# Patient Record
Sex: MALE | Race: WHITE | Employment: OTHER | URBAN - METROPOLITAN AREA
[De-identification: names, ages, dates, MRNs, and addresses within clinical notes are randomized per-mention and may not be internally consistent; named-entity substitution may affect disease eponyms.]

---

## 2017-01-16 ENCOUNTER — MYC MEDICAL ADVICE (OUTPATIENT)
Dept: FAMILY MEDICINE | Facility: CLINIC | Age: 52
End: 2017-01-16

## 2017-01-16 DIAGNOSIS — F34.1 DYSTHYMIA: ICD-10-CM

## 2017-01-16 DIAGNOSIS — F90.0 ADHD (ATTENTION DEFICIT HYPERACTIVITY DISORDER), INATTENTIVE TYPE: Primary | ICD-10-CM

## 2017-01-16 NOTE — TELEPHONE ENCOUNTER
Will route to provider to advise on Adderall Rx, and I see no plan for Bupropion in your last office visit with him.     Anna Marcum RN   January 16, 2017 4:49 PM  [facility] Triage   [phone]

## 2017-01-17 RX ORDER — DEXTROAMPHETAMINE SACCHARATE, AMPHETAMINE ASPARTATE MONOHYDRATE, DEXTROAMPHETAMINE SULFATE AND AMPHETAMINE SULFATE 5; 5; 5; 5 MG/1; MG/1; MG/1; MG/1
20 CAPSULE, EXTENDED RELEASE ORAL DAILY
Qty: 30 CAPSULE | Refills: 0 | Status: SHIPPED | OUTPATIENT
Start: 2017-01-17 | End: 2017-01-17

## 2017-01-17 RX ORDER — DEXTROAMPHETAMINE SACCHARATE, AMPHETAMINE ASPARTATE MONOHYDRATE, DEXTROAMPHETAMINE SULFATE AND AMPHETAMINE SULFATE 7.5; 7.5; 7.5; 7.5 MG/1; MG/1; MG/1; MG/1
30 CAPSULE, EXTENDED RELEASE ORAL DAILY
Qty: 30 CAPSULE | Refills: 0 | Status: SHIPPED | OUTPATIENT
Start: 2017-01-17 | End: 2017-03-28

## 2017-01-17 RX ORDER — DEXTROAMPHETAMINE SACCHARATE, AMPHETAMINE ASPARTATE MONOHYDRATE, DEXTROAMPHETAMINE SULFATE AND AMPHETAMINE SULFATE 7.5; 7.5; 7.5; 7.5 MG/1; MG/1; MG/1; MG/1
30 CAPSULE, EXTENDED RELEASE ORAL DAILY
Qty: 30 CAPSULE | Refills: 0 | Status: SHIPPED | OUTPATIENT
Start: 2017-01-17 | End: 2017-01-17

## 2017-01-17 RX ORDER — DEXTROAMPHETAMINE SACCHARATE, AMPHETAMINE ASPARTATE MONOHYDRATE, DEXTROAMPHETAMINE SULFATE AND AMPHETAMINE SULFATE 5; 5; 5; 5 MG/1; MG/1; MG/1; MG/1
20 CAPSULE, EXTENDED RELEASE ORAL DAILY
Qty: 30 CAPSULE | Refills: 0 | Status: SHIPPED | OUTPATIENT
Start: 2017-01-17 | End: 2017-03-28

## 2017-01-17 RX ORDER — BUPROPION HYDROCHLORIDE 300 MG/1
300 TABLET ORAL EVERY MORNING
Qty: 90 TABLET | Refills: 1 | Status: SHIPPED | OUTPATIENT
Start: 2017-01-17 | End: 2017-07-21

## 2017-01-31 ENCOUNTER — MYC MEDICAL ADVICE (OUTPATIENT)
Dept: FAMILY MEDICINE | Facility: CLINIC | Age: 52
End: 2017-01-31

## 2017-01-31 NOTE — TELEPHONE ENCOUNTER
PA was faxed to [facility] at the following fax number [phone]    Will postpone encounter for 3 days- awaiting on response.     Etta Sierra MA

## 2017-01-31 NOTE — TELEPHONE ENCOUNTER
PA needed for Adderall 20mg. Letter placed in Diomedes Morris's sign me folder.    Etta Sierra MA

## 2017-02-01 NOTE — TELEPHONE ENCOUNTER
Received PA Decision: HID# [id number]    Prior authorization of this medication for this recipient is not granted.  Authorization criteria are not met.    Placed letter in Diomedes Morris'  MA Folder

## 2017-02-03 NOTE — TELEPHONE ENCOUNTER
Called [facility] at [phone] to clarify that the brand name ADDERALL is approved with no PA needed.     Perla Royal CMA ([facility])

## 2017-02-03 NOTE — TELEPHONE ENCOUNTER
PA denied.  Reason given:      Patient does not meet PA criteria. **The brand Adderall XR is preferred over the generic and does not require a PA.**     Perla Royal CMA (AAMA)

## 2017-02-03 NOTE — TELEPHONE ENCOUNTER
Reviewed - so is the medication covered or not? We can do brand name if they prefer - patient should call insurance and clarify this.  Thanks.  Kian Morris, MPAS, PA-C

## 2017-02-03 NOTE — TELEPHONE ENCOUNTER
Spoke with patient and relayed Diomedes's message below.    Perla Royal CMA ([facility])

## 2017-03-20 ENCOUNTER — MYC MEDICAL ADVICE (OUTPATIENT)
Dept: FAMILY MEDICINE | Facility: CLINIC | Age: 52
End: 2017-03-20

## 2017-03-20 DIAGNOSIS — L29.9 ITCHY SCALP: ICD-10-CM

## 2017-03-20 NOTE — TELEPHONE ENCOUNTER
Will route to provider for refills. See MyChart. Wellbutrin is good for 3 more months. Fluocinonide not prescribed since 2015.     Juni Mays RN

## 2017-03-21 RX ORDER — FLUOCINONIDE TOPICAL SOLUTION USP, 0.05% 0.5 MG/ML
SOLUTION TOPICAL
Qty: 60 ML | Refills: 0 | Status: SHIPPED | OUTPATIENT
Start: 2017-03-21 | End: 2018-05-22

## 2017-03-28 ENCOUNTER — MYC MEDICAL ADVICE (OUTPATIENT)
Dept: FAMILY MEDICINE | Facility: CLINIC | Age: 52
End: 2017-03-28

## 2017-03-28 DIAGNOSIS — F90.0 ADHD (ATTENTION DEFICIT HYPERACTIVITY DISORDER), INATTENTIVE TYPE: ICD-10-CM

## 2017-03-28 RX ORDER — DEXTROAMPHETAMINE SACCHARATE, AMPHETAMINE ASPARTATE MONOHYDRATE, DEXTROAMPHETAMINE SULFATE AND AMPHETAMINE SULFATE 5; 5; 5; 5 MG/1; MG/1; MG/1; MG/1
20 CAPSULE, EXTENDED RELEASE ORAL DAILY
Qty: 30 CAPSULE | Refills: 0 | Status: SHIPPED | OUTPATIENT
Start: 2017-03-28 | End: 2017-03-28

## 2017-03-28 RX ORDER — DEXTROAMPHETAMINE SACCHARATE, AMPHETAMINE ASPARTATE MONOHYDRATE, DEXTROAMPHETAMINE SULFATE AND AMPHETAMINE SULFATE 5; 5; 5; 5 MG/1; MG/1; MG/1; MG/1
20 CAPSULE, EXTENDED RELEASE ORAL DAILY
Qty: 30 CAPSULE | Refills: 0 | Status: SHIPPED | OUTPATIENT
Start: 2017-03-28 | End: 2017-07-17

## 2017-03-28 RX ORDER — DEXTROAMPHETAMINE SACCHARATE, AMPHETAMINE ASPARTATE MONOHYDRATE, DEXTROAMPHETAMINE SULFATE AND AMPHETAMINE SULFATE 7.5; 7.5; 7.5; 7.5 MG/1; MG/1; MG/1; MG/1
30 CAPSULE, EXTENDED RELEASE ORAL DAILY
Qty: 30 CAPSULE | Refills: 0 | Status: SHIPPED | OUTPATIENT
Start: 2017-03-28 | End: 2017-03-28

## 2017-03-28 RX ORDER — DEXTROAMPHETAMINE SACCHARATE, AMPHETAMINE ASPARTATE MONOHYDRATE, DEXTROAMPHETAMINE SULFATE AND AMPHETAMINE SULFATE 7.5; 7.5; 7.5; 7.5 MG/1; MG/1; MG/1; MG/1
30 CAPSULE, EXTENDED RELEASE ORAL DAILY
Qty: 30 CAPSULE | Refills: 0 | Status: SHIPPED | OUTPATIENT
Start: 2017-03-28 | End: 2017-07-17

## 2017-03-28 NOTE — TELEPHONE ENCOUNTER
Please advise regarding refill of Adderall.  Patient has one more Wellbutrin refill on file for a 90 day supply.    Tory Juarez RN

## 2017-07-16 ENCOUNTER — E-VISIT (OUTPATIENT)
Dept: FAMILY MEDICINE | Facility: CLINIC | Age: 52
End: 2017-07-16
Payer: COMMERCIAL

## 2017-07-16 DIAGNOSIS — F90.0 ADHD (ATTENTION DEFICIT HYPERACTIVITY DISORDER), INATTENTIVE TYPE: ICD-10-CM

## 2017-07-16 PROCEDURE — 99207 ZZC NO BILLABLE SERVICE THIS VISIT: CPT | Performed by: PHYSICIAN ASSISTANT

## 2017-07-16 NOTE — MR AVS SNAPSHOT
After Visit Summary   7/16/2017    Carrillo Dunlap    MRN: [id number]           Patient Information     Date Of Birth          1965        Visit Information        Provider Department      7/16/2017 7:49 PM Kian Morris PA-C [facility]        Today's Diagnoses     ADHD (attention deficit hyperactivity disorder), inattentive type           Follow-ups after your visit        Who to contact     If you have questions or need follow up information about today's clinic visit or your schedule please contact [facility] directly at [phone].  Normal or non-critical lab and imaging results will be communicated to you by Happy [employer] party supplies & rentalshart, letter or phone within 4 business days after the clinic has received the results. If you do not hear from us within 7 days, please contact the clinic through AquaBlokt or phone. If you have a critical or abnormal lab result, we will notify you by phone as soon as possible.  Submit refill requests through Redington or call your pharmacy and they will forward the refill request to us. Please allow 3 business days for your refill to be completed.          Additional Information About Your Visit        MyChart Information     [facility] gives you secure access to your electronic health record. If you see a primary care provider, you can also send messages to your care team and make appointments. If you have questions, please call your primary care clinic.  If you do not have a primary care provider, please call [phone] and they will assist you.        Care EveryWhere ID     This is your Care EveryWhere ID. This could be used by other organizations to access your [facility] medical records  [id number]         Blood Pressure from Last 3 Encounters:   12/19/16 100/64   09/23/16 110/60   04/20/16 97/55    Weight from Last 3 Encounters:   12/19/16 170 lb (77.1 kg)   09/23/16 167 lb 4 oz (75.9 kg)   04/20/16 173 lb 3.2 oz (78.6 kg)               Today, you had the following     No orders found for display         Today's Medication Changes          These changes are accurate as of: 7/16/17 11:59 PM.  If you have any questions, ask your nurse or doctor.               These medicines have changed or have updated prescriptions.        Dose/Directions    * amphetamine-dextroamphetamine 30 MG per 24 hr capsule   Commonly known as:  ADDERALL XR   This may have changed:  additional instructions   Used for:  ADHD (attention deficit hyperactivity disorder), inattentive type        Dose:  30 mg   Take 1 capsule (30 mg) by mouth daily   Quantity:  30 capsule   Refills:  0       * amphetamine-dextroamphetamine 20 MG per 24 hr capsule   Commonly known as:  ADDERALL XR   This may have changed:  additional instructions   Used for:  ADHD (attention deficit hyperactivity disorder), inattentive type        Dose:  20 mg   Take 1 capsule (20 mg) by mouth daily   Quantity:  30 capsule   Refills:  0       * Notice:  This list has 2 medication(s) that are the same as other medications prescribed for you. Read the directions carefully, and ask your doctor or other care provider to review them with you.         Where to get your medicines      Some of these will need a paper prescription and others can be bought over the counter.  Ask your nurse if you have questions.     Bring a paper prescription for each of these medications     amphetamine-dextroamphetamine 20 MG per 24 hr capsule    amphetamine-dextroamphetamine 30 MG per 24 hr capsule                Primary Care Provider Office Phone # Fax #    Kian Morris PA-C [phone]       [address] [address]        Equal Access to Services     [facility] AH: Joselyn Moss, lisandro nagel, josesito arenas. So [facility] [phone].    ATENCIÓN: Si habla español, tiene a ch disposición  servicios gratuitos de asistencia lingüística. Gualberto machado [phone].    We comply with applicable federal civil rights laws and [place] laws. We do not discriminate on the basis of race, color, national origin, age, disability sex, sexual orientation or gender identity.            Thank you!     Thank you for choosing [facility]  for your care. Our goal is always to provide you with excellent care. Hearing back from our patients is one way we can continue to improve our services. Please take a few minutes to complete the written survey that you may receive in the mail after your visit with us. Thank you!             Your Updated Medication List - Protect others around you: Learn how to safely use, store and throw away your medicines at www.disposemymeds.org.          This list is accurate as of: 7/16/17 11:59 PM.  Always use your most recent med list.                   Brand Name Dispense Instructions for use Diagnosis    * amphetamine-dextroamphetamine 30 MG per 24 hr capsule    ADDERALL XR    30 capsule    Take 1 capsule (30 mg) by mouth daily    ADHD (attention deficit hyperactivity disorder), inattentive type       * amphetamine-dextroamphetamine 20 MG per 24 hr capsule    ADDERALL XR    30 capsule    Take 1 capsule (20 mg) by mouth daily    ADHD (attention deficit hyperactivity disorder), inattentive type       buPROPion 300 MG 24 hr tablet    WELLBUTRIN XL    90 tablet    Take 1 tablet (300 mg) by mouth every morning    Dysthymia       CALCIUM + D PO      2 tablet daily - 600mg total        cyanocobalamin 1000 MCG tablet    vitamin  B-12     Take 1,000 mcg by mouth daily.        fish oil-omega-3 fatty acids 1000 MG capsule      Take 2 g by mouth daily.        fluocinonide 0.05 % solution    LIDEX    60 mL    Apply sparingly to affected area twice daily as needed.  Do not apply to face. +++Related visit due+++    Itchy scalp       l-tyrosine 500 MG Tabs           Multi-vitamin Tabs tablet    Generic drug:  multivitamin, therapeutic with minerals      1 TABLET DAILY        PROBIOTIC DAILY PO           Selenium 200 MCG Caps           zinc 50 MG Tabs           * Notice:  This list has 2 medication(s) that are the same as other medications prescribed for you. Read the directions carefully, and ask your doctor or other care provider to review them with you.

## 2017-07-17 RX ORDER — DEXTROAMPHETAMINE SACCHARATE, AMPHETAMINE ASPARTATE MONOHYDRATE, DEXTROAMPHETAMINE SULFATE AND AMPHETAMINE SULFATE 7.5; 7.5; 7.5; 7.5 MG/1; MG/1; MG/1; MG/1
30 CAPSULE, EXTENDED RELEASE ORAL DAILY
Qty: 30 CAPSULE | Refills: 0 | Status: SHIPPED | OUTPATIENT
Start: 2017-07-17 | End: 2017-07-21

## 2017-07-17 RX ORDER — DEXTROAMPHETAMINE SACCHARATE, AMPHETAMINE ASPARTATE MONOHYDRATE, DEXTROAMPHETAMINE SULFATE AND AMPHETAMINE SULFATE 5; 5; 5; 5 MG/1; MG/1; MG/1; MG/1
20 CAPSULE, EXTENDED RELEASE ORAL DAILY
Qty: 30 CAPSULE | Refills: 0 | Status: SHIPPED | OUTPATIENT
Start: 2017-07-17 | End: 2017-07-21

## 2017-07-21 ENCOUNTER — OFFICE VISIT (OUTPATIENT)
Dept: FAMILY MEDICINE | Facility: CLINIC | Age: 52
End: 2017-07-21
Payer: COMMERCIAL

## 2017-07-21 VITALS
BODY MASS INDEX: 25.03 KG/M2 | HEART RATE: 72 BPM | SYSTOLIC BLOOD PRESSURE: 98 MMHG | HEIGHT: 69 IN | TEMPERATURE: 98.7 F | WEIGHT: 169 LBS | DIASTOLIC BLOOD PRESSURE: 58 MMHG

## 2017-07-21 DIAGNOSIS — F41.1 GENERALIZED ANXIETY DISORDER: Primary | ICD-10-CM

## 2017-07-21 DIAGNOSIS — F34.1 DYSTHYMIA: ICD-10-CM

## 2017-07-21 DIAGNOSIS — F90.0 ADHD (ATTENTION DEFICIT HYPERACTIVITY DISORDER), INATTENTIVE TYPE: ICD-10-CM

## 2017-07-21 DIAGNOSIS — Z13.220 SCREENING CHOLESTEROL LEVEL: ICD-10-CM

## 2017-07-21 PROCEDURE — 80061 LIPID PANEL: CPT | Performed by: PHYSICIAN ASSISTANT

## 2017-07-21 PROCEDURE — 36415 COLL VENOUS BLD VENIPUNCTURE: CPT | Performed by: PHYSICIAN ASSISTANT

## 2017-07-21 PROCEDURE — 99214 OFFICE O/P EST MOD 30 MIN: CPT | Performed by: PHYSICIAN ASSISTANT

## 2017-07-21 RX ORDER — DEXTROAMPHETAMINE SACCHARATE, AMPHETAMINE ASPARTATE MONOHYDRATE, DEXTROAMPHETAMINE SULFATE AND AMPHETAMINE SULFATE 5; 5; 5; 5 MG/1; MG/1; MG/1; MG/1
20 CAPSULE, EXTENDED RELEASE ORAL DAILY
Qty: 30 CAPSULE | Refills: 0 | Status: SHIPPED | OUTPATIENT
Start: 2017-07-21 | End: 2017-07-21

## 2017-07-21 RX ORDER — DEXTROAMPHETAMINE SACCHARATE, AMPHETAMINE ASPARTATE MONOHYDRATE, DEXTROAMPHETAMINE SULFATE AND AMPHETAMINE SULFATE 7.5; 7.5; 7.5; 7.5 MG/1; MG/1; MG/1; MG/1
30 CAPSULE, EXTENDED RELEASE ORAL DAILY
Qty: 30 CAPSULE | Refills: 0 | Status: SHIPPED | OUTPATIENT
Start: 2017-07-21 | End: 2017-10-30

## 2017-07-21 RX ORDER — BUPROPION HYDROCHLORIDE 300 MG/1
300 TABLET ORAL EVERY MORNING
Qty: 90 TABLET | Refills: 1 | Status: SHIPPED | OUTPATIENT
Start: 2017-07-21 | End: 2018-02-06

## 2017-07-21 RX ORDER — DEXTROAMPHETAMINE SACCHARATE, AMPHETAMINE ASPARTATE MONOHYDRATE, DEXTROAMPHETAMINE SULFATE AND AMPHETAMINE SULFATE 7.5; 7.5; 7.5; 7.5 MG/1; MG/1; MG/1; MG/1
30 CAPSULE, EXTENDED RELEASE ORAL DAILY
Qty: 30 CAPSULE | Refills: 0 | Status: SHIPPED | OUTPATIENT
Start: 2017-07-21 | End: 2017-07-21

## 2017-07-21 RX ORDER — DEXTROAMPHETAMINE SACCHARATE, AMPHETAMINE ASPARTATE MONOHYDRATE, DEXTROAMPHETAMINE SULFATE AND AMPHETAMINE SULFATE 5; 5; 5; 5 MG/1; MG/1; MG/1; MG/1
20 CAPSULE, EXTENDED RELEASE ORAL DAILY
Qty: 30 CAPSULE | Refills: 0 | Status: SHIPPED | OUTPATIENT
Start: 2017-07-21 | End: 2017-10-30

## 2017-07-21 RX ORDER — ASCORBIC ACID 500 MG
500 TABLET ORAL DAILY
COMMUNITY
End: 2021-08-03

## 2017-07-21 ASSESSMENT — ANXIETY QUESTIONNAIRES
3. WORRYING TOO MUCH ABOUT DIFFERENT THINGS: MORE THAN HALF THE DAYS
6. BECOMING EASILY ANNOYED OR IRRITABLE: SEVERAL DAYS
7. FEELING AFRAID AS IF SOMETHING AWFUL MIGHT HAPPEN: NOT AT ALL
1. FEELING NERVOUS, ANXIOUS, OR ON EDGE: SEVERAL DAYS
5. BEING SO RESTLESS THAT IT IS HARD TO SIT STILL: SEVERAL DAYS
GAD7 TOTAL SCORE: 8
2. NOT BEING ABLE TO STOP OR CONTROL WORRYING: SEVERAL DAYS

## 2017-07-21 ASSESSMENT — PATIENT HEALTH QUESTIONNAIRE - PHQ9: 5. POOR APPETITE OR OVEREATING: MORE THAN HALF THE DAYS

## 2017-07-21 NOTE — PROGRESS NOTES
"  SUBJECTIVE:                                                    Carrillo Dunlap is a 52 year old male who presents to clinic today for the following health issues:    Patient would also like to have his cholesterol checked today.    Depression Followup    Status since last visit: Stable, about the same    See PHQ-9 for current symptoms.  Other associated symptoms: None    Complicating factors:   Significant life event:  Yes-  Moving mom into a home   Current substance abuse:  None  Anxiety or Panic symptoms:  Yes-  A little bit    PHQ-9  English  PHQ-9   Any Language      Amount of exercise or physical activity:    Problems taking medications regularly: No    Medication side effects: none  Diet: regular (no restrictions)      Medication Followup of ADDERALL    Taking Medication as prescribed: yes    Side Effects:  Sometimes stays up too late.  Might be because of needing to cut down on coffee.    Medication Helping Symptoms:  yes     Mood - still challenged with self doubt, low mood. Feels down, hard on himself. Is a perfectionist.     Problem list and histories reviewed & adjusted, as indicated.  Additional history: as documented    Labs reviewed in EPIC    Reviewed and updated as needed this visit by clinical staffAllergies       Reviewed and updated as needed this visit by Provider         ROS:  Constitutional, HEENT, cardiovascular, pulmonary, gi and gu systems are negative, except as otherwise noted.      OBJECTIVE:   BP 98/58 (BP Location: Right arm, Cuff Size: Adult Regular)  Pulse 72  Temp 98.7  F (37.1  C) (Oral)  Ht 5' 9\" (1.753 m)  Wt 169 lb (76.7 kg)  BMI 24.96 kg/m2  Body mass index is 24.96 kg/(m^2).  GENERAL: healthy, alert and no distress  Psych: Appropriate appearance.  Alert and oriented times 3; coherent speech, normal   rate and volume, able to articulate logical thoughts, able   to abstract reason, no tangential thoughts, no hallucinations   or delusions.  Normal behavior.  His " affect is bright.      ASSESSMENT/PLAN:     (F41.1) Generalized anxiety disorder  (primary encounter diagnosis)  Comment:   Plan: Ongoing therapy visits. Self cares, exercise, meditation, etc    (F90.0) ADHD (attention deficit hyperactivity disorder), inattentive type  Comment:   Plan: amphetamine-dextroamphetamine (ADDERALL XR) 20         MG per 24 hr capsule,         amphetamine-dextroamphetamine (ADDERALL XR) 30         MG per 24 hr capsule, DISCONTINUED:         amphetamine-dextroamphetamine (ADDERALL XR) 20         MG per 24 hr capsule, DISCONTINUED:         amphetamine-dextroamphetamine (ADDERALL XR) 30         MG per 24 hr capsule, DISCONTINUED:         amphetamine-dextroamphetamine (ADDERALL XR) 20         MG per 24 hr capsule, DISCONTINUED:         amphetamine-dextroamphetamine (ADDERALL XR) 30         MG per 24 hr capsule        Stable, doing okay    (F34.1) Dysthymia  Comment:   Plan: buPROPion (WELLBUTRIN XL) 300 MG 24 hr tablet        Ongoing self doubts and challenges related to that. Continue therapy. Positive self talk advised    (Z03.486) Screening cholesterol level  Comment:   Plan: Lipid panel reflex to direct LDL        Checking lab per request     ZORAIDA DOHERTY PA-C  [facility]

## 2017-07-21 NOTE — MR AVS SNAPSHOT
After Visit Summary   7/21/2017    Carrillo Dunlap    MRN: [id number]           Patient Information     Date Of Birth          1965        Visit Information        Provider Department      7/21/2017 12:20 PM Kian Morris PA-C [facility]        Today's Diagnoses     Generalized anxiety disorder    -  1    ADHD (attention deficit hyperactivity disorder), inattentive type        Dysthymia        Screening cholesterol level          Care Instructions    [facility]   Discharged by : Rena CATHERINE Certified Medical Assistant (AAMA)   Paper scripts provided to patient : 6   If you have any questions regarding to your visit please contact your care team:   Team [facility] Hours Telephone Number   KRISTI Genao Dr., PA-C    7am-7pm Monday - Thursday   7am-5pm Fridays  [phone]   (Appointment scheduling available 24/7)   RN Line   [phone] option 2       What options do I have for visits at the clinic other than the traditional office visit?   To expand how we care for you, many of our providers are utilizing electronic visits (e-visits) and telephone visits, when medically appropriate, for interactions with their patients rather than a visit in the clinic. We also offer nurse visits for many medical concerns. Just like any other service, we will bill your insurance company for this type of visit based on time spent on the phone with your provider. Not all insurance companies cover these visits. Please check with your medical insurance if this type of visit is covered. You will be responsible for any charges that are not paid by your insurance.     E-visits via [place]: generally incur a $35.00 fee.     Telephone visits:   Time spent on the phone: *charged based on time that is spent on the phone in increments of 10 minutes. Estimated cost:   5-10 mins $30.00   11-20 mins. $59.00   21-30 mins.  $85.00   Use AMGast (secure email communication and access to your chart) to send your primary care provider a message or make an appointment. Ask someone on your Team how to sign up for Cramster.   For a Price Quote for your services, please call our Consumer Price Line at [phone].   As always, Thank you for trusting us with your health care needs!                [facility]:    Scheduling Appointments  [place] [place]  Call: [phone]    [place]  Call: [phone]    [facility]  Call: [phone]                    Follow-ups after your visit        Who to contact     If you have questions or need follow up information about today's clinic visit or your schedule please contact [facility] directly at [phone].  Normal or non-critical lab and imaging results will be communicated to you by MyChart, letter or phone within 4 business days after the clinic has received the results. If you do not hear from us within 7 days, please contact the clinic through Kanduhart or phone. If you have a critical or abnormal lab result, we will notify you by phone as soon as possible.  Submit refill requests through Cramster or call your pharmacy and they will forward the refill request to us. Please allow 3 business days for your refill to be completed.          Additional Information About Your Visit        MyChart Information     [employer] gives you secure access to your electronic health record. If you see a primary care provider, you can also send messages to your care team and make appointments. If you have questions, please call your primary care clinic.  If you do not have a primary care provider, please call [phone] and they will assist you.        Care EveryWhere ID     This is your Care EveryWhere ID. This could be used by other organizations to access your [facility] medical records  [id number]       "  Your Vitals Were     Pulse Temperature Height BMI (Body Mass Index)          72 98.7  F (37.1  C) (Oral) 5' 9\" (1.753 m) 24.96 kg/m2         Blood Pressure from Last 3 Encounters:   07/21/17 98/58   12/19/16 100/64   09/23/16 110/60    Weight from Last 3 Encounters:   07/21/17 169 lb (76.7 kg)   12/19/16 170 lb (77.1 kg)   09/23/16 167 lb 4 oz (75.9 kg)              We Performed the Following     Lipid panel reflex to direct LDL          Today's Medication Changes          These changes are accurate as of: 7/21/17  1:13 PM.  If you have any questions, ask your nurse or doctor.               Start taking these medicines.        Dose/Directions    * amphetamine-dextroamphetamine 20 MG per 24 hr capsule   Commonly known as:  ADDERALL XR   Used for:  ADHD (attention deficit hyperactivity disorder), inattentive type   Started by:  Kian Morris PA-C        Dose:  20 mg   Take 1 capsule (20 mg) by mouth daily (Fill on or after 9/19/2017)   Quantity:  30 capsule   Refills:  0       * amphetamine-dextroamphetamine 30 MG per 24 hr capsule   Commonly known as:  ADDERALL XR   Used for:  ADHD (attention deficit hyperactivity disorder), inattentive type   Started by:  Kian Morris PA-C        Dose:  30 mg   Take 1 capsule (30 mg) by mouth daily (Fill on or after 9/19/2017)   Quantity:  30 capsule   Refills:  0       * Notice:  This list has 2 medication(s) that are the same as other medications prescribed for you. Read the directions carefully, and ask your doctor or other care provider to review them with you.         Where to get your medicines      These medications were sent to [id number] - [place], [place] - [address]  [address], [place] [address]     Phone:  [phone]     buPROPion 300 MG 24 hr tablet         Some of these will need a paper prescription and others can be bought over the counter.  Ask your nurse if you have questions.     Bring a paper prescription for " each of these medications     amphetamine-dextroamphetamine 20 MG per 24 hr capsule    amphetamine-dextroamphetamine 30 MG per 24 hr capsule                Primary Care Provider Office Phone # Fax #    Kian Morris PA-C [phone]       [facility] [address] [facility] [address]        Equal Access to Services     SHOAIB MACIAS : Hadii aad ku hadasho Soomaali, waaxda luqadaha, qaybta kaalmada adeegyada, waxay idiin hayaan adeeg kharash la'anthonyn . So [facility] [phone].    ATENCIÓN: Si habla español, tiene a ch disposición servicios gratuitos de asistencia lingüística. Donnaame al [phone].    We comply with applicable federal civil rights laws and [place] laws. We do not discriminate on the basis of race, color, national origin, age, disability sex, sexual orientation or gender identity.            Thank you!     Thank you for choosing [facility]  for your care. Our goal is always to provide you with excellent care. Hearing back from our patients is one way we can continue to improve our services. Please take a few minutes to complete the written survey that you may receive in the mail after your visit with us. Thank you!             Your Updated Medication List - Protect others around you: Learn how to safely use, store and throw away your medicines at www.disposemymeds.org.          This list is accurate as of: 7/21/17  1:13 PM.  Always use your most recent med list.                   Brand Name Dispense Instructions for use Diagnosis    * amphetamine-dextroamphetamine 20 MG per 24 hr capsule    ADDERALL XR    30 capsule    Take 1 capsule (20 mg) by mouth daily (Fill on or after 9/19/2017)    ADHD (attention deficit hyperactivity disorder), inattentive type       * amphetamine-dextroamphetamine 30 MG per 24 hr capsule    ADDERALL XR    30 capsule    Take 1 capsule (30 mg) by mouth daily (Fill on or after 9/19/2017)    ADHD (attention deficit  hyperactivity disorder), inattentive type       ascorbic acid 500 MG tablet    VITAMIN C     Take by mouth daily        buPROPion 300 MG 24 hr tablet    WELLBUTRIN XL    90 tablet    Take 1 tablet (300 mg) by mouth every morning    Dysthymia       CALCIUM + D PO      2 tablet daily - 600mg total        cyanocobalamin 1000 MCG tablet    vitamin  B-12     Take 1,000 mcg by mouth daily.        fish oil-omega-3 fatty acids 1000 MG capsule      Take 2 g by mouth daily.        fluocinonide 0.05 % solution    LIDEX    60 mL    Apply sparingly to affected area twice daily as needed.  Do not apply to face. +++Related visit due+++    Itchy scalp       l-tyrosine 500 MG Tabs           Multi-vitamin Tabs tablet   Generic drug:  multivitamin, therapeutic with minerals      1 TABLET DAILY        THEANINE PO      Take 200 mg by mouth daily        UNABLE TO FIND      MEDICATION NAME: Lynn Lemon Balm 500mg capsule        zinc 50 MG Tabs           * Notice:  This list has 2 medication(s) that are the same as other medications prescribed for you. Read the directions carefully, and ask your doctor or other care provider to review them with you.

## 2017-07-21 NOTE — PATIENT INSTRUCTIONS
[facility]   Discharged by : Rena CATHERINE Certified Medical Assistant (AAMA)   Paper scripts provided to patient : 6   If you have any questions regarding to your visit please contact your care team:   [facility] Hours Telephone Number   KRISTI Genao Dr., PA-C    7am-7pm Monday - Thursday   7am-5pm Fridays  [phone]   (Appointment scheduling available 24/7)   RN Line   [phone] option 2       What options do I have for visits at the clinic other than the traditional office visit?   To expand how we care for you, many of our providers are utilizing electronic visits (e-visits) and telephone visits, when medically appropriate, for interactions with their patients rather than a visit in the clinic. We also offer nurse visits for many medical concerns. Just like any other service, we will bill your insurance company for this type of visit based on time spent on the phone with your provider. Not all insurance companies cover these visits. Please check with your medical insurance if this type of visit is covered. You will be responsible for any charges that are not paid by your insurance.     E-visits via Checkrhart: generally incur a $35.00 fee.     Telephone visits:   Time spent on the phone: *charged based on time that is spent on the phone in increments of 10 minutes. Estimated cost:   5-10 mins $30.00   11-20 mins. $59.00   21-30 mins. $85.00   Use Checkrhart (secure email communication and access to your chart) to send your primary care provider a message or make an appointment. Ask someone on your Team how to sign up for "[employer]   For a Price Quote for your services, please call our Consumer Price Line at [phone].   As always, Thank you for trusting us with your health care needs!                [facility]:    Scheduling Appointments  [facility] [place]  Call: [phone]    [place]  [facility]  Call: [phone]    [facility]  Call: [phone]

## 2017-07-21 NOTE — NURSING NOTE
"Chief Complaint   Patient presents with     Depression     Recheck Medication     Adderall     LAB REQUEST     lipids       Initial There were no vitals taken for this visit. Estimated body mass index is 25.1 kg/(m^2) as calculated from the following:    Height as of 12/19/16: 5' 9\" (1.753 m).    Weight as of 12/19/16: 170 lb (77.1 kg).  Medication Reconciliation: complete   Jennifer Youssef, KATELYNN      "

## 2017-07-22 LAB
CHOLEST SERPL-MCNC: 207 MG/DL
HDLC SERPL-MCNC: 49 MG/DL
LDLC SERPL CALC-MCNC: 146 MG/DL
NONHDLC SERPL-MCNC: 158 MG/DL
TRIGL SERPL-MCNC: 62 MG/DL

## 2017-07-22 ASSESSMENT — ANXIETY QUESTIONNAIRES: GAD7 TOTAL SCORE: 8

## 2017-07-22 ASSESSMENT — PATIENT HEALTH QUESTIONNAIRE - PHQ9: SUM OF ALL RESPONSES TO PHQ QUESTIONS 1-9: 8

## 2017-09-22 ENCOUNTER — MYC MEDICAL ADVICE (OUTPATIENT)
Dept: FAMILY MEDICINE | Facility: CLINIC | Age: 52
End: 2017-09-22

## 2017-09-25 ENCOUNTER — MYC MEDICAL ADVICE (OUTPATIENT)
Dept: FAMILY MEDICINE | Facility: CLINIC | Age: 52
End: 2017-09-25

## 2017-10-30 ENCOUNTER — OFFICE VISIT (OUTPATIENT)
Dept: FAMILY MEDICINE | Facility: CLINIC | Age: 52
End: 2017-10-30
Payer: COMMERCIAL

## 2017-10-30 VITALS
DIASTOLIC BLOOD PRESSURE: 64 MMHG | HEIGHT: 69 IN | TEMPERATURE: 99.2 F | HEART RATE: 76 BPM | WEIGHT: 166 LBS | SYSTOLIC BLOOD PRESSURE: 100 MMHG | BODY MASS INDEX: 24.59 KG/M2

## 2017-10-30 DIAGNOSIS — Z23 NEED FOR PROPHYLACTIC VACCINATION AND INOCULATION AGAINST INFLUENZA: ICD-10-CM

## 2017-10-30 DIAGNOSIS — F90.0 ADHD (ATTENTION DEFICIT HYPERACTIVITY DISORDER), INATTENTIVE TYPE: Primary | ICD-10-CM

## 2017-10-30 DIAGNOSIS — F41.1 GENERALIZED ANXIETY DISORDER: ICD-10-CM

## 2017-10-30 PROCEDURE — 99214 OFFICE O/P EST MOD 30 MIN: CPT | Mod: 25 | Performed by: PHYSICIAN ASSISTANT

## 2017-10-30 PROCEDURE — 90471 IMMUNIZATION ADMIN: CPT | Performed by: PHYSICIAN ASSISTANT

## 2017-10-30 PROCEDURE — 90686 IIV4 VACC NO PRSV 0.5 ML IM: CPT | Performed by: PHYSICIAN ASSISTANT

## 2017-10-30 RX ORDER — DEXTROAMPHETAMINE SACCHARATE, AMPHETAMINE ASPARTATE MONOHYDRATE, DEXTROAMPHETAMINE SULFATE AND AMPHETAMINE SULFATE 7.5; 7.5; 7.5; 7.5 MG/1; MG/1; MG/1; MG/1
30 CAPSULE, EXTENDED RELEASE ORAL DAILY
Qty: 30 CAPSULE | Refills: 0 | Status: SHIPPED | OUTPATIENT
Start: 2017-10-30 | End: 2017-10-30

## 2017-10-30 RX ORDER — DEXTROAMPHETAMINE SACCHARATE, AMPHETAMINE ASPARTATE MONOHYDRATE, DEXTROAMPHETAMINE SULFATE AND AMPHETAMINE SULFATE 5; 5; 5; 5 MG/1; MG/1; MG/1; MG/1
20 CAPSULE, EXTENDED RELEASE ORAL DAILY
Qty: 30 CAPSULE | Refills: 0 | Status: SHIPPED | OUTPATIENT
Start: 2017-10-30 | End: 2017-10-30

## 2017-10-30 RX ORDER — DEXTROAMPHETAMINE SACCHARATE, AMPHETAMINE ASPARTATE MONOHYDRATE, DEXTROAMPHETAMINE SULFATE AND AMPHETAMINE SULFATE 7.5; 7.5; 7.5; 7.5 MG/1; MG/1; MG/1; MG/1
30 CAPSULE, EXTENDED RELEASE ORAL DAILY
Qty: 30 CAPSULE | Refills: 0 | Status: SHIPPED | OUTPATIENT
Start: 2017-10-30 | End: 2018-02-06

## 2017-10-30 RX ORDER — DEXTROAMPHETAMINE SACCHARATE, AMPHETAMINE ASPARTATE MONOHYDRATE, DEXTROAMPHETAMINE SULFATE AND AMPHETAMINE SULFATE 5; 5; 5; 5 MG/1; MG/1; MG/1; MG/1
20 CAPSULE, EXTENDED RELEASE ORAL DAILY
Qty: 30 CAPSULE | Refills: 0 | Status: SHIPPED | OUTPATIENT
Start: 2017-10-30 | End: 2018-02-06

## 2017-10-30 NOTE — MR AVS SNAPSHOT
"              After Visit Summary   10/30/2017    Carrillo Dunlap    MRN: [id number]           Patient Information     Date Of Birth          1965        Visit Information        Provider Department      10/30/2017 12:20 PM Kian Morris PA-C [facility]        Today's Diagnoses     ADHD (attention deficit hyperactivity disorder), inattentive type    -  1    Need for prophylactic vaccination and inoculation against influenza        Generalized anxiety disorder           Follow-ups after your visit        Who to contact     If you have questions or need follow up information about today's clinic visit or your schedule please contact [facility] directly at [phone].  Normal or non-critical lab and imaging results will be communicated to you by MyChart, letter or phone within 4 business days after the clinic has received the results. If you do not hear from us within 7 days, please contact the clinic through [employer] or phone. If you have a critical or abnormal lab result, we will notify you by phone as soon as possible.  Submit refill requests through [employer] [facility] [employer] or call your pharmacy and they will forward the refill request to us. Please allow 3 business days for your refill to be completed.          Additional Information About Your Visit        MyChart Information     [facility] [employer] [facility] gives you secure access to your electronic health record. If you see a primary care provider, you can also send messages to your care team and make appointments. If you have questions, please call your primary care clinic.  If you do not have a primary care provider, please call [phone] and they will assist you.        Care EveryWhere ID     This is your Care EveryWhere ID. This could be used by other organizations to access your [place] medical records  [id number]        Your Vitals Were     Pulse Temperature Height BMI (Body Mass Index)          76 99.2  F (37.3  C) (Oral) 5' 9\" " (1.753 m) 24.51 kg/m2         Blood Pressure from Last 3 Encounters:   10/30/17 100/64   07/21/17 98/58   12/19/16 100/64    Weight from Last 3 Encounters:   10/30/17 166 lb (75.3 kg)   07/21/17 169 lb (76.7 kg)   12/19/16 170 lb (77.1 kg)              We Performed the Following     FLU VAC, SPLIT VIRUS IM > 3 YO (QUADRIVALENT) [34237]     Vaccine Administration, Initial [95491]          Today's Medication Changes          These changes are accurate as of: 10/30/17  2:40 PM.  If you have any questions, ask your nurse or doctor.               Start taking these medicines.        Dose/Directions    * amphetamine-dextroamphetamine 20 MG per 24 hr capsule   Commonly known as:  ADDERALL XR   Used for:  ADHD (attention deficit hyperactivity disorder), inattentive type   Started by:  Kian Morris PA-C        Dose:  20 mg   Take 1 capsule (20 mg) by mouth daily (Fill on or after 12/28/2017)   Quantity:  30 capsule   Refills:  0       * amphetamine-dextroamphetamine 30 MG per 24 hr capsule   Commonly known as:  ADDERALL XR   Used for:  ADHD (attention deficit hyperactivity disorder), inattentive type   Started by:  Kian Morris PA-C        Dose:  30 mg   Take 1 capsule (30 mg) by mouth daily (Fill on or after 12/28/2017)   Quantity:  30 capsule   Refills:  0       * Notice:  This list has 2 medication(s) that are the same as other medications prescribed for you. Read the directions carefully, and ask your doctor or other care provider to review them with you.         Where to get your medicines      Some of these will need a paper prescription and others can be bought over the counter.  Ask your nurse if you have questions.     Bring a paper prescription for each of these medications     amphetamine-dextroamphetamine 20 MG per 24 hr capsule    amphetamine-dextroamphetamine 30 MG per 24 hr capsule                Primary Care Provider Office Phone # Fax #    Kian Morris PA-C [phone]        [address] [facility] [address]        Equal Access to Services     SHOAIB MACIAS : Hadii aad ku hadsonyajean Moss, waleandro nagel, qaleo clarkemagill sprague, josesito wilks. So [facility] [phone].    ATENCIÓN: Si habla español, tiene a ch disposición servicios gratuitos de asistencia lingüística. Llame al [phone].    We comply with applicable federal civil rights laws and [place] laws. We do not discriminate on the basis of race, color, national origin, age, disability, sex, sexual orientation, or gender identity.            Thank you!     Thank you for choosing [facility]  for your care. Our goal is always to provide you with excellent care. Hearing back from our patients is one way we can continue to improve our services. Please take a few minutes to complete the written survey that you may receive in the mail after your visit with us. Thank you!             Your Updated Medication List - Protect others around you: Learn how to safely use, store and throw away your medicines at www.disposemymeds.org.          This list is accurate as of: 10/30/17  2:40 PM.  Always use your most recent med list.                   Brand Name Dispense Instructions for use Diagnosis    * amphetamine-dextroamphetamine 20 MG per 24 hr capsule    ADDERALL XR    30 capsule    Take 1 capsule (20 mg) by mouth daily (Fill on or after 12/28/2017)    ADHD (attention deficit hyperactivity disorder), inattentive type       * amphetamine-dextroamphetamine 30 MG per 24 hr capsule    ADDERALL XR    30 capsule    Take 1 capsule (30 mg) by mouth daily (Fill on or after 12/28/2017)    ADHD (attention deficit hyperactivity disorder), inattentive type       ascorbic acid 500 MG tablet    VITAMIN C     Take by mouth daily        buPROPion 300 MG 24 hr tablet    WELLBUTRIN XL    90 tablet    Take 1 tablet (300 mg) by mouth every morning    Dysthymia       CALCIUM + D PO      2 tablet daily  - 600mg total        cyanocobalamin 1000 MCG tablet    vitamin  B-12     Take 1,000 mcg by mouth daily.        fish oil-omega-3 fatty acids 1000 MG capsule      Take 2 g by mouth daily.        fluocinonide 0.05 % solution    LIDEX    60 mL    Apply sparingly to affected area twice daily as needed.  Do not apply to face. +++Related visit due+++    Itchy scalp       l-tyrosine 500 MG Tabs           Multi-vitamin Tabs tablet   Generic drug:  multivitamin, therapeutic with minerals      1 TABLET DAILY        THEANINE PO      Take 200 mg by mouth daily        UNABLE TO FIND      MEDICATION NAME: Lynn Krause Balm 500mg capsule        zinc 50 MG Tabs           * Notice:  This list has 2 medication(s) that are the same as other medications prescribed for you. Read the directions carefully, and ask your doctor or other care provider to review them with you.

## 2017-10-30 NOTE — NURSING NOTE
"Chief Complaint   Patient presents with     A.D.H.D     Flu Shot     done       Initial /64 (Cuff Size: Adult Regular)  Pulse 76  Temp 99.2  F (37.3  C) (Oral)  Ht 5' 9\" (1.753 m)  Wt 166 lb (75.3 kg)  BMI 24.51 kg/m2 Estimated body mass index is 24.51 kg/(m^2) as calculated from the following:    Height as of this encounter: 5' 9\" (1.753 m).    Weight as of this encounter: 166 lb (75.3 kg).  Medication Reconciliation: complete   Rena Eubanks, Certified Medical Assistant (AAMA)     "

## 2017-10-30 NOTE — PROGRESS NOTES
"  SUBJECTIVE:   Carrillo Dunlap is a 52 year old male who presents to clinic today for the following health issues:    Medication Followup of Adderall    Taking Medication as prescribed: yes    Side Effects:  None    Medication Helping Symptoms:  yes     Doing well on current dose. 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     Denies any side effects - no weight loss, no decreased appetite.   States he gets hungry at night.  Problem list and histories reviewed & adjusted, as indicated.  Additional history: as documented    Anxiety a challenge, tends to be a perfectionist. He doesn't complete tasks. Is reading books about this. Stopped seeing his therapist.       BP Readings from Last 3 Encounters:   10/30/17 100/64   07/21/17 98/58   12/19/16 100/64    Wt Readings from Last 3 Encounters:   10/30/17 166 lb (75.3 kg)   07/21/17 169 lb (76.7 kg)   12/19/16 170 lb (77.1 kg)          Reviewed and updated as needed this visit by clinical staffTobacco  Allergies  Med Hx  Surg Hx  Fam Hx  Soc Hx      Reviewed and updated as needed this visit by Provider         ROS:  Constitutional, HEENT, cardiovascular, pulmonary, gi and gu systems are negative, except as otherwise noted.      OBJECTIVE:   /64 (Cuff Size: Adult Regular)  Pulse 76  Temp 99.2  F (37.3  C) (Oral)  Ht 5' 9\" (1.753 m)  Wt 166 lb (75.3 kg)  BMI 24.51 kg/m2  Body mass index is 24.51 kg/(m^2).  GENERAL: healthy, alert and no distress  Psych: Appropriate appearance.  Alert and oriented times 3; coherent speech, normal   rate and volume, able to articulate logical thoughts, able   to abstract reason, no tangential thoughts, no hallucinations   or delusions.  Normal behavior.  His affect is bright, he is agitated at times.        ASSESSMENT/PLAN:     (F90.0) ADHD (attention deficit hyperactivity disorder), inattentive type  (primary encounter diagnosis)  Comment:   Plan: amphetamine-dextroamphetamine (ADDERALL XR) 20 " "        MG per 24 hr capsule,         amphetamine-dextroamphetamine (ADDERALL XR) 30         MG per 24 hr capsule, DISCONTINUED:         amphetamine-dextroamphetamine (ADDERALL XR) 20         MG per 24 hr capsule, DISCONTINUED:         amphetamine-dextroamphetamine (ADDERALL XR) 30         MG per 24 hr capsule, DISCONTINUED:         amphetamine-dextroamphetamine (ADDERALL XR) 20         MG per 24 hr capsule, DISCONTINUED:         amphetamine-dextroamphetamine (ADDERALL XR) 30         MG per 24 hr capsule        This is mostly stable. I think his baseline anxiety and worry are flaring things, see below    (Z23) Need for prophylactic vaccination and inoculation against influenza  Comment:   Plan: FLU VAC, SPLIT VIRUS IM > 3 YO (QUADRIVALENT)         [07879], Vaccine Administration, Initial         [72212]        Given    (F41.1) Generalized anxiety disorder  Comment:   Plan: Recommend self cares and focusing on breaking down tasks. He's hard on himself, hopefully he can see some improvement through the books he is reading. I want him to do the 20 minute task list and work on the \"gotta and should\" statements he makes on himself.     25 min visit over 50% education and counseling   He can revisit me in 6 months, earlier if issues arise with his current anxiety - I think he's coming a long way.     ZORAIDA DOHERTY PA-C  [facility]  Injectable Influenza Immunization Documentation    1.  Is the person to be vaccinated sick today?   No    2. Does the person to be vaccinated have an allergy to a component   of the vaccine?   No  Egg Allergy Algorithm Link    3. Has the person to be vaccinated ever had a serious reaction   to influenza vaccine in the past?   No    4. Has the person to be vaccinated ever had Guillain-Barré syndrome?   No    Form completed by Rena Eubanks, Certified Medical Assistant (AAMA)          "

## 2017-12-27 ENCOUNTER — OFFICE VISIT (OUTPATIENT)
Dept: URGENT CARE | Facility: URGENT CARE | Age: 52
End: 2017-12-27
Payer: COMMERCIAL

## 2017-12-27 VITALS
OXYGEN SATURATION: 99 % | SYSTOLIC BLOOD PRESSURE: 127 MMHG | BODY MASS INDEX: 25.46 KG/M2 | WEIGHT: 168 LBS | TEMPERATURE: 98.5 F | DIASTOLIC BLOOD PRESSURE: 77 MMHG | HEIGHT: 68 IN | HEART RATE: 86 BPM

## 2017-12-27 DIAGNOSIS — S41.112A ARM LACERATION, LEFT, INITIAL ENCOUNTER: Primary | ICD-10-CM

## 2017-12-27 PROCEDURE — 90715 TDAP VACCINE 7 YRS/> IM: CPT | Performed by: FAMILY MEDICINE

## 2017-12-27 PROCEDURE — 90471 IMMUNIZATION ADMIN: CPT | Performed by: FAMILY MEDICINE

## 2017-12-27 PROCEDURE — 99213 OFFICE O/P EST LOW 20 MIN: CPT | Mod: 25 | Performed by: FAMILY MEDICINE

## 2017-12-27 RX ORDER — CEPHALEXIN 500 MG/1
500 CAPSULE ORAL 3 TIMES DAILY
Qty: 30 CAPSULE | Refills: 0 | Status: SHIPPED | OUTPATIENT
Start: 2017-12-27 | End: 2018-01-06

## 2017-12-27 NOTE — MR AVS SNAPSHOT
After Visit Summary   12/27/2017    Carrillo Dunlap    MRN: [id number]           Patient Information     Date Of Birth          1965        Visit Information        Provider Department      12/27/2017 8:15 PM Aleida Smith MD [facility]        Today's Diagnoses     Arm laceration, left, initial encounter    -  1      Care Instructions      Old Laceration: Not Sutured  A laceration is a cut through the skin. This will usually require stitches if it is deep. However, if a laceration remains open for too long, the risk of infection increases. In your case, too much time has passed before coming for treatment. The danger of infection from stitching at this time is too high. That is why your wound was not stitched.  If the wound is spread open, it will heal by filling in from the bottom and sides. A wound that is not stitched may take 1 to 4 weeks to heal, depending on the size of the opening. You will probably have a visible scar. You can discuss revision of the scar with your healthcare provider at a later time.  Home care  The following guidelines will help you care for your laceration at home:    Keep the wound clean and dry. If a bandage was applied and it becomes wet or dirty, replace it. Otherwise, leave it in place for the first 24 hours, then change it once a day or as directed.    Clean the wound daily:    After removing any bandage, wash the area with soap and water. Use a wet cotton swab to loosen and remove any blood or crust that forms.    Talk with your healthcare provider before applying any antibiotic ointment to the wound. Reapply a fresh bandage.    You may remove the bandage to shower as usual after the first 24 hours, but don't soak the area in water (no tub baths or swimming) until the wound heals or your provider tells you it's OK.  Avoid activities that may reinjure your wound.    The healthcare provider may prescribe an antibiotic  cream or ointment to prevent infection.     If you are at high risk for infection, or the wound is grossly contaminated, your provider may prescribe an oral antibiotic medicine to prevent infection. Don't stop using this medicine until you have finished the prescribed course, or the provider tells you to stop.    The healthcare provider may also prescribe medicine for pain. Follow instructions for taking these medicines.    Check the wound daily for signs of infection listed below.  Follow-up care  Follow up with your healthcare provider, or as advised.  When to seek medical advice  Call your healthcare provider right away if any of these occur:    Wound bleeding not controlled by direct pressure    Signs of infection, including increasing pain in the wound, increasing wound redness or swelling, or pus or bad odor coming from the wound    Fever of 100.4 F (38. C) or higher or as directed by your healthcare provider    Wound edges re-open    Wound changes colors    Numbness around the wound     Decreased movement around the injured area  Date Last Reviewed: 6/10/2015    9658-3587 [employer]. [address] [place] [address]. All rights reserved. This information is not intended as a substitute for professional medical care. Always follow your healthcare professional's instructions.                Follow-ups after your visit        Additional Services     GENERAL SURG ADULT REFERRAL       Your provider has referred you to: [facility]: General Surgery Clinic [facility] [phone]   [email or web address]    Please be aware that coverage of these services is subject to the terms and limitations of your health insurance plan.  Call member services at your health plan with any benefit or coverage questions.      Please bring the following with you to your appointment:    (1) Any X-Rays, CTs or MRIs which have been performed.  Contact the facility where they were  done to arrange for  prior to your scheduled appointment.   (2) List of current medications   (3) This referral request   (4) Any documents/labs given to you for this referral                  Follow-up notes from your care team     Return in about 7 days (around 1/3/2018), or if symptoms worsen or fail to improve.      Your next 10 appointments already scheduled     Dec 28, 2017  5:00 PM CST   SHORT with Columba Rios MD   [facility]    [address] [place] [id number] [phone]              Who to contact     If you have questions or need follow up information about today's clinic visit or your schedule please contact [facility] directly at [phone].  Normal or non-critical lab and imaging results will be communicated to you by MyChart, letter or phone within 4 business days after the clinic has received the results. If you do not hear from us within 7 days, please contact the clinic through MyChart or phone. If you have a critical or abnormal lab result, we will notify you by phone as soon as possible.  Submit refill requests through [employer] or call your pharmacy and they will forward the refill request to us. Please allow 3 business days for your refill to be completed.          Additional Information About Your Visit        [employer] Information     [employer] gives you secure access to your electronic health record. If you see a primary care provider, you can also send messages to your care team and make appointments. If you have questions, please call your primary care clinic.  If you do not have a primary care provider, please call [phone] and they will assist you.        Care EveryWhere ID     This is your Care EveryWhere ID. This could be used by other organizations to access your [place] medical records  [id number]        Your Vitals Were     Pulse Temperature Height Pulse Oximetry BMI (Body Mass Index)  "      86 98.5  F (36.9  C) (Oral) 5' 8\" (1.727 m) 99% 25.54 kg/m2        Blood Pressure from Last 3 Encounters:   12/27/17 127/77   10/30/17 100/64   07/21/17 98/58    Weight from Last 3 Encounters:   12/27/17 168 lb (76.2 kg)   10/30/17 166 lb (75.3 kg)   07/21/17 169 lb (76.7 kg)              We Performed the Following     GENERAL SURG ADULT REFERRAL     TDAP (ADACEL)          Today's Medication Changes          These changes are accurate as of: 12/27/17  9:03 PM.  If you have any questions, ask your nurse or doctor.               Start taking these medicines.        Dose/Directions    cephALEXin 500 MG capsule   Commonly known as:  KEFLEX   Used for:  Arm laceration, left, initial encounter   Started by:  Aleida Smith MD        Dose:  500 mg   Take 1 capsule (500 mg) by mouth 3 times daily for 10 days   Quantity:  30 capsule   Refills:  0            Where to get your medicines      These medications were sent to [facility] [address] [place] [address] AT [address] [address]     Phone:  [phone]     cephALEXin 500 MG capsule                Primary Care Provider Office Phone # Fax #    Kian Morris PA-C [phone]       [address] [place] [address]        Equal Access to Services     SHOAIB MACIAS AH: Hadii jarred ku hadasho Soomaali, waaxda luqadaha, qaybta kaalmada adeegyada, waxay rocio wilks. So [facility] [phone].    ATENCIÓN: Si habla español, tiene a ch disposición servicios gratuitos de asistencia lingüística. Llame al [phone].    We comply with applicable federal civil rights laws and [place] laws. We do not discriminate on the basis of race, color, national origin, age, disability, sex, sexual orientation, or gender identity.            Thank you!     Thank you for choosing [facility]  for your care. Our goal is always to " provide you with excellent care. Hearing back from our patients is one way we can continue to improve our services. Please take a few minutes to complete the written survey that you may receive in the mail after your visit with us. Thank you!             Your Updated Medication List - Protect others around you: Learn how to safely use, store and throw away your medicines at www.disposemymeds.org.          This list is accurate as of: 12/27/17  9:03 PM.  Always use your most recent med list.                   Brand Name Dispense Instructions for use Diagnosis    * amphetamine-dextroamphetamine 20 MG per 24 hr capsule    ADDERALL XR    30 capsule    Take 1 capsule (20 mg) by mouth daily (Fill on or after 12/28/2017)    ADHD (attention deficit hyperactivity disorder), inattentive type       * amphetamine-dextroamphetamine 30 MG per 24 hr capsule    ADDERALL XR    30 capsule    Take 1 capsule (30 mg) by mouth daily (Fill on or after 12/28/2017)    ADHD (attention deficit hyperactivity disorder), inattentive type       ascorbic acid 500 MG tablet    VITAMIN C     Take by mouth daily        buPROPion 300 MG 24 hr tablet    WELLBUTRIN XL    90 tablet    Take 1 tablet (300 mg) by mouth every morning    Dysthymia       CALCIUM + D PO      2 tablet daily - 600mg total        cephALEXin 500 MG capsule    KEFLEX    30 capsule    Take 1 capsule (500 mg) by mouth 3 times daily for 10 days    Arm laceration, left, initial encounter       cyanocobalamin 1000 MCG tablet    vitamin  B-12     Take 1,000 mcg by mouth daily.        fish oil-omega-3 fatty acids 1000 MG capsule      Take 2 g by mouth daily.        fluocinonide 0.05 % solution    LIDEX    60 mL    Apply sparingly to affected area twice daily as needed.  Do not apply to face. +++Related visit due+++    Itchy scalp       l-tyrosine 500 MG Tabs           Multi-vitamin Tabs tablet   Generic drug:  multivitamin, therapeutic with minerals      1 TABLET DAILY        THEANINE PO       Take 200 mg by mouth daily        UNABLE TO FIND      MEDICATION NAME: Lynn Krause Balm 500mg capsule        zinc 50 MG Tabs           * Notice:  This list has 2 medication(s) that are the same as other medications prescribed for you. Read the directions carefully, and ask your doctor or other care provider to review them with you.

## 2017-12-28 ENCOUNTER — MYC MEDICAL ADVICE (OUTPATIENT)
Dept: FAMILY MEDICINE | Facility: CLINIC | Age: 52
End: 2017-12-28

## 2017-12-28 NOTE — PROGRESS NOTES
SUBJECTIVE:   52 year old male sustained laceration of right inner upper arm 3 days ago. Nature of injury: tripped over Mary clutter and wrapped his left arm around the door metal plate and the hook tore up his skin. Tetanus vaccination status reviewed: Adacel vaccination indicated and given today.     OBJECTIVE:   Patient appears well, vitals are normal. Laceration 4 cm long and 1.5 cm gaping noted at left inner upper arm.  The laceration is deep and muscle sheath is visible at the base.  There is a rim of redness around the wound with scant crusting. No induration.  No pus discharge. Neurovascular and tendon structures are intact.    ASSESSMENT:   Laceration as described-too old to suture today    PLAN:   Wound cleansed, debrided of visible foreign material and necrotic tissue, and dressed and bandaged.  Keflex as per orders. . Antibiotic ointment and dressing applied.  Wound care instructions provided.  Adacel given.  Observe for any signs of infection or other problems.  Return for delayed closure in 7-10 days with general surgery.    Aleida Cole MD

## 2017-12-28 NOTE — NURSING NOTE
"Chief Complaint   Patient presents with     Urgent Care     Pt in clinic to have eval for left upper arm pain due to fall 3 days ago.     Musculoskeletal Problem       Initial /77  Pulse 86  Temp 98.5  F (36.9  C) (Oral)  Ht 5' 8\" (1.727 m)  Wt 168 lb (76.2 kg)  SpO2 99%  BMI 25.54 kg/m2 Estimated body mass index is 25.54 kg/(m^2) as calculated from the following:    Height as of this encounter: 5' 8\" (1.727 m).    Weight as of this encounter: 168 lb (76.2 kg).  Medication Reconciliation: complete   Stephanie Crane/ MA    "

## 2017-12-28 NOTE — PATIENT INSTRUCTIONS
Old Laceration: Not Sutured  A laceration is a cut through the skin. This will usually require stitches if it is deep. However, if a laceration remains open for too long, the risk of infection increases. In your case, too much time has passed before coming for treatment. The danger of infection from stitching at this time is too high. That is why your wound was not stitched.  If the wound is spread open, it will heal by filling in from the bottom and sides. A wound that is not stitched may take 1 to 4 weeks to heal, depending on the size of the opening. You will probably have a visible scar. You can discuss revision of the scar with your healthcare provider at a later time.  Home care  The following guidelines will help you care for your laceration at home:    Keep the wound clean and dry. If a bandage was applied and it becomes wet or dirty, replace it. Otherwise, leave it in place for the first 24 hours, then change it once a day or as directed.    Clean the wound daily:    After removing any bandage, wash the area with soap and water. Use a wet cotton swab to loosen and remove any blood or crust that forms.    Talk with your healthcare provider before applying any antibiotic ointment to the wound. Reapply a fresh bandage.    You may remove the bandage to shower as usual after the first 24 hours, but don't soak the area in water (no tub baths or swimming) until the wound heals or your provider tells you it's OK.  Avoid activities that may reinjure your wound.    The healthcare provider may prescribe an antibiotic cream or ointment to prevent infection.     If you are at high risk for infection, or the wound is grossly contaminated, your provider may prescribe an oral antibiotic medicine to prevent infection. Don't stop using this medicine until you have finished the prescribed course, or the provider tells you to stop.    The healthcare provider may also prescribe medicine for pain. Follow instructions for  taking these medicines.    Check the wound daily for signs of infection listed below.  Follow-up care  Follow up with your healthcare provider, or as advised.  When to seek medical advice  Call your healthcare provider right away if any of these occur:    Wound bleeding not controlled by direct pressure    Signs of infection, including increasing pain in the wound, increasing wound redness or swelling, or pus or bad odor coming from the wound    Fever of 100.4 F (38. C) or higher or as directed by your healthcare provider    Wound edges re-open    Wound changes colors    Numbness around the wound     Decreased movement around the injured area  Date Last Reviewed: 6/10/2015    9215-0410 [employer]. [address] [place] [address], [place] [address]. All rights reserved. This information is not intended as a substitute for professional medical care. Always follow your healthcare professional's instructions.

## 2018-01-03 ENCOUNTER — TELEPHONE (OUTPATIENT)
Dept: SURGERY | Facility: CLINIC | Age: 53
End: 2018-01-03

## 2018-01-03 NOTE — TELEPHONE ENCOUNTER
Pre Visit Call and Assessment    Date of call:  01/03/2018    Phone numbers:  Home number on file [phone] (home)    Reached patient/confirmed appointment:  No - left message:   on voicemail  Patient care team/Primary provider:  Kian Morris  Referred to:  Dr. Sukhwinder Huerta    Reason for visit:  Arm laceration consult

## 2018-01-04 ENCOUNTER — OFFICE VISIT (OUTPATIENT)
Dept: SURGERY | Facility: CLINIC | Age: 53
End: 2018-01-04
Payer: COMMERCIAL

## 2018-01-04 VITALS
OXYGEN SATURATION: 98 % | WEIGHT: 170.4 LBS | SYSTOLIC BLOOD PRESSURE: 112 MMHG | DIASTOLIC BLOOD PRESSURE: 72 MMHG | BODY MASS INDEX: 25.82 KG/M2 | HEIGHT: 68 IN | HEART RATE: 90 BPM | TEMPERATURE: 97.8 F

## 2018-01-04 DIAGNOSIS — S41.112A LACERATION OF LEFT UPPER EXTREMITY, INITIAL ENCOUNTER: Primary | ICD-10-CM

## 2018-01-04 ASSESSMENT — PAIN SCALES - GENERAL: PAINLEVEL: NO PAIN (0)

## 2018-01-04 NOTE — LETTER
1/4/2018       RE: Carrillo Dunlap  [address]  [place] [address]     Dear Colleague,    Thank you for referring your patient, Carrillo Dunlap, to the [facility] SUR[facility]ERY at [facility]. Please see a copy of my visit note below.    Here for wound check of minor laceration of left axilla/upper arm, was left open  Without symptoms of infection.  Wound examined good granulation tissue, no signs infection.  Today discussed open wound cares  Stop antibiotics.  Follow up with me if not closed in 2 -3 weeks.  The total time spent with this patient was 10 minutes.  Of this time, greater than 50% was spent counseling and coordinating care.        Again, thank you for allowing me to participate in the care of your patient.      Sincerely,    Sukhwinder Huerta MD

## 2018-01-04 NOTE — NURSING NOTE
"No chief complaint on file.      Vitals:    01/04/18 0837   BP: 112/72   Pulse: 90   Temp: 97.8  F (36.6  C)   TempSrc: Oral   SpO2: 98%   Weight: 170 lb 6.4 oz   Height: 5' 8\"       Body mass index is 25.91 kg/(m^2).      Miladys MCCRAY LPN                       "

## 2018-01-04 NOTE — PROGRESS NOTES
Here for wound check of minor laceration of left axilla/upper arm, was left open  Without symptoms of infection.  Wound examined good granulation tissue, no signs infection.  Today discussed open wound cares  Stop antibiotics.  Follow up with me if not closed in 2 -3 weeks.  The total time spent with this patient was 10 minutes.  Of this time, greater than 50% was spent counseling and coordinating care.

## 2018-01-04 NOTE — MR AVS SNAPSHOT
After Visit Summary   1/4/2018    Carrillo Dunlap    MRN: [id number]           Patient Information     Date Of Birth          1965        Visit Information        Provider Department      1/4/2018 8:30 AM Sukhwinder Huerta MD [facility]        Today's Diagnoses     Laceration of left upper extremity, initial encounter    -  1      Care Instructions    Return to the Surgery Clinic on an as needed basis.  Please call [phone], option #3, with questions.            Follow-ups after your visit        Who to contact     Please call your clinic at [phone] to:    Ask questions about your health    Make or cancel appointments    Discuss your medicines    Learn about your test results    Speak to your doctor   If you have compliments or concerns about an experience at your clinic, or if you wish to file a complaint, please contact [facility] Patient Relations at [phone] or email us at [email or web address]         Additional Information About Your Visit        [employer] Information     [employer] gives you secure access to your electronic health record. If you see a primary care provider, you can also send messages to your care team and make appointments. If you have questions, please call your primary care clinic.  If you do not have a primary care provider, please call [phone] and they will assist you.      Bundle It is an electronic gateway that provides easy, online access to your medical records. With Bundle It, you can request a clinic appointment, read your test results, renew a prescription or communicate with your care team.     To access your existing account, please contact your [facility] Clinic or call [phone] for assistance.        Care EveryWhere ID     This is your Care EveryWhere ID. This could be used by other organizations to access your [place] medical records  [id number]        Your  "Vitals Were     Pulse Temperature Height Pulse Oximetry BMI (Body Mass Index)       90 97.8  F (36.6  C) (Oral) 5' 8\" 98% 25.91 kg/m2        Blood Pressure from Last 3 Encounters:   01/04/18 112/72   12/27/17 127/77   10/30/17 100/64    Weight from Last 3 Encounters:   01/04/18 170 lb 6.4 oz   12/27/17 168 lb   10/30/17 166 lb              Today, you had the following     No orders found for display       Primary Care Provider Office Phone # Fax #    Kian Morris PA-C [phone]       [address] [facility] [address]        Equal Access to Services     SHOAIB MACIAS : Hadii jarred Moss, wadbda robe, qaybta kaalmada adejoseyagill, josesito wilks. So [facility] [phone].    ATENCIÓN: Si habla español, tiene a ch disposición servicios gratuitos de asistencia lingüística. Llame al [phone].    We comply with applicable federal civil rights laws and [place] laws. We do not discriminate on the basis of race, color, national origin, age, disability, sex, sexual orientation, or gender identity.            Thank you!     Thank you for choosing [facility]  for your care. Our goal is always to provide you with excellent care. Hearing back from our patients is one way we can continue to improve our services. Please take a few minutes to complete the written survey that you may receive in the mail after your visit with us. Thank you!             Your Updated Medication List - Protect others around you: Learn how to safely use, store and throw away your medicines at www.disposemymeds.org.          This list is accurate as of: 1/4/18  9:07 AM.  Always use your most recent med list.                   Brand Name Dispense Instructions for use Diagnosis    * amphetamine-dextroamphetamine 20 MG per 24 hr capsule    ADDERALL XR    30 capsule    Take 1 capsule (20 mg) by mouth daily (Fill on or after 12/28/2017)    ADHD (attention deficit " hyperactivity disorder), inattentive type       * amphetamine-dextroamphetamine 30 MG per 24 hr capsule    ADDERALL XR    30 capsule    Take 1 capsule (30 mg) by mouth daily (Fill on or after 12/28/2017)    ADHD (attention deficit hyperactivity disorder), inattentive type       ascorbic acid 500 MG tablet    VITAMIN C     Take by mouth daily        buPROPion 300 MG 24 hr tablet    WELLBUTRIN XL    90 tablet    Take 1 tablet (300 mg) by mouth every morning    Dysthymia       CALCIUM + D PO      2 tablet daily - 600mg total        cephALEXin 500 MG capsule    KEFLEX    30 capsule    Take 1 capsule (500 mg) by mouth 3 times daily for 10 days    Arm laceration, left, initial encounter       cyanocobalamin 1000 MCG tablet    vitamin  B-12     Take 1,000 mcg by mouth daily.        fish oil-omega-3 fatty acids 1000 MG capsule      Take 2 g by mouth daily.        fluocinonide 0.05 % solution    LIDEX    60 mL    Apply sparingly to affected area twice daily as needed.  Do not apply to face. +++Related visit due+++    Itchy scalp       Multi-vitamin Tabs tablet   Generic drug:  multivitamin, therapeutic with minerals      1 TABLET DAILY        zinc 50 MG Tabs           * Notice:  This list has 2 medication(s) that are the same as other medications prescribed for you. Read the directions carefully, and ask your doctor or other care provider to review them with you.

## 2018-02-06 ENCOUNTER — MYC MEDICAL ADVICE (OUTPATIENT)
Dept: FAMILY MEDICINE | Facility: CLINIC | Age: 53
End: 2018-02-06

## 2018-02-06 DIAGNOSIS — F90.0 ADHD (ATTENTION DEFICIT HYPERACTIVITY DISORDER), INATTENTIVE TYPE: ICD-10-CM

## 2018-02-06 DIAGNOSIS — F34.1 DYSTHYMIA: ICD-10-CM

## 2018-02-06 RX ORDER — DEXTROAMPHETAMINE SACCHARATE, AMPHETAMINE ASPARTATE MONOHYDRATE, DEXTROAMPHETAMINE SULFATE AND AMPHETAMINE SULFATE 7.5; 7.5; 7.5; 7.5 MG/1; MG/1; MG/1; MG/1
30 CAPSULE, EXTENDED RELEASE ORAL DAILY
Qty: 30 CAPSULE | Refills: 0 | Status: SHIPPED | OUTPATIENT
Start: 2018-02-06 | End: 2018-02-06

## 2018-02-06 RX ORDER — DEXTROAMPHETAMINE SACCHARATE, AMPHETAMINE ASPARTATE MONOHYDRATE, DEXTROAMPHETAMINE SULFATE AND AMPHETAMINE SULFATE 5; 5; 5; 5 MG/1; MG/1; MG/1; MG/1
20 CAPSULE, EXTENDED RELEASE ORAL DAILY
Qty: 30 CAPSULE | Refills: 0 | Status: SHIPPED | OUTPATIENT
Start: 2018-02-06 | End: 2018-05-22

## 2018-02-06 RX ORDER — DEXTROAMPHETAMINE SACCHARATE, AMPHETAMINE ASPARTATE MONOHYDRATE, DEXTROAMPHETAMINE SULFATE AND AMPHETAMINE SULFATE 7.5; 7.5; 7.5; 7.5 MG/1; MG/1; MG/1; MG/1
30 CAPSULE, EXTENDED RELEASE ORAL DAILY
Qty: 30 CAPSULE | Refills: 0 | Status: SHIPPED | OUTPATIENT
Start: 2018-02-06 | End: 2018-05-22

## 2018-02-06 RX ORDER — DEXTROAMPHETAMINE SACCHARATE, AMPHETAMINE ASPARTATE MONOHYDRATE, DEXTROAMPHETAMINE SULFATE AND AMPHETAMINE SULFATE 5; 5; 5; 5 MG/1; MG/1; MG/1; MG/1
20 CAPSULE, EXTENDED RELEASE ORAL DAILY
Qty: 30 CAPSULE | Refills: 0 | Status: SHIPPED | OUTPATIENT
Start: 2018-02-06 | End: 2018-02-06

## 2018-02-06 RX ORDER — BUPROPION HYDROCHLORIDE 300 MG/1
300 TABLET ORAL EVERY MORNING
Qty: 90 TABLET | Refills: 1 | Status: SHIPPED | OUTPATIENT
Start: 2018-02-06 | End: 2018-05-22

## 2018-02-06 NOTE — TELEPHONE ENCOUNTER
Will route to provider to advise. Do you require an OV for adderall and wellbutrin? MyChart sent.     Juni Mays RN

## 2018-02-18 ENCOUNTER — HEALTH MAINTENANCE LETTER (OUTPATIENT)
Age: 53
End: 2018-02-18

## 2018-02-19 NOTE — TELEPHONE ENCOUNTER
Patient picking up envelope ID verified and envelope given to patient.      .Shanthi Burgos  Patient Representative

## 2018-03-13 ENCOUNTER — MYC MEDICAL ADVICE (OUTPATIENT)
Dept: FAMILY MEDICINE | Facility: CLINIC | Age: 53
End: 2018-03-13

## 2018-03-14 NOTE — TELEPHONE ENCOUNTER
MyChart sent to patient.      Entered immunization for March, 3, 2018    Rena Eubanks, Certified Medical Assistant (AAMA)

## 2018-05-17 ENCOUNTER — OFFICE VISIT (OUTPATIENT)
Dept: PHARMACY | Facility: CLINIC | Age: 53
End: 2018-05-17
Payer: COMMERCIAL

## 2018-05-17 DIAGNOSIS — G47.09 OTHER INSOMNIA: ICD-10-CM

## 2018-05-17 DIAGNOSIS — E78.5 HYPERLIPIDEMIA WITH TARGET LDL LESS THAN 130: ICD-10-CM

## 2018-05-17 DIAGNOSIS — F41.1 GENERALIZED ANXIETY DISORDER: ICD-10-CM

## 2018-05-17 DIAGNOSIS — F90.0 ADHD (ATTENTION DEFICIT HYPERACTIVITY DISORDER), INATTENTIVE TYPE: Primary | ICD-10-CM

## 2018-05-17 DIAGNOSIS — F33.0 MILD EPISODE OF RECURRENT MAJOR DEPRESSIVE DISORDER (H): ICD-10-CM

## 2018-05-17 PROCEDURE — 99607 MTMS BY PHARM ADDL 15 MIN: CPT | Performed by: PHARMACIST

## 2018-05-17 PROCEDURE — 99605 MTMS BY PHARM NP 15 MIN: CPT | Performed by: PHARMACIST

## 2018-05-17 NOTE — PATIENT INSTRUCTIONS
Recommendations from today's MTM visit:                                                    MTM (medication therapy management) is a service provided by a clinical pharmacist designed to help you get the most of out of your medicines.   Today we reviewed what your medicines are for, how to know if they are working, that your medicines are safe and how to make your medicine regimen as easy as possible.     1. Try taking your Adderall together in the morning.  Start keeping a log of your symptoms with time of day.    2. I think it would be a good idea to start seeing a therapist again.    Next MTM visit: 6/14/18 at 12pm    To schedule another MTM appointment, please call the clinic directly or you may call the MTM scheduling line at [phone] or toll-free at [phone].     My Clinical Pharmacist's contact information:                                                      It was a pleasure seeing you today!  Please feel free to contact me with any questions or concerns you have.      Maria Ines Chaney, PharmD  Medication Therapy Management Pharmacist  [facility]  Phone: [phone]    You may receive a survey about the MTM services you received.  I would appreciate your feedback to help me serve you better in the future. Please fill it out and return it when you can. Your comments will be anonymous.

## 2018-05-17 NOTE — Clinical Note
Yolanda Hercules,  I visited with this patient for MTM to discuss his ADHD.  We talked a lot about not self-adjusting meds with his currently frequency in order for us to assess efficacy.  He is going to start taking the Adderall XR 50mg QAM and keep a symptom log.  I also placed a BMP order, as last one on file is 2009.  He sees you on Tuesday.  Please see my note as fyi and let me know if you have questions.  I'll follow up with him in a month.  Thank you! Maria Ines Chaney, PharmD Medication Therapy Management Pharmacist [facility] Phone: [phone]

## 2018-05-17 NOTE — MR AVS SNAPSHOT
After Visit Summary   5/17/2018    Carrillo Dunlap    MRN: [id number]           Patient Information     Date Of Birth          1965        Visit Information        Provider Department      5/17/2018 2:00 PM Maria Ines Chaney [facility]        Care Instructions    Recommendations from today's MTM visit:                                                    MTM (medication therapy management) is a service provided by a clinical pharmacist designed to help you get the most of out of your medicines.   Today we reviewed what your medicines are for, how to know if they are working, that your medicines are safe and how to make your medicine regimen as easy as possible.     1. Try taking your Adderall together in the morning.  Start keeping a log of your symptoms with time of day.    2. I think it would be a good idea to start seeing a therapist again.    Next MTM visit: 6/14/18 at 12pm    To schedule another MTM appointment, please call the clinic directly or you may call the MTM scheduling line at [phone] or toll-free at [phone].     My Clinical Pharmacist's contact information:                                                      It was a pleasure seeing you today!  Please feel free to contact me with any questions or concerns you have.      Maria Ines Chaney, PharmD  Medication Therapy Management Pharmacist  [facility]  Phone: [phone]    You may receive a survey about the MTM services you received.  I would appreciate your feedback to help me serve you better in the future. Please fill it out and return it when you can. Your comments will be anonymous.            Follow-ups after your visit        Your next 10 appointments already scheduled     May 22, 2018  1:00 PM CDT   MyChart Long with Kian Morris PA-C   [facility] ([facility])    [address] [place] [address]  [address]   [phone]            Jun 14, 2018 12:00 PM CDT   Office Visit with Maria Ines Chaney RPH   [facility] ([facility])    [address] [place] [address] [phone]           Bring a current list of meds and any records pertaining to this visit. For Physicals, please bring immunization records and any forms needing to be filled out. Please arrive 10 minutes early to complete paperwork.              Who to contact     If you have questions or need follow up information about today's clinic visit or your schedule please contact [facility] directly at [phone].  Normal or non-critical lab and imaging results will be communicated to you by [employer], letter or phone within 4 business days after the clinic has received the results. If you do not hear from us within 7 days, please contact the clinic through [employer] or phone. If you have a critical or abnormal lab result, we will notify you by phone as soon as possible.  Submit refill requests through Co[employer] or call your pharmacy and they will forward the refill request to us. Please allow 3 business days for your refill to be completed.          Additional Information About Your Visit        CoSchedule Information     Co[employer] gives you secure access to your electronic health record. If you see a primary care provider, you can also send messages to your care team and make appointments. If you have questions, please call your primary care clinic.  If you do not have a primary care provider, please call [phone] and they will assist you.        Care EveryWhere ID     This is your Care EveryWhere ID. This could be used by other organizations to access your [place] medical records  [id number]         Blood Pressure from Last 3 Encounters:   01/04/18 112/72   12/27/17 127/77   10/30/17 100/64    Weight from Last 3  Encounters:   01/04/18 170 lb 6.4 oz (77.3 kg)   12/27/17 168 lb (76.2 kg)   10/30/17 166 lb (75.3 kg)              Today, you had the following     No orders found for display       Primary Care Provider Office Phone # Fax #    Kian Morris PA-C [phone]       [address] [facility] [address]        Equal Access to Services     SHOAIB MACIAS : Hadii aad ku hadasho Soomaali, waaxda luqadaha, qaybta kaalmada adeegyada, waxay idiin hayaan adeeg kharash la'anthonyn . So [facility] [phone].    ATENCIÓN: Si habla español, tiene a ch disposición servicios gratuitos de asistencia lingüística. Llame al [phone].    We comply with applicable federal civil rights laws and [place] laws. We do not discriminate on the basis of race, color, national origin, age, disability, sex, sexual orientation, or gender identity.            Thank you!     Thank you for choosing [facility]  for your care. Our goal is always to provide you with excellent care. Hearing back from our patients is one way we can continue to improve our services. Please take a few minutes to complete the written survey that you may receive in the mail after your visit with us. Thank you!             Your Updated Medication List - Protect others around you: Learn how to safely use, store and throw away your medicines at www.disposemymeds.org.          This list is accurate as of 5/17/18  3:08 PM.  Always use your most recent med list.                   Brand Name Dispense Instructions for use Diagnosis    * amphetamine-dextroamphetamine 20 MG per 24 hr capsule    ADDERALL XR    30 capsule    Take 1 capsule (20 mg) by mouth daily (Fill on or after 4/4/2018)    ADHD (attention deficit hyperactivity disorder), inattentive type       * amphetamine-dextroamphetamine 30 MG per 24 hr capsule    ADDERALL XR    30 capsule    Take 1 capsule (30 mg) by mouth daily (Fill on or after 4/4/2018)    ADHD (attention  deficit hyperactivity disorder), inattentive type       ascorbic acid 500 MG tablet    VITAMIN C     Take by mouth daily        buPROPion 300 MG 24 hr tablet    WELLBUTRIN XL    90 tablet    Take 1 tablet (300 mg) by mouth every morning    Dysthymia       CALCIUM + D PO      2 tablet daily - 600mg total        cyanocobalamin 1000 MCG tablet    vitamin  B-12     Take 1,000 mcg by mouth daily.        fish oil-omega-3 fatty acids 1000 MG capsule      Take 2 g by mouth daily.        fluocinonide 0.05 % solution    LIDEX    60 mL    Apply sparingly to affected area twice daily as needed.  Do not apply to face. +++Related visit due+++    Itchy scalp       Multi-vitamin Tabs tablet   Generic drug:  multivitamin, therapeutic with minerals      1 TABLET DAILY        zinc 50 MG Tabs           * Notice:  This list has 2 medication(s) that are the same as other medications prescribed for you. Read the directions carefully, and ask your doctor or other care provider to review them with you.

## 2018-05-17 NOTE — PROGRESS NOTES
"SUBJECTIVE/OBJECTIVE:                           Carrillo Dunlap is a 53 year old male coming in for an initial visit for Medication Therapy Management.  He was self-referred to me.   Pt was 30 minutes late for 60 minute appointment, due to reported difficulty with finding location.    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 He does not have a set schedule.  Finds himself \"becoming a hermit\"    ADHD: Pt taking Adderall XR 50mg daily, though often divides and changes the time of the doses.  He reported having taken 50mg all in the morning \"at some point,\" but couldn't recall why he changed the timing.  He reported most frequently taking 20mg QAM and 30mg early afternoon.  He forgets the second dose 1-2 times weekly and noted that he doesn't realize it until ~5pm when it seems apparent that he hasn't \"gotten anything done all afternoon,\" at which point he takes the 30mg dose. Pt reported feeling more able to focus on work soon after the morning dose and notices that the dose subtley wears off during the day (rather than abruptly).  Pt described difficulty with focus and becoming easily distracted to other tasks without his medication  It is difficult for patient to report whether afternoon dose is helpful, since he takes it at variable times.  Pt used to keep a log of symptoms and current dosing, but hasn't done that lately.  He has read many books about ADHD and " "treatment options and has used that information to adjust his medication timing or try days/weeks off of medication.  Of note, patient described a long history of likely ADHD behavior throughout adolescence, but was not diagnosed until about 3 years ago.  Pt describes himself as a perfectionist.    Past meds: Concerta x 1 month in 12/2015- didn't work    Insomnia: Pt reported that his sleep initiation times are always different and that he feels wide awake \"like I should start my day\" at 8-9pm.  He will often not go to sleep until 4am, but does sleep soundly on most nights, once asleep.  As above, patient drinks about 1-2 pots of coffee daily, often until 10pm or later, seemingly out of habit and wanting to get more work done, especially on days he forgets his afternoon Adderall.    Depression/Anxiety: Pt provided lengthy history of having been a successful  with frequent travel to major companies.  He reported that [employer] wanted to work with him on a certain product, which he declined at the time and now regrets.  His recall of past events was focused on lost opportunities and feeling unsupported in his work and personal life.  He is currently working at home doing web design, which is not where he pictured himself at this point in life.  He noted that he feels as though he's become a hermit in his apartment and has no friends in [place].  He described constantly second guessing himself, worrying about everything, and being a perfectionist.  He had been seeing a therapist for awhile, but stopped about one year ago.    Pt currently taking bupropion ER 300mg QAM, which was originally started for combination of depression, smoking cessation, and ADHD.  Per chart notes, he has historically been averse to trying medication for depression/anxiety.  Unable to fully discuss at this visit (focused on ADHD).    Citalopram x 6 months in 2010  Sertraline x 4-5 months in 2011    Hyperlipidemia: No current therapy.  The " 10-year ASCVD risk score (Sidneylaney JIMENEZ Jr, et al., 2013) is: 6.5%    Values used to calculate the score:      Age: 53 years      Sex: Male      Is Non- : No      Diabetic: No      Tobacco smoker: Yes      Systolic Blood Pressure: 104 mmHg      Is BP treated: No      HDL Cholesterol: 49 mg/dL      Total Cholesterol: 207 mg/dL      Current labs include:  BP Readings from Last 3 Encounters:   05/18/18 104/70   01/04/18 112/72   12/27/17 127/77     Lab Results   Component Value Date    A1C 5.9 05/02/2008   .  Lab Results   Component Value Date    CHOL 207 07/21/2017     Lab Results   Component Value Date    TRIG 62 07/21/2017     Lab Results   Component Value Date    HDL 49 07/21/2017     Lab Results   Component Value Date     07/21/2017     Last Basic Metabolic Panel:  Lab Results   Component Value Date     11/16/2009      Lab Results   Component Value Date    POTASSIUM 4.5 11/16/2009     Lab Results   Component Value Date    CHLORIDE 104 11/16/2009     Lab Results   Component Value Date    BUN 15 11/16/2009     Lab Results   Component Value Date    CR 0.80 11/16/2009     ASSESSMENT:                             Not all current medications were reviewed today.   Though supplements were not discussed today, follow up on electrolytes/renal function is due.  Last BMP 2009.    Medication Adherence: good, though discussed not self-adjusting doses in order to determine true efficacy    ADHD: The use of extended release Adderall, in combination with hx of Sharmin en Y gastric bypass, leads to questionable serum levels due to inability of GI to process medications that require prolonged dissolution for adequate absorption.  It is likely that the medication is not getting fully absorbed, but remains unknown as to whether there could be lingering effect from this formulation.  Pt will start taking the full 50mg QAM, as currently prescribed, to determine if/when he notices a wearing off effect.   "Despite s/p RYGB, it is possible to adjust XR dosing for benefit, though may consider switching to immediate release formulations with more frequent dosing, including ability to adjust for smaller doses in the afternoon, if needed.  Although he should not have BID dosing with this change, further discussed not taking the second dose in the evening, as it likely affecting his sleep pattern.  Pt will also start keeping a log of symptoms.  Discussed importance of taking medications as prescribed in order to adequately assess efficacy.    Insomnia: Discussed importance of avoiding caffeine after mid afternoon, as it can affect sleep/wake cycle even if he doesn't feel \"caffeinated.\"  In combination with ADHD medication adjustment, patient will hopefully not feel the need to drink caffeine into the night.  As above, discussed timing of Adderall XR doses.    Depression/Anxiety: Pt would likely benefit from starting individual therapy again- pt agreed and stated that he had contact information.  Will more fully discuss depression/anxiety at follow up.  May benefit from SSRI.    Hyperlipidemia: Based on risk score <7.5% and no other known risk factors, therapy is not indicated at this time.    PLAN:                            1. Pt will take Adderall 50mg QAM and start keeping a log of of symptoms.  2. Pt will look into restarting individual therapy.  3. Pt due for updated BMP- order placed per provider CPA    I spent 40 minutes with this patient today.  A copy of the visit note was provided to the patient's primary care provider.    Will follow up in one month.    The patient was given a summary of these recommendations as an after visit summary.     Maria Ines Chaney, PharmD  Medication Therapy Management Pharmacist  [facility]  Phone: [phone]    "

## 2018-05-18 VITALS — SYSTOLIC BLOOD PRESSURE: 104 MMHG | DIASTOLIC BLOOD PRESSURE: 70 MMHG | HEART RATE: 83 BPM

## 2018-05-22 ENCOUNTER — OFFICE VISIT (OUTPATIENT)
Dept: FAMILY MEDICINE | Facility: CLINIC | Age: 53
End: 2018-05-22
Payer: COMMERCIAL

## 2018-05-22 VITALS
TEMPERATURE: 100.1 F | SYSTOLIC BLOOD PRESSURE: 106 MMHG | HEIGHT: 68 IN | DIASTOLIC BLOOD PRESSURE: 64 MMHG | BODY MASS INDEX: 26.07 KG/M2 | WEIGHT: 172 LBS | HEART RATE: 80 BPM

## 2018-05-22 DIAGNOSIS — Z12.11 SCREEN FOR COLON CANCER: ICD-10-CM

## 2018-05-22 DIAGNOSIS — F34.1 DYSTHYMIA: ICD-10-CM

## 2018-05-22 DIAGNOSIS — L29.9 ITCHY SCALP: ICD-10-CM

## 2018-05-22 DIAGNOSIS — F90.0 ADHD (ATTENTION DEFICIT HYPERACTIVITY DISORDER), INATTENTIVE TYPE: Primary | ICD-10-CM

## 2018-05-22 PROCEDURE — 99214 OFFICE O/P EST MOD 30 MIN: CPT | Performed by: PHYSICIAN ASSISTANT

## 2018-05-22 RX ORDER — DEXTROAMPHETAMINE SACCHARATE, AMPHETAMINE ASPARTATE MONOHYDRATE, DEXTROAMPHETAMINE SULFATE AND AMPHETAMINE SULFATE 7.5; 7.5; 7.5; 7.5 MG/1; MG/1; MG/1; MG/1
30 CAPSULE, EXTENDED RELEASE ORAL DAILY
Qty: 30 CAPSULE | Refills: 0 | Status: SHIPPED | OUTPATIENT
Start: 2018-05-22 | End: 2018-06-20

## 2018-05-22 RX ORDER — BUPROPION HYDROCHLORIDE 300 MG/1
300 TABLET ORAL EVERY MORNING
Qty: 90 TABLET | Refills: 3 | Status: SHIPPED | OUTPATIENT
Start: 2018-05-22 | End: 2018-10-15 | Stop reason: DRUGHIGH

## 2018-05-22 RX ORDER — DEXTROAMPHETAMINE SACCHARATE, AMPHETAMINE ASPARTATE MONOHYDRATE, DEXTROAMPHETAMINE SULFATE AND AMPHETAMINE SULFATE 5; 5; 5; 5 MG/1; MG/1; MG/1; MG/1
20 CAPSULE, EXTENDED RELEASE ORAL DAILY
Qty: 30 CAPSULE | Refills: 0 | Status: SHIPPED | OUTPATIENT
Start: 2018-05-22 | End: 2018-05-22

## 2018-05-22 RX ORDER — DEXTROAMPHETAMINE SACCHARATE, AMPHETAMINE ASPARTATE MONOHYDRATE, DEXTROAMPHETAMINE SULFATE AND AMPHETAMINE SULFATE 5; 5; 5; 5 MG/1; MG/1; MG/1; MG/1
20 CAPSULE, EXTENDED RELEASE ORAL DAILY
Qty: 30 CAPSULE | Refills: 0 | Status: SHIPPED | OUTPATIENT
Start: 2018-05-22 | End: 2018-06-20

## 2018-05-22 RX ORDER — FLUOCINONIDE TOPICAL SOLUTION USP, 0.05% 0.5 MG/ML
SOLUTION TOPICAL
Qty: 60 ML | Refills: 5 | Status: SHIPPED | OUTPATIENT
Start: 2018-05-22 | End: 2021-08-03

## 2018-05-22 RX ORDER — DEXTROAMPHETAMINE SACCHARATE, AMPHETAMINE ASPARTATE MONOHYDRATE, DEXTROAMPHETAMINE SULFATE AND AMPHETAMINE SULFATE 7.5; 7.5; 7.5; 7.5 MG/1; MG/1; MG/1; MG/1
30 CAPSULE, EXTENDED RELEASE ORAL DAILY
Qty: 30 CAPSULE | Refills: 0 | Status: SHIPPED | OUTPATIENT
Start: 2018-05-22 | End: 2018-05-22

## 2018-05-22 ASSESSMENT — ANXIETY QUESTIONNAIRES
1. FEELING NERVOUS, ANXIOUS, OR ON EDGE: NEARLY EVERY DAY
7. FEELING AFRAID AS IF SOMETHING AWFUL MIGHT HAPPEN: NOT AT ALL
5. BEING SO RESTLESS THAT IT IS HARD TO SIT STILL: SEVERAL DAYS
3. WORRYING TOO MUCH ABOUT DIFFERENT THINGS: MORE THAN HALF THE DAYS
6. BECOMING EASILY ANNOYED OR IRRITABLE: MORE THAN HALF THE DAYS
GAD7 TOTAL SCORE: 13
2. NOT BEING ABLE TO STOP OR CONTROL WORRYING: NEARLY EVERY DAY

## 2018-05-22 ASSESSMENT — PATIENT HEALTH QUESTIONNAIRE - PHQ9: 5. POOR APPETITE OR OVEREATING: MORE THAN HALF THE DAYS

## 2018-05-22 NOTE — MR AVS SNAPSHOT
After Visit Summary   5/22/2018    Carrillo Dunlap    MRN: [id number]           Patient Information     Date Of Birth          1965        Visit Information        Provider Department      5/22/2018 1:00 PM Kian Morris PA-C [facility]        Today's Diagnoses     ADHD (attention deficit hyperactivity disorder), inattentive type    -  1    Screen for colon cancer        Dysthymia        Itchy scalp          Care Instructions    1. Ravinder Youssef's books are good - Daring Greatly is about shame (and indirectly perfection) and Gifts of Imperfection           Follow-ups after your visit        Additional Services     GASTROENTEROLOGY ADULT REF PROCEDURE ONLY Other; [place] [facility] [phone]       Last Lab Result: Creatinine (mg/dL)       Date                     Value                 11/16/2009               0.80             ----------  There is no height or weight on file to calculate BMI.     Needed:  No  Language:  English    Patient will be contacted to schedule procedure.     Please be aware that coverage of these services is subject to the terms and limitations of your health insurance plan.  Call member services at your health plan with any benefit or coverage questions.  Any procedures must be performed at a [facility] facility OR coordinated by your clinic's referral office.    Please bring the following with you to your appointment:    (1) Any X-Rays, CTs or MRIs which have been performed.  Contact the facility where they were done to arrange for  prior to your scheduled appointment.    (2) List of current medications   (3) This referral request   (4) Any documents/labs given to you for this referral            MENTAL HEALTH REFERRAL  - Adult; Outpatient Treatment; Individual/Couples/Family/Group Therapy/Health Psychology; Other: Not Listed - Enter Referral Details in Scheduling Comments Below       Patient will check own therapist     All scheduling  is subject to the client's specific insurance plan & benefits, provider/location availability, and provider clinical specialities.  Please arrive 15 minutes early for your first appointment and bring your completed paperwork.    Please be aware that coverage of these services is subject to the terms and limitations of your health insurance plan.  Call member services at your health plan with any benefit or coverage questions.                  Your next 10 appointments already scheduled     Jun 14, 2018 12:00 PM CDT   Office Visit with Maria Ines Chaney RPH   [facility] ([facility])    [address] [place] [id number] [phone]           Bring a current list of meds and any records pertaining to this visit. For Physicals, please bring immunization records and any forms needing to be filled out. Please arrive 10 minutes early to complete paperwork.              Who to contact     If you have questions or need follow up information about today's clinic visit or your schedule please contact [facility] directly at [phone].  Normal or non-critical lab and imaging results will be communicated to you by [employer] [employer], letter or phone within 4 business days after the clinic has received the results. If you do not hear from us within 7 days, please contact the clinic through [employer] or phone. If you have a critical or abnormal lab result, we will notify you by phone as soon as possible.  Submit refill requests through [employer] or call your pharmacy and they will forward the refill request to us. Please allow 3 business days for your refill to be completed.          Additional Information About Your Visit        [facility] [employer] Information     [employer] gives you secure access to your electronic health record. If you see a primary care provider, you can also send messages to your care team and make  "appointments. If you have questions, please call your primary care clinic.  If you do not have a primary care provider, please call [phone] and they will assist you.        Care EveryWhere ID     This is your Care EveryWhere ID. This could be used by other organizations to access your [place] medical records  [id number]        Your Vitals Were     Pulse Temperature Height BMI (Body Mass Index)          80 100.1  F (37.8  C) (Oral) 5' 8\" (1.727 m) 26.15 kg/m2         Blood Pressure from Last 3 Encounters:   05/22/18 106/64   05/18/18 104/70   01/04/18 112/72    Weight from Last 3 Encounters:   05/22/18 172 lb (78 kg)   01/04/18 170 lb 6.4 oz (77.3 kg)   12/27/17 168 lb (76.2 kg)              We Performed the Following     GASTROENTEROLOGY ADULT REF PROCEDURE ONLY Other; [place] [facility] [phone]     MENTAL HEALTH REFERRAL  - Adult; Outpatient Treatment; Individual/Couples/Family/Group Therapy/Health Psychology; Other: Not Listed - Enter Referral Details in Scheduling Comments Below          Today's Medication Changes          These changes are accurate as of 5/22/18  1:43 PM.  If you have any questions, ask your nurse or doctor.               Start taking these medicines.        Dose/Directions    * amphetamine-dextroamphetamine 30 MG per 24 hr capsule   Commonly known as:  ADDERALL XR   Used for:  ADHD (attention deficit hyperactivity disorder), inattentive type   Started by:  Kian Morris PA-C        Dose:  30 mg   Take 1 capsule (30 mg) by mouth daily (Fill on or after 6/21/18)   Quantity:  30 capsule   Refills:  0       * amphetamine-dextroamphetamine 20 MG per 24 hr capsule   Commonly known as:  ADDERALL XR   Used for:  ADHD (attention deficit hyperactivity disorder), inattentive type   Started by:  Kian Morris PA-C        Dose:  20 mg   Take 1 capsule (20 mg) by mouth daily (Fill on or after 6/21/18)   Quantity:  30 capsule   Refills:  0       * Notice:  This list has 2 medication(s) that " are the same as other medications prescribed for you. Read the directions carefully, and ask your doctor or other care provider to review them with you.      These medicines have changed or have updated prescriptions.        Dose/Directions    fluocinonide 0.05 % solution   Commonly known as:  LIDEX   This may have changed:  additional instructions   Used for:  Itchy scalp   Changed by:  Kian Morris PA-C        Apply sparingly to affected area twice daily as needed.  Do not apply to face.   Quantity:  60 mL   Refills:  5            Where to get your medicines      These medications were sent to [id number] - [place], [place] - [address]  [address], [place] [address]     Phone:  [phone]     buPROPion 300 MG 24 hr tablet    fluocinonide 0.05 % solution         Some of these will need a paper prescription and others can be bought over the counter.  Ask your nurse if you have questions.     Bring a paper prescription for each of these medications     amphetamine-dextroamphetamine 20 MG per 24 hr capsule    amphetamine-dextroamphetamine 30 MG per 24 hr capsule                Primary Care Provider Office Phone # Fax #    Kian Morris PA-C [phone]       [address] [facility] [address]        Equal Access to Services     SHOAIB MACIAS AH: Hadii jarred franz hadasho Soomaali, waaxda luqadaha, qaybta kaalmada adeegyada, josesito wilks. So [facility] [phone].    ATENCIÓN: Si habla español, tiene a ch disposición servicios gratuitos de asistencia lingüística. Gualberto al [phone].    We comply with applicable federal civil rights laws and [place] laws. We do not discriminate on the basis of race, color, national origin, age, disability, sex, sexual orientation, or gender identity.            Thank you!     Thank you for choosing [facility]  for your care. Our goal is always to provide you with excellent care.  Hearing back from our patients is one way we can continue to improve our services. Please take a few minutes to complete the written survey that you may receive in the mail after your visit with us. Thank you!             Your Updated Medication List - Protect others around you: Learn how to safely use, store and throw away your medicines at www.disposemymeds.org.          This list is accurate as of 5/22/18  1:43 PM.  Always use your most recent med list.                   Brand Name Dispense Instructions for use Diagnosis    * amphetamine-dextroamphetamine 30 MG per 24 hr capsule    ADDERALL XR    30 capsule    Take 1 capsule (30 mg) by mouth daily (Fill on or after 6/21/18)    ADHD (attention deficit hyperactivity disorder), inattentive type       * amphetamine-dextroamphetamine 20 MG per 24 hr capsule    ADDERALL XR    30 capsule    Take 1 capsule (20 mg) by mouth daily (Fill on or after 6/21/18)    ADHD (attention deficit hyperactivity disorder), inattentive type       ascorbic acid 500 MG tablet    VITAMIN C     Take by mouth daily        buPROPion 300 MG 24 hr tablet    WELLBUTRIN XL    90 tablet    Take 1 tablet (300 mg) by mouth every morning    Dysthymia       CALCIUM + D PO      2 tablet daily - 600mg total        cyanocobalamin 1000 MCG tablet    vitamin  B-12     Take 1,000 mcg by mouth every other day        fish oil-omega-3 fatty acids 1000 MG capsule      Take 2 g by mouth daily.        fluocinonide 0.05 % solution    LIDEX    60 mL    Apply sparingly to affected area twice daily as needed.  Do not apply to face.    Itchy scalp       Multi-vitamin Tabs tablet   Generic drug:  multivitamin, therapeutic with minerals      1 TABLET DAILY        vitamin B complex with vitamin C Tabs tablet      Take 1 tablet by mouth every other day        zinc 50 MG Tabs           * Notice:  This list has 2 medication(s) that are the same as other medications prescribed for you. Read the directions carefully, and ask  your doctor or other care provider to review them with you.

## 2018-05-22 NOTE — PROGRESS NOTES
"  SUBJECTIVE:   Carrillo Dunlap is a 53 year old male who presents to clinic today for the following health issues:    Depression Followup    Status since last visit: Stable     See PHQ-9 for current symptoms.  Other associated symptoms: None    Complicating factors:   Significant life event:  Yes-  Mom has moved into old folks home   Current substance abuse:  None  Anxiety or Panic symptoms:  Yes-  Possibly more anxious    PHQ-9 3/22/2016 9/23/2016 7/21/2017   Total Score 7 8 8   Q9: Suicide Ideation Not at all Not at all Not at all       PHQ-9  English  PHQ-9   Any Language  Suicide Assessment Five-step Evaluation and Treatment (SAFE-T)    Amount of exercise or physical activity: 3 days/week for an average of 30-45 minutes    Problems taking medications regularly: Yes,  problems remembering to take and staying organized with his medications    Medication side effects: dry mouth    Diet: regular (no restrictions)    Medication Followup of fluocinonide    Taking Medication as prescribed: yes    Side Effects:  None    Medication Helping Symptoms:  Yes, keeps them from getting more irritated     Medication Followup of Adderall XR    Taking Medication as prescribed: yes    Side Effects:  Trouble sleeping    Medication Helping Symptoms:  yes    Problem list and histories reviewed & adjusted, as indicated.  Additional history: as documented    Labs reviewed in EPIC    Reviewed and updated as needed this visit by clinical staff  Tobacco  Allergies  Meds  Med Hx  Surg Hx  Fam Hx  Soc Hx      Reviewed and updated as needed this visit by Provider         ROS:  Constitutional, HEENT, cardiovascular, pulmonary, gi and gu systems are negative, except as otherwise noted.    OBJECTIVE:     /64 (Cuff Size: Adult Large)  Pulse 80  Temp 100.1  F (37.8  C) (Oral)  Ht 5' 8\" (1.727 m)  Wt 172 lb (78 kg)  BMI 26.15 kg/m2  Body mass index is 26.15 kg/(m^2).  GENERAL: healthy, alert and no distress  Psych: " Appropriate appearance.  Alert and oriented times 3; coherent speech, normal   rate and volume, able to articulate logical thoughts, able   to abstract reason, no tangential thoughts, no hallucinations   or delusions.  Normal behavior.  His affect is bright.      ASSESSMENT/PLAN:     (F90.0) ADHD (attention deficit hyperactivity disorder), inattentive type  (primary encounter diagnosis)  Comment:   Plan: amphetamine-dextroamphetamine (ADDERALL XR) 30         MG per 24 hr capsule,         amphetamine-dextroamphetamine (ADDERALL XR) 20         MG per 24 hr capsule, DISCONTINUED:         amphetamine-dextroamphetamine (ADDERALL XR) 20         MG per 24 hr capsule, DISCONTINUED:         amphetamine-dextroamphetamine (ADDERALL XR) 30         MG per 24 hr capsule        Stable - doing okay. Recommend he continue regimen. Self cares and other methods of managing his ADD with a non medication methods recommended.     (Z12.11) Screen for colon cancer  Comment:   Plan: GASTROENTEROLOGY ADULT REF PROCEDURE ONLY         Other; [facility] [phone]        Referral given    (F34.1) Dysthymia  Comment:   Plan: buPROPion (WELLBUTRIN XL) 300 MG 24 hr tablet,         MENTAL HEALTH REFERRAL  - Adult; Outpatient         Treatment; Individual/Couples/Family/Group         Therapy/Health Psychology; Other: Not Listed -         Enter Referral Details in Scheduling Comments         Below        I think a bit worse today. He's really quite anxious today and doing a lot of negative self talk about past and issues he feels he needs to address without finishing them. I suggested he focus on one thing at a time. He wants to continue his current regimen. I really think getting back in with a therapist is a very good idea.     (L29.9) Itchy scalp  Comment:   Plan: fluocinonide (LIDEX) 0.05 % solution        Refills given.     ZORAIDA DOHERTY PA-C  [facility]

## 2018-05-22 NOTE — NURSING NOTE
Handed patient 4 printed out prescription for:    2 for:  amphetamine-dextroamphetamine (ADDERALL XR) 30 MG per 24 hr capsule  2 for:  amphetamine-dextroamphetamine (ADDERALL XR) 20 MG per 24 hr capsule    Jennifer Youssef, CMA

## 2018-05-22 NOTE — PATIENT INSTRUCTIONS
1. Ravinder Youssef's books are good - Daring Greatly is about shame (and indirectly perfection) and Gifts of Imperfection

## 2018-05-23 ASSESSMENT — PATIENT HEALTH QUESTIONNAIRE - PHQ9: SUM OF ALL RESPONSES TO PHQ QUESTIONS 1-9: 9

## 2018-05-23 ASSESSMENT — ANXIETY QUESTIONNAIRES: GAD7 TOTAL SCORE: 13

## 2018-06-14 ENCOUNTER — OFFICE VISIT (OUTPATIENT)
Dept: PHARMACY | Facility: CLINIC | Age: 53
End: 2018-06-14
Payer: COMMERCIAL

## 2018-06-14 VITALS — HEART RATE: 89 BPM | DIASTOLIC BLOOD PRESSURE: 72 MMHG | SYSTOLIC BLOOD PRESSURE: 109 MMHG

## 2018-06-14 DIAGNOSIS — F90.0 ADHD (ATTENTION DEFICIT HYPERACTIVITY DISORDER), INATTENTIVE TYPE: Primary | ICD-10-CM

## 2018-06-14 DIAGNOSIS — F33.0 MILD EPISODE OF RECURRENT MAJOR DEPRESSIVE DISORDER (H): ICD-10-CM

## 2018-06-14 DIAGNOSIS — F41.1 GENERALIZED ANXIETY DISORDER: ICD-10-CM

## 2018-06-14 DIAGNOSIS — G47.09 OTHER INSOMNIA: ICD-10-CM

## 2018-06-14 PROCEDURE — 99606 MTMS BY PHARM EST 15 MIN: CPT | Performed by: PHARMACIST

## 2018-06-14 NOTE — Clinical Note
Hello,  I had a follow up visit with this patient today.  I think, at this point, Carrillo is going to benefit most from individual therapy.  I provided the [facility]. I encouraged him to take the the Adderall all in the morning, which has improved, but he is still dividing the dose on some days.  He did report feeling better since significantly cutting back on caffeine.  I'll follow up with him in a few months.  Please let me know if you have questions!  Maria Ines Chaney, PharmD Medication Therapy Management Pharmacist [facility] Phone: [phone]

## 2018-06-14 NOTE — PROGRESS NOTES
"SUBJECTIVE/OBJECTIVE:                           Carrillo Dunlap is a 53 year old male coming in for follow-up visit for Medication Therapy Management.  He was self-referred to me.     Chief Complaint: To check in on how Adderall is going.    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 He does not have a set schedule.     ADHD: Current therapy is Adderall XR 50 mg daily. He states that the Adderall helps \"at least a bit.\" Some days he chooses to divide the dosing. He feels overwhelmed with all of the tasks he wants to accomplish each day and he does not feel he is able to focus on one task. He states that he easily becomes distracted at home, so it may be helpful to try working outside of the house. He mentions trying to write a \"JUDIT\" (most important thing) each day and trying to focus on the JUDIT before other tasks. He also started to use the \"focus richar\" on the computer, which limits what he can do on the computer during his work time.     Past meds: Concerta x 1 month in 12/2015- didn't work    Insomnia: No current drug therapy. He states that he doesn't have much structure, which makes having a sleep schedule challenging. He stated that the big difference he noticed in sleep improvement was due to cutting back caffeine later in the day. He now adds decaf to his coffee to decrease his overall caffeine intake, but to still allow himself to drink coffee throughout the " day.    Depression/Anxiety: Pt currently taking bupropion ER 300mg QAM, which was originally started for combination of depression, smoking cessation, and ADHD. Pt feels unmotivated to do his work because he does not enjoy what he is doing. He states that his lack of confidence is keeping him from applying to other positions. He feels that he has failed and he is frustrated that he does not know how to let go of past decisions and life events. He expresses that he wants to talk to a therapist, but does not recall whom he should call.     Citalopram x 6 months in 2010  Sertraline x 4-5 months in 2011    Current labs include:  BP Readings from Last 3 Encounters:   05/22/18 106/64   05/18/18 104/70   01/04/18 112/72     Lab Results   Component Value Date    A1C 5.9 05/02/2008   .  Lab Results   Component Value Date    CHOL 207 07/21/2017     Lab Results   Component Value Date    TRIG 62 07/21/2017     Lab Results   Component Value Date    HDL 49 07/21/2017     Lab Results   Component Value Date     07/21/2017     Last Basic Metabolic Panel:  Lab Results   Component Value Date     11/16/2009      Lab Results   Component Value Date    POTASSIUM 4.5 11/16/2009     Lab Results   Component Value Date    CHLORIDE 104 11/16/2009     Lab Results   Component Value Date    BUN 15 11/16/2009     Lab Results   Component Value Date    CR 0.80 11/16/2009     ASSESSMENT:                             Not all current medications were reviewed today.     Medication Adherence: good, though there are days that he chooses to not take both the 20 mg and 30 mg Adderall capsule together    ADHD: Encouraged pt to continue taking the full 50 mg Adderall dose in the morning. It seems that reported difficulty with focus is likely related to depression in addition to some ADHD component.  Discussed techniques to aid in focus each day, such as trying to write a JUDIT each evening to prepare for the day ahead and going to the library to  complete work.    Insomnia: Stable. Reinforced the importance of decreasing caffeine intake later in the day.     Depression/Anxiety: Pt would benefit from starting individual therapy again- pt agreed. Provided pt with contact information for the [facility]. Continue current mediation for now.  Bupropion may be stimulating, but is difficult to assess at this time.  May consider alternate antidepressant in the future.    PLAN:                            1. Pt will continue to take Adderall 50mg QAM  2. Pt will look into restarting individual therapy.  3. Review antidepressant options at future appointment.     I spent 30 minutes with this patient today.  A copy of the visit note was provided to the patient's primary care provider.    Will follow up in three months.    The patient was given a summary of these recommendations as an after visit summary.     Maria Ines Chaney, PharmD  Medication Therapy Management Pharmacist  [facility]  Phone: [phone]  Moriah Collins, Pharm-D4

## 2018-06-14 NOTE — PATIENT INSTRUCTIONS
"Recommendations from today's MTM visit:                                                      1. Do your best to go to the library to do work.    2. Continue to decrease your caffeine intake later in the day.    3. Use a calendar or a journal to write your \"JUDIT\" for the day the night before. Do your best to focus on your JUDIT throughout the day. Try to prepare your workplace at night for the next day.     4. Try your best to take the Adderall XR 50 mg in the morning    5. The [facility] number is [phone]      Next MTM visit: September 6, 2018 at 11:00 am    To schedule another MTM appointment, please call the clinic directly or you may call the MTM scheduling line at [phone] or toll-free at [phone].     My Clinical Pharmacist's contact information:                                                      It was a pleasure seeing you today!  Please feel free to contact me with any questions or concerns you have.      Maria Ines Chaney, PharmD  Medication Therapy Management Pharmacist  [facility]  Phone: [phone]  Moriahzuleika Collins Pharm-D4    You may receive a survey about the MTM services you received.  I would appreciate your feedback to help me serve you better in the future. Please fill it out and return it when you can. Your comments will be anonymous.          "

## 2018-06-20 ENCOUNTER — MYC REFILL (OUTPATIENT)
Dept: FAMILY MEDICINE | Facility: CLINIC | Age: 53
End: 2018-06-20

## 2018-06-20 ENCOUNTER — MYC MEDICAL ADVICE (OUTPATIENT)
Dept: FAMILY MEDICINE | Facility: CLINIC | Age: 53
End: 2018-06-20

## 2018-06-20 DIAGNOSIS — F90.0 ADHD (ATTENTION DEFICIT HYPERACTIVITY DISORDER), INATTENTIVE TYPE: ICD-10-CM

## 2018-06-29 RX ORDER — DEXTROAMPHETAMINE SACCHARATE, AMPHETAMINE ASPARTATE MONOHYDRATE, DEXTROAMPHETAMINE SULFATE AND AMPHETAMINE SULFATE 5; 5; 5; 5 MG/1; MG/1; MG/1; MG/1
20 CAPSULE, EXTENDED RELEASE ORAL DAILY
Qty: 30 CAPSULE | Refills: 0 | Status: SHIPPED | OUTPATIENT
Start: 2018-06-29 | End: 2018-10-10 | Stop reason: DRUGHIGH

## 2018-06-29 RX ORDER — DEXTROAMPHETAMINE SACCHARATE, AMPHETAMINE ASPARTATE MONOHYDRATE, DEXTROAMPHETAMINE SULFATE AND AMPHETAMINE SULFATE 5; 5; 5; 5 MG/1; MG/1; MG/1; MG/1
20 CAPSULE, EXTENDED RELEASE ORAL DAILY
Qty: 30 CAPSULE | Refills: 0 | Status: SHIPPED | OUTPATIENT
Start: 2018-06-29 | End: 2018-06-29

## 2018-06-29 RX ORDER — DEXTROAMPHETAMINE SACCHARATE, AMPHETAMINE ASPARTATE MONOHYDRATE, DEXTROAMPHETAMINE SULFATE AND AMPHETAMINE SULFATE 7.5; 7.5; 7.5; 7.5 MG/1; MG/1; MG/1; MG/1
30 CAPSULE, EXTENDED RELEASE ORAL DAILY
Qty: 30 CAPSULE | Refills: 0 | Status: SHIPPED | OUTPATIENT
Start: 2018-06-29 | End: 2018-06-29

## 2018-06-29 RX ORDER — DEXTROAMPHETAMINE SACCHARATE, AMPHETAMINE ASPARTATE MONOHYDRATE, DEXTROAMPHETAMINE SULFATE AND AMPHETAMINE SULFATE 7.5; 7.5; 7.5; 7.5 MG/1; MG/1; MG/1; MG/1
30 CAPSULE, EXTENDED RELEASE ORAL DAILY
Qty: 30 CAPSULE | Refills: 0 | Status: SHIPPED | OUTPATIENT
Start: 2018-06-29 | End: 2018-10-15 | Stop reason: DRUGHIGH

## 2018-06-29 NOTE — TELEPHONE ENCOUNTER
Prescription of amphetamine-dextroamphetamine (ADDERALL XR) 30 MG (x3) and amphetamine-dextroamphetamine (ADDERALL XR) 20 MG (x3) was brought to the  and patient informed to .

## 2018-09-05 ENCOUNTER — TELEPHONE (OUTPATIENT)
Dept: FAMILY MEDICINE | Facility: CLINIC | Age: 53
End: 2018-09-05

## 2018-09-05 NOTE — TELEPHONE ENCOUNTER
9/5/2018    Call Regarding Preventive Health Screening Colonoscopy       Attempt 1    Message on voicemail     Comments:       Outreach   SV

## 2018-09-06 ENCOUNTER — OFFICE VISIT (OUTPATIENT)
Dept: PHARMACY | Facility: CLINIC | Age: 53
End: 2018-09-06
Payer: COMMERCIAL

## 2018-09-06 VITALS — SYSTOLIC BLOOD PRESSURE: 120 MMHG | DIASTOLIC BLOOD PRESSURE: 76 MMHG | HEART RATE: 91 BPM

## 2018-09-06 DIAGNOSIS — F41.1 GENERALIZED ANXIETY DISORDER: ICD-10-CM

## 2018-09-06 DIAGNOSIS — F90.0 ADHD (ATTENTION DEFICIT HYPERACTIVITY DISORDER), INATTENTIVE TYPE: Primary | ICD-10-CM

## 2018-09-06 DIAGNOSIS — G47.09 OTHER INSOMNIA: ICD-10-CM

## 2018-09-06 DIAGNOSIS — F33.0 MILD EPISODE OF RECURRENT MAJOR DEPRESSIVE DISORDER (H): ICD-10-CM

## 2018-09-06 PROCEDURE — 99606 MTMS BY PHARM EST 15 MIN: CPT | Performed by: PHARMACIST

## 2018-09-06 NOTE — Clinical Note
Hello,  I had MTM follow up with this patient to check in on ADHD.  He was able to establish care with an individual therapist.  Please see note as marsha.  I will follow up with him in 3 months.  Thank you, Maria Ines Chaney, PharmD Medication Therapy Management Pharmacist [facility] Phone: [phone]

## 2018-09-06 NOTE — PATIENT INSTRUCTIONS
Recommendations from today's MTM visit:                                                        1. Call [facility] at [phone] to get a new primary care doctor at [facility].    2. Continue taking your Adderall 50mg in the morning on days when that is helpful.  It's ok to divide it if you need some extra boost in the afternoon.    Next MTM visit: 12/3/18 at 12pm    To schedule another MTM appointment, please call the clinic directly or you may call the MTM scheduling line at [phone] or toll-free at [phone].     My Clinical Pharmacist's contact information:                                                      It was a pleasure seeing you today!  Please feel free to contact me with any questions or concerns you have.      Maria Ines Chaney, PharmD  Medication Therapy Management Pharmacist  [facility]  Phone: [phone]    You may receive a survey about the MTM services you received.  I would appreciate your feedback to help me serve you better in the future. Please fill it out and return it when you can. Your comments will be anonymous.

## 2018-09-06 NOTE — MR AVS SNAPSHOT
After Visit Summary   9/6/2018    Carrillo Dunlap    MRN: [id number]           Patient Information     Date Of Birth          1965        Visit Information        Provider Department      9/6/2018 11:00 AM Maria Ines Chaney RPH [facility]        Care Instructions    Recommendations from today's MTM visit:                                                        1. Call [place] scheduling at [phone] to get a new primary care doctor at [facility].    2. Continue taking your Adderall 50mg in the morning on days when that is helpful.  It's ok to divide it if you need some extra boost in the afternoon.    Next MTM visit: 12/3/18 at 12pm    To schedule another MTM appointment, please call the clinic directly or you may call the MTM scheduling line at [phone] or toll-free at [phone].     My Clinical Pharmacist's contact information:                                                      It was a pleasure seeing you today!  Please feel free to contact me with any questions or concerns you have.      Maria Ines Chaney, PharmD  Medication Therapy Management Pharmacist  [facility]  Phone: [phone]    You may receive a survey about the MTM services you received.  I would appreciate your feedback to help me serve you better in the future. Please fill it out and return it when you can. Your comments will be anonymous.          Follow-ups after your visit        Your next 10 appointments already scheduled     Dec 03, 2018 12:00 PM CST   Office Visit with Maria Ines Chaney RPH   [facility] ([facility])    [address] [place] [id number] [phone]           Bring a current list of meds and any records pertaining to this visit. For Physicals, please bring immunization records and any forms  needing to be filled out. Please arrive 10 minutes early to complete paperwork.              Who to contact     If you have questions or need follow up information about today's clinic visit or your schedule please contact [facility] directly at [phone].  Normal or non-critical lab and imaging results will be communicated to you by MyChart, letter or phone within 4 business days after the clinic has received the results. If you do not hear from us within 7 days, please contact the clinic through [employer] or phone. If you have a critical or abnormal lab result, we will notify you by phone as soon as possible.  Submit refill requests through [employer] or call your pharmacy and they will forward the refill request to us. Please allow 3 business days for your refill to be completed.          Additional Information About Your Visit        [employer] Information     [employer] gives you secure access to your electronic health record. If you see a primary care provider, you can also send messages to your care team and make appointments. If you have questions, please call your primary care clinic.  If you do not have a primary care provider, please call [phone] and they will assist you.        Care EveryWhere ID     This is your Care EveryWhere ID. This could be used by other organizations to access your [place] medical records  [id number]         Blood Pressure from Last 3 Encounters:   06/14/18 109/72   05/22/18 106/64   05/18/18 104/70    Weight from Last 3 Encounters:   05/22/18 172 lb (78 kg)   01/04/18 170 lb 6.4 oz (77.3 kg)   12/27/17 168 lb (76.2 kg)              Today, you had the following     No orders found for display       Primary Care Provider Office Phone # Fax #    Kian Morris PA-C [phone] [address] [facility] [address]        Equal Access to Services     SHOAIB MACIAS AH: lisandro Merrill qaybta  josesito morleyjose [place] ah. Eliana [facility] [phone].    ATENCIÓN: Si twin kuhn, tiene a ch disposición servicios gratuitos de asistencia lingüística. Gualberto al [phone].    We comply with applicable federal civil rights laws and [place] laws. We do not discriminate on the basis of race, color, national origin, age, disability, sex, sexual orientation, or gender identity.            Thank you!     Thank you for choosing [facility]  for your care. Our goal is always to provide you with excellent care. Hearing back from our patients is one way we can continue to improve our services. Please take a few minutes to complete the written survey that you may receive in the mail after your visit with us. Thank you!             Your Updated Medication List - Protect others around you: Learn how to safely use, store and throw away your medicines at www.disposemymeds.org.          This list is accurate as of 9/6/18 11:42 AM.  Always use your most recent med list.                   Brand Name Dispense Instructions for use Diagnosis    * amphetamine-dextroamphetamine 20 MG per 24 hr capsule    ADDERALL XR    30 capsule    Take 1 capsule (20 mg) by mouth daily (Fill on or after 8/27/18)    ADHD (attention deficit hyperactivity disorder), inattentive type       * amphetamine-dextroamphetamine 30 MG per 24 hr capsule    ADDERALL XR    30 capsule    Take 1 capsule (30 mg) by mouth daily (Fill on or after 8/27/18)    ADHD (attention deficit hyperactivity disorder), inattentive type       ascorbic acid 500 MG tablet    VITAMIN C     Take by mouth daily        buPROPion 300 MG 24 hr tablet    WELLBUTRIN XL    90 tablet    Take 1 tablet (300 mg) by mouth every morning    Dysthymia       CALCIUM + D PO      2 tablet daily - 600mg total        cyanocobalamin 1000 MCG tablet    vitamin  B-12     Take 1,000 mcg by mouth every other day        fish oil-omega-3 fatty  acids 1000 MG capsule      Take 2 g by mouth daily.        fluocinonide 0.05 % solution    LIDEX    60 mL    Apply sparingly to affected area twice daily as needed.  Do not apply to face.    Itchy scalp       Multi-vitamin Tabs tablet   Generic drug:  multivitamin, therapeutic with minerals      1 TABLET DAILY        vitamin B complex with vitamin C Tabs tablet      Take 1 tablet by mouth every other day        zinc 50 MG Tabs           * Notice:  This list has 2 medication(s) that are the same as other medications prescribed for you. Read the directions carefully, and ask your doctor or other care provider to review them with you.

## 2018-09-06 NOTE — PROGRESS NOTES
"SUBJECTIVE/OBJECTIVE:                           Carrillo Dunlap is a 53 year old male coming in for follow-up visit for Medication Therapy Management.  He was self-referred to me.   Pt reported he would like a new PCP clinic that is closer to his home.    Chief Complaint: To check in on how Adderall is going.    Allergies/ADRs: Reviewed in Epic  Tobacco: 0-1 pack per day - is not interested in quittingTobacco Cessation Action Plan: Information offered: Patient not interested at this time   Has nicotine gum for strong cravings where he can't smoke; Is reading \"Easy way to quit smoking\" and finds it helpful  Chantix- lots of irritability  Alcohol: not currently using  Caffeine: down to 1/4 caf coffee  Activity: walking- helpful  PMH: Reviewed in Epic; S/P Sharmin en KAT 2008    Medication Adherence/Access: Pt reported improving adherence to Adderall XR 50 mg once daily in the morning regime.  On occasions where his work period is delayed until the afternoon, he reported splitting doses to 30 mg in the morning and 20 mg in the early afternoon to try an extend the medication's effective period.     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 He does not have a set schedule.     ADHD: Pt has been taking Adderall XR 50mg qam, which is a change from divided dosing at last visit, and reported noticing increased motivation after taking it. He has had days where he still divides the dosing, mostly when he has work to do in the afternoon.  Pt reported that 50mg dose seems to work better, whereas 20-30mg dose has less robust effect, but he appreciates the divided dose option. Pt reported that both doses last about 4-5 hours prior to wearing off.  Pt also reported that he has regular tinnitus and notices it \"getting louder\" when Adderall is wearing off.    Pt is still working on web design, which he does not enjoy, making it difficult to find internal motivation.  He reported feeling frustrated that " "he \"hasn't done anything this summer.\" Pt did describe having improved structure to his day, prioritizing work in the morning, when he knows he can be most productive.  He continues to work on staying focused on the task at hand, in addition to acceptance of not completing his entire checklist in a day.    Insomnia: No current drug therapy. Patient reported that failure to accomplish work goals in a timely manner play into his insomnia.  As above, improved structure to his day is helping to reduce feeling of needing to stay up late to finish tasks. He reported reducing his caffeine intake to 1/4 caffeinated coffee.    Depression/Anxiety: Pt currently taking bupropion ER 300mg QAM, which was originally started for combination of depression, smoking cessation, and ADHD. Pt reported modest improvement in symptoms, but struggles with completing tasks.  He advised that bupropion has taken \"some of the fun out of smoking\", but that although he knows he needs to quit he is not ready at this time.  He has had an initial appointment with Gabino Gibbons, PhD of [facility] and plans to continue seeing him for individual therapy.    Citalopram x 6 months in 2010  Sertraline x 4-5 months in 2011    Current labs include:  BP Readings from Last 3 Encounters:   09/06/18 120/76   06/14/18 109/72   05/22/18 106/64       Lab Results   Component Value Date    CHOL 207 07/21/2017     Lab Results   Component Value Date    TRIG 62 07/21/2017     Lab Results   Component Value Date    HDL 49 07/21/2017     Lab Results   Component Value Date     07/21/2017     ASSESSMENT:                             Not all current medications were reviewed today.     Medication Adherence: good, though there are days that he chooses to not take both the 20 mg and 30 mg Adderall capsule together.    ADHD: Improved focus, as patient has worked on creating structure to his day. Encouraged pt to continue taking the full 50 mg Adderall " dose in the morning, though he can divide dosing or skip days as needed depending on the day's needs.    Insomnia: Improving.  Patient's reported decrease in caffeine intake has helped modestly.  Continued concerns appear tied to desire to complete unfinished work.  Reinforced the importance of not staying up to complete tasks patient was unable to finish at the expense of sleep.     Depression/Anxiety: Encouraged continued follow up with new psychologist to create ongoing plan for individual therapy.    PLAN:                            1. Call [facility] at [phone] to get a new primary care doctor at [facility].     2. Continue taking your Adderall 50mg in the morning on days when that is helpful.  It's ok to divide it if you need some extra boost in the afternoon.    I spent 30 minutes with this patient today.  A copy of the visit note was provided to the patient's primary care provider.    Will follow up in three months.  Next MTM visit is scheduled for 12/3/18 at 12pm.    The patient was given a summary of these recommendations as an after visit summary.     Maria Ines Chaney, PharmD  Medication Therapy Management Pharmacist  [facility]  Phone: [phone]  Carrillo Sparks PD4 Student

## 2018-09-12 ENCOUNTER — OFFICE VISIT (OUTPATIENT)
Dept: FAMILY MEDICINE | Facility: CLINIC | Age: 53
End: 2018-09-12
Payer: COMMERCIAL

## 2018-09-12 VITALS
WEIGHT: 174 LBS | BODY MASS INDEX: 26.46 KG/M2 | OXYGEN SATURATION: 99 % | RESPIRATION RATE: 18 BRPM | HEART RATE: 82 BPM | SYSTOLIC BLOOD PRESSURE: 108 MMHG | DIASTOLIC BLOOD PRESSURE: 64 MMHG | TEMPERATURE: 97.9 F

## 2018-09-12 DIAGNOSIS — Z23 NEED FOR PROPHYLACTIC VACCINATION AND INOCULATION AGAINST INFLUENZA: ICD-10-CM

## 2018-09-12 DIAGNOSIS — F90.0 ADHD (ATTENTION DEFICIT HYPERACTIVITY DISORDER), INATTENTIVE TYPE: ICD-10-CM

## 2018-09-12 DIAGNOSIS — Z13.220 LIPID SCREENING: ICD-10-CM

## 2018-09-12 DIAGNOSIS — Z12.11 SCREEN FOR COLON CANCER: ICD-10-CM

## 2018-09-12 DIAGNOSIS — F32.1 MODERATE MAJOR DEPRESSION (H): ICD-10-CM

## 2018-09-12 DIAGNOSIS — Z13.1 SCREENING FOR DIABETES MELLITUS: ICD-10-CM

## 2018-09-12 DIAGNOSIS — Z11.4 SCREENING FOR HIV (HUMAN IMMUNODEFICIENCY VIRUS): ICD-10-CM

## 2018-09-12 DIAGNOSIS — F41.1 GENERALIZED ANXIETY DISORDER: ICD-10-CM

## 2018-09-12 DIAGNOSIS — Z76.89 ESTABLISHING CARE WITH NEW DOCTOR, ENCOUNTER FOR: Primary | ICD-10-CM

## 2018-09-12 LAB
ALBUMIN SERPL-MCNC: 3.6 G/DL (ref 3.4–5)
ALP SERPL-CCNC: 89 U/L (ref 40–150)
ALT SERPL W P-5'-P-CCNC: 27 U/L (ref 0–70)
ANION GAP SERPL CALCULATED.3IONS-SCNC: 7 MMOL/L (ref 3–14)
AST SERPL W P-5'-P-CCNC: 26 U/L (ref 0–45)
BILIRUB SERPL-MCNC: 0.6 MG/DL (ref 0.2–1.3)
BUN SERPL-MCNC: 13 MG/DL (ref 7–30)
CALCIUM SERPL-MCNC: 8.6 MG/DL (ref 8.5–10.1)
CHLORIDE SERPL-SCNC: 106 MMOL/L (ref 94–109)
CHOLEST SERPL-MCNC: 174 MG/DL
CO2 SERPL-SCNC: 28 MMOL/L (ref 20–32)
CREAT SERPL-MCNC: 0.83 MG/DL (ref 0.66–1.25)
GFR SERPL CREATININE-BSD FRML MDRD: >90 ML/MIN/1.7M2
GLUCOSE SERPL-MCNC: 85 MG/DL (ref 70–99)
HDLC SERPL-MCNC: 44 MG/DL
LDLC SERPL CALC-MCNC: 119 MG/DL
NONHDLC SERPL-MCNC: 130 MG/DL
POTASSIUM SERPL-SCNC: 3.9 MMOL/L (ref 3.4–5.3)
PROT SERPL-MCNC: 7.1 G/DL (ref 6.8–8.8)
SODIUM SERPL-SCNC: 141 MMOL/L (ref 133–144)
TRIGL SERPL-MCNC: 56 MG/DL
TSH SERPL DL<=0.005 MIU/L-ACNC: 1.73 MU/L (ref 0.4–4)

## 2018-09-12 PROCEDURE — 90686 IIV4 VACC NO PRSV 0.5 ML IM: CPT | Performed by: NURSE PRACTITIONER

## 2018-09-12 PROCEDURE — 80061 LIPID PANEL: CPT | Performed by: NURSE PRACTITIONER

## 2018-09-12 PROCEDURE — 87389 HIV-1 AG W/HIV-1&-2 AB AG IA: CPT | Performed by: NURSE PRACTITIONER

## 2018-09-12 PROCEDURE — 84443 ASSAY THYROID STIM HORMONE: CPT | Performed by: NURSE PRACTITIONER

## 2018-09-12 PROCEDURE — 90471 IMMUNIZATION ADMIN: CPT | Performed by: NURSE PRACTITIONER

## 2018-09-12 PROCEDURE — 36415 COLL VENOUS BLD VENIPUNCTURE: CPT | Performed by: NURSE PRACTITIONER

## 2018-09-12 PROCEDURE — 99214 OFFICE O/P EST MOD 30 MIN: CPT | Mod: 25 | Performed by: NURSE PRACTITIONER

## 2018-09-12 PROCEDURE — 80053 COMPREHEN METABOLIC PANEL: CPT | Performed by: NURSE PRACTITIONER

## 2018-09-12 RX ORDER — BUSPIRONE HYDROCHLORIDE 5 MG/1
TABLET ORAL
Qty: 150 TABLET | Refills: 0 | Status: SHIPPED | OUTPATIENT
Start: 2018-09-12 | End: 2018-12-14

## 2018-09-12 NOTE — PATIENT INSTRUCTIONS
It was a pleasure to meet you! For now, continue the Adderall and counseling.  I know you wish to get off Aderall eventually, we will continue to work on the best plan for you  Follow-up:  Please Return to Clinic in 1 month for Health maintenance exam or earlier if any concerns   Consider adding buspar start low and titrate up as ordered if you feel your anxiety continues to be not well controlled.       Treating Anxiety Disorders with Therapy    If you have an anxiety disorder, you don t have to suffer anymore. Treatment is available. Therapy (also called counseling) is often a helpful treatment for anxiety disorders. With therapy, a specially trained professional (therapist) helps you face and learn to manage your anxiety. Therapy can be short-term or long-term depending on your needs. In some cases, medicine may also be prescribed with therapy. It may take time before you notice how much therapy is helping, but stick with it. With therapy, you can feel better.  Cognitive behavioral therapy (CBT)  Cognitive behavioral therapy (CBT) teaches you to manage anxiety. It does this by helping you understand how you think and act when you re anxious. Research has shown CBT to be a very effective treatment for anxiety disorders. How CBT is run is almost like a class. It involves homework and activities to build skills that teach you to cope with anxiety step by step. It can be done in a group or one-on-one, and often takes place for a set number of sessions. CBT has two main parts:    Cognitive therapy helps you identify the negative, irrational thoughts that occur with your anxiety. You ll learn to replace these with more positive, realistic thoughts.    Behavioral therapy helps you change how you react to anxiety. You ll learn coping skills and methods for relaxing to help you better deal with anxiety.  Other forms of therapy  Other therapy methods may work better for you than CBT. Or, you may move from CBT to another  form of therapy as your treatment needs change. This may mean meeting with a therapist by yourself or in a group. Therapy can also help you work through problems in your life, such as drug or alcohol dependence, that may be making your anxiety worse.  Getting better takes time  Therapy will help you feel better and teach you skills to help manage anxiety long term. But change doesn t happen right away. It takes a commitment from you. And treatment only works if you learn to face the causes of your anxiety. So, you might feel worse before you feel better. This can sometimes make it hard to stick with it. But remember: Therapy is a very effective treatment. The results will be well worth it.  Helping yourself  If anxiety is wearing you down, here are some things you can do to cope:    Check with your doctor and rule out any physical problems that may be causing the anxiety symptoms.    If an anxiety disorder is diagnosed, seek mental healthcare. This is an illness and it can respond to treatment. Most types of anxiety disorders will respond to talk therapy and medicine.    Educate yourself about anxiety disorders. Keep track of helpful online resources and books you can use during stressful periods.    Try stress management techniques such as meditation.    Consider online or in-person support groups.    Don t fight your feelings. Anxiety feeds itself. The more you worry about it, the worse it gets. Instead, try to identify what might have triggered your anxiety. Then try to put this threat in perspective.    Keep in mind that you can t control everything about a situation. Change what you can and let the rest take its course.    Exercise -- it s a great way to relieve tension and help your body feel relaxed.    Examine your life for stress, and try to find ways to reduce it.    Avoid caffeine and nicotine, which can make anxiety symptoms worse.    Fight the temptation to turn to alcohol or unprescribed drugs for  relief. They only make things worse in the long run.   Date Last Reviewed: 1/1/2017 2000-2017 [employer]. [address] [facility], [place] [address]. All rights reserved. This information is not intended as a substitute for professional medical care. Always follow your healthcare professional's instructions.        Depression Affects Your Mind and Body  Everyone feels sad or  blue  from time to time for a few days or weeks. Depression is when these feelings don't go away and they interfere with daily life.  Depression is a real illness that can develop at any age. It is one of the most common mental health problems in the [place] Depression makes you feel sad, helpless, and hopeless. It gets in the way of your life and relationships. It inhibits your ability to think and act. But, with help, you can feel better again.      When I was depressed, I felt awful. I was so tired all the time I could hardly think, but at night I couldn t fall asleep. My head hurt. My stomach hurt. I didn t know what was wrong with me.    Depression affects your whole body  Brain chemicals affect your body as well as your mood. So depression may do more than just make you feel low. You may also feel bad physically. Depression can:    Cause trouble with mental tasks such as remembering, concentrating, or making decisions    Make you feel nervous and jumpy    Cause trouble sleeping. Or you may sleep too much    Change your appetite    Cause headaches, stomachaches, or other aches and pains    Drain your body of energy  Depression and other illness  It is common for people who have chronic health problems to also have depression. It can often be hard to tell which one caused the other. A person might become depressed after finding out they have a health problem. But some studies suggest being depressed may make certain health problems more likely. And some depressed people stop taking care of themselves. This may make them more  likely to get sick.  Date Last Reviewed: 1/1/2017 2000-2017 [employer]. [address] [place] [address], [place] [address]. All rights reserved. This information is not intended as a substitute for professional medical care. Always follow your healthcare professional's instructions.        Depression: Tips to Help Yourself    As your healthcare providers help treat your depression, you can also help yourself. Keep in mind that your illness affects you emotionally, physically, mentally, and socially. So full recovery will take time. Take care of your body and your soul, and be patient with yourself as you get better.  Self-care    Educate yourself. Read about treatment and medicine options. If you have the energy, attend local conferences or support groups. Keep a list of useful websites and helpful books and use them as needed. This illness is not your fault. Don t blame yourself for your depression.    Manage early symptoms. If you notice symptoms returning, experience triggers, or identify other factors that may lead to a depressive episode, get help as soon as possible. Ask trusted friends and family to monitor your behavior and let you know if they see anything of concern.    Work with your provider. Find a provider you can trust. Communicate honestly with that person and share information on your treatment for depression and your reaction to medicines.    Be prepared for a crisis. Know what to do if you experience a crisis. Keep the phone number of a crisis hotline and know the location of your community's urgent care centers and the closest emergency department.    Hold off on big decisions. Depression can cloud your judgment. So wait until you feel better before making major life decisions, such as changing jobs, moving, or getting  or .    Be patient. Recovering from depression is a process. Don t be discouraged if it takes some time to feel better.    Keep it simple. Depression saps  your energy and concentration. So you won t be able to do all the things you used to do. Set small goals and do what you can.    Be with others. Don t isolate yourself--you ll only feel worse. Try to be with other people. And take part in fun activities when you can. Go to a movie, ballgame, [employer] service, or social event. Talk openly with people you can trust. And accept help when it s offered.  Take care of your body  People with depression often lose the desire to take care of themselves. That only makes their problems worse. During treatment and afterward, make a point to:    Exercise. It s a great way to take care of your body. And studies have shown that exercise helps fight depression.    Avoid drugs and alcohol. These may ease the pain in the short term. But they ll only make your problems worse in the long run.    Get relief from stress. Ask your healthcare provider for relaxation exercises and techniques to help relieve stress.    Eat right. A balanced and healthy diet helps keep your body healthy.  Date Last Reviewed: 1/1/2017 2000-2017 [employer]. [address] [place] [address], [place] [address]. All rights reserved. This information is not intended as a substitute for professional medical care. Always follow your healthcare professional's instructions.

## 2018-09-12 NOTE — MR AVS SNAPSHOT
After Visit Summary   9/12/2018    Carrillo Dunlap    MRN: [id number]           Patient Information     Date Of Birth          1965        Visit Information        Provider Department      9/12/2018 8:00 AM Adali Merino APRN [facility]        Today's Diagnoses     Establishing care with new doctor, encounter for    -  1    Generalized anxiety disorder        Moderate major depression (H)        Screen for colon cancer        Screening for HIV (human immunodeficiency virus)        Need for prophylactic vaccination and inoculation against influenza        ADHD (attention deficit hyperactivity disorder), inattentive type        Screening for diabetes mellitus        Lipid screening          Care Instructions    It was a pleasure to meet you! For now, continue the Adderall and counseling.  I know you wish to get off Aderall eventually, we will continue to work on the best plan for you  Follow-up:  Please Return to Clinic in 1 month for Health maintenance exam or earlier if any concerns   Consider adding buspar start low and titrate up as ordered if you feel your anxiety continues to be not well controlled.       Treating Anxiety Disorders with Therapy    If you have an anxiety disorder, you don t have to suffer anymore. Treatment is available. Therapy (also called counseling) is often a helpful treatment for anxiety disorders. With therapy, a specially trained professional (therapist) helps you face and learn to manage your anxiety. Therapy can be short-term or long-term depending on your needs. In some cases, medicine may also be prescribed with therapy. It may take time before you notice how much therapy is helping, but stick with it. With therapy, you can feel better.  Cognitive behavioral therapy (CBT)  Cognitive behavioral therapy (CBT) teaches you to manage anxiety. It does this by helping you understand how you think and act when you re anxious. Research has  shown CBT to be a very effective treatment for anxiety disorders. How CBT is run is almost like a class. It involves homework and activities to build skills that teach you to cope with anxiety step by step. It can be done in a group or one-on-one, and often takes place for a set number of sessions. CBT has two main parts:    Cognitive therapy helps you identify the negative, irrational thoughts that occur with your anxiety. You ll learn to replace these with more positive, realistic thoughts.    Behavioral therapy helps you change how you react to anxiety. You ll learn coping skills and methods for relaxing to help you better deal with anxiety.  Other forms of therapy  Other therapy methods may work better for you than CBT. Or, you may move from CBT to another form of therapy as your treatment needs change. This may mean meeting with a therapist by yourself or in a group. Therapy can also help you work through problems in your life, such as drug or alcohol dependence, that may be making your anxiety worse.  Getting better takes time  Therapy will help you feel better and teach you skills to help manage anxiety long term. But change doesn t happen right away. It takes a commitment from you. And treatment only works if you learn to face the causes of your anxiety. So, you might feel worse before you feel better. This can sometimes make it hard to stick with it. But remember: Therapy is a very effective treatment. The results will be well worth it.  Helping yourself  If anxiety is wearing you down, here are some things you can do to cope:    Check with your doctor and rule out any physical problems that may be causing the anxiety symptoms.    If an anxiety disorder is diagnosed, seek mental healthcare. This is an illness and it can respond to treatment. Most types of anxiety disorders will respond to talk therapy and medicine.    Educate yourself about anxiety disorders. Keep track of helpful online resources and books  you can use during stressful periods.    Try stress management techniques such as meditation.    Consider online or in-person support groups.    Don t fight your feelings. Anxiety feeds itself. The more you worry about it, the worse it gets. Instead, try to identify what might have triggered your anxiety. Then try to put this threat in perspective.    Keep in mind that you can t control everything about a situation. Change what you can and let the rest take its course.    Exercise -- it s a great way to relieve tension and help your body feel relaxed.    Examine your life for stress, and try to find ways to reduce it.    Avoid caffeine and nicotine, which can make anxiety symptoms worse.    Fight the temptation to turn to alcohol or unprescribed drugs for relief. They only make things worse in the long run.   Date Last Reviewed: 1/1/2017 2000-2017 [employer]. [address] [place], [place] [address]. All rights reserved. This information is not intended as a substitute for professional medical care. Always follow your healthcare professional's instructions.        Depression Affects Your Mind and Body  Everyone feels sad or  blue  from time to time for a few days or weeks. Depression is when these feelings don't go away and they interfere with daily life.  Depression is a real illness that can develop at any age. It is one of the most common mental health problems in the [place] Depression makes you feel sad, helpless, and hopeless. It gets in the way of your life and relationships. It inhibits your ability to think and act. But, with help, you can feel better again.      When I was depressed, I felt awful. I was so tired all the time I could hardly think, but at night I couldn t fall asleep. My head hurt. My stomach hurt. I didn t know what was wrong with me.    Depression affects your whole body  Brain chemicals affect your body as well as your mood. So depression may do more than just make you  feel low. You may also feel bad physically. Depression can:    Cause trouble with mental tasks such as remembering, concentrating, or making decisions    Make you feel nervous and jumpy    Cause trouble sleeping. Or you may sleep too much    Change your appetite    Cause headaches, stomachaches, or other aches and pains    Drain your body of energy  Depression and other illness  It is common for people who have chronic health problems to also have depression. It can often be hard to tell which one caused the other. A person might become depressed after finding out they have a health problem. But some studies suggest being depressed may make certain health problems more likely. And some depressed people stop taking care of themselves. This may make them more likely to get sick.  Date Last Reviewed: 1/1/2017 2000-2017 [employer]. [address] [place] [address], [place] [address]. All rights reserved. This information is not intended as a substitute for professional medical care. Always follow your healthcare professional's instructions.        Depression: Tips to Help Yourself    As your healthcare providers help treat your depression, you can also help yourself. Keep in mind that your illness affects you emotionally, physically, mentally, and socially. So full recovery will take time. Take care of your body and your soul, and be patient with yourself as you get better.  Self-care    Educate yourself. Read about treatment and medicine options. If you have the energy, attend local conferences or support groups. Keep a list of useful websites and helpful books and use them as needed. This illness is not your fault. Don t blame yourself for your depression.    Manage early symptoms. If you notice symptoms returning, experience triggers, or identify other factors that may lead to a depressive episode, get help as soon as possible. Ask trusted friends and family to monitor your behavior and let you know if they  see anything of concern.    Work with your provider. Find a provider you can trust. Communicate honestly with that person and share information on your treatment for depression and your reaction to medicines.    Be prepared for a crisis. Know what to do if you experience a crisis. Keep the phone number of a crisis hotline and know the location of your community's urgent care centers and the closest emergency department.    Hold off on big decisions. Depression can cloud your judgment. So wait until you feel better before making major life decisions, such as changing jobs, moving, or getting  or .    Be patient. Recovering from depression is a process. Don t be discouraged if it takes some time to feel better.    Keep it simple. Depression saps your energy and concentration. So you won t be able to do all the things you used to do. Set small goals and do what you can.    Be with others. Don t isolate yourself--you ll only feel worse. Try to be with other people. And take part in fun activities when you can. Go to a movie, ballgame, [employer] service, or social event. Talk openly with people you can trust. And accept help when it s offered.  Take care of your body  People with depression often lose the desire to take care of themselves. That only makes their problems worse. During treatment and afterward, make a point to:    Exercise. It s a great way to take care of your body. And studies have shown that exercise helps fight depression.    Avoid drugs and alcohol. These may ease the pain in the short term. But they ll only make your problems worse in the long run.    Get relief from stress. Ask your healthcare provider for relaxation exercises and techniques to help relieve stress.    Eat right. A balanced and healthy diet helps keep your body healthy.  Date Last Reviewed: 1/1/2017 2000-2017 [employer]. [address] [place] [address], [place] [address]. All rights reserved. This information  is not intended as a substitute for professional medical care. Always follow your healthcare professional's instructions.                Follow-ups after your visit        Follow-up notes from your care team     Return in about 1 month (around 10/12/2018) for next annual exam or as needed.      Your next 10 appointments already scheduled     Dec 03, 2018 12:00 PM CST   Office Visit with Maria Ines Chaney RPH   [facility] ([facility])    [address] [place] [id number] [phone]           Bring a current list of meds and any records pertaining to this visit. For Physicals, please bring immunization records and any forms needing to be filled out. Please arrive 10 minutes early to complete paperwork.              Who to contact     If you have questions or need follow up information about today's clinic visit or your schedule please contact [facility] directly at [phone].  Normal or non-critical lab and imaging results will be communicated to you by Correlechart, letter or phone within 4 business days after the clinic has received the results. If you do not hear from us within 7 days, please contact the clinic through [facility] or phone. If you have a critical or abnormal lab result, we will notify you by phone as soon as possible.  Submit refill requests through [employer]MOS or call your pharmacy and they will forward the refill request to us. Please allow 3 business days for your refill to be completed.          Additional Information About Your Visit        Correlechart Information     [employer] gives you secure access to your electronic health record. If you see a primary care provider, you can also send messages to your care team and make appointments. If you have questions, please call your primary care clinic.  If you do not have a primary care provider, please call [phone] and they will  assist you.        Care EveryWhere ID     This is your Care EveryWhere ID. This could be used by other organizations to access your [place] medical records  [id number]        Your Vitals Were     Pulse Temperature Respirations Pulse Oximetry BMI (Body Mass Index)       82 97.9  F (36.6  C) (Temporal) 18 99% 26.46 kg/m2        Blood Pressure from Last 3 Encounters:   09/12/18 108/64   09/06/18 120/76   06/14/18 109/72    Weight from Last 3 Encounters:   09/12/18 174 lb (78.9 kg)   05/22/18 172 lb (78 kg)   01/04/18 170 lb 6.4 oz (77.3 kg)              We Performed the Following     Comprehensive metabolic panel     FLU VACCINE, SPLIT VIRUS, IM (QUADRIVALENT) [62739]- >3 YRS     HIV Screening     Lipid panel reflex to direct LDL Fasting     TSH with free T4 reflex     Vaccine Administration, Initial [19022]          Today's Medication Changes          These changes are accurate as of 9/12/18  9:26 AM.  If you have any questions, ask your nurse or doctor.               Start taking these medicines.        Dose/Directions    busPIRone 5 MG tablet   Commonly known as:  BUSPAR   Used for:  Generalized anxiety disorder   Started by:  Adali Merino APRN CNP        Start at 5 mg twice daily for 3 days, then 7.5 mg (1.5 tabs) twice daily for 3 days, then 10 mg (2 tabs) twice daily   Quantity:  150 tablet   Refills:  0            Where to get your medicines      These medications were sent to [facility][id number] [address] [address]     Phone:  [phone]     busPIRone 5 MG tablet                Primary Care Provider Office Phone # Fax #    ONEL Uribe [phone]       [address] [facility] [address]  [facility] [address]        Equal Access to Services     SHOAIB MACIAS AH: Joselyn Moss, wadbda luqjay, qaybta kaalmada darcie, josesito wilks. So [facility] [phone].    ATENCIÓN: Ketty murcia  español, tiene a ch disposición servicios gratuitos de asistencia lingüística. Gualberto machado [phone].    We comply with applicable federal civil rights laws and [place] laws. We do not discriminate on the basis of race, color, national origin, age, disability, sex, sexual orientation, or gender identity.            Thank you!     Thank you for choosing [facility]  for your care. Our goal is always to provide you with excellent care. Hearing back from our patients is one way we can continue to improve our services. Please take a few minutes to complete the written survey that you may receive in the mail after your visit with us. Thank you!             Your Updated Medication List - Protect others around you: Learn how to safely use, store and throw away your medicines at www.disposemymeds.org.          This list is accurate as of 9/12/18  9:26 AM.  Always use your most recent med list.                   Brand Name Dispense Instructions for use Diagnosis    * amphetamine-dextroamphetamine 20 MG per 24 hr capsule    ADDERALL XR    30 capsule    Take 1 capsule (20 mg) by mouth daily (Fill on or after 8/27/18)    ADHD (attention deficit hyperactivity disorder), inattentive type       * amphetamine-dextroamphetamine 30 MG per 24 hr capsule    ADDERALL XR    30 capsule    Take 1 capsule (30 mg) by mouth daily (Fill on or after 8/27/18)    ADHD (attention deficit hyperactivity disorder), inattentive type       ascorbic acid 500 MG tablet    VITAMIN C     Take by mouth daily        buPROPion 300 MG 24 hr tablet    WELLBUTRIN XL    90 tablet    Take 1 tablet (300 mg) by mouth every morning    Dysthymia       busPIRone 5 MG tablet    BUSPAR    150 tablet    Start at 5 mg twice daily for 3 days, then 7.5 mg (1.5 tabs) twice daily for 3 days, then 10 mg (2 tabs) twice daily    Generalized anxiety disorder       CALCIUM + D PO      2 tablet daily - 600mg total        cyanocobalamin 1000 MCG tablet    vitamin   B-12     Take 1,000 mcg by mouth every other day        fish oil-omega-3 fatty acids 1000 MG capsule      Take 2 g by mouth daily.        fluocinonide 0.05 % solution    LIDEX    60 mL    Apply sparingly to affected area twice daily as needed.  Do not apply to face.    Itchy scalp       Multi-vitamin Tabs tablet   Generic drug:  multivitamin, therapeutic with minerals      1 TABLET DAILY        vitamin B complex with vitamin C Tabs tablet      Take 1 tablet by mouth every other day        zinc 50 MG Tabs           * Notice:  This list has 2 medication(s) that are the same as other medications prescribed for you. Read the directions carefully, and ask your doctor or other care provider to review them with you.

## 2018-09-13 LAB — HIV 1+2 AB+HIV1 P24 AG SERPL QL IA: NONREACTIVE

## 2018-09-17 ENCOUNTER — EXTERNAL RECORD (OUTPATIENT)
Dept: OTHER | Age: 53
End: 2018-09-17

## 2018-09-17 ENCOUNTER — TRANSFERRED RECORDS (OUTPATIENT)
Dept: HEALTH INFORMATION MANAGEMENT | Facility: CLINIC | Age: 53
End: 2018-09-17

## 2018-10-09 NOTE — PROGRESS NOTES
"  SUBJECTIVE:   Carrillo Dunlap is a 53 year old male who presents to clinic today for the following health issues:     Medication Followup of Adderall XR 50 mg daily **print dap letter**    Taking Medication as prescribed: yes    Side Effects:  None, dry mouth, tiredness     Medication Helping Symptoms:  yes       PHQ-9 SCORE 9/23/2016 7/21/2017 5/22/2018   Total Score - - -   Total Score - - -   Total Score 8 8 9      UGO-7 SCORE 9/23/2016 7/21/2017 5/22/2018   Total Score - - -   Total Score 12 8 13   Feeling anxious and tense, not sure if new or if he is more mindful   Still having a hard time focusing  Sleeps well when he does get to bed  Denies side effects    Did counseling started with Gabino Gibbons outside counseling, 2 appointments and going so so not sure if helpful but he is the top of his game,most expensive. Wants to swithc over counseling here maybe too but not sure if would be a step down to less qualified counselor     Takes meds at 8 am and later in the afternoon hears the ringing in his ears and knows his time is running out for the day--around 5 or 6 pm, Tries doing stupid stuff in the evening like housework not using brain much   Scribbled notes all over the house and does crazy things like changing font and colors of his notes  \"I pace like crazy\"  Gastric bypass history, does take his vitamins   never did IR, was started onXR and went up to the 50 mg daily  welbutrin only daily     Did try something else years before the buproprion in the past for anxiety and depression, bad sexual side effects but low priority since he is not with anyone nor desires to have partner so open to med changes  Also mentions he gets angry about things and is often frustrated    Kian Clark mini habits was helpful book for him    Works as  Web design, wants to go into film school maybe, many times in and out of school for different things, uncle's company 3 different times went back and stopped again. Mom was " active in womens rights, trying to do a film Renato Barlow type to interview her    Other questions at the end can they do a TENS unit but on my brain, or other electrical testing/stim? I stayed up late last night thinking of all the questions I could think of to talk to you about today...    Very tangiential with some pleasantly forced speech like a motor     Problem list and histories reviewed & adjusted, as indicated.  Additional history: as documented    Patient Active Problem List   Diagnosis     Dysthymia     Generalized anxiety disorder     Rectal fistula     Hyperlipidemia with target LDL less than 130     ADHD (attention deficit hyperactivity disorder), inattentive type     Excess skin of abdomen     Moderate major depression (H)     Past Surgical History:   Procedure Laterality Date     AMPUTATION  just kidding    be nice to have it pop up again where I simple verify it.     COLONOSCOPY  12/2016     GASTRIC BYPASS      2008     GI SURGERY         Social History   Substance Use Topics     Smoking status: Current Every Day Smoker     Smokeless tobacco: Never Used     Alcohol use No      Comment: Socially/very little     Family History   Problem Relation Age of Onset     HEART DISEASE Mother      Obesity Mother      C.A.D. Father      MI     Coronary Artery Disease Father      Coronary Artery Disease Paternal Grandfather      Depression/Anxiety Brother      Obesity Brother      Obesity Brother      Coronary Artery Disease Brother      heart blockage      Obesity Brother          Current Outpatient Prescriptions   Medication Sig Dispense Refill     amphetamine-dextroamphetamine (ADDERALL XR) 30 MG per 24 hr capsule Take 1 capsule (30 mg) by mouth daily (Fill on or after 8/27/18) 30 capsule 0     ascorbic acid (VITAMIN C) 500 MG tablet Take by mouth daily       buPROPion (WELLBUTRIN XL) 300 MG 24 hr tablet Take 1 tablet (300 mg) by mouth every morning 90 tablet 3     busPIRone (BUSPAR) 5 MG tablet Start at 5  mg twice daily for 3 days, then 7.5 mg (1.5 tabs) twice daily for 3 days, then 10 mg (2 tabs) twice daily 150 tablet 0     busPIRone (BUSPAR) 5 MG tablet Start at 5 mg twice daily for 3 days, then 7.5 mg (1.5 tabs) twice daily for 3 days, then 10 mg (2 tabs) twice daily 150 tablet 0     CALCIUM + D OR 2 tablet daily - 600mg total       cyanocolbalamin (VITAMIN B-12) 1000 MCG tablet Take 1,000 mcg by mouth every other day        fish oil-omega-3 fatty acids (FISH OIL) 1000 MG capsule Take 2 g by mouth daily.       fluocinonide (LIDEX) 0.05 % solution Apply sparingly to affected area twice daily as needed.  Do not apply to face. 60 mL 5     MULTI-VITAMIN OR TABS 1 TABLET DAILY       vitamin B complex with vitamin C (VITAMIN  B COMPLEX) TABS tablet Take 1 tablet by mouth every other day       zinc 50 MG TABS        No Known Allergies  BP Readings from Last 3 Encounters:   10/10/18 110/68   09/12/18 108/64   09/06/18 120/76    Wt Readings from Last 3 Encounters:   10/10/18 172 lb (78 kg)   09/12/18 174 lb (78.9 kg)   05/22/18 172 lb (78 kg)                  Labs reviewed in EPIC    Reviewed and updated as needed this visit by clinical staff       Reviewed and updated as needed this visit by Provider         ROS:  Constitutional, HEENT, cardiovascular, pulmonary, GI, , musculoskeletal, neuro, skin, endocrine and psych systems are negative, except as otherwise noted.    OBJECTIVE:     /68  Pulse 99  Temp 98.3  F (36.8  C) (Oral)  Resp 18  Wt 172 lb (78 kg)  SpO2 99%  BMI 26.15 kg/m2  Body mass index is 26.15 kg/(m^2).  GENERAL: healthy, alert and no distress  EYES: Eyes grossly normal to inspection, PERRL and conjunctivae and sclerae normal  RESP: Respiratory rate regular and breathing easily   CV: No abnormal color and extremities warm   MS: extremities normal- no gross deformities noted  SKIN: no suspicious lesions or rashes  NEURO: Normal strength and tone, mentation intact and speech normal  PSYCH:  mentation appears normal, tangential, affect normal/bright, anxious, speech pressured, judgement and insight intact and appearance well groomed, many notes and papers with jotted down or printed off lists he is shuffling around     Diagnostic Test Results:  No results found for this or any previous visit (from the past 24 hour(s)).    ASSESSMENT/PLAN:         ICD-10-CM    1. ADHD (attention deficit hyperactivity disorder), inattentive type F90.0 Drug Abuse Screen Panel 13, Urine (Pain Care Package)     MENTAL HEALTH REFERRAL  - Adult; Psychiatry and Medication Management; [place] [facility] [place] [facility] [phone].  Medication management & future refills will be returned to [facility] [place]CP upon completion of evaluation; We lia...   2. Moderate major depression (H) F32.1 MENTAL HEALTH REFERRAL  - Adult; Psychiatry and Medication Management; Psychiatry; [facility] [place] [facility] [phone].  Medication management & future refills will be returned to [facility] PCP upon completion of evaluation; We lia...   3. UGO (generalized anxiety disorder) F41.1 MENTAL HEALTH REFERRAL  - Adult; Psychiatry and Medication Management; Psychiatry; [facility] [place] [facility] [phone].  Medication management & future refills will be returned to [facility] PCP upon completion of evaluation; We lia...     busPIRone (BUSPAR) 5 MG tablet   ADHD and anxiety depression not well controlled, anxiety seems to be worsening. Pt admits to pacing and very low functioning daily around 5-6 pm, anger and frustration at times, no SI. Has difficulties falling asleep otherwise no side effects. Discussed and pt agrees to do psychiatry collaborative for oversight, I did consult with him for refills today and will wait to hear back, pt will return to get paper Rx once I do. Plan to change to IR twice a day due to history of gastric bypass. Added buspar to help more with anxiety, discussed will make small  changes each visit but consider addition or change to another serotonin specific reuptake inhibitor. Would like to discuss sleep habits in more detail next visit if can keep on task a bit more, very tangiential today, question bipolar but he denies this.   switched over to me as Primary Care Provider and needing refills in about a week.   contineu to work on counseling and good self care, Return to Clinic in 1 month  Health maintenance exam at his convenience    Patient Instructions   Check out:  Ravinder ANDERSON talk on Shame and vulnerability    Allison Gupta on habit forming-nice quiz to take on tendencies    I did refer you and sent a message to our Collaborative Care Psychiatrist I think would be excellent for you to see: Dr. Naila eB to review medications. I think we should switch you to twice a day buproprion and Adderall due to your history of gastric bypass, and I also added buspar that I sent for you to start tonight.     Once I get your urine results back and hear back from Dr. Be I will do the Adderall and welbutrin changes--just wanted to verify the dosages and give him heads up you will see him soon.     We can switch you over to the [place] counseling [facility]--last I heard they are booked out until the end of November so you might want to continue with your current therapist until you can get in.     Nice to see you and please return in a month for ADHD and Anxiety follow up. Take care!              50 min spent in direct face to face time with this pt, greater than 50% in counseling and coordination of care of above diagnoses      ONEL Uribe CNP  [facility] [place]

## 2018-10-10 ENCOUNTER — OFFICE VISIT (OUTPATIENT)
Dept: FAMILY MEDICINE | Facility: CLINIC | Age: 53
End: 2018-10-10
Payer: COMMERCIAL

## 2018-10-10 VITALS
BODY MASS INDEX: 26.15 KG/M2 | OXYGEN SATURATION: 99 % | RESPIRATION RATE: 18 BRPM | HEART RATE: 99 BPM | DIASTOLIC BLOOD PRESSURE: 68 MMHG | SYSTOLIC BLOOD PRESSURE: 110 MMHG | WEIGHT: 172 LBS | TEMPERATURE: 98.3 F

## 2018-10-10 DIAGNOSIS — F32.1 MODERATE MAJOR DEPRESSION (H): ICD-10-CM

## 2018-10-10 DIAGNOSIS — F41.1 GAD (GENERALIZED ANXIETY DISORDER): ICD-10-CM

## 2018-10-10 DIAGNOSIS — F90.0 ADHD (ATTENTION DEFICIT HYPERACTIVITY DISORDER), INATTENTIVE TYPE: Primary | ICD-10-CM

## 2018-10-10 LAB
AMPHETAMINES UR QL: ABNORMAL NG/ML
BARBITURATES UR QL SCN: NOT DETECTED NG/ML
BENZODIAZ UR QL SCN: NOT DETECTED NG/ML
BUPRENORPHINE UR QL: NOT DETECTED NG/ML
CANNABINOIDS UR QL: ABNORMAL NG/ML
COCAINE UR QL SCN: NOT DETECTED NG/ML
D-METHAMPHET UR QL: NOT DETECTED NG/ML
METHADONE UR QL SCN: NOT DETECTED NG/ML
OPIATES UR QL SCN: NOT DETECTED NG/ML
OXYCODONE UR QL SCN: NOT DETECTED NG/ML
PCP UR QL SCN: NOT DETECTED NG/ML
PROPOXYPH UR QL: NOT DETECTED NG/ML
TRICYCLICS UR QL SCN: NOT DETECTED NG/ML

## 2018-10-10 PROCEDURE — 80306 DRUG TEST PRSMV INSTRMNT: CPT | Performed by: NURSE PRACTITIONER

## 2018-10-10 PROCEDURE — 99215 OFFICE O/P EST HI 40 MIN: CPT | Performed by: NURSE PRACTITIONER

## 2018-10-10 RX ORDER — BUSPIRONE HYDROCHLORIDE 5 MG/1
TABLET ORAL
Qty: 150 TABLET | Refills: 0 | Status: SHIPPED | OUTPATIENT
Start: 2018-10-10 | End: 2018-12-04

## 2018-10-10 NOTE — MR AVS SNAPSHOT
After Visit Summary   10/10/2018    Carrillo Dunlap    MRN: [id number]           Patient Information     Date Of Birth          1965        Visit Information        Provider Department      10/10/2018 11:20 AM Adali Merino APRN [facility]        Today's Diagnoses     ADHD (attention deficit hyperactivity disorder), inattentive type    -  1    Moderate major depression (H)        UGO (generalized anxiety disorder)          Care Instructions    Check out:  Ravinder ANDERSON talk on Shame and vulnerability    Allison Gupta on habit forming-nice quiz to take on tendencies    I did refer you and sent a message to our [facility] Psychiatrist I think would be excellent for you to see: Dr. Naila Be to review medications. I think we should switch you to twice a day buproprion and Adderall due to your history of gastric bypass, and I also added buspar that I sent for you to start tonight.     Once I get your urine results back and hear back from Dr. Be I will do the Adderall and welbutrin changes--just wanted to verify the dosages and give him heads up you will see him soon.     We can switch you over to the [facility]--last I heard they are booked out until the end of November so you might want to continue with your current therapist until you can get in.     Nice to see you and please return in a month for ADHD and Anxiety follow up. Take care!                      Follow-ups after your visit        Additional Services     MENTAL HEALTH REFERRAL  - Adult; Psychiatry and Medication Management; Psychiatry; [facility] [place]: [facility]ry Service [phone].  Medication management & future refills will be returned to [facility] PCP upon completion of evaluation; We lia...       All scheduling is subject to the client's specific insurance plan & benefits, provider/location availability, and provider clinical specialities.  Please arrive 15 minutes  early for your first appointment and bring your completed paperwork.  I recommend Dr. Be for you to see    Please be aware that coverage of these services is subject to the terms and limitations of your health insurance plan.  Call member services at your health plan with any benefit or coverage questions.                  Follow-up notes from your care team     Return in about 4 weeks (around 11/7/2018).      Your next 10 appointments already scheduled     Dec 03, 2018 12:00 PM CST   Office Visit with Maria Ines Chaney [facility] Psychiatry ([facility])    [address] [place] [id number] [phone]           Bring a current list of meds and any records pertaining to this visit. For Physicals, please bring immunization records and any forms needing to be filled out. Please arrive 10 minutes early to complete paperwork.              Who to contact     If you have questions or need follow up information about today's clinic visit or your schedule please contact [facility] directly at [phone].  Normal or non-critical lab and imaging results will be communicated to you by MyChart, letter or phone within 4 business days after the clinic has received the results. If you do not hear from us within 7 days, please contact the clinic through [facility] or phone. If you have a critical or abnormal lab result, we will notify you by phone as soon as possible.  Submit refill requests through [facility] or call your pharmacy and they will forward the refill request to us. Please allow 3 business days for your refill to be completed.          Additional Information About Your Visit        [facility] Information     [facility] gives you secure access to your electronic health record. If you see a primary care provider, you can also send messages to your care team and make appointments. If you have questions,  please call your primary care clinic.  If you do not have a primary care provider, please call [phone] and they will assist you.        Care EveryWhere ID     This is your Care EveryWhere ID. This could be used by other organizations to access your [place] medical records  [id number]        Your Vitals Were     Pulse Temperature Respirations Pulse Oximetry BMI (Body Mass Index)       99 98.3  F (36.8  C) (Oral) 18 99% 26.15 kg/m2        Blood Pressure from Last 3 Encounters:   10/10/18 110/68   09/12/18 108/64   09/06/18 120/76    Weight from Last 3 Encounters:   10/10/18 172 lb (78 kg)   09/12/18 174 lb (78.9 kg)   05/22/18 172 lb (78 kg)              We Performed the Following     DEPRESSION ACTION PLAN (DAP)     Drug Abuse Screen Panel 13, Urine (Pain Care Package)     MENTAL HEALTH REFERRAL  - Adult; Psychiatry and Medication Management; [place] [facility] [place] [facility] [phone].  Medication management & future refills will be returned to [facility] PCP upon completion of evaluation; We lia...          Today's Medication Changes          These changes are accurate as of 10/10/18  1:13 PM.  If you have any questions, ask your nurse or doctor.               These medicines have changed or have updated prescriptions.        Dose/Directions    amphetamine-dextroamphetamine 30 MG per 24 hr capsule   Commonly known as:  ADDERALL XR   This may have changed:  Another medication with the same name was removed. Continue taking this medication, and follow the directions you see here.   Used for:  ADHD (attention deficit hyperactivity disorder), inattentive type   Changed by:  Adali Merino APRN CNP        Dose:  30 mg   Take 1 capsule (30 mg) by mouth daily (Fill on or after 8/27/18)   Quantity:  30 capsule   Refills:  0       * busPIRone 5 MG tablet   Commonly known as:  BUSPAR   This may have changed:  Another medication with the same name was added. Make sure you understand how and  when to take each.   Used for:  Generalized anxiety disorder   Changed by:  Adali Merino APRN CNP        Start at 5 mg twice daily for 3 days, then 7.5 mg (1.5 tabs) twice daily for 3 days, then 10 mg (2 tabs) twice daily   Quantity:  150 tablet   Refills:  0       * busPIRone 5 MG tablet   Commonly known as:  BUSPAR   This may have changed:  You were already taking a medication with the same name, and this prescription was added. Make sure you understand how and when to take each.   Used for:  UGO (generalized anxiety disorder)   Changed by:  Adali Merino APRN CNP        Start at 5 mg twice daily for 3 days, then 7.5 mg (1.5 tabs) twice daily for 3 days, then 10 mg (2 tabs) twice daily   Quantity:  150 tablet   Refills:  0       * Notice:  This list has 2 medication(s) that are the same as other medications prescribed for you. Read the directions carefully, and ask your doctor or other care provider to review them with you.         Where to get your medicines      These medications were sent to [id number] - [place], [place] - [address], [place] [address]     Phone:  [phone]     busPIRone 5 MG tablet                Primary Care Provider Office Phone # Fax #    ONEL Uribe [phone]       [address] [place] [id number]        Equal Access to Services     [facility] Center[facility]HMINA : Hadii jarred ku hadasho Soomaali, waaxda luqadaha, qaybta kaalmada adeegyada, josesito [place] . So [facility] [phone].    ATENCIÓN: Si habla español, tiene a ch disposición servicios gratuitos de asistencia lingüística. Llame al [phone].    We comply with applicable federal civil rights laws and [place] laws. We do not discriminate on the basis of race, color, national origin, age, disability, sex, sexual orientation, or gender identity.            Thank you!     Thank you for choosing [facility]  for  your care. Our goal is always to provide you with excellent care. Hearing back from our patients is one way we can continue to improve our services. Please take a few minutes to complete the written survey that you may receive in the mail after your visit with us. Thank you!             Your Updated Medication List - Protect others around you: Learn how to safely use, store and throw away your medicines at www.disposemymeds.org.          This list is accurate as of 10/10/18  1:13 PM.  Always use your most recent med list.                   Brand Name Dispense Instructions for use Diagnosis    amphetamine-dextroamphetamine 30 MG per 24 hr capsule    ADDERALL XR    30 capsule    Take 1 capsule (30 mg) by mouth daily (Fill on or after 8/27/18)    ADHD (attention deficit hyperactivity disorder), inattentive type       ascorbic acid 500 MG tablet    VITAMIN C     Take by mouth daily        buPROPion 300 MG 24 hr tablet    WELLBUTRIN XL    90 tablet    Take 1 tablet (300 mg) by mouth every morning    Dysthymia       * busPIRone 5 MG tablet    BUSPAR    150 tablet    Start at 5 mg twice daily for 3 days, then 7.5 mg (1.5 tabs) twice daily for 3 days, then 10 mg (2 tabs) twice daily    Generalized anxiety disorder       * busPIRone 5 MG tablet    BUSPAR    150 tablet    Start at 5 mg twice daily for 3 days, then 7.5 mg (1.5 tabs) twice daily for 3 days, then 10 mg (2 tabs) twice daily    UGO (generalized anxiety disorder)       CALCIUM + D PO      2 tablet daily - 600mg total        cyanocobalamin 1000 MCG tablet    vitamin  B-12     Take 1,000 mcg by mouth every other day        fish oil-omega-3 fatty acids 1000 MG capsule      Take 2 g by mouth daily.        fluocinonide 0.05 % solution    LIDEX    60 mL    Apply sparingly to affected area twice daily as needed.  Do not apply to face.    Itchy scalp       Multi-vitamin Tabs tablet   Generic drug:  multivitamin, therapeutic with minerals      1 TABLET DAILY        vitamin  B complex with vitamin C Tabs tablet      Take 1 tablet by mouth every other day        zinc 50 MG Tabs           * Notice:  This list has 2 medication(s) that are the same as other medications prescribed for you. Read the directions carefully, and ask your doctor or other care provider to review them with you.

## 2018-10-10 NOTE — LETTER
My Depression Action Plan  Name: Carrillo Dunlap   Date of Birth 1965  Date: 10/10/2018    My doctor: Adali Merino   My clinic: [address] [address] [phone]          GREEN    ZONE   Good Control    What it looks like:     Things are going generally well. You have normal up s and down s. You may even feel depressed from time to time, but bad moods usually last less than a day.   What you need to do:  1. Continue to care for yourself (see self care plan)  2. Check your depression survival kit and update it as needed  3. Follow your physician s recommendations including any medication.  4. Do not stop taking medication unless you consult with your physician first.           YELLOW         ZONE Getting Worse    What it looks like:     Depression is starting to interfere with your life.     It may be hard to get out of bed; you may be starting to isolate yourself from others.    Symptoms of depression are starting to last most all day and this has happened for several days.     You may have suicidal thoughts but they are not constant.   What you need to do:     1. Call your care team, your response to treatment will improve if you keep your care team informed of your progress. Yellow periods are signs an adjustment may need to be made.     2. Continue your self-care, even if you have to fake it!    3. Talk to someone in your support network    4. Open up your depression survival kit           RED    ZONE Medical Alert - Get Help    What it looks like:     Depression is seriously interfering with your life.     You may experience these or other symptoms: You can t get out of bed most days, can t work or engage in other necessary activities, you have trouble taking care of basic hygiene, or basic responsibilities, thoughts of suicide or death that will not go away, self-injurious behavior.     What you need to do:  1. Call  your care team and request a same-day appointment. If they are not available (weekends or after hours) call your local crisis line, emergency room or 911.            Depression Self Care Plan / Survival Kit    Self-Care for Depression  Here s the deal. Your body and mind are really not as separate as most people think.  What you do and think affects how you feel and how you feel influences what you do and think. This means if you do things that people who feel good do, it will help you feel better.  Sometimes this is all it takes.  There is also a place for medication and therapy depending on how severe your depression is, so be sure to consult with your medical provider and/ or Behavioral Health Consultant if your symptoms are worsening or not improving.     In order to better manage my stress, I will:    Exercise  Get some form of exercise, every day. This will help reduce pain and release endorphins, the  feel good  chemicals in your brain. This is almost as good as taking antidepressants!  This is not the same as joining a gym and then never going! (they count on that by the way ) It can be as simple as just going for a walk or doing some gardening, anything that will get you moving.      Hygiene   Maintain good hygiene (Get out of bed in the morning, Make your bed, Brush your teeth, Take a shower, and Get dressed like you were going to work, even if you are unemployed).  If your clothes don't fit try to get ones that do.    Diet  I will strive to eat foods that are good for me, drink plenty of water, and avoid excessive sugar, caffeine, alcohol, and other mood-altering substances.  Some foods that are helpful in depression are: complex carbohydrates, B vitamins, flaxseed, fish or fish oil, fresh fruits and vegetables.    Psychotherapy  I agree to participate in Individual Therapy (if recommended).    Medication  If prescribed medications, I agree to take them.  Missing doses can result in serious side effects.   I understand that drinking alcohol, or other illicit drug use, may cause potential side effects.  I will not stop my medication abruptly without first discussing it with my provider.    Staying Connected With Others  I will stay in touch with my friends, family members, and my primary care provider/team.    Use your imagination  Be creative.  We all have a creative side; it doesn t matter if it s oil painting, sand castles, or mud pies! This will also kick up the endorphins.    Witness Beauty  (AKA stop and smell the roses) Take a look outside, even in mid-winter. Notice colors, textures. Watch the squirrels and birds.     Service to others  Be of service to others.  There is always someone else in need.  By helping others we can  get out of ourselves  and remember the really important things.  This also provides opportunities for practicing all the other parts of the program.    Humor  Laugh and be silly!  Adjust your TV habits for less news and crime-drama and more comedy.    Control your stress  Try breathing deep, massage therapy, biofeedback, and meditation. Find time to relax each day.     My support system    Clinic Contact:  Phone number:    Contact 1:  Phone number:    Contact 2:  Phone number:    Hinduism/:  Phone number:    Therapist:  Phone number:    [facility] crisis center:    Phone number:    Other community support:  Phone number:

## 2018-10-10 NOTE — PATIENT INSTRUCTIONS
Check out:  Ravinder ANDERSON talk on Shame and vulnerability    Allison Gupta on habit forming-nice quiz to take on tendencies    I did refer you and sent a message to our Collaborative Care Psychiatrist I think would be excellent for you to see: Dr. Naila Be to review medications. I think we should switch you to twice a day buproprion and Adderall due to your history of gastric bypass, and I also added buspar that I sent for you to start tonight.     Once I get your urine results back and hear back from Dr. Be I will do the Adderall and welbutrin changes--just wanted to verify the dosages and give him heads up you will see him soon.     We can switch you over to the [facility]--last I heard they are booked out until the end of November so you might want to continue with your current therapist until you can get in.     Nice to see you and please return in a month for ADHD and Anxiety follow up. Take care!

## 2018-10-11 ASSESSMENT — PATIENT HEALTH QUESTIONNAIRE - PHQ9: SUM OF ALL RESPONSES TO PHQ QUESTIONS 1-9: 8

## 2018-10-12 ENCOUNTER — TELEPHONE (OUTPATIENT)
Dept: FAMILY MEDICINE | Facility: CLINIC | Age: 53
End: 2018-10-12

## 2018-10-12 ENCOUNTER — MYC MEDICAL ADVICE (OUTPATIENT)
Dept: FAMILY MEDICINE | Facility: CLINIC | Age: 53
End: 2018-10-12

## 2018-10-12 DIAGNOSIS — F90.0 ADHD (ATTENTION DEFICIT HYPERACTIVITY DISORDER), INATTENTIVE TYPE: Primary | ICD-10-CM

## 2018-10-12 DIAGNOSIS — F32.1 MODERATE MAJOR DEPRESSION (H): ICD-10-CM

## 2018-10-12 DIAGNOSIS — F41.1 GENERALIZED ANXIETY DISORDER: ICD-10-CM

## 2018-10-12 NOTE — TELEPHONE ENCOUNTER
Called pt, tested positive for marijana use so cannot refill his ADHD meds but will still refill anxiety and depression meds. Will also send Tamrt message and pt to see psych soon.     Adali SYKES CNP

## 2018-10-15 RX ORDER — BUPROPION HYDROCHLORIDE 150 MG/1
150 TABLET, EXTENDED RELEASE ORAL 2 TIMES DAILY
Qty: 60 TABLET | Refills: 1 | Status: SHIPPED | OUTPATIENT
Start: 2018-10-15 | End: 2018-12-14

## 2018-10-15 RX ORDER — DEXTROAMPHETAMINE SACCHARATE, AMPHETAMINE ASPARTATE, DEXTROAMPHETAMINE SULFATE AND AMPHETAMINE SULFATE 3.75; 3.75; 3.75; 3.75 MG/1; MG/1; MG/1; MG/1
15 TABLET ORAL 2 TIMES DAILY
Qty: 60 TABLET | Refills: 0 | Status: SHIPPED | OUTPATIENT
Start: 2018-10-15 | End: 2018-11-13

## 2018-10-15 NOTE — TELEPHONE ENCOUNTER
I changed the wellbutrin to 150 mg twice a day due to his history of gastric bypass.     Please let pt know that I consulted with Dr. Be and can refill the adderall at 15 mg twice a day rather than taking the holiday off these-- that would be a slight dose reduction rather than stopping these suddenly, that might be kind of hard.      pelase try to get soonest appointment with Dr. Naila Be collaborative Psych    Rx placed in basket.     Thanks  Adali SYKES CNP

## 2018-10-16 ASSESSMENT — ANXIETY QUESTIONNAIRES
IF YOU CHECKED OFF ANY PROBLEMS ON THIS QUESTIONNAIRE, HOW DIFFICULT HAVE THESE PROBLEMS MADE IT FOR YOU TO DO YOUR WORK, TAKE CARE OF THINGS AT HOME, OR GET ALONG WITH OTHER PEOPLE: SOMEWHAT DIFFICULT
7. FEELING AFRAID AS IF SOMETHING AWFUL MIGHT HAPPEN: NOT AT ALL
5. BEING SO RESTLESS THAT IT IS HARD TO SIT STILL: MORE THAN HALF THE DAYS
3. WORRYING TOO MUCH ABOUT DIFFERENT THINGS: MORE THAN HALF THE DAYS
6. BECOMING EASILY ANNOYED OR IRRITABLE: MORE THAN HALF THE DAYS
GAD7 TOTAL SCORE: 13
1. FEELING NERVOUS, ANXIOUS, OR ON EDGE: NEARLY EVERY DAY
2. NOT BEING ABLE TO STOP OR CONTROL WORRYING: MORE THAN HALF THE DAYS

## 2018-10-16 ASSESSMENT — PATIENT HEALTH QUESTIONNAIRE - PHQ9: 5. POOR APPETITE OR OVEREATING: MORE THAN HALF THE DAYS

## 2018-10-17 ENCOUNTER — MYC MEDICAL ADVICE (OUTPATIENT)
Dept: FAMILY MEDICINE | Facility: CLINIC | Age: 53
End: 2018-10-17

## 2018-10-17 ASSESSMENT — ANXIETY QUESTIONNAIRES: GAD7 TOTAL SCORE: 13

## 2018-10-17 NOTE — TELEPHONE ENCOUNTER
Spoke with pt,  He will have the pharmacy he wants to fill med [facility] to [facility] the script    Kindra Tobias RN   [facility]

## 2018-10-30 ENCOUNTER — MYC MEDICAL ADVICE (OUTPATIENT)
Dept: FAMILY MEDICINE | Facility: CLINIC | Age: 53
End: 2018-10-30

## 2018-10-30 NOTE — TELEPHONE ENCOUNTER
I will check with Joanne to see if something in the process has changed--I thought Dr. Be would be a good fit and I had already discussed pt with him and told him he was coming his way.     Joanne, just let me know.     Thanks,  Adali SYKES CNP

## 2018-10-30 NOTE — TELEPHONE ENCOUNTER
At this time Dr. Be's schedule is only open for IPC patients.  The patient will need to work with [facility] to get a collaborative psych appt.      Joanne Garcia [facility]  MEDICAL LEAD

## 2018-10-30 NOTE — TELEPHONE ENCOUNTER
Adali    See pt mychart message    Kindra Tobias, ZACHERY   [facility]

## 2018-10-30 NOTE — TELEPHONE ENCOUNTER
Called patient. Notified of message from Joanne. Pt verbalized understanding and had no further questions.    Sarah Cobos RN  [facility]

## 2018-12-03 ENCOUNTER — OFFICE VISIT (OUTPATIENT)
Dept: PHARMACY | Facility: CLINIC | Age: 53
End: 2018-12-03
Payer: COMMERCIAL

## 2018-12-03 DIAGNOSIS — F41.1 GENERALIZED ANXIETY DISORDER: ICD-10-CM

## 2018-12-03 DIAGNOSIS — F33.0 MILD EPISODE OF RECURRENT MAJOR DEPRESSIVE DISORDER (H): ICD-10-CM

## 2018-12-03 DIAGNOSIS — F90.0 ADHD (ATTENTION DEFICIT HYPERACTIVITY DISORDER), INATTENTIVE TYPE: Primary | ICD-10-CM

## 2018-12-03 DIAGNOSIS — G47.09 OTHER INSOMNIA: ICD-10-CM

## 2018-12-03 PROCEDURE — 99606 MTMS BY PHARM EST 15 MIN: CPT | Performed by: PHARMACIST

## 2018-12-03 PROCEDURE — 99607 MTMS BY PHARM ADDL 15 MIN: CPT | Performed by: PHARMACIST

## 2018-12-03 NOTE — PATIENT INSTRUCTIONS
Recommendations from today's MTM visit:                                                    MTM (medication therapy management) is a service provided by a clinical pharmacist designed to help you get the most of out of your medicines.     1. Set an alarm on your phone, with a second alert, to remember your second doses    2. It may be helpful to talk to your provider tomorrow about consolidating your bupropion dose since the second dose may be impacting sleep    3. [facility] may be able to help you set up CBT.  You can reach them at  [phone].    Next MTM visit: 3/11/19 at noon    To schedule another MTM appointment, please call the clinic directly or you may call the MTM scheduling line at [phone] or toll-free at [phone].     My Clinical Pharmacist's contact information:                                                      It was a pleasure talking with you today!  Please feel free to contact me with any questions or concerns you have.      Maria Ines Chaney, Valerie  Medication Therapy Management Pharmacist  [facility]  Phone: [phone]    You may receive a survey about the MTM services you received.  I would appreciate your feedback to help me serve you better in the future. Please fill it out and return it when you can. Your comments will be anonymous.

## 2018-12-03 NOTE — MR AVS SNAPSHOT
After Visit Summary   12/3/2018    Carrillo Dunlap    MRN: [id number]           Patient Information     Date Of Birth          1965        Visit Information        Provider Department      12/3/2018 12:00 PM Maria Ines Chaney [facility]        Care Instructions    Recommendations from today's MTM visit:                                                    MTM (medication therapy management) is a service provided by a clinical pharmacist designed to help you get the most of out of your medicines.     1. Set an alarm on your phone, with a second alert, to remember your second doses    2. It may be helpful to talk to your provider tomorrow about consolidating your bupropion dose since the second dose may be impacting sleep    3. [facility] may be able to help you set up CBT.  You can reach them at  [phone].    Next MTM visit: 3/11/19 at noon    To schedule another MTM appointment, please call the clinic directly or you may call the MTM scheduling line at [phone] or toll-free at [phone].     My Clinical Pharmacist's contact information:                                                      It was a pleasure talking with you today!  Please feel free to contact me with any questions or concerns you have.      Maria Ines Chaney, Valerie  Medication Therapy Management Pharmacist  [facility]  Phone: [phone]    You may receive a survey about the MTM services you received.  I would appreciate your feedback to help me serve you better in the future. Please fill it out and return it when you can. Your comments will be anonymous.            Follow-ups after your visit        Your next 10 appointments already scheduled     Dec 04, 2018  1:15 PM CST   (Arrive by 12:45 PM)   [facility] Daniele [facility]es CNP   [facility]    [address]  [place] [address]   [phone]            Mar 11, 2019 12:00 PM CDT   Office Visit with Maria Ines Chaney RPH   [facility] ([facility])    [address] [place] [id number] [phone]           Bring a current list of meds and any records pertaining to this visit. For Physicals, please bring immunization records and any forms needing to be filled out. Please arrive 10 minutes early to complete paperwork.              Who to contact     If you have questions or need follow up information about today's clinic visit or your schedule please contact [facility] directly at [phone].  Normal or non-critical lab and imaging results will be communicated to you by MyChart, letter or phone within 4 business days after the clinic has received the results. If you do not hear from us within 7 days, please contact the clinic through Poshmarkhart or phone. If you have a critical or abnormal lab result, we will notify you by phone as soon as possible.  Submit refill requests through Can'tWait or call your pharmacy and they will forward the refill request to us. Please allow 3 business days for your refill to be completed.          Additional Information About Your Visit        PoshmarkharAmuso Information     Can'tWait gives you secure access to your electronic health record. If you see a primary care provider, you can also send messages to your care team and make appointments. If you have questions, please call your primary care clinic.  If you do not have a primary care provider, please call [phone] and they will assist you.        Care EveryWhere ID     This is your Care EveryWhere ID. This could be used by other organizations to access your [facility] medical records  [id number]         Blood Pressure from Last 3 Encounters:   10/10/18 110/68   09/12/18 108/64   09/06/18 120/76     Weight from Last 3 Encounters:   10/10/18 172 lb (78 kg)   09/12/18 174 lb (78.9 kg)   05/22/18 172 lb (78 kg)              Today, you had the following     No orders found for display       Primary Care Provider Office Phone # Fax #    ONEL Uribe [phone]       [address] [facility] [address]  [facility] [address]        Equal Access to Services     SHOAIB MACIAS : Hadii aad ku hadasho Soomaali, waaxda luqadaha, qaybta kaalmada adeegyada, waxay idiin hayaan adeeg kharash la'haritha . So [facility] [phone].    ATENCIÓN: Si habla español, tiene a ch disposición servicios gratuitos de asistencia lingüística. Llame al [phone].    We comply with applicable federal civil rights laws and [place] laws. We do not discriminate on the basis of race, color, national origin, age, disability, sex, sexual orientation, or gender identity.            Thank you!     Thank you for choosing [facility]  for your care. Our goal is always to provide you with excellent care. Hearing back from our patients is one way we can continue to improve our services. Please take a few minutes to complete the written survey that you may receive in the mail after your visit with us. Thank you!             Your Updated Medication List - Protect others around you: Learn how to safely use, store and throw away your medicines at www.disposemymeds.org.          This list is accurate as of 12/3/18  1:03 PM.  Always use your most recent med list.                   Brand Name Dispense Instructions for use Diagnosis    amphetamine-dextroamphetamine 15 MG tablet    ADDERALL    60 tablet    Take 1 tablet (15 mg) by mouth 2 times daily    ADHD (attention deficit hyperactivity disorder), inattentive type       buPROPion 150 MG 12 hr tablet    WELLBUTRIN SR    60 tablet    Take 1 tablet (150 mg) by mouth 2 times daily    Generalized anxiety disorder, Moderate major depression (H)       * busPIRone 5 MG  tablet    BUSPAR    150 tablet    Start at 5 mg twice daily for 3 days, then 7.5 mg (1.5 tabs) twice daily for 3 days, then 10 mg (2 tabs) twice daily    Generalized anxiety disorder       * busPIRone 5 MG tablet    BUSPAR    150 tablet    Start at 5 mg twice daily for 3 days, then 7.5 mg (1.5 tabs) twice daily for 3 days, then 10 mg (2 tabs) twice daily    UGO (generalized anxiety disorder)       CALCIUM + D PO      2 tablet daily - 600mg total        fish oil-omega-3 fatty acids 1000 MG capsule      Take 2 g by mouth daily.        fluocinonide 0.05 % external solution    LIDEX    60 mL    Apply sparingly to affected area twice daily as needed.  Do not apply to face.    Itchy scalp       Multi-vitamin tablet   Generic drug:  multivitamin w/minerals      1 TABLET DAILY        vitamin B complex with vitamin C tablet      Take 1 tablet by mouth every other day        vitamin B-12 1000 MCG tablet    CYANOCOBALAMIN     Take 1,000 mcg by mouth every other day        vitamin C 500 MG tablet    ASCORBIC ACID     Take by mouth daily        zinc 50 MG Tabs           * Notice:  This list has 2 medication(s) that are the same as other medications prescribed for you. Read the directions carefully, and ask your doctor or other care provider to review them with you.

## 2018-12-03 NOTE — Clinical Note
Hello,  I see this patient every few months for Medication Therapy Management (MTM) to discuss medication optimization.  I saw him this past Monday.  I just read your note and much of your assessment in regard to rumination and negative self talk is reflective of my experience with him over the past many months.  In knowing him, I do think that the XR forms of his meds were more helpful as well. We had been working on optimal dose timing together and felt that he was having some improvement. Please see my note as marsha and let me know what questions you may have.  I look forward to working with you.  Maria Ines Chaney, PharmD Medication Therapy Management Pharmacist [facility] Phone: [phone]

## 2018-12-03 NOTE — PROGRESS NOTES
"SUBJECTIVE/OBJECTIVE:                           Carrillo Dunlap is a 53 year old male coming in for follow-up visit for Medication Therapy Management.  He was self-referred to me.    Chief Complaint: To check in on how Adderall is going.    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 He does not have a set schedule.     ADHD: Pt is currently taking Adderall 15mg BID, which was recently changed from Adderall XR 50mg every morning by new PCP.  Prior to that, he had been dividing the dose into 20+30mg at variable times during the day and noted an improvement in motivation and consistent effect when moved to once in the morning.  Today, he reported taking the first dose soon after waking and feels a big peak effect, but then notices a very \"sharp drop\" when the IR tablet wears off around lunch and notices some tinnitus. Despite worsening symptoms at that time, he often forgets the second dose and will take it as late as 4pm, but forgets the dose altogether on about three days per week.  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  More significantly, he has acquired many boxes from his mother over the past few months, which are starting to pile up in his apartment and causing " "lots of stress.  He reported that he will attempt to start going through the items, but get distracted in the process, which makes him feel very poorly about himself.  He reported having gotten two speeding tickets in past three months, which he attributes to \"just not paying attention.\"  One ticket occurred while driving back from [place] overnight and the other was on his way to work in the morning.    He noted that he recently listened to the book, \"Faster than Normal,\" which was very helpful to him in terms of finding behavioral strategies for working with ADHD.    Past meds: Concerta x 1 month in 12/2015- didn't work    Depression/Anxiety/Insomnia: Pt is currently taking Bupropion SR 150mg BID, which was recently changed from bupropion ER 300mg in the morning.  He doesn't feel that the dose change has made a significant effect on his mood, but he is forgetting the second dose about half the days per week.  He will take the second dose as late as 6pm if he remembers.  Buspirone was also recently started by PCP, titrating from 5mg BID to 10mg BID, though patient noted that he had a much more difficult time falling asleep at a higher dose, so he continues at 5mg BID.  He does feel \"less tense\" with the buspirone, but hasn't noticed a significant impact on general anxiety.  Pt has intake appointment scheduled with collaborative care psychiatry med management provider on 12/4/18.    Citalopram x 6 months in 2010  Sertraline x 4-5 months in 2011    Current labs include:  BP Readings from Last 3 Encounters:   12/03/18 118/72   10/10/18 110/68   09/12/18 108/64     ASSESSMENT:                             Not all current medications were reviewed today.     Medication Adherence: good    ADHD: Pt looking forward to intake for psychiatry medication provider on 12/4/18.  Encouraged patient to continue working through the book that he has found helpful.  Pt is having difficulty remembering the second dose, though symptoms " were also not fully managed on extended release Adderall.  Switching to a methylphenidate based medication may provide improved outcomes, though he did try Concerta a few years ago for a one month trial.  Patient is likely to have better outcomes with once daily dosing and doses can be adjusted as needed, if absorption is questionable due to previous gastric bypass.  For now, we discussed setting an alarm with a second alert on his phone to help remember second daily doses. Pt plans to discuss with new provider tomorrow.      Depression/Anxiety/Insomnia:  Writer has had some difficulty with diagnostic clarity due to complex overlap of depression, anxiety, and ADHD symptoms.  Pt may be having more difficulty falling asleep due to taking second doses of Adderall and bupropion later in the evening- consider switching back to bupropion ER. Buspirone may also contribute to increased anxiety, though may also cause some drowsiness.  Continue current medication for now, though switching to a different medication may be helpful since buspirone tolerability has been difficult. Patient has tried two SSRIs, so SNRI trial may be reasonable.  Patient is likely to do best with once daily dosing.    PLAN:                            1. Follow up with psychiatry as scheduled.  2. Consider switching back to XR Adderall and possibly switching to a methylphenidate product in the future.  At this of closing this note, pt has seen psych provider and Adderall was switched back to XR formulation.    Future: Consider switching bupropion back to XR then to SNRI in the future    I spent 60 minutes with this patient today.  A copy of the visit note was provided to the patient's primary care provider and new psychiatry provider.    Will follow up in three months.  Next MTM visit is scheduled for 12/3/18 at 12pm.    The patient was given a summary of these recommendations as an after visit summary.     Maria Ines Chaney, PharmD  Medication Therapy  Management Pharmacist  [facility]  Phone: [phone]

## 2018-12-04 ENCOUNTER — OFFICE VISIT (OUTPATIENT)
Dept: PSYCHIATRY | Facility: CLINIC | Age: 53
End: 2018-12-04
Attending: NURSE PRACTITIONER
Payer: COMMERCIAL

## 2018-12-04 VITALS
SYSTOLIC BLOOD PRESSURE: 120 MMHG | RESPIRATION RATE: 16 BRPM | HEART RATE: 92 BPM | TEMPERATURE: 98.6 F | WEIGHT: 177 LBS | BODY MASS INDEX: 26.91 KG/M2 | DIASTOLIC BLOOD PRESSURE: 60 MMHG | OXYGEN SATURATION: 99 %

## 2018-12-04 VITALS — HEART RATE: 84 BPM | DIASTOLIC BLOOD PRESSURE: 72 MMHG | SYSTOLIC BLOOD PRESSURE: 118 MMHG

## 2018-12-04 DIAGNOSIS — F32.1 MODERATE MAJOR DEPRESSION (H): ICD-10-CM

## 2018-12-04 DIAGNOSIS — F90.0 ADHD (ATTENTION DEFICIT HYPERACTIVITY DISORDER), INATTENTIVE TYPE: Primary | ICD-10-CM

## 2018-12-04 DIAGNOSIS — F41.1 GENERALIZED ANXIETY DISORDER: ICD-10-CM

## 2018-12-04 PROCEDURE — 90792 PSYCH DIAG EVAL W/MED SRVCS: CPT | Performed by: NURSE PRACTITIONER

## 2018-12-04 RX ORDER — DEXTROAMPHETAMINE SACCHARATE, AMPHETAMINE ASPARTATE MONOHYDRATE, DEXTROAMPHETAMINE SULFATE AND AMPHETAMINE SULFATE 7.5; 7.5; 7.5; 7.5 MG/1; MG/1; MG/1; MG/1
30 CAPSULE, EXTENDED RELEASE ORAL DAILY
Qty: 30 CAPSULE | Refills: 0 | Status: SHIPPED | OUTPATIENT
Start: 2018-12-04 | End: 2019-01-02

## 2018-12-04 RX ORDER — DEXTROAMPHETAMINE SACCHARATE, AMPHETAMINE ASPARTATE MONOHYDRATE, DEXTROAMPHETAMINE SULFATE AND AMPHETAMINE SULFATE 5; 5; 5; 5 MG/1; MG/1; MG/1; MG/1
20 CAPSULE, EXTENDED RELEASE ORAL DAILY
Qty: 30 CAPSULE | Refills: 0 | Status: SHIPPED | OUTPATIENT
Start: 2018-12-04 | End: 2019-01-02

## 2018-12-04 ASSESSMENT — ANXIETY QUESTIONNAIRES
5. BEING SO RESTLESS THAT IT IS HARD TO SIT STILL: MORE THAN HALF THE DAYS
GAD7 TOTAL SCORE: 15
7. FEELING AFRAID AS IF SOMETHING AWFUL MIGHT HAPPEN: NOT AT ALL
3. WORRYING TOO MUCH ABOUT DIFFERENT THINGS: NEARLY EVERY DAY
6. BECOMING EASILY ANNOYED OR IRRITABLE: NEARLY EVERY DAY
2. NOT BEING ABLE TO STOP OR CONTROL WORRYING: NEARLY EVERY DAY
GAD7 TOTAL SCORE: 15
7. FEELING AFRAID AS IF SOMETHING AWFUL MIGHT HAPPEN: NOT AT ALL
1. FEELING NERVOUS, ANXIOUS, OR ON EDGE: MORE THAN HALF THE DAYS
GAD7 TOTAL SCORE: 15
4. TROUBLE RELAXING: MORE THAN HALF THE DAYS

## 2018-12-04 ASSESSMENT — PATIENT HEALTH QUESTIONNAIRE - PHQ9
10. IF YOU CHECKED OFF ANY PROBLEMS, HOW DIFFICULT HAVE THESE PROBLEMS MADE IT FOR YOU TO DO YOUR WORK, TAKE CARE OF THINGS AT HOME, OR GET ALONG WITH OTHER PEOPLE: EXTREMELY DIFFICULT
SUM OF ALL RESPONSES TO PHQ QUESTIONS 1-9: 15
SUM OF ALL RESPONSES TO PHQ QUESTIONS 1-9: 15

## 2018-12-04 NOTE — PROGRESS NOTES
"                                                         Outpatient Psychiatric Evaluation- Standard  Adult    Name:  Carrillo Dunlap  : 1965    Source of Referral:  Primary Care Provider: ONEL Uribe CNP   Last visit: 10/10/2018  Current Psychotherapist: none   Last visit: interested CBT     Identifying Data:  Patient is a 53 year old, single  White  male  who presents for initial visit with me.  Patient is currently employed part time. Patient attended the session alone. Consent to communicate signed for no-one patient's. Consent for treatment signed and included in electronic medical record. Discussed limits of confidentiality today. My Practice Policy was reviewed and signed.     Patient prefers to be called: \"Nirmal\"    Chief Complaint:    Patient reports: \"I'm looking for help with managing ADHD issues, and confidence, depression and anxiety.\"      HPI:    Patient had neuro-psych testing in  some time after moving to [place]. Transitioned from 18 year dynamic job of working for [employer] (active, on feet, multiple varied tasks) to running own business. Neuropsych testing at local clinic diagnosed him with ADHD. Thereafter was treated by former PCP with Adderall. He started with new PCP, JEANMARIE Merino CNP when he moved residences. She changed his Adderall XR to IR BID and highlighted patient's depression/anxiety, hence the referral to writer and recommendation of therapy.    Patient reports \"feeling stuck\" in current leila of poor task management, procrastination and rumination on perceived lack of success. 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 Eventually  left, and recession hit; he lost his business as well as two homes and filed for bankruptcy. He is now renting and is a " ". Compares himself often to younger successful colleagues and successful friends and easily gets down on self.    In 2013 he started Adderall with PCP; tapered to Adderall XR 50 mg daily; states he found this worked moderately effectively. He switched PCP's when he moved into rental in 2018 and was counseled on trying Adderall IR BID secondary to gastric bypass; transitioned to Adderall IR 20 mg AM + 30 mg afternoon; felt it has had onset abruptly and had sharp come-downs. Recently tested positive for cannabis (states he uses 4 times a year, and happened to use with friend); was going to have stimulant discontinued as a result, but PCP didn't want to abruptly stop, so was reduced to 15 mg BID. Patient states he got more symptomatic as a result and incurred two speeding tickets because he wasn't paying attention. He is currently taking Adderall IR 15 mg at 9 am with food, then 1 pm with lunch; takes afternoon dosage 75% of time when remembers. He overall finds that Adderall XR worked better for him; IR doesn't last as long, and gets tinnitus on come-downs. Also wonders if Adderall inhibits creativity.    Patient acknowledges big part of his issue is working alone from home, and not having structure. He has no regular sleep schedule; tends to get \"jolt\" of motivation at 8 PM and will work until 2-3 AM. Will get 4-5 hours sleep, then wake at 8 AM; \"will take a while to get going\" and approach work at 11 AM. Takes the occasional afternoon nap. He feels it would be better if it worked in structured workplace environment. He endorses easy distractibility, procrastination, easy overwhelment with getting started on tasks; has hard time organizing ideas and realistically measuring abilities/expectations; has reminder notes all over house. Has had issues in past procrastinating to point of not submitting taxes x 5 years.    He states he submitted today's GAD7 and PHQ9 scores last night when feeling " "sad/frustrated reflecting on dissatisfying level of success and financial woes.    He was recently prescribed Buspar by PCP; tapered to 10 mg BID but felt nauseated, light headed and having \"the willies\" after dosing, so decreased back down to 5 mg BID; not sure of any effect towards anxiety/mood otherwise. He previously trialed Celexa and Zoloft years ago; can't recall experience. He started Wellbutrin 5 years ago, before stimulants, for \"dysthymia\"; felt it worked; taking 300 mg daily; no side effects to note. He trialed Concerta in 12/2015, but can't recall effects.    Patient states a prominent inters test in engaging with CBT at this time.      Psychiatric Review of Symptoms:  Depression: Depressed Mood  Guilt: Increase  Concentration: Decrease  Ruminations: Increase    PHQ-9 scores:   PHQ-9 SCORE 5/22/2018 10/10/2018 12/4/2018   PHQ-9 Total Score - - -   PHQ-9 Total Score MyChart - - 15 (Moderately severe depression)   PHQ-9 Total Score - - -   PHQ-9 Total Score 9 8 15     Fannie:  No symptoms   MDQ Score: Negative Screen  Anxiety: Worrying too much about different things  Trouble relaxing  Restlessness    UGO-7 scores:    UGO-7 SCORE 5/22/2018 10/16/2018 12/4/2018   Total Score - - -   Total Score - - 15 (severe anxiety)   Total Score 13 13 15     Answers for HPI/ROS submitted by the patient on 12/4/2018   If you checked off any problems, how difficult have these problems made it for you to do your work, take care of things at home, or get along with other people?: Extremely difficult  PHQ9 TOTAL SCORE: 15  UGO 7 TOTAL SCORE: 15    Panic:  No symptoms   Agoraphobia:  No   PTSD:  No symptoms   OCD:  No symptoms   Psychosis: No symptoms   ADD / ADHD: Attention Problem(s)  Task Completion Difficulties  Poor Organization  Distractible  Forgetful  Gambling or shoplifting: No   Eating Disorder:  No symptoms  Sleep:   poor discipline     Psychiatric History:   Denies psychiatric hospitalization or suicide " attempts.    Substance Use History:  Denies ETOH use  Cannabis 4 times a year  No other substance use  Denies tobacco use    Past Medical History:  Past Medical History:   Diagnosis Date     Depressive disorder      Heart disease 07-17-17    My brother just found out about a blockage in his heart.     Obesity, unspecified       Surgery:   Past Surgical History:   Procedure Laterality Date     AMPUTATION  just kidding    be nice to have it pop up again where I simple verify it.     COLONOSCOPY  12/2016     GASTRIC BYPASS      2008     GI SURGERY       Allergies:   No Known Allergies  Primary Care Provider: ONEL Uribe CNP  Seizures or Head Injury: No    Gastric bypass in 2007  (285 lbs --> 175lbs)  Has a treadmill for exercise, but not routine  No cardiac issues, no seizures, no TBIs    Current Medications:    Current Outpatient Prescriptions:      amphetamine-dextroamphetamine (ADDERALL) 15 MG per tablet, Take 1 tablet (15 mg) by mouth 2 times daily, Disp: 60 tablet, Rfl: 0     ascorbic acid (VITAMIN C) 500 MG tablet, Take by mouth daily, Disp: , Rfl:      buPROPion (WELLBUTRIN SR) 150 MG 12 hr tablet, Take 1 tablet (150 mg) by mouth 2 times daily, Disp: 60 tablet, Rfl: 1     busPIRone (BUSPAR) 5 MG tablet, Start at 5 mg twice daily for 3 days, then 7.5 mg (1.5 tabs) twice daily for 3 days, then 10 mg (2 tabs) twice daily, Disp: 150 tablet, Rfl: 0     busPIRone (BUSPAR) 5 MG tablet, Start at 5 mg twice daily for 3 days, then 7.5 mg (1.5 tabs) twice daily for 3 days, then 10 mg (2 tabs) twice daily, Disp: 150 tablet, Rfl: 0     CALCIUM + D OR, 2 tablet daily - 600mg total, Disp: , Rfl:      cyanocolbalamin (VITAMIN B-12) 1000 MCG tablet, Take 1,000 mcg by mouth every other day , Disp: , Rfl:      fish oil-omega-3 fatty acids (FISH OIL) 1000 MG capsule, Take 2 g by mouth daily., Disp: , Rfl:      fluocinonide (LIDEX) 0.05 % solution, Apply sparingly to affected area twice daily as needed.  Do not apply to  face., Disp: 60 mL, Rfl: 5     MULTI-VITAMIN OR TABS, 1 TABLET DAILY, Disp: , Rfl:      vitamin B complex with vitamin C (VITAMIN  B COMPLEX) TABS tablet, Take 1 tablet by mouth every other day, Disp: , Rfl:      zinc 50 MG TABS, , Disp: , Rfl:     The [place] Prescription Monitoring Program has been reviewed and there are no concerns about diversionary activity for controlled substances at this time    Vital Signs:  Vitals: There were no vitals taken for this visit.    Labs:  Last Comprehensive Metabolic Panel:  Sodium   Date Value Ref Range Status   09/12/2018 141 133 - 144 mmol/L Final     Potassium   Date Value Ref Range Status   09/12/2018 3.9 3.4 - 5.3 mmol/L Final     Chloride   Date Value Ref Range Status   09/12/2018 106 94 - 109 mmol/L Final     Carbon Dioxide   Date Value Ref Range Status   09/12/2018 28 20 - 32 mmol/L Final     Anion Gap   Date Value Ref Range Status   09/12/2018 7 3 - 14 mmol/L Final     Glucose   Date Value Ref Range Status   09/12/2018 85 70 - 99 mg/dL Final     Comment:     Fasting specimen     Urea Nitrogen   Date Value Ref Range Status   09/12/2018 13 7 - 30 mg/dL Final     Creatinine   Date Value Ref Range Status   09/12/2018 0.83 0.66 - 1.25 mg/dL Final     GFR Estimate   Date Value Ref Range Status   09/12/2018 >90 >60 mL/min/1.7m2 Final     Comment:     Non  GFR Calc     Calcium   Date Value Ref Range Status   09/12/2018 8.6 8.5 - 10.1 mg/dL Final     Bilirubin Total   Date Value Ref Range Status   09/12/2018 0.6 0.2 - 1.3 mg/dL Final     Alkaline Phosphatase   Date Value Ref Range Status   09/12/2018 89 40 - 150 U/L Final     ALT   Date Value Ref Range Status   09/12/2018 27 0 - 70 U/L Final     AST   Date Value Ref Range Status   09/12/2018 26 0 - 45 U/L Final         Review of Systems:  10 systems (general, cardiovascular, respiratory, eyes, ENT, endocrine, GI, , M/S, neurological) were reviewed. Most pertinent finding(s) is/are:  all  unremarkable.    Family History:   Patient reported family history includes:   Family History   Problem Relation Age of Onset     Heart Disease Mother      Obesity Mother      C.A.D. Father      MI     Coronary Artery Disease Father      Coronary Artery Disease Paternal Grandfather      Depression/Anxiety Brother      Obesity Brother      Obesity Brother      Coronary Artery Disease Brother      heart blockage      Obesity Brother      Mental Illness History: Needs further evaluation    Social History:   Grew up in [facility] [place]  4 brothers  Single, never ; no children  Has BFA in Industrial Design    Mental Status Examination:     Appearance:  awake, alert  Attitude:  cooperative   Eye Contact:  good  Gait and Station: Normal  Psychomotor Behavior:  intact station, gait and muscle tone  Oriented to:  time, person, and place  Attention Span and Concentration:  Normal  Speech:  clear, coherent  Mood:  anxious and depressed  Affect:  appropriate and in normal range  Associations:  no loose associations  Thought Process:  logical, goal oriented, tangental and circumstantial  Thought Content:  Appropriate to Interview  Recent and Remote Memory:  intact Not formally assessed. No amnesia.  Fund of Knowledge: appropriate  Insight:  good  Judgment:  intact  Impulse Control:  intact    Suicide Risk Assessment:  Today Carrillo Dunlap denies suicidal ideation or self-harm impulses. Therefore, based on all available evidence including the factors cited above, Carrillo Dunlap does not appear to be at imminent risk for self-harm, does not meet criteria for a 72-hr hold, and therefore remains appropriate for ongoing outpatient level of care.  A thorough assessment of risk factors related to suicide and self-harm have been reviewed and are noted above. The patient convincingly denies acute suicidality on several occasions. Local community safety resources reviewed and printed for patient to use if needed.  There was no deceit detected, and the patient presented in a manner that was believable.     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 Provider to review and confirm.       Generalized anxiety disorder 03/09/2011     Priority: Medium     Diagnosis updated by automated process. Provider to review and confirm.       Dysthymia 01/08/2010     Priority: Medium     Seeing Ruperto Suarez, Therapist.             A 12-item WHODAS 2.0 assessment was completed by the patient today and recorded in EPIC.    WHODAS 2.0 Total Score 12/4/2018   Total Score 28   Total Score MyChart 28       The Patient Activation Measure (JOSH) score was completed and recorded in [facility]. This assesses patient knowledge, skill, and confidence for self-management.   JOSH Score (Last Two) 3/23/2012 6/23/2014   JOSH Raw Score 37 36   Activation Score 49.9 47.4   JOSH Level 2 2                Impression:  Carrillo Dunlap is a 53 year old male with a relatively recently rendered diagnosis of ADHD and subsequent treatment. He has concomitant depression and anxiety in the context of psychosocial stressors (finances, career) and inadequately managed ADHD symptoms. We agree he will significantly benefit from behavioral interventions, and he is to pair himself with a CBT therapist to  target daily habits, ruminations and neuroses. For now, we will revert back to former dosage of Adderall XR, as he endorses more optimal tolerability and effect. He may continue Wellbutrin  mg daily, as historically helpful. We can consider, going forward, a retrial of a serotonin specific reuptake inhibitor if further indicated.    Medication side effects and alternatives reviewed. Health promotion activities recommended and reviewed today. All questions addressed. Education and counseling completed regarding risks and benefits of medications and psychotherapy options. Collaborative Care Psychiatry Service model reviewed today. Recommend therapy for additional support.     Treatment Plan:    Stop Adderall IR and start Adderall XR 50 mg qAM    Continue Wellbutrin  mg daily    Referred for CBT at [facility]      Continue all other medical care per primary care provider.     Continue all other medications as reviewed per electronic medical record today.     Safety plan reviewed. To the Emergency Department as needed or call after hours crisis line at [phone] or [phone]. [place] Crisis Text Line: Text [place] to 427740  or  Suicide LifeLine Chat: suicidepreventionlifeline.org/chat/      Schedule an appointment with me in 4 weeks or sooner as needed.  Call [facility] at [phone] to schedule.    Follow up with primary care provider as planned or for acute medical concerns.    Call the psychiatric nurse line with medication questions or concerns at [phone].    Shelfarihart may be used to communicate with your provider, but this is not intended to be used for emergencies.      Community Resources:    National Suicide Prevention Lifeline: 466.767.4920 (TTY: 818.994.8542). Call anytime for help.  (www.suicidepreventionlifeline.org)  National [place] on Mental Illness (www.[place].org): 733.676.4803 or 915-520-4948.   Mental Health Association (www.mentalhealth.org):  729-301-7887 or 244-198-2000.  [place] Crisis Text Line: Text [place] to 436086  Suicide LifeLine Chat: suicidepreventionPrivcapline.org/chat    Administrative Billing:   Time spent with patient was 60 minutes and greater than 50% of time or 40 minutes was spent in counseling and coordination of care regarding above diagnoses and treatment plan.    Patient Status:  Patient will continue to be seen for ongoing consultation and stabilization.    Signed:   Daniele Neves, CNP  Psychiatry

## 2018-12-04 NOTE — MR AVS SNAPSHOT
After Visit Summary   12/4/2018    Carrillo Dunlap    MRN: [id number]           Patient Information     Date Of Birth          1965        Visit Information        Provider Department      12/4/2018 1:15 PM Daniele Neves CNP [facility]        Today's Diagnoses     ADHD (attention deficit hyperactivity disorder), inattentive type    -  1    Moderate major depression (H)        Generalized anxiety disorder           Follow-ups after your visit        Additional Services     MENTAL HEALTH REFERRAL  - Adult; Outpatient Treatment; Individual/Couples/Family/Group Therapy/Health Psychology; [facility] [place] [facility] [phone]; We will contact you to schedule the appointment or please call with any questions       All scheduling is subject to the client's specific insurance plan & benefits, provider/location availability, and provider clinical specialities.  Please arrive 15 minutes early for your first appointment and bring your completed paperwork.    Please be aware that coverage of these services is subject to the terms and limitations of your health insurance plan.  Call member services at your health plan with any benefit or coverage questions.                            Your next 10 appointments already scheduled     Jan 02, 2019  2:15 PM CST   [facility] with Daniele [facility]es C[facility]   [facility]    [address] [place] [address] [phone]            Mar 11, 2019 12:00 PM CDT   Office Visit with Maria Ines Chaney [facility] ([facility])    [address] [place] [id number] [phone]           Bring a current list of meds and any records pertaining to this visit. For Physicals, please bring immunization records and any forms needing to be filled out.  Please arrive 10 minutes early to complete paperwork.              Who to contact     If you have questions or need follow up information about today's clinic visit or your schedule please contact [facility] directly at [phone].  Normal or non-critical lab and imaging results will be communicated to you by MyChart, letter or phone within 4 business days after the clinic has received the results. If you do not hear from us within 7 days, please contact the clinic through [employer] or phone. If you have a critical or abnormal lab result, we will notify you by phone as soon as possible.  Submit refill requests through [employer] or call your pharmacy and they will forward the refill request to us. Please allow 3 business days for your refill to be completed.          Additional Information About Your Visit        [employer] Information     [employer] gives you secure access to your electronic health record. If you see a primary care provider, you can also send messages to your care team and make appointments. If you have questions, please call your primary care clinic.  If you do not have a primary care provider, please call [phone] and they will assist you.        Care EveryWhere ID     This is your Care EveryWhere ID. This could be used by other organizations to access your [place] medical records  [id number]        Your Vitals Were     Pulse Temperature Respirations Pulse Oximetry BMI (Body Mass Index)       92 98.6  F (37  C) (Oral) 16 99% 26.91 kg/m2        Blood Pressure from Last 3 Encounters:   12/04/18 120/60   12/03/18 118/72   10/10/18 110/68    Weight from Last 3 Encounters:   12/04/18 177 lb (80.3 kg)   10/10/18 172 lb (78 kg)   09/12/18 174 lb (78.9 kg)              We Performed the Following     MENTAL HEALTH REFERRAL  - Adult; Outpatient Treatment; Individual/Couples/Family/Group Therapy/Health Psychology; [facility]: [facility] [phone]; We will contact you to  schedule the appointment or please call with any questions          Today's Medication Changes          These changes are accurate as of 12/4/18  7:38 PM.  If you have any questions, ask your nurse or doctor.               These medicines have changed or have updated prescriptions.        Dose/Directions    * amphetamine-dextroamphetamine 15 MG tablet   Commonly known as:  ADDERALL   This may have changed:  Another medication with the same name was added. Make sure you understand how and when to take each.   Used for:  ADHD (attention deficit hyperactivity disorder), inattentive type   Changed by:  Daniele Neves CNP        Dose:  15 mg   Take 1 tablet (15 mg) by mouth 2 times daily   Quantity:  60 tablet   Refills:  0       * amphetamine-dextroamphetamine 30 MG 24 hr capsule   Commonly known as:  ADDERALL XR   This may have changed:  You were already taking a medication with the same name, and this prescription was added. Make sure you understand how and when to take each.   Used for:  ADHD (attention deficit hyperactivity disorder), inattentive type   Changed by:  Daniele Neves CNP        Dose:  30 mg   Take 1 capsule (30 mg) by mouth daily   Quantity:  30 capsule   Refills:  0       * amphetamine-dextroamphetamine 20 MG 24 hr capsule   Commonly known as:  ADDERALL XR   This may have changed:  You were already taking a medication with the same name, and this prescription was added. Make sure you understand how and when to take each.   Used for:  ADHD (attention deficit hyperactivity disorder), inattentive type   Changed by:  Daniele Neves CNP        Dose:  20 mg   Take 1 capsule (20 mg) by mouth daily   Quantity:  30 capsule   Refills:  0       * Notice:  This list has 3 medication(s) that are the same as other medications prescribed for you. Read the directions carefully, and ask your doctor or other care provider to review them with you.         Where to get your medicines      Some  of these will need a paper prescription and others can be bought over the counter.  Ask your nurse if you have questions.     Bring a paper prescription for each of these medications     amphetamine-dextroamphetamine 20 MG 24 hr capsule    amphetamine-dextroamphetamine 30 MG 24 hr capsule                Primary Care Provider Office Phone # Fax #    ONEL Uribe [phone]       [address] [facility] [address]  [facility] [address]        Equal Access to Services     SHOAIB MACIAS : Hadii aad ku hadasho Soomaali, waaxda luqadaha, qaybta kaalmada adeegyada, waxay idiin hayaan adeeg kharash la'haritha . So [facility] [phone].    ATENCIÓN: Si habla español, tiene a ch disposición servicios gratuitos de asistencia lingüística. [facility] [phone].    We comply with applicable federal civil rights laws and [place] laws. We do not discriminate on the basis of race, color, national origin, age, disability, sex, sexual orientation, or gender identity.            Thank you!     Thank you for choosing [facility]  for your care. Our goal is always to provide you with excellent care. Hearing back from our patients is one way we can continue to improve our services. Please take a few minutes to complete the written survey that you may receive in the mail after your visit with us. Thank you!             Your Updated Medication List - Protect others around you: Learn how to safely use, store and throw away your medicines at www.disposemymeds.org.          This list is accurate as of 12/4/18  7:38 PM.  Always use your most recent med list.                   Brand Name Dispense Instructions for use Diagnosis    * amphetamine-dextroamphetamine 15 MG tablet    ADDERALL    60 tablet    Take 1 tablet (15 mg) by mouth 2 times daily    ADHD (attention deficit hyperactivity disorder), inattentive type       * amphetamine-dextroamphetamine 30 MG 24 hr capsule    ADDERALL XR    30 capsule    Take 1  capsule (30 mg) by mouth daily    ADHD (attention deficit hyperactivity disorder), inattentive type       * amphetamine-dextroamphetamine 20 MG 24 hr capsule    ADDERALL XR    30 capsule    Take 1 capsule (20 mg) by mouth daily    ADHD (attention deficit hyperactivity disorder), inattentive type       buPROPion 150 MG 12 hr tablet    WELLBUTRIN SR    60 tablet    Take 1 tablet (150 mg) by mouth 2 times daily    Generalized anxiety disorder, Moderate major depression (H)       busPIRone 5 MG tablet    BUSPAR    150 tablet    Start at 5 mg twice daily for 3 days, then 7.5 mg (1.5 tabs) twice daily for 3 days, then 10 mg (2 tabs) twice daily    Generalized anxiety disorder       CALCIUM + D PO      2 tablet daily - 600mg total        fish oil-omega-3 fatty acids 1000 MG capsule      Take 2 g by mouth daily.        fluocinonide 0.05 % external solution    LIDEX    60 mL    Apply sparingly to affected area twice daily as needed.  Do not apply to face.    Itchy scalp       Multi-vitamin tablet   Generic drug:  multivitamin w/minerals      1 TABLET DAILY        vitamin B complex with vitamin C tablet      Take 1 tablet by mouth every other day        vitamin B-12 1000 MCG tablet    CYANOCOBALAMIN     Take 1,000 mcg by mouth every other day        vitamin C 500 MG tablet    ASCORBIC ACID     Take by mouth daily        zinc 50 MG Tabs           * Notice:  This list has 3 medication(s) that are the same as other medications prescribed for you. Read the directions carefully, and ask your doctor or other care provider to review them with you.

## 2018-12-05 ASSESSMENT — PATIENT HEALTH QUESTIONNAIRE - PHQ9: SUM OF ALL RESPONSES TO PHQ QUESTIONS 1-9: 15

## 2018-12-05 ASSESSMENT — ANXIETY QUESTIONNAIRES: GAD7 TOTAL SCORE: 15

## 2018-12-14 ENCOUNTER — MYC MEDICAL ADVICE (OUTPATIENT)
Dept: FAMILY MEDICINE | Facility: CLINIC | Age: 53
End: 2018-12-14

## 2018-12-14 ENCOUNTER — OFFICE VISIT (OUTPATIENT)
Dept: FAMILY MEDICINE | Facility: CLINIC | Age: 53
End: 2018-12-14
Payer: COMMERCIAL

## 2018-12-14 VITALS
OXYGEN SATURATION: 100 % | HEART RATE: 94 BPM | TEMPERATURE: 98.1 F | RESPIRATION RATE: 14 BRPM | WEIGHT: 175 LBS | SYSTOLIC BLOOD PRESSURE: 112 MMHG | BODY MASS INDEX: 26.61 KG/M2 | DIASTOLIC BLOOD PRESSURE: 60 MMHG

## 2018-12-14 DIAGNOSIS — F41.1 GENERALIZED ANXIETY DISORDER: ICD-10-CM

## 2018-12-14 DIAGNOSIS — F90.0 ADHD (ATTENTION DEFICIT HYPERACTIVITY DISORDER), INATTENTIVE TYPE: Primary | ICD-10-CM

## 2018-12-14 PROCEDURE — 99213 OFFICE O/P EST LOW 20 MIN: CPT | Performed by: NURSE PRACTITIONER

## 2018-12-14 RX ORDER — BUSPIRONE HYDROCHLORIDE 5 MG/1
TABLET ORAL
Qty: 120 TABLET | Refills: 0 | Status: SHIPPED | OUTPATIENT
Start: 2018-12-14 | End: 2019-06-21

## 2018-12-14 NOTE — PROGRESS NOTES
"Carrillo Dunlap is a 53 year old  maleor a medication check and ADHD follow up.      CURRENT CONCERNS:  Medication dosage, long term to short term     Review of past medical history:     Generalized anxiety disorder  ADHD (attention deficit hyperactivity disorder), inattentive type     CURRENT PRESCRIPTIONS:    Adderall 15 mg twice a day      MEDICATION BENEFITS:  Controlled symptoms:  Attention span  Uncontrolled symptoms:  None     MEDICATION SIDE EFFECTS:   Has:  none  Denies:  appetite suppression, weight loss, insomnia, tics, palpitations, stomach ache, headache, emotional lability, rebound irritability, drowsiness, \"zombie\" effect, growth suppression and dry mouth     Saw psych and MTM and both reviewed the meds, neither thought he needed to be on IR rather than XR with his history of gastric bypass  He was switched to 50 mg XR and was told could switch the Welbutrin 300 mg XR  He hasn't done yet. 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  PERRLA, LUNGS:  Clear, HEART:  Normal rate and rhythm.  Normal S1 and S2.  No murmurs., ABDOMEN:  Soft, non-tender, no organomegaly. and NEURO:  No tics or tremor.  Normal tone and strength. Normal gait and balance.          ASSESSMENT:    ICD-10-CM    1. ADHD (attention deficit hyperactivity disorder), inattentive type F90.0    2. Generalized anxiety disorder F41.1 busPIRone (BUSPAR) 5 MG tablet    doing well now and following with collaborative care " psych, see below   Consider going up on buspar 15 mg twice a day, he will discuss next month with psych     PLAN:  Medication:  No change in medication. Continue on current Rx. Already has new refills from Psych and will follow up there next month.     Come back when transitioning meds back to me for Refills , no longer than 3 months  Start CBT, he has number and just call today to set it up    Continue good self care and return for health maintenance exam when due

## 2018-12-17 NOTE — TELEPHONE ENCOUNTER
Adali,    Please see patient's [employer] message. Follow up from office visit 12/14/2018    Please advise    Thank You!  Lyndsay Arroyo RN  Triage Nurse

## 2018-12-31 ASSESSMENT — ANXIETY QUESTIONNAIRES
2. NOT BEING ABLE TO STOP OR CONTROL WORRYING: MORE THAN HALF THE DAYS
1. FEELING NERVOUS, ANXIOUS, OR ON EDGE: SEVERAL DAYS
4. TROUBLE RELAXING: MORE THAN HALF THE DAYS
7. FEELING AFRAID AS IF SOMETHING AWFUL MIGHT HAPPEN: NOT AT ALL
3. WORRYING TOO MUCH ABOUT DIFFERENT THINGS: NEARLY EVERY DAY
5. BEING SO RESTLESS THAT IT IS HARD TO SIT STILL: SEVERAL DAYS
GAD7 TOTAL SCORE: 11
GAD7 TOTAL SCORE: 11
6. BECOMING EASILY ANNOYED OR IRRITABLE: MORE THAN HALF THE DAYS
GAD7 TOTAL SCORE: 11
7. FEELING AFRAID AS IF SOMETHING AWFUL MIGHT HAPPEN: NOT AT ALL

## 2018-12-31 ASSESSMENT — PATIENT HEALTH QUESTIONNAIRE - PHQ9
SUM OF ALL RESPONSES TO PHQ QUESTIONS 1-9: 11
10. IF YOU CHECKED OFF ANY PROBLEMS, HOW DIFFICULT HAVE THESE PROBLEMS MADE IT FOR YOU TO DO YOUR WORK, TAKE CARE OF THINGS AT HOME, OR GET ALONG WITH OTHER PEOPLE: VERY DIFFICULT
SUM OF ALL RESPONSES TO PHQ QUESTIONS 1-9: 11

## 2019-01-01 ASSESSMENT — ANXIETY QUESTIONNAIRES: GAD7 TOTAL SCORE: 11

## 2019-01-01 ASSESSMENT — PATIENT HEALTH QUESTIONNAIRE - PHQ9: SUM OF ALL RESPONSES TO PHQ QUESTIONS 1-9: 11

## 2019-01-02 ENCOUNTER — OFFICE VISIT (OUTPATIENT)
Dept: PSYCHIATRY | Facility: CLINIC | Age: 54
End: 2019-01-02
Payer: COMMERCIAL

## 2019-01-02 VITALS
TEMPERATURE: 98.7 F | OXYGEN SATURATION: 98 % | SYSTOLIC BLOOD PRESSURE: 100 MMHG | HEART RATE: 102 BPM | RESPIRATION RATE: 16 BRPM | WEIGHT: 178 LBS | HEIGHT: 69 IN | BODY MASS INDEX: 26.36 KG/M2 | DIASTOLIC BLOOD PRESSURE: 60 MMHG

## 2019-01-02 DIAGNOSIS — F32.1 MODERATE MAJOR DEPRESSION (H): ICD-10-CM

## 2019-01-02 DIAGNOSIS — F90.0 ADHD (ATTENTION DEFICIT HYPERACTIVITY DISORDER), INATTENTIVE TYPE: Primary | ICD-10-CM

## 2019-01-02 DIAGNOSIS — F41.1 GENERALIZED ANXIETY DISORDER: ICD-10-CM

## 2019-01-02 PROCEDURE — 99214 OFFICE O/P EST MOD 30 MIN: CPT | Performed by: NURSE PRACTITIONER

## 2019-01-02 RX ORDER — BUPROPION HYDROCHLORIDE 150 MG/1
150 TABLET, EXTENDED RELEASE ORAL 2 TIMES DAILY
Qty: 180 TABLET | Refills: 0 | Status: SHIPPED | OUTPATIENT
Start: 2019-01-02 | End: 2019-03-06

## 2019-01-02 RX ORDER — DEXTROAMPHETAMINE SACCHARATE, AMPHETAMINE ASPARTATE MONOHYDRATE, DEXTROAMPHETAMINE SULFATE AND AMPHETAMINE SULFATE 5; 5; 5; 5 MG/1; MG/1; MG/1; MG/1
20 CAPSULE, EXTENDED RELEASE ORAL DAILY
Qty: 30 CAPSULE | Refills: 0 | Status: SHIPPED | OUTPATIENT
Start: 2019-01-02 | End: 2019-02-05

## 2019-01-02 RX ORDER — DEXTROAMPHETAMINE SACCHARATE, AMPHETAMINE ASPARTATE MONOHYDRATE, DEXTROAMPHETAMINE SULFATE AND AMPHETAMINE SULFATE 7.5; 7.5; 7.5; 7.5 MG/1; MG/1; MG/1; MG/1
30 CAPSULE, EXTENDED RELEASE ORAL DAILY
Qty: 30 CAPSULE | Refills: 0 | Status: SHIPPED | OUTPATIENT
Start: 2019-01-02 | End: 2019-02-05

## 2019-01-02 ASSESSMENT — MIFFLIN-ST. JEOR: SCORE: 1639.84

## 2019-01-02 NOTE — PROGRESS NOTES
"    Outpatient Psychiatric Progress Note    Patient was 15 minutes late for appointment. States he was \"goofing around\" in usual fashion.    Name: Carrillo Dunlap   : 1965                    Primary Care Provider: ONEL Uribe CNP   Therapist: none     PHQ-9 scores:  PHQ-9 SCORE 10/10/2018 2018 2018   PHQ-9 Total Score - - -   PHQ-9 Total Score MyChart - 15 (Moderately severe depression) 11 (Moderate depression)   PHQ-9 Total Score - - -   PHQ-9 Total Score 8 15 11       UGO-7 scores:  UGO-7 SCORE 10/16/2018 2018 2018   Total Score - - -   Total Score - 15 (severe anxiety) 11 (moderate anxiety)   Total Score 13 15 11     Answers for HPI/ROS submitted by the patient on 2018   If you checked off any problems, how difficult have these problems made it for you to do your work, take care of things at home, or get along with other people?: Very difficult  PHQ9 TOTAL SCORE: 11  UGO 7 TOTAL SCORE: 11        Patient Identification:  Patient is a 53 year old year old, single  White  male  who presents for return visit with me.  Patient is currently employed part time. Patient attended the session alone. Patient prefers to be called: \"Nirmal\".    Interim History:  I last saw Carrillo Dunlap for outpatient psychiatry Consultation on 18.     During that appointment, we reviewed his psychiatric history and ongoing issues with attention, organization, procrastination and self-esteem in his [employer] work. We reverted his Adderall back to XR formulation and former dosage of 50 mg. He was otherwise encouraged to prioritize CBT.    Current medications include:   Current Outpatient Medications   Medication Sig     amphetamine-dextroamphetamine (ADDERALL XR) 20 MG 24 hr capsule Take 1 capsule (20 mg) by mouth daily     amphetamine-dextroamphetamine (ADDERALL XR) 30 MG 24 hr capsule Take 1 capsule (30 mg) by mouth daily     ascorbic acid (VITAMIN C) 500 MG tablet Take " by mouth daily     busPIRone (BUSPAR) 5 MG tablet 10 mg (2 tabs) twice daily and may increase to 15 mg twice a day as tolerated     CALCIUM + D OR 2 tablet daily - 600mg total     cyanocolbalamin (VITAMIN B-12) 1000 MCG tablet Take 1,000 mcg by mouth every other day      fish oil-omega-3 fatty acids (FISH OIL) 1000 MG capsule Take 2 g by mouth daily.     fluocinonide (LIDEX) 0.05 % solution Apply sparingly to affected area twice daily as needed.  Do not apply to face.     MULTI-VITAMIN OR TABS 1 TABLET DAILY     vitamin B complex with vitamin C (VITAMIN  B COMPLEX) TABS tablet Take 1 tablet by mouth every other day     zinc 50 MG TABS      No current facility-administered medications for this visit.        The [place] Prescription Monitoring Program has been reviewed and there are no concerns about diversionary activity for controlled substances at this time.      I was able to review most recent Primary Care Provider, specialty provider, and therapy visit notes that I have access to.     Today, patient reports he's finding former formulation/dosage of Adderall XR 50 mg daily more effective/reliable for him. He was quite circumstantial in his report today, seemingly still to have issues with organization and prioritization of tasks. Continues to worry about finances and has all sorts of odd working hours. Got call from [facility] about therapy, but didn't call back; worried he'll be paired with someone who isn't skilled in CBT for ADHD; agrees to contact them back today. He otherwise continues with Wellbutrin  mg BID; formerly had issues remembering 2nd dosage, but has pill organizer now, so much more adherent. He increased Buspar, under PCP's direction, from 5 mg BID to 10 mg BID; thinks it's helping and denies side effects he originally reported.      Past Medical History:   Diagnosis Date     Depressive disorder      Heart disease 07-17-17    My brother just found out about a blockage in his heart.     Obesity,  "unspecified       has a past medical history of Depressive disorder, Heart disease (07-17-17), and Obesity, unspecified. He also has no past medical history of Arthritis, Cancer (H), Cerebral infarction (H), Congestive heart failure (H), Congestive heart failure, unspecified, COPD (chronic obstructive pulmonary disease) (H), Coronary artery disease, CVA (cerebral infarction), Diabetes (H), History of blood transfusion, Hypertension, Thyroid disease, or Uncomplicated asthma.    Social history updates:  Still thinking about working on [employer] on mother, looking for other employment.    Substance use updates:  Denies ETOH use  Cannabis 4 times a year  No other substance use  Denies tobacco use      Vital Signs:   /60 (BP Location: Right arm, Patient Position: Chair, Cuff Size: Adult Regular)   Pulse 102   Temp 98.7  F (37.1  C) (Oral)   Resp 16   Ht 1.74 m (5' 8.5\")   Wt 80.7 kg (178 lb)   SpO2 98%   BMI 26.67 kg/m      Labs:  Most recent laboratory results reviewed and no new labs.    Review of Systems:  10 systems (general, cardiovascular, respiratory, eyes, ENT, endocrine, GI, , M/S, neurological) were reviewed. Most pertinent finding(s) is/are:  all unremarkable.    Mental Status Examination:     Appearance:  awake, alert  Attitude:  cooperative   Eye Contact:  good  Gait and Station: Normal  Psychomotor Behavior:  intact station, gait and muscle tone  Oriented to:  time, person, and place  Attention Span and Concentration:  Normal  Speech:  clear, coherent  Mood:  anxious and depressed  Affect:  appropriate and in normal range  Associations:  no loose associations  Thought Process:  logical, goal oriented, tangental and circumstantial  Thought Content:  Appropriate to Interview  Recent and Remote Memory:  intact Not formally assessed. No amnesia.  Fund of Knowledge: appropriate  Insight:  good  Judgment:  intact  Impulse Control:  intact     Suicide Risk Assessment:  Today Carrillo Dunlap " denies suicidal ideation or self-harm impulses. Therefore, based on all available evidence including the factors cited above, Carrillo Dunlap does not appear to be at imminent risk for self-harm, does not meet criteria for a 72-hr hold, and therefore remains appropriate for ongoing outpatient level of care.  A thorough assessment of risk factors related to suicide and self-harm have been reviewed and are noted above. The patient convincingly denies acute suicidality on several occasions. Local community safety resources reviewed and printed for patient to use if needed. There was no deceit detected, and the patient presented in a manner that was believable.      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 Provider to review and confirm.       Generalized anxiety disorder 03/09/2011     Priority: Medium     Diagnosis updated by automated process. Provider to review and confirm.       Dysthymia 01/08/2010     Priority: Medium     Seeing Ruperto Suarez, Therapist.             Assessment:  We had a truncated visit today, given patient's lateness. Carrillo Dunlap reports better toleration and efficacy with former Adderall XR formulation. His issues are still quite the same however, and we agree therapy, specifically CBT for ADHD, is  essential for him to progress. We will maintain his current medication and allow him to proceed with this. He denies need for any further psychopharm changes at this time. As before, we can consider, going forward, a retrial of a serotonin specific reuptake inhibitor if further indicated.    Medication side effects and alternatives were reviewed. Health promotion activities recommended and reviewed today. All questions addressed. Education and counseling completed regarding risks and benefits of medications and psychotherapy options.    Treatment Plan:    Continue Adderall XR 50 mg qAM    Continue Wellbutrin  mg BID    Continue Buspar 10 mg BID    Previously referred for CBT at [facility]. Patient to contact [facility] to arrange       Continue all other medical care per primary care provider.     Continue all other medications as reviewed per electronic medical record today.     Safety plan reviewed. To the Emergency Department as needed or call after hours crisis line at [phone] or [phone]. [place] Crisis Text Line: Text [place] to 281791  or  Suicide LifeLine Chat: suicidepreventionlifeline.org/chat/       Schedule an appointment with me in 2-3 months or sooner as needed.  Call [facility] at [phone] to schedule.    Follow up with primary care provider as planned or for acute medical concerns.    Call the psychiatric nurse line with medication questions or concerns at [phone].    [employer] may be used to communicate with your provider, but this is not intended to be used for emergencies.    Administrative Billing:   Time spent with patient was 30 minutes and greater than 50% of time or 20 minutes was spent in counseling and coordination of care regarding above diagnoses and treatment plan.    Patient Status:  Patient will continue to be seen for ongoing consultation and stabilization.    Signed:   Daniele Neves CNP   Psychiatry

## 2019-01-10 ENCOUNTER — OFFICE VISIT (OUTPATIENT)
Dept: PSYCHOLOGY | Facility: CLINIC | Age: 54
End: 2019-01-10
Payer: COMMERCIAL

## 2019-01-10 DIAGNOSIS — F32.1 MODERATE MAJOR DEPRESSION (H): ICD-10-CM

## 2019-01-10 DIAGNOSIS — F90.0 ADHD (ATTENTION DEFICIT HYPERACTIVITY DISORDER), INATTENTIVE TYPE: Primary | ICD-10-CM

## 2019-01-10 PROCEDURE — 90791 PSYCH DIAGNOSTIC EVALUATION: CPT | Performed by: SOCIAL WORKER

## 2019-01-10 ASSESSMENT — ANXIETY QUESTIONNAIRES
6. BECOMING EASILY ANNOYED OR IRRITABLE: SEVERAL DAYS
2. NOT BEING ABLE TO STOP OR CONTROL WORRYING: SEVERAL DAYS
7. FEELING AFRAID AS IF SOMETHING AWFUL MIGHT HAPPEN: NOT AT ALL
IF YOU CHECKED OFF ANY PROBLEMS ON THIS QUESTIONNAIRE, HOW DIFFICULT HAVE THESE PROBLEMS MADE IT FOR YOU TO DO YOUR WORK, TAKE CARE OF THINGS AT HOME, OR GET ALONG WITH OTHER PEOPLE: SOMEWHAT DIFFICULT
GAD7 TOTAL SCORE: 6
5. BEING SO RESTLESS THAT IT IS HARD TO SIT STILL: SEVERAL DAYS
1. FEELING NERVOUS, ANXIOUS, OR ON EDGE: NOT AT ALL
3. WORRYING TOO MUCH ABOUT DIFFERENT THINGS: MORE THAN HALF THE DAYS

## 2019-01-10 ASSESSMENT — PATIENT HEALTH QUESTIONNAIRE - PHQ9
5. POOR APPETITE OR OVEREATING: SEVERAL DAYS
SUM OF ALL RESPONSES TO PHQ QUESTIONS 1-9: 10
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
## 2019-01-11 ASSESSMENT — ANXIETY QUESTIONNAIRES: GAD7 TOTAL SCORE: 6

## 2019-01-21 ENCOUNTER — OFFICE VISIT (OUTPATIENT)
Dept: PSYCHOLOGY | Facility: CLINIC | Age: 54
End: 2019-01-21
Payer: COMMERCIAL

## 2019-01-21 DIAGNOSIS — F90.0 ADHD (ATTENTION DEFICIT HYPERACTIVITY DISORDER), INATTENTIVE TYPE: Primary | ICD-10-CM

## 2019-01-21 DIAGNOSIS — F32.1 MODERATE MAJOR DEPRESSION (H): ICD-10-CM

## 2019-01-21 PROCEDURE — 90834 PSYTX W PT 45 MINUTES: CPT | Performed by: SOCIAL WORKER

## 2019-01-21 ASSESSMENT — PATIENT HEALTH QUESTIONNAIRE - PHQ9
SUM OF ALL RESPONSES TO PHQ QUESTIONS 1-9: 14
5. POOR APPETITE OR OVEREATING: SEVERAL DAYS

## 2019-01-21 ASSESSMENT — ANXIETY QUESTIONNAIRES
7. FEELING AFRAID AS IF SOMETHING AWFUL MIGHT HAPPEN: NOT AT ALL
GAD7 TOTAL SCORE: 8
3. WORRYING TOO MUCH ABOUT DIFFERENT THINGS: MORE THAN HALF THE DAYS
2. NOT BEING ABLE TO STOP OR CONTROL WORRYING: SEVERAL DAYS
5. BEING SO RESTLESS THAT IT IS HARD TO SIT STILL: NOT AT ALL
6. BECOMING EASILY ANNOYED OR IRRITABLE: MORE THAN HALF THE DAYS
1. FEELING NERVOUS, ANXIOUS, OR ON EDGE: MORE THAN HALF THE DAYS

## 2019-01-21 NOTE — PROGRESS NOTES
"                                           Progress Note    Client Name: Carrillo Dunlap  Date: 1/21/19         Service Type: Individual      Session Start Time: 10:10  Session End Time: 10:50      Session Length: 50 minutes     Session #: 2     Attendees: Client attended alone    Treatment Plan Last Reviewed: 1/21/19  PHQ-9 / UGO-7 : 1/21/19     DATA      Progress Since Last Session (Related to Symptoms / Goals / Homework):   Symptoms: Worsening Pt's PHQ-9 score increased over the past two weeks.    Homework: Pt was not assigned homework last session      Episode of Care Goals: No improvement - PREPARATION (Decided to change - considering how); Intervened by negotiating a change plan and determining options / strategies for behavior change, identifying triggers, exploring social supports, and working towards setting a date to begin behavior change     Current / Ongoing Stressors and Concerns: Pt reports the weather is making him more \"slugish\", although is more motivated at night time. He reports no huge stressors since our last visit. He reports telling himself \"I am not good enough\" because he is not doing a perfect job when working on his responsibilities and weekly tasks. He reports that his mind has been scattered throughout the day with multiple tasks he \"should\" be working on, including his work portfolio, organizing his tax info, making his apartment more tidy, and wanting to sell items online. Last night, he reports working five hours on organizing his items to sell online.      Treatment Objective(s) Addressed in This Session:   Developed a weekly activity sheet to document his progress on his work portfolio.     Intervention:   CBT: Educated pt on the CBT and practiced using a CBT thought record in session. Pt began to connect how his cognitive thought process effects his mood and motivation.   Solution Focused: Increasing the pt's ability to follow through with pre-organized tasks by outlining " step-by-step directions to complete his weekly goal. Pt's affect became less anxious when this writer redirected the conversation to the pt's most distressing problem.        ASSESSMENT: Current Emotional / Mental Status (status of significant symptoms):   Risk status (Self / Other harm or suicidal ideation)   Client denies current fears or concerns for personal safety.   Client denies current or recent suicidal ideation or behaviors.   Client denies current or recent homicidal ideation or behaviors.   Client denies current or recent self injurious behavior or ideation.   Client denies other safety concerns.   Client Client reports there has been no change in risk factors since their last session.     Client Client reports there has been no change in protective factors since their last session.     A safety and risk management plan has not been developed at this time, however client was given the after-hours number / 911 should there be a change in any of these risk factors.     Appearance:   Appropriate    Eye Contact:   Good    Psychomotor Behavior: Restless    Attitude:   Cooperative    Orientation:   All   Speech    Rate / Production: Hyperverbal  Normal     Volume:  Normal    Mood:    Normal   Affect:    Worrisome    Thought Content:  Clear    Thought Form:  Coherent  Logical    Insight:    Poor      Medication Review:   No changes to current psychiatric medication(s)     Medication Compliance:   Yes     Changes in Health Issues:   None reported     Chemical Use Review:   Substance Use: Chemical use reviewed, no active concerns identified      Tobacco Use: No current tobacco use.       Collateral Reports Completed:   Not Applicable    PLAN: (Client Tasks / Therapist Tasks / Other) The pt will begin to complete his first goal objective. He will utilize his weekly schedule to document his mood before/after working on his project summary overview for one of his portfolio projects for one hour (2-3pm) today,  Wednesday, and Friday. He will additionally fill out a CBT thought record after he completes his hour of work.       AFSHIN Gomez, [facility], 1/31/19  Note reviewed and clinical supervision by AFSHIN Weaver [facility] 1/31/2019                                                      ________________________________________________________________________    Treatment Plan    Client's Name: Carrillo Dunlap  YOB: 1965    Date: 1/21/19    DSM-V Diagnoses: Attention-Deficit/Hyperactivity Disorder  314.01 (F90.2) Combined presentation or 296.31 (F33.0) Major Depressive Disorder, Recurrent Episode, Mild _ and With anxious distress  Psychosocial / Contextual Factors: Limited social support, limited financial support, physical/medical concerns, employment constraints  WHODAS: did not complete during visit    Referral / Collaboration:  Referral to another professional/service is not indicated at this time.    Anticipated number of session or this episode of care: 15      MeasurableTreatment Goal(s) related to diagnosis / functional impairment(s)  Goal 1: Client will begin to work on his work portfolio.     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      Objective #A (Client Action)    Client will schedule times of the day to work on his work portfolio. 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   Status: New - Date: 1/21/19     Intervention(s)  Therapist will assign homework including weekly activity scheduling and CBT thought logs  provide educational materials on CBT  role-play effective communication skills.      AFSHIN Gomez, [facility] January 21, 2019  Note reviewed and clinical supervision by AFSHIN Weaver [facility] 1/31/2019

## 2019-01-22 ASSESSMENT — ANXIETY QUESTIONNAIRES: GAD7 TOTAL SCORE: 8

## 2019-02-04 ENCOUNTER — MYC MEDICAL ADVICE (OUTPATIENT)
Dept: PSYCHIATRY | Facility: CLINIC | Age: 54
End: 2019-02-04

## 2019-02-04 DIAGNOSIS — F90.0 ADHD (ATTENTION DEFICIT HYPERACTIVITY DISORDER), INATTENTIVE TYPE: ICD-10-CM

## 2019-02-04 NOTE — TELEPHONE ENCOUNTER
information:    Fill Date Written    Drug   Qty Days Prescriber   01/10/2019 01/02/2019 Dextroamp-Amphet Er 20 MG Cap 30 30 Da Mca  01/10/2019 01/02/2019 Dextroamp-Amphet Er 30 MG Cap 30 30 Da Mca    Patient should be out based on 2/10/19 per  (and as pt stated).    Patient's next appointment with Daniele Neves is 3/6/19.    Routing to provider for instruction regarding where patient can  the Rx .    Sara Bui RN on 2/4/2019 at 4:18 PM

## 2019-02-05 RX ORDER — DEXTROAMPHETAMINE SACCHARATE, AMPHETAMINE ASPARTATE MONOHYDRATE, DEXTROAMPHETAMINE SULFATE AND AMPHETAMINE SULFATE 5; 5; 5; 5 MG/1; MG/1; MG/1; MG/1
20 CAPSULE, EXTENDED RELEASE ORAL DAILY
Qty: 30 CAPSULE | Refills: 0 | Status: SHIPPED | OUTPATIENT
Start: 2019-02-05 | End: 2019-03-12

## 2019-02-05 RX ORDER — DEXTROAMPHETAMINE SACCHARATE, AMPHETAMINE ASPARTATE MONOHYDRATE, DEXTROAMPHETAMINE SULFATE AND AMPHETAMINE SULFATE 7.5; 7.5; 7.5; 7.5 MG/1; MG/1; MG/1; MG/1
30 CAPSULE, EXTENDED RELEASE ORAL DAILY
Qty: 30 CAPSULE | Refills: 0 | Status: SHIPPED | OUTPATIENT
Start: 2019-02-05 | End: 2019-03-12

## 2019-02-05 NOTE — TELEPHONE ENCOUNTER
Called patient and left a message letting him know his prescriptions are at the [place] .     Nicki Andersen, CMA

## 2019-02-13 ENCOUNTER — OFFICE VISIT (OUTPATIENT)
Dept: PSYCHOLOGY | Facility: CLINIC | Age: 54
End: 2019-02-13
Payer: COMMERCIAL

## 2019-02-13 DIAGNOSIS — F32.1 MODERATE MAJOR DEPRESSION (H): ICD-10-CM

## 2019-02-13 DIAGNOSIS — F90.0 ADHD (ATTENTION DEFICIT HYPERACTIVITY DISORDER), INATTENTIVE TYPE: Primary | ICD-10-CM

## 2019-02-13 PROCEDURE — 90834 PSYTX W PT 45 MINUTES: CPT | Performed by: SOCIAL WORKER

## 2019-02-13 ASSESSMENT — PATIENT HEALTH QUESTIONNAIRE - PHQ9
SUM OF ALL RESPONSES TO PHQ QUESTIONS 1-9: 8
5. POOR APPETITE OR OVEREATING: SEVERAL DAYS

## 2019-02-13 ASSESSMENT — ANXIETY QUESTIONNAIRES
2. NOT BEING ABLE TO STOP OR CONTROL WORRYING: MORE THAN HALF THE DAYS
5. BEING SO RESTLESS THAT IT IS HARD TO SIT STILL: NOT AT ALL
7. FEELING AFRAID AS IF SOMETHING AWFUL MIGHT HAPPEN: NOT AT ALL
3. WORRYING TOO MUCH ABOUT DIFFERENT THINGS: MORE THAN HALF THE DAYS
GAD7 TOTAL SCORE: 7
1. FEELING NERVOUS, ANXIOUS, OR ON EDGE: SEVERAL DAYS
6. BECOMING EASILY ANNOYED OR IRRITABLE: SEVERAL DAYS

## 2019-02-13 NOTE — PROGRESS NOTES
"                                           Progress Note    Client Name: Carrillo Dunlap  Date: 2/13/19         Service Type: Individual  Video Visit: No     Session Start Time: 11:15  Session End Time: 11:53   Session Length:     Session #: 3    Attendees: Client attended alone     Treatment Plan Last Reviewed: reviewed  PHQ-9 / UGO-7 : reviewed    DATA  Interactive Complexity: No  Crisis: No       Progress Since Last Session (Related to Symptoms / Goals / Homework):   Symptoms: Improving pt is paying attention to his thoughts, feelings, and behaviors    Homework: Completed in session. Pt came to session with completed CBT thought logs.       Episode of Care Goals: Minimal progress - ACTION (Actively working towards change); Intervened by reinforcing change plan / affirming steps taken     Current / Ongoing Stressors and Concerns: Pt reports that he has made progress on his goals.      Treatment Objective(s) Addressed in This Session:   Client will schedule times of the day to work on his work portfolio and use a thought log during the process.  He was able to work on this during the scheduled times. He was unable to go to the library so instead went to the coffee shop. He usually finds coffee shops distracting but not this time. He states this may be because \"there was no other reason to be there, and is the intent to in why I was there.\" He states the most challenging thing about the process was getting himself there. He also applied the thought log process to completing his taxes. He started having the urge to apply his thought log to other tasks and found it difficult to be okay with completing the log for just one task.      Intervention:   CBT: Utilized the CBT wheel on a whiteboard during session. Pt reports he tries to talk himself out of doing things, especially when he has to focus on one things. He described thinking about what makes him want to avoid engaging in his responsibilities. He believes " "this is because he has perfectionist qualities. He developed a more helpful way of thinking about this using the statement, \"this may may not be perfect the first time, and yet I can revise my draft in the future.\" In regards to taxes, he reports having the thought, \"this takes me 10 times longer than other people.\" He reports telling himself, \"cmon Nirmal you dumbass.\" He noticed that his brothers use to call him similar names and discussed core beliefs with the pt. He was able to continue to focus on the task at hand and did not let his negative thoughts lead him to engage in other task.     ASSESSMENT: Current Emotional / Mental Status (status of significant symptoms):   Risk status (Self / Other harm or suicidal ideation)   Client denies current fears or concerns for personal safety.   Client denies current or recent suicidal ideation or behaviors.   Client denies current or recent homicidal ideation or behaviors.   Client denies current or recent self injurious behavior or ideation.   Client denies other safety concerns.   Client Client reports there has been no change in risk factors since their last session.     Client Client reports there has been no change in protective factors since their last session.     A safety and risk management plan has not been developed at this time, however client was given the after-hours number / 911 should there be a change in any of these risk factors.     Appearance:   Appropriate    Eye Contact:   Good    Psychomotor Behavior: Normal  Restless    Attitude:   Cooperative    Orientation:   All   Speech    Rate / Production: Hyperverbal     Volume:  Normal    Mood:    Anxious  Normal   Affect:    Appropriate    Thought Content:  Clear    Thought Form:  Coherent  Logical    Insight:    Fair, improving     Medication Review:   No current psychiatric medications prescribed     Medication Compliance:   No     Changes in Health Issues:   None reported     Chemical Use " Review:   Substance Use: increase in cannabis.  Client reports frequency using x2/ in the past two weeks with his older brother.  Patient assessed present costs and future losses as a result of substance use.      Tobacco Use: No current tobacco use.      Diagnosis:  1. ADHD (attention deficit hyperactivity disorder), inattentive type    2. Moderate major depression (H)    Rule out substance use disorder    Collateral Reports Completed:   Not Applicable    PLAN: (Client Tasks / Therapist Tasks / Other). Pt will give himself permission to be okay with completing what he was able to complete during each day. Pt will continue to utilize CBT thought logs, paying close attention to his self-defeating statements. He will also brainstorm positive self-coping statements to counteract self-defeating statements.     AFSHIN Gomez, [facility] 2.13.19  Note reviewed and clinical supervision by AFSHIN Weaver [facility] 2/22/2019                                                     ______________________________________________________________________    Treatment Plan     Client's Name: Carrillo Dunlap                   YOB: 1965     Date: 1/21/19     DSM-V Diagnoses: Attention-Deficit/Hyperactivity Disorder  314.01 (F90.2) Combined presentation or 296.31 (F33.0) Major Depressive Disorder, Recurrent Episode, Mild _ and With anxious distress  Psychosocial / Contextual Factors: Limited social support, limited financial support, physical/medical concerns, employment constraints  WHODAS: did not complete during visit     Referral / Collaboration:  Referral to another professional/service is not indicated at this time.     Anticipated number of session or this episode of care: 15        MeasurableTreatment Goal(s) related to diagnosis / functional impairment(s)  Goal 1: Client will begin to work on his work portfolio.     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       Objective #A (Client Action)                Client will schedule times of the day to work on his work portfolio. 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   Status: New - Date: 1/21/19      Intervention(s)  Therapist will assign homework including weekly activity scheduling and CBT thought logs  provide educational materials on CBT  role-play effective communication skills.    Goal 2: Client will decrease his monthly marijuana use.     I will know I've met my goal when I do not use marijuana around my brother.      Objective #A (Client Action)    Client will identify at least 3 example(s) of how marijuana has resulted in an experience that interferes with person values or goals.  Status: New- 2/19/19    Intervention(s)  Therapist will help client identify how his personal values and goals are interfered when he uses marijuana.     Objective #B  Client will identify triggers and environmental cues contributing to his marijuana usage.     Status: New - Date: 2/19/19     Intervention(s)  Therapist will teach the client how to perform a behavioral chain analysis to better identify triggers and cues to using marijuana.     Objective #C  Client will maintain sobriety for one month.  Status: New - Date: 2.19.19     Intervention(s)  Therapist will provide educational materials for the client to increase his coping skills when he has urges to use marijuana. Therapist will provide additional resources to help him maintain sobriety.         AFSHIN Gomez, LGSW January 21, 2019  Note reviewed and clinical supervision by AFSHIN Weaver [facility] 1/31/2019

## 2019-02-13 NOTE — Clinical Note
Valentino Torres,Can you please review and recommend any changes/suggestions to this note? Thanks!Panchito

## 2019-02-14 ASSESSMENT — ANXIETY QUESTIONNAIRES: GAD7 TOTAL SCORE: 7

## 2019-03-04 ENCOUNTER — OFFICE VISIT (OUTPATIENT)
Dept: PSYCHOLOGY | Facility: CLINIC | Age: 54
End: 2019-03-04
Payer: COMMERCIAL

## 2019-03-04 DIAGNOSIS — F90.0 ADHD (ATTENTION DEFICIT HYPERACTIVITY DISORDER), INATTENTIVE TYPE: Primary | ICD-10-CM

## 2019-03-04 DIAGNOSIS — F32.1 MODERATE MAJOR DEPRESSION (H): ICD-10-CM

## 2019-03-04 PROCEDURE — 90834 PSYTX W PT 45 MINUTES: CPT | Performed by: SOCIAL WORKER

## 2019-03-04 ASSESSMENT — ANXIETY QUESTIONNAIRES
3. WORRYING TOO MUCH ABOUT DIFFERENT THINGS: MORE THAN HALF THE DAYS
GAD7 TOTAL SCORE: 10
6. BECOMING EASILY ANNOYED OR IRRITABLE: MORE THAN HALF THE DAYS
2. NOT BEING ABLE TO STOP OR CONTROL WORRYING: MORE THAN HALF THE DAYS
7. FEELING AFRAID AS IF SOMETHING AWFUL MIGHT HAPPEN: NOT AT ALL
5. BEING SO RESTLESS THAT IT IS HARD TO SIT STILL: SEVERAL DAYS
1. FEELING NERVOUS, ANXIOUS, OR ON EDGE: MORE THAN HALF THE DAYS
IF YOU CHECKED OFF ANY PROBLEMS ON THIS QUESTIONNAIRE, HOW DIFFICULT HAVE THESE PROBLEMS MADE IT FOR YOU TO DO YOUR WORK, TAKE CARE OF THINGS AT HOME, OR GET ALONG WITH OTHER PEOPLE: SOMEWHAT DIFFICULT

## 2019-03-04 ASSESSMENT — PATIENT HEALTH QUESTIONNAIRE - PHQ9
5. POOR APPETITE OR OVEREATING: SEVERAL DAYS
SUM OF ALL RESPONSES TO PHQ QUESTIONS 1-9: 9

## 2019-03-04 NOTE — Clinical Note
Good afternoon Melissa,Can you please review this note and recommend potential changes? Thanks!Panchito

## 2019-03-04 NOTE — PROGRESS NOTES
Progress Note    Client Name: Carrillo Dunlap  Date: 3/4/19         Service Type: Individual  Video Visit: No     Session Start Time: 11:10  Session End Time: 11:50  Session Length:     Session #: 4    Attendees: Client attended alone     Treatment Plan Last Reviewed: reviewed  PHQ-9 / UGO-7 : UGO increased by 3 points from last session    DATA  Interactive Complexity: No  Crisis: No       Progress Since Last Session (Related to Symptoms / Goals / Homework):   Symptoms: The same. Pt reports that he has made little progress on his initial goal of working on his work portfolio. He often pushes other responsibilities until the last minute and as a result has to prioritize these tasks ahead of his long-term goal of working on his portfolio. Pt reports being more aware on conscious about how he can counteract his self defeating thoughts in order to continuing being productive.      Homework: Did not complete. Pt did not complete his thought logs. We discussed how his urge to reformat the thought logs may take away from the purpose of the exercise. We developed a new worksheet together in session for him to use outside of session.      Episode of Care Goals: No improvement - PREPARATION (Decided to change - considering how); Intervened by negotiating a change plan and determining options / strategies for behavior change, identifying triggers, exploring social supports, and working towards setting a date to begin behavior change     Current / Ongoing Stressors and Concerns: Pt reports feeling like he has little structure in his day and is contemplating getting a regular 9-5 position. We processed the benefits of structure and discussed what steps he can take to make his daily life more structured now. Pt also discussed becoming more sad lately due to the weather and reports engaging in some overeating behaviors to avoid uncomfortable or unpleasant thoughts and feelings.  "     Treatment Objective(s) Addressed in This Session:   Identify negative self-talk and behaviors: challenge core beliefs, myths, and actions     Intervention:   CBT: Situation: When pt realizes he does not have structure and is engaging in multiple tasks at a time. Pt reports thinking to himself \"I'm not getting things done because I have no structure.\" He noticed himself feeling more anxious and overwhelmed. As a result, pt bought a sad lamp in hopes that he would improve his mood but also engage in an activity on a consistent basis.   ACT Provided education on ACT basics including experiential avoidance strategies and defusion strategies.       ASSESSMENT: Current Emotional / Mental Status (status of significant symptoms):   Risk status (Self / Other harm or suicidal ideation)   Client denies current fears or concerns for personal safety.   Client denies current or recent suicidal ideation or behaviors.   Client denies current or recent homicidal ideation or behaviors.   Client denies current or recent self injurious behavior or ideation.   Client denies other safety concerns.   Client Client reports there has been no change in risk factors since their last session.     Client Client reports there has been no change in protective factors since their last session.     A safety and risk management plan has not been developed at this time, however client was given the after-hours number / 911 should there be a change in any of these risk factors.     Appearance:   Appropriate    Eye Contact:   Good    Psychomotor Behavior: Normal  Restless    Attitude:   Cooperative    Orientation:   All   Speech    Rate / Production: Hyperverbal     Volume:  Normal    Mood:    Anxious  Sad    Affect:    Appropriate    Thought Content:  Clear    Thought Form:  Coherent  Logical    Insight:    Fair, improving     Medication Review:   No current psychiatric medications prescribed     Medication Compliance:   No     Changes in Health " "Issues:   None reported     Chemical Use Review:  Substance Use: decrease in cannabis.  Client reports frequency using of 0 in the past two weeks.    Tobacco Use: No current tobacco use.      Diagnosis:  1. ADHD (attention deficit hyperactivity disorder), inattentive type    2. Moderate major depression (H)      Collateral Reports Completed:   Not Applicable    PLAN: (Client Tasks / Therapist Tasks / Other). Pt agreed to use SAD lamp on a daily basis from 9am-9:30am. Pt will continue practicing observing his thoughts and labeling his feelings. He will start to use congitive distancing strategies like describing thoughts and feelings as \"I am having the thought... \"I am having the feeling.\" Pt will also practice \"thanking his mind\" when he notices himself engaging in anticipatory anxiety.    AFSHIN Gomez, [facility] 3.4.19    Note reviewed and clinical supervision by AFSHIN Weaver [facility] 3/8/2019                                                   ______________________________________________________________________    Treatment Plan     Client's Name: Carrillo Dunlap                   YOB: 1965     Date: 1/21/19     DSM-V Diagnoses: Attention-Deficit/Hyperactivity Disorder  314.01 (F90.2) Combined presentation or 296.31 (F33.0) Major Depressive Disorder, Recurrent Episode, Mild _ and With anxious distress  Psychosocial / Contextual Factors: Limited social support, limited financial support, physical/medical concerns, employment constraints  WHODAS: did not complete during visit     Referral / Collaboration:  Referral to another professional/service is not indicated at this time.     Anticipated number of session or this episode of care: 15        MeasurableTreatment Goal(s) related to diagnosis / functional impairment(s)  Goal 1: Client will begin to work on his work portfolio.     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       Objective #A (Client Action)                Client will schedule times of the day to work on his work portfolio. 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   Status: New - Date: 1/21/19      Intervention(s)  Therapist will assign homework including weekly activity scheduling and CBT thought logs  provide educational materials on CBT  role-play effective communication skills.    Goal 2: Client will decrease his monthly marijuana use.     I will know I've met my goal when I do not use marijuana around my brother.      Objective #A (Client Action)    Client will identify at least 3 example(s) of how marijuana has resulted in an experience that interferes with person values or goals.  Status: New- 2/19/19    Intervention(s)  Therapist will help client identify how his personal values and goals are interfered when he uses marijuana.     Objective #B  Client will identify triggers and environmental cues contributing to his marijuana usage.     Status: New - Date: 2/19/19     Intervention(s)  Therapist will teach the client how to perform a behavioral chain analysis to better identify triggers and cues to using marijuana.     Objective #C  Client will maintain sobriety for one month.  Status: New - Date: 2.19.19     Intervention(s)  Therapist will provide educational materials for the client to increase his coping skills when he has urges to use marijuana. Therapist will provide additional resources to help him maintain sobriety.         AFSHIN Gomez, SW January 21, 2019  Note reviewed and clinical supervision by AFSHIN Weaver [facility] 1/31/2019

## 2019-03-05 ASSESSMENT — ANXIETY QUESTIONNAIRES: GAD7 TOTAL SCORE: 10

## 2019-03-06 ENCOUNTER — TELEPHONE (OUTPATIENT)
Dept: PSYCHIATRY | Facility: CLINIC | Age: 54
End: 2019-03-06

## 2019-03-06 DIAGNOSIS — F32.1 MODERATE MAJOR DEPRESSION (H): ICD-10-CM

## 2019-03-06 DIAGNOSIS — F90.0 ADHD (ATTENTION DEFICIT HYPERACTIVITY DISORDER), INATTENTIVE TYPE: ICD-10-CM

## 2019-03-06 RX ORDER — BUPROPION HYDROCHLORIDE 150 MG/1
150 TABLET, EXTENDED RELEASE ORAL 2 TIMES DAILY
Qty: 180 TABLET | Refills: 0 | Status: SHIPPED | OUTPATIENT
Start: 2019-03-06 | End: 2019-03-11 | Stop reason: DRUGHIGH

## 2019-03-06 NOTE — TELEPHONE ENCOUNTER
RN spoke to patient. He has about 7 days worth of Adderall. He requested to  the Adderall RX from [facility] next Wednesday 3/13/19.     Bupropion was refilled today.     [facility] Database was reviewed.   Fill Date ID Written                                Drug                         Qty Days Prescriber   02/06/2019 1 02/05/2019 Dextroamp-Amphet Er 20 Mg Cap 30 30 Da Mca   02/06/2019 1 02/05/2019 Dextroamp-Amphet Er 30 Mg Cap 30 30 Da Mca

## 2019-03-06 NOTE — TELEPHONE ENCOUNTER
Reason for call:  Other   Patient called regarding (reason for call): prescription  Additional comments: Requesting refill of buproprian and adderall (generic) as appt today needed to be rescheduled.    Phone number to reach patient:  Home number on file [phone] (home)    Best Time:  Anytime     Can we leave a detailed message on this number?  YES

## 2019-03-11 ENCOUNTER — OFFICE VISIT (OUTPATIENT)
Dept: PHARMACY | Facility: CLINIC | Age: 54
End: 2019-03-11
Payer: COMMERCIAL

## 2019-03-11 VITALS — DIASTOLIC BLOOD PRESSURE: 62 MMHG | SYSTOLIC BLOOD PRESSURE: 100 MMHG | HEART RATE: 82 BPM

## 2019-03-11 DIAGNOSIS — F90.0 ADHD (ATTENTION DEFICIT HYPERACTIVITY DISORDER), INATTENTIVE TYPE: Primary | ICD-10-CM

## 2019-03-11 DIAGNOSIS — F41.1 GENERALIZED ANXIETY DISORDER: ICD-10-CM

## 2019-03-11 DIAGNOSIS — F32.1 MODERATE MAJOR DEPRESSION (H): ICD-10-CM

## 2019-03-11 PROCEDURE — 99605 MTMS BY PHARM NP 15 MIN: CPT | Performed by: PHARMACIST

## 2019-03-11 PROCEDURE — 99607 MTMS BY PHARM ADDL 15 MIN: CPT | Performed by: PHARMACIST

## 2019-03-11 RX ORDER — BUPROPION HYDROCHLORIDE 300 MG/1
300 TABLET ORAL EVERY MORNING
Qty: 30 TABLET | Refills: 1 | Status: SHIPPED | OUTPATIENT
Start: 2019-03-11 | End: 2019-03-20

## 2019-03-11 NOTE — Clinical Note
Thank you for your quick response earlier!  I did go ahead and send the new prescription.  Please see my note as fyi and let me know if you have questions!Thanks,Maria Ines

## 2019-03-11 NOTE — PATIENT INSTRUCTIONS
Recommendations from today's MTM visit:                                                    MTM (medication therapy management) is a service provided by a clinical pharmacist designed to help you get the most of out of your medicines.   Today we reviewed what your medicines are for, how to know if they are working, that your medicines are safe and how to make your medicine regimen as easy as possible.     1. I'm so glad to hear that you're feeling some mood improvement!    2. I will talk to your prescriber about switching bupropion back to extended release 300mg once daily    Next MTM visit: 9/16/19 at 12:00pm    To schedule another MTM appointment, please call the clinic directly or you may call the MTM scheduling line at [phone] or toll-free at [phone].     My Clinical Pharmacist's contact information:                                                      It was a pleasure talking with you today!  Please feel free to contact me with any questions or concerns you have.      Maria Ines Chaney, Valerie  Medication Therapy Management Pharmacist  [facility]  Phone: [phone]    You may receive a survey about the MTM services you received by email and/or US Mail.  I would appreciate your feedback to help me serve you better in the future. Your comments will be anonymous.

## 2019-03-11 NOTE — Clinical Note
Hello,I met with our mutual patient for Medication Therapy Management (MTM) to review medications. He expressed enjoyment with CBT and really had a much more positive outlook today, as compared to previous visits with me.  Please see my note as marsha and let me know if you have any questions.  Thanks,Maria Ines

## 2019-03-11 NOTE — PROGRESS NOTES
"SUBJECTIVE/OBJECTIVE:                           Carrillo Dunlap is a 53 year old male coming in for follow-up visit for Medication Therapy Management.  He was self-referred to me.  This is the first visit of 2019.  Pt arrived 15 minutes late to this appointment.    Chief Complaint: \"I think things are getting better\"    Allergies/ADRs: Reviewed in Epic  Tobacco: 0-1 pack per day - is not interested in quittingTobacco Cessation Action Plan: Information offered: Patient not interested at this time   Chantix- lots of irritability  Alcohol: not currently using  Caffeine: down to 1/4 caf coffee  Activity: walking- helpful  PMH: Reviewed in Epic; S/P Sharmin en Y 2008    Medication Adherence/Access: Pt is taking Adderall and bupropion once daily, which he doesn't typically miss.  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 He does not have a set schedule, but is going to a work site twice weekly, which has been helpful for creating routine.    ADHD: Pt is currently taking total of Adderall XR 50mg in the morning, divided into 20mg first thing in the morning and 30mg after breakfast, as he would sometimes get an upset stomach if taking the whole dose without food.  Pt reported feeling less \"on and off\" feelings during the day with switch back to XR formulation and appreciates the once daily dosing.  He has been doing CBT weekly, which he finds helpful, and reportedly feels that he has made progress with not needing to be perfect with his first iteration of work and is overall less anxious.  He recently started using an Carri smart speaker for reminders during the day, such as when to start making dinner or leave to do errands.  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  Pt does still notice " "some tinnitus, though unchanged since last visit.  He doesn't feel that it is currently bothersome.    Past meds: Concerta x 1 month in 12/2015- didn't work    Depression/Anxiety: Pt is currently prescribed bupropion SR 150mg BID, but has been taking bupropion ER 300mg QAM from past supply while awaiting next refill.  Pt reportedly hasn't noticed any change in overall mood, anxiety, or sleep with formulation change, but is preferring the once daily dosing, as he was still occasionally forgetting the second SR dose.  He is also currently prescribed buspirone 10mg BID, but is taking 5mg BID due to feeling some physical \"minor jitters\" on the higher dose.  He did report feeling noticeably calmer with the medication, but has some initial lightheadedness soon after each dose.  Pt described building a lightbox with an LED panel and has been using for about the last 10 days.  He noted that the light is 120watt and ~6000ka.  He feels that the days have felt \"smoother\" with a more positive outlook over the last 2 weeks.  He had been concerned about finding a well-matched CBT therapist, but feels that the relationship and appointments are going well.  Of note, patient used much less self-deprecating language during this visit and expressed a more positive outlook than previous visits.     Citalopram x 6 months in 2010  Sertraline x 4-5 months in 2011    Current labs include:  BP Readings from Last 3 Encounters:   03/11/19 100/62   01/02/19 100/60   12/14/18 112/60     ASSESSMENT:                             Not all current medications were reviewed today.     Medication Adherence: much improved with once daily dosing; will set Carri reminder for second dose of other medications    ADHD: Much improved medication adherence and dosing consistency, in addition to improved symptoms.  Encouraged patient's progress in CBT and finding ways to set reminders that work for his lifestyle.  Continue current " medication.    Depression/Anxiety:  Overall mood and anxiety improvement, likely due to combination of of medication consistency and seasonal changes.  Unclear what lux can be calculated from supplies in patient's homemade lightbox.  Pt would benefit from switching to once daily bupropion for ease of administration/adherence.  If medication changes are needed in the future, consider SNRI as patient has tried two SSRIs in the past.     PLAN:                            1. Switch bupropion to ER 300mg QAM, as discussed with Daniele Neves CNP.    I spent 45 minutes with this patient today.  A copy of the visit note was provided to the psychiatry provider.    Will follow up in six months.  Next MTM visit is scheduled for 9/16/19 at 12pm.    The patient was given a summary of these recommendations as an after visit summary.     Maria Ines Chaney, PharmD  Medication Therapy Management Pharmacist  [facility]  Phone: [phone]

## 2019-03-12 RX ORDER — DEXTROAMPHETAMINE SACCHARATE, AMPHETAMINE ASPARTATE MONOHYDRATE, DEXTROAMPHETAMINE SULFATE AND AMPHETAMINE SULFATE 5; 5; 5; 5 MG/1; MG/1; MG/1; MG/1
20 CAPSULE, EXTENDED RELEASE ORAL DAILY
Qty: 30 CAPSULE | Refills: 0 | Status: SHIPPED | OUTPATIENT
Start: 2019-03-12 | End: 2019-06-21

## 2019-03-12 RX ORDER — DEXTROAMPHETAMINE SACCHARATE, AMPHETAMINE ASPARTATE MONOHYDRATE, DEXTROAMPHETAMINE SULFATE AND AMPHETAMINE SULFATE 7.5; 7.5; 7.5; 7.5 MG/1; MG/1; MG/1; MG/1
30 CAPSULE, EXTENDED RELEASE ORAL DAILY
Qty: 30 CAPSULE | Refills: 0 | Status: SHIPPED | OUTPATIENT
Start: 2019-03-12 | End: 2019-06-21

## 2019-03-12 NOTE — TELEPHONE ENCOUNTER
Called pt and LVM. Left rx of amphetamine-dextroamphetamine (ADDERALL XR) 20 MG 24 hr capsule and amphetamine-dextroamphetamine (ADDERALL XR) 30 MG 24 hr capsule at  for .     Tierra Jimenez MA

## 2019-03-13 ENCOUNTER — OFFICE VISIT (OUTPATIENT)
Dept: PSYCHOLOGY | Facility: CLINIC | Age: 54
End: 2019-03-13
Payer: COMMERCIAL

## 2019-03-13 DIAGNOSIS — F32.1 MODERATE MAJOR DEPRESSION (H): ICD-10-CM

## 2019-03-13 DIAGNOSIS — F90.0 ADHD (ATTENTION DEFICIT HYPERACTIVITY DISORDER), INATTENTIVE TYPE: Primary | ICD-10-CM

## 2019-03-13 PROCEDURE — 90832 PSYTX W PT 30 MINUTES: CPT | Performed by: SOCIAL WORKER

## 2019-03-13 ASSESSMENT — ANXIETY QUESTIONNAIRES
5. BEING SO RESTLESS THAT IT IS HARD TO SIT STILL: NOT AT ALL
IF YOU CHECKED OFF ANY PROBLEMS ON THIS QUESTIONNAIRE, HOW DIFFICULT HAVE THESE PROBLEMS MADE IT FOR YOU TO DO YOUR WORK, TAKE CARE OF THINGS AT HOME, OR GET ALONG WITH OTHER PEOPLE: SOMEWHAT DIFFICULT
2. NOT BEING ABLE TO STOP OR CONTROL WORRYING: SEVERAL DAYS
GAD7 TOTAL SCORE: 4
6. BECOMING EASILY ANNOYED OR IRRITABLE: SEVERAL DAYS
1. FEELING NERVOUS, ANXIOUS, OR ON EDGE: SEVERAL DAYS
7. FEELING AFRAID AS IF SOMETHING AWFUL MIGHT HAPPEN: NOT AT ALL
3. WORRYING TOO MUCH ABOUT DIFFERENT THINGS: SEVERAL DAYS

## 2019-03-13 ASSESSMENT — PATIENT HEALTH QUESTIONNAIRE - PHQ9: 5. POOR APPETITE OR OVEREATING: NOT AT ALL

## 2019-03-13 NOTE — PROGRESS NOTES
"                                           Progress Note    Client Name: Carrillo Dunlap  Date: 3/13/19         Service Type: Individual  Video Visit: No     Session Start Time: 11:30am  Session End Time: 12:00pm  Session Length:     Session #: 6    Attendees: Client attended alone     Treatment Plan Last Reviewed: reviewed  PHQ-9 / UGO-7 : Reviewed    DATA  Interactive Complexity: No  Crisis: No       Progress Since Last Session (Related to Symptoms / Goals / Homework):   Symptoms: Similar to in the past. Pt reports that his mood has been somewhat brighter than usual, however.   Homework: Did not complete. Pt practiced defusion.       Episode of Care Goals: Minimal progress - ACTION (Actively working towards change); Intervened by reinforcing change plan / affirming steps taken     Current / Ongoing Stressors and Concerns: Pt reports using his therapy light twice this past week at 9:00am and it helped to begin his day with a structured task. We discussed how it might be beneficial to begin with a mindfulness exercise while sitting under the lamp instead of answering e-mails and doing other responsibilities. We discussed the importance of him being able to identify his thoughts and feelings instead of just engaging in behaviors mindlessly.       Treatment Objective(s) Addressed in This Session:   Identify negative self-talk and behaviors: challenge core beliefs, myths, and actions     Intervention:   Interpersonal therapy: This writer processed with the pt how he sometimes avoids answers questions. We discussed how he was distracted with the thought, \"I am upset they did not shovel my alley ramp\" when this writer  asked him how it felt to feel disappointed in himself for not completing worksheets outside of session. Pt stated that since he was a child, he has had a tendency to engage in distraction when he feels uncomfortable or  negative emotions.     ACT: Provided a handout on ACT defusion strategies. " "     ASSESSMENT: Current Emotional / Mental Status (status of significant symptoms):   Risk status (Self / Other harm or suicidal ideation)   Client denies current fears or concerns for personal safety.   Client denies current or recent suicidal ideation or behaviors.   Client denies current or recent homicidal ideation or behaviors.   Client denies current or recent self injurious behavior or ideation.   Client denies other safety concerns.   Client Client reports there has been no change in risk factors since their last session.     Client Client reports there has been no change in protective factors since their last session.     A safety and risk management plan has not been developed at this time, however client was given the after-hours number / 911 should there be a change in any of these risk factors.     Appearance:   Appropriate    Eye Contact:   Good    Psychomotor Behavior: Agitated  Restless    Attitude:   Cooperative    Orientation:   All   Speech    Rate / Production: Hyperverbal     Volume:  Normal    Mood:    Anxious    Affect:    Appropriate    Thought Content:  Rumination    Thought Form:  Coherent    Insight:    Fair, improving     Medication Review:   No current psychiatric medications prescribed     Medication Compliance:   No     Changes in Health Issues:   None reported     Chemical Use Review:  Substance Use: decrease in cannabis.  Client reports frequency using of 0 in the past two weeks.    Tobacco Use: No current tobacco use.      Diagnosis:  1. ADHD (attention deficit hyperactivity disorder), inattentive type    2. Moderate major depression (H)      Collateral Reports Completed:   Not Applicable    PLAN: (Client Tasks / Therapist Tasks / Other). Pt agreed to use SAD lamp three times this next week from 9am-9:30am. Pt will practice observing his thoughts and labeling his feelings. He will start to use congitive distancing strategies like describing thoughts and feelings as \"I am having the " "thought... \"I am having the feeling.\"     Jose Hudson, MSW, [facility] 3.13.19  Note reviewed and clinical supervision by Kenyetta David MSW [facility] 4/5/2019     _______________________    Treatment Plan     Client's Name: Carrillo Dunlap                   YOB: 1965     Date: 1/21/19     DSM-V Diagnoses: Attention-Deficit/Hyperactivity Disorder  314.01 (F90.2) Combined presentation or 296.31 (F33.0) Major Depressive Disorder, Recurrent Episode, Mild _ and With anxious distress  Psychosocial / Contextual Factors: Limited social support, limited financial support, physical/medical concerns, employment constraints  WHODAS: did not complete during visit     Referral / Collaboration:  Referral to another professional/service is not indicated at this time.     Anticipated number of session or this episode of care: 15        MeasurableTreatment Goal(s) related to diagnosis / functional impairment(s)  Goal 1: Client will begin to work on his work portfolio.     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       Objective #A (Client Action)                Client will schedule times of the day to work on his work portfolio. 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   Status: New - Date: 1/21/19      Intervention(s)  Therapist will assign homework including weekly activity scheduling and CBT thought logs  provide educational materials on CBT  role-play effective communication skills.    Goal 2: Client will decrease his monthly marijuana use.     I will know I've met my goal when I do not use marijuana around my brother.      Objective #A (Client Action)    Client will identify at least 3 example(s) of how marijuana has resulted in an experience that interferes with person values or goals.  Status: New- 2/19/19    Intervention(s)  Therapist will help client " identify how his personal values and goals are interfered when he uses marijuana.     Objective #B  Client will identify triggers and environmental cues contributing to his marijuana usage.     Status: New - Date: 2/19/19     Intervention(s)  Therapist will teach the client how to perform a behavioral chain analysis to better identify triggers and cues to using marijuana.     Objective #C  Client will maintain sobriety for one month.  Status: New - Date: 2.19.19     Intervention(s)  Therapist will provide educational materials for the client to increase his coping skills when he has urges to use marijuana. Therapist will provide additional resources to help him maintain sobriety.         AFSHIN Gomez, LGSW January 21, 2019  Note reviewed and clinical supervision by AFSHIN Weaver [facility] 1/31/2019

## 2019-03-14 ASSESSMENT — ANXIETY QUESTIONNAIRES: GAD7 TOTAL SCORE: 4

## 2019-03-20 ENCOUNTER — OFFICE VISIT (OUTPATIENT)
Dept: PSYCHIATRY | Facility: CLINIC | Age: 54
End: 2019-03-20
Payer: COMMERCIAL

## 2019-03-20 VITALS
BODY MASS INDEX: 27.87 KG/M2 | RESPIRATION RATE: 16 BRPM | DIASTOLIC BLOOD PRESSURE: 56 MMHG | SYSTOLIC BLOOD PRESSURE: 90 MMHG | OXYGEN SATURATION: 99 % | HEART RATE: 80 BPM | WEIGHT: 186 LBS | TEMPERATURE: 97.8 F

## 2019-03-20 DIAGNOSIS — F41.1 GENERALIZED ANXIETY DISORDER: ICD-10-CM

## 2019-03-20 DIAGNOSIS — F32.1 MODERATE MAJOR DEPRESSION (H): ICD-10-CM

## 2019-03-20 DIAGNOSIS — F90.0 ADHD (ATTENTION DEFICIT HYPERACTIVITY DISORDER), INATTENTIVE TYPE: Primary | ICD-10-CM

## 2019-03-20 PROCEDURE — 99214 OFFICE O/P EST MOD 30 MIN: CPT | Performed by: NURSE PRACTITIONER

## 2019-03-20 RX ORDER — DEXTROAMPHETAMINE SACCHARATE, AMPHETAMINE ASPARTATE MONOHYDRATE, DEXTROAMPHETAMINE SULFATE AND AMPHETAMINE SULFATE 5; 5; 5; 5 MG/1; MG/1; MG/1; MG/1
20 CAPSULE, EXTENDED RELEASE ORAL DAILY
Qty: 30 CAPSULE | Refills: 0 | Status: SHIPPED | OUTPATIENT
Start: 2019-03-20 | End: 2019-09-16 | Stop reason: DRUGHIGH

## 2019-03-20 RX ORDER — DEXTROAMPHETAMINE SACCHARATE, AMPHETAMINE ASPARTATE MONOHYDRATE, DEXTROAMPHETAMINE SULFATE AND AMPHETAMINE SULFATE 7.5; 7.5; 7.5; 7.5 MG/1; MG/1; MG/1; MG/1
30 CAPSULE, EXTENDED RELEASE ORAL DAILY
Qty: 30 CAPSULE | Refills: 0 | Status: SHIPPED | OUTPATIENT
Start: 2019-04-20 | End: 2019-09-16 | Stop reason: DRUGHIGH

## 2019-03-20 RX ORDER — DEXTROAMPHETAMINE SACCHARATE, AMPHETAMINE ASPARTATE MONOHYDRATE, DEXTROAMPHETAMINE SULFATE AND AMPHETAMINE SULFATE 7.5; 7.5; 7.5; 7.5 MG/1; MG/1; MG/1; MG/1
30 CAPSULE, EXTENDED RELEASE ORAL DAILY
Qty: 30 CAPSULE | Refills: 0 | Status: SHIPPED | OUTPATIENT
Start: 2019-05-21 | End: 2019-09-16 | Stop reason: DRUGHIGH

## 2019-03-20 RX ORDER — DEXTROAMPHETAMINE SACCHARATE, AMPHETAMINE ASPARTATE MONOHYDRATE, DEXTROAMPHETAMINE SULFATE AND AMPHETAMINE SULFATE 5; 5; 5; 5 MG/1; MG/1; MG/1; MG/1
20 CAPSULE, EXTENDED RELEASE ORAL DAILY
Qty: 30 CAPSULE | Refills: 0 | Status: SHIPPED | OUTPATIENT
Start: 2019-04-20 | End: 2019-09-16 | Stop reason: DRUGHIGH

## 2019-03-20 RX ORDER — DEXTROAMPHETAMINE SACCHARATE, AMPHETAMINE ASPARTATE MONOHYDRATE, DEXTROAMPHETAMINE SULFATE AND AMPHETAMINE SULFATE 5; 5; 5; 5 MG/1; MG/1; MG/1; MG/1
20 CAPSULE, EXTENDED RELEASE ORAL DAILY
Qty: 30 CAPSULE | Refills: 0 | Status: SHIPPED | OUTPATIENT
Start: 2019-05-21 | End: 2019-09-16 | Stop reason: DRUGHIGH

## 2019-03-20 RX ORDER — BUPROPION HYDROCHLORIDE 300 MG/1
300 TABLET ORAL EVERY MORNING
Qty: 90 TABLET | Refills: 0 | Status: SHIPPED | OUTPATIENT
Start: 2019-03-20 | End: 2019-06-21

## 2019-03-20 RX ORDER — DEXTROAMPHETAMINE SACCHARATE, AMPHETAMINE ASPARTATE MONOHYDRATE, DEXTROAMPHETAMINE SULFATE AND AMPHETAMINE SULFATE 7.5; 7.5; 7.5; 7.5 MG/1; MG/1; MG/1; MG/1
30 CAPSULE, EXTENDED RELEASE ORAL DAILY
Qty: 30 CAPSULE | Refills: 0 | Status: SHIPPED | OUTPATIENT
Start: 2019-03-20 | End: 2019-09-16 | Stop reason: DRUGHIGH

## 2019-03-20 ASSESSMENT — ANXIETY QUESTIONNAIRES
5. BEING SO RESTLESS THAT IT IS HARD TO SIT STILL: MORE THAN HALF THE DAYS
4. TROUBLE RELAXING: SEVERAL DAYS
2. NOT BEING ABLE TO STOP OR CONTROL WORRYING: MORE THAN HALF THE DAYS
GAD7 TOTAL SCORE: 8
GAD7 TOTAL SCORE: 8
6. BECOMING EASILY ANNOYED OR IRRITABLE: SEVERAL DAYS
1. FEELING NERVOUS, ANXIOUS, OR ON EDGE: SEVERAL DAYS
7. FEELING AFRAID AS IF SOMETHING AWFUL MIGHT HAPPEN: NOT AT ALL
7. FEELING AFRAID AS IF SOMETHING AWFUL MIGHT HAPPEN: NOT AT ALL
3. WORRYING TOO MUCH ABOUT DIFFERENT THINGS: SEVERAL DAYS
GAD7 TOTAL SCORE: 8

## 2019-03-20 ASSESSMENT — PATIENT HEALTH QUESTIONNAIRE - PHQ9
10. IF YOU CHECKED OFF ANY PROBLEMS, HOW DIFFICULT HAVE THESE PROBLEMS MADE IT FOR YOU TO DO YOUR WORK, TAKE CARE OF THINGS AT HOME, OR GET ALONG WITH OTHER PEOPLE: SOMEWHAT DIFFICULT
SUM OF ALL RESPONSES TO PHQ QUESTIONS 1-9: 9
SUM OF ALL RESPONSES TO PHQ QUESTIONS 1-9: 9

## 2019-03-20 NOTE — PROGRESS NOTES
"    Outpatient Psychiatric Progress Note    Name: Carrillo Dunlap   : 1965                    Primary Care Provider: ONEL Uribe CNP  Therapist: Jose Contreras - last visit 3/13/2019    PHQ-9 scores:  PHQ-9 SCORE 2019 3/4/2019 3/20/2019   PHQ-9 Total Score - - -   PHQ-9 Total Score MyChart - - 9 (Mild depression)   PHQ-9 Total Score - - -   PHQ-9 Total Score 8 9 9       UGO-7 scores:  UGO-7 SCORE 3/4/2019 3/13/2019 3/20/2019   Total Score - - -   Total Score - - 8 (mild anxiety)   Total Score 10 4 8     Answers for HPI/ROS submitted by the patient on 3/20/2019   If you checked off any problems, how difficult have these problems made it for you to do your work, take care of things at home, or get along with other people?: Somewhat difficult  PHQ9 TOTAL SCORE: 9  UGO 7 TOTAL SCORE: 8        Patient Identification:  Patient is a 53 year old year old, single  White  male  who presents for return visit with me.  Patient is currently employed part time. Patient attended the session alone. Patient prefers to be called: \"Nirmal\".    Interim History:  I last saw Carrillo Dunlap for outpatient psychiatry Return Visit on 19.     During that appointment, we planned to maintain his Adderall XR 50 mg daily Wellbutrin  mg BID and Buspar 10 mg BID. We would otherwise prioritize psychotherapy and referral to [facility] was placed.       Current medications include:   Current Outpatient Medications   Medication Sig     amphetamine-dextroamphetamine (ADDERALL XR) 20 MG 24 hr capsule Take 1 capsule (20 mg) by mouth daily     amphetamine-dextroamphetamine (ADDERALL XR) 30 MG 24 hr capsule Take 1 capsule (30 mg) by mouth daily     ascorbic acid (VITAMIN C) 500 MG tablet Take by mouth daily     buPROPion (WELLBUTRIN XL) 300 MG 24 hr tablet Take 1 tablet (300 mg) by mouth every morning     busPIRone (BUSPAR) 5 MG tablet 10 mg (2 tabs) twice daily and may increase to 15 mg twice a day as " "tolerated     CALCIUM + D OR 2 tablet daily - 600mg total     cyanocolbalamin (VITAMIN B-12) 1000 MCG tablet Take 1,000 mcg by mouth every other day      fish oil-omega-3 fatty acids (FISH OIL) 1000 MG capsule Take 2 g by mouth daily.     fluocinonide (LIDEX) 0.05 % solution Apply sparingly to affected area twice daily as needed.  Do not apply to face.     MULTI-VITAMIN OR TABS 1 TABLET DAILY     vitamin B complex with vitamin C (VITAMIN  B COMPLEX) TABS tablet Take 1 tablet by mouth every other day     zinc 50 MG TABS      No current facility-administered medications for this visit.        The [place] Prescription Monitoring Program has been reviewed and there are no concerns about diversionary activity for controlled substances at this time.      I was able to review most recent Primary Care Provider, specialty provider, and therapy visit notes that I have access to.     Today, patient reports he's largely doing well. Started psychotherapy with Jose Hudson at [facility]; has been working on \"stopping thoughts\", working on self-esteem, as well as organizational skill building. 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    He's also been incorporating technology to improve his daily structure. Has [employer] function as alarm clock, reminder for work/breaks/meals, and even dims his lighting at night so he doesn't work too late. Has also been using a full-spectrum lamp few mornings a week, which he thinks has helped.    He's continued with Adderall XR 50 mg qAM; working well for him; no issues. Was having hard time rembering to dose second dose of Wellbutrin SR, so his PharmD changed this back to Wellbutrin  mg qAM; has been mostly adherent. States he's cut back on Buspar as feels this can make him feel woozy if dosed on an empty stomach; not sure what it's doing for his anxiety, and as making progress in therapy he'd " be satisfied to discontinue this medication.        Past Medical History:   Diagnosis Date     Depressive disorder      Heart disease 07-17-17    My brother just found out about a blockage in his heart.     Obesity, unspecified       has a past medical history of Depressive disorder, Heart disease (07-17-17), and Obesity, unspecified. He also has no past medical history of Arthritis, Cancer (H), Cerebral infarction (H), Congestive heart failure (H), Congestive heart failure, unspecified, COPD (chronic obstructive pulmonary disease) (H), Coronary artery disease, CVA (cerebral infarction), Diabetes (H), History of blood transfusion, Hypertension, Thyroid disease, or Uncomplicated asthma.    Social history updates:  See above    Substance use updates:  Denies ETOH use  Cannabis 4 times a year  No other substance use  Denies tobacco use      Vital Signs:   BP 90/56 (BP Location: Left arm, Patient Position: Chair, Cuff Size: Adult Large)   Pulse 80   Temp 97.8  F (36.6  C) (Oral)   Resp 16   Wt 84.4 kg (186 lb)   SpO2 99%   BMI 27.87 kg/m      Labs:  Most recent laboratory results reviewed and no new labs.    Review of Systems:  10 systems (general, cardiovascular, respiratory, eyes, ENT, endocrine, GI, , M/S, neurological) were reviewed. Most pertinent finding(s) is/are: all unremarkable.    Mental Status Examination:     Appearance:  awake, alert  Attitude:  cooperative   Eye Contact:  good  Gait and Station: Normal  Psychomotor Behavior:  intact station, gait and muscle tone  Oriented to:  time, person, and place  Attention Span and Concentration:  Normal  Speech:  clear, coherent  Mood:  anxious and depressed  Affect:  appropriate and in normal range  Associations:  no loose associations  Thought Process:  logical, goal oriented, tangental and circumstantial  Thought Content:  Appropriate to Interview  Recent and Remote Memory:  intact Not formally assessed. No amnesia.  Fund of  Knowledge: appropriate  Insight:  good  Judgment:  intact  Impulse Control:  intact     Suicide Risk Assessment:  Today Carrillo Dunlap denies suicidal ideation or self-harm impulses. Therefore, based on all available evidence including the factors cited above, Carrillo Dunlap does not appear to be at imminent risk for self-harm, does not meet criteria for a 72-hr hold, and therefore remains appropriate for ongoing outpatient level of care.  A thorough assessment of risk factors related to suicide and self-harm have been reviewed and are noted above. The patient convincingly denies acute suicidality on several occasions. Local community safety resources reviewed and printed for patient to use if needed. There was no deceit detected, and the patient presented in a manner that was believable.      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 Provider to review and confirm.       Generalized anxiety disorder 03/09/2011     Priority: Medium     Diagnosis updated by automated process. Provider to review and confirm.       Dysthymia 01/08/2010     Priority: Medium     Seeing Ruperto Suarez, Therapist.             Assessment:  Carrillo Dunlap appears to have made very promising progress these past few months in managing his ADHD and  mood, through a combination of psychotherapy and medication treatment. And he's been admirably innovative in utilizing technology in improving his daily organization and task management. He is to be commended for his efforts. We agree to maintain his Adderall XR at 50 mg daily and Wellbutrin XL at 300 mg daily. The Buspar has always been tricky, and as were not quite convinced of any essential therapeutic effect, and as he's making good progress in therapy, we'll discontinue this. I encourage him to continue psychotherapy with GRZEGORZ Hudson, and follow-up with his PCP and PharmD going forward.    Medication side effects and alternatives were reviewed. Health promotion activities recommended and reviewed today. All questions addressed. Education and counseling completed regarding risks and benefits of medications and psychotherapy options.    Treatment Plan:    Continue Adderall XR 50 mg qAM    Continue Wellbutrin  mg daily    Continue psychotherapy with Jose Hudson       Continue all other medical care per primary care provider.     Continue all other medications as reviewed per electronic medical record today.     Safety plan reviewed. To the Emergency Department as needed or call after hours crisis line at [phone] or [phone]. [place] Crisis Text Line: Text [place] to 293738  or  Suicide LifeLine Chat: suicidepreventionlifeline.org/chat/       Follow up with primary care provider as planned or for acute medical concerns.      Administrative Billing:   Time spent with patient was 30 minutes and greater than 50% of time or 20 minutes was spent in counseling and coordination of care regarding above diagnoses and treatment plan.    Patient Status:  The patient is being returned to the referring provider for ongoing care and medication prescribing.  The patient can be referred back to this service for further consultation as needed.    Signed:   Daniele Neves, [facility]   Psychiatry

## 2019-03-20 NOTE — Clinical Note
Adali Avelar. Nirmal has made some exceptional progress, in no small part from therapy. We're going to continue his Adderall and Wellbutrin, and I've written him for 3 months of each. He was instructed to schedule with you in three months, and from there you two can make a determination of how often to meet. Let me know if you have any questions, and feel free to refer him back as needed. Thanks and regards, Daniele

## 2019-03-20 NOTE — Clinical Note
Yolanda. Met with Nirmal today. He's made real noticeable progress since you started working with you. I'm hopeful about his trajectory. We're going to keep Adderall and Wellbutrin where they are (Buspar was extraneous and we stopped this). Should he have any major mood/anxiety relapses or other concerns, do feel free to have his PCP refer him back to me, but I'm otherwise going to send him back to his PCP for continued med management. Let me know if you have any questions, and thanks again -- Daniele.

## 2019-03-21 ASSESSMENT — ANXIETY QUESTIONNAIRES: GAD7 TOTAL SCORE: 8

## 2019-03-21 ASSESSMENT — PATIENT HEALTH QUESTIONNAIRE - PHQ9: SUM OF ALL RESPONSES TO PHQ QUESTIONS 1-9: 9

## 2019-04-15 ENCOUNTER — OFFICE VISIT (OUTPATIENT)
Dept: PSYCHOLOGY | Facility: CLINIC | Age: 54
End: 2019-04-15
Payer: COMMERCIAL

## 2019-04-15 DIAGNOSIS — F90.0 ADHD (ATTENTION DEFICIT HYPERACTIVITY DISORDER), INATTENTIVE TYPE: Primary | ICD-10-CM

## 2019-04-15 DIAGNOSIS — F32.1 MODERATE MAJOR DEPRESSION (H): ICD-10-CM

## 2019-04-15 PROCEDURE — 90832 PSYTX W PT 30 MINUTES: CPT | Performed by: SOCIAL WORKER

## 2019-04-15 ASSESSMENT — ANXIETY QUESTIONNAIRES
6. BECOMING EASILY ANNOYED OR IRRITABLE: SEVERAL DAYS
IF YOU CHECKED OFF ANY PROBLEMS ON THIS QUESTIONNAIRE, HOW DIFFICULT HAVE THESE PROBLEMS MADE IT FOR YOU TO DO YOUR WORK, TAKE CARE OF THINGS AT HOME, OR GET ALONG WITH OTHER PEOPLE: SOMEWHAT DIFFICULT
7. FEELING AFRAID AS IF SOMETHING AWFUL MIGHT HAPPEN: NOT AT ALL
3. WORRYING TOO MUCH ABOUT DIFFERENT THINGS: MORE THAN HALF THE DAYS
5. BEING SO RESTLESS THAT IT IS HARD TO SIT STILL: SEVERAL DAYS
1. FEELING NERVOUS, ANXIOUS, OR ON EDGE: SEVERAL DAYS
GAD7 TOTAL SCORE: 8
2. NOT BEING ABLE TO STOP OR CONTROL WORRYING: SEVERAL DAYS

## 2019-04-15 ASSESSMENT — PATIENT HEALTH QUESTIONNAIRE - PHQ9
5. POOR APPETITE OR OVEREATING: MORE THAN HALF THE DAYS
SUM OF ALL RESPONSES TO PHQ QUESTIONS 1-9: 10

## 2019-04-15 NOTE — PROGRESS NOTES
Progress Note    Client Name: Carrillo Dunlap  Date: 4/15/19         Service Type: Individual  Video Visit: No     Session Start Time: 10:30am  Session End Time: 11:00am  Session Length: 30 minutes    Session #: 7    Attendees: Client attended alone     Treatment Plan Last Reviewed: reviewed  PHQ-9 / UGO-7 : Reviewed    DATA  Interactive Complexity: No  Crisis: No       Progress Since Last Session (Related to Symptoms / Goals / Homework):   Symptoms: Similar to in the past.   Homework: Did not complete. Pt was able to practice cognitive distancing strategies since last session.       Episode of Care Goals: Minimal progress - PREPARATION (Decided to change - considering how); Intervened by negotiating a change plan and determining options / strategies for behavior change, identifying triggers, exploring social supports, and working towards setting a date to begin behavior change     Current / Ongoing Stressors and Concerns: Pt was 30 minutes late to the session. This writer validated his ability to call ahead to inform this writer he was going to be late. We also had a discussion about him calling ahead to appointments if he is going to be late or if he needs to cancel. Pt states he is proud of himself for being able to get his taxes completed on time. He reports feeling better about his ability to accomplish things and would like to get a full-time job with an employer so that he has more structure in his life. We developed an action plan during the remainder of the session to get him closer towards obtaining employment.     Treatment Objective(s) Addressed in This Session:   Identify negative self-talk and behaviors: challenge core beliefs, myths, and actions     Intervention:   ACT: Pt states he would like to get a full time job because it would provide him more structure throughout his day, it will give him more of a consistent income, and he will be able to  "socialize with others more. The pt filled out a willingness and action plan worksheet, outlining the beginning steps he will take in order to interview for employment. We also discussed unpleasant and uncomfortable thoughts, feelings, sensations, and urges he may have to \"make room for\" in order to achieve his goals.      ASSESSMENT: Current Emotional / Mental Status (status of significant symptoms):   Risk status (Self / Other harm or suicidal ideation)   Client denies current fears or concerns for personal safety.   Client denies current or recent suicidal ideation or behaviors.   Client denies current or recent homicidal ideation or behaviors.   Client denies current or recent self injurious behavior or ideation.   Client denies other safety concerns.   Client Client reports there has been no change in risk factors since their last session.     Client Client reports there has been no change in protective factors since their last session.     A safety and risk management plan has not been developed at this time, however client was given the after-hours number / 911 should there be a change in any of these risk factors.     Appearance:   Appropriate    Eye Contact:   Good    Psychomotor Behavior: Hyperactive  Normal    Attitude:   Cooperative    Orientation:   All   Speech    Rate / Production: Hyperverbal     Volume:  Normal    Mood:    Anxious    Affect:    Worrisome    Thought Content:  Clear  Rumination    Thought Form:  Coherent    Insight:    Fair, improving     Medication Review:   No changes to current psychiatric medication(s)     Medication Compliance:   No     Changes in Health Issues:   None reported     Chemical Use Review:  Substance Use: decrease in cannabis.  Client reports frequency using of 0 in the past two weeks.    Tobacco Use: No current tobacco use.      Diagnosis:  1. ADHD (attention deficit hyperactivity disorder), inattentive type    2. Moderate major depression (H)      Collateral Reports " "Completed:   Not Applicable    PLAN: (Client Tasks / Therapist Tasks / Other). Pt agreed to fill out the remainder of his willingness and action plan and bring to his session next week. He identified several steps he can act on now, including obtaining a list of all the projects he has worked on in the past to begin updating his portfolio. He also agreed to practice mindfulness when he is having the thought, \"I am a failure,\" and commit to finishing the beginning steps of his action plan.     AFSHIN Gomez, [facility] 4.15.19  Note reviewed and clinical supervision by AFSHIN Weaver [facility] 5/3/2019   _______________________    Treatment Plan     Client's Name: Carrillo Dunlap                   YOB: 1965     Date: 1/21/19     DSM-V Diagnoses: Attention-Deficit/Hyperactivity Disorder  314.01 (F90.2) Combined presentation or 296.31 (F33.0) Major Depressive Disorder, Recurrent Episode, Mild _ and With anxious distress  Psychosocial / Contextual Factors: Limited social support, limited financial support, physical/medical concerns, employment constraints  WHODAS: did not complete during visit     Referral / Collaboration:  Referral to another professional/service is not indicated at this time.     Anticipated number of session or this episode of care: 15        MeasurableTreatment Goal(s) related to diagnosis / functional impairment(s)  Goal 1: Client will begin to work on his work portfolio.     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       Objective #A (Client Action)                Client will schedule times of the day to work on his work portfolio. 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   Status: New - Date: 1/21/19      Intervention(s)  Therapist will assign homework including weekly activity scheduling and CBT thought logs  provide " educational materials on CBT  role-play effective communication skills.    Goal 2: Client will decrease his monthly marijuana use.     I will know I've met my goal when I do not use marijuana around my brother.      Objective #A (Client Action)    Client will identify at least 3 example(s) of how marijuana has resulted in an experience that interferes with person values or goals.  Status: New- 2/19/19    Intervention(s)  Therapist will help client identify how his personal values and goals are interfered when he uses marijuana.     Objective #B  Client will identify triggers and environmental cues contributing to his marijuana usage.     Status: New - Date: 2/19/19     Intervention(s)  Therapist will teach the client how to perform a behavioral chain analysis to better identify triggers and cues to using marijuana.     Objective #C  Client will maintain sobriety for one month.  Status: New - Date: 2.19.19     Intervention(s)  Therapist will provide educational materials for the client to increase his coping skills when he has urges to use marijuana. Therapist will provide additional resources to help him maintain sobriety.         AFSHIN Gomez, SW January 21, 2019  Note reviewed and clinical supervision by AFSHIN Weaver [facility] 1/31/2019

## 2019-04-15 NOTE — Clinical Note
Good bill Torres,Can you please review this note and recommend potential suggestions? Thanks!Panchito

## 2019-04-16 ASSESSMENT — ANXIETY QUESTIONNAIRES: GAD7 TOTAL SCORE: 8

## 2019-04-22 ENCOUNTER — OFFICE VISIT (OUTPATIENT)
Dept: PSYCHOLOGY | Facility: CLINIC | Age: 54
End: 2019-04-22
Payer: COMMERCIAL

## 2019-04-22 DIAGNOSIS — F32.1 MODERATE MAJOR DEPRESSION (H): ICD-10-CM

## 2019-04-22 DIAGNOSIS — F90.0 ADHD (ATTENTION DEFICIT HYPERACTIVITY DISORDER), INATTENTIVE TYPE: Primary | ICD-10-CM

## 2019-04-22 PROCEDURE — 90834 PSYTX W PT 45 MINUTES: CPT | Performed by: SOCIAL WORKER

## 2019-04-22 ASSESSMENT — PATIENT HEALTH QUESTIONNAIRE - PHQ9
5. POOR APPETITE OR OVEREATING: SEVERAL DAYS
SUM OF ALL RESPONSES TO PHQ QUESTIONS 1-9: 10

## 2019-04-22 ASSESSMENT — ANXIETY QUESTIONNAIRES
2. NOT BEING ABLE TO STOP OR CONTROL WORRYING: SEVERAL DAYS
7. FEELING AFRAID AS IF SOMETHING AWFUL MIGHT HAPPEN: NOT AT ALL
1. FEELING NERVOUS, ANXIOUS, OR ON EDGE: MORE THAN HALF THE DAYS
GAD7 TOTAL SCORE: 7
5. BEING SO RESTLESS THAT IT IS HARD TO SIT STILL: SEVERAL DAYS
IF YOU CHECKED OFF ANY PROBLEMS ON THIS QUESTIONNAIRE, HOW DIFFICULT HAVE THESE PROBLEMS MADE IT FOR YOU TO DO YOUR WORK, TAKE CARE OF THINGS AT HOME, OR GET ALONG WITH OTHER PEOPLE: SOMEWHAT DIFFICULT
6. BECOMING EASILY ANNOYED OR IRRITABLE: SEVERAL DAYS
3. WORRYING TOO MUCH ABOUT DIFFERENT THINGS: SEVERAL DAYS

## 2019-04-22 NOTE — Clinical Note
Good morning Melissa,Can you please review this note and recommend potential suggestions? Thanks!Panchito

## 2019-04-22 NOTE — PROGRESS NOTES
Progress Note    Client Name: Carrillo Dunlap  Date: 4/22/19         Service Type: Individual  Video Visit: No     Session Start Time: 11:10am  Session End Time: 12:00pm  Session Length: 50 minutes    Session #: 8    Attendees: Client attended alone     Treatment Plan Last Reviewed: reviewed  PHQ-9 / UGO-7 : Reviewed    DATA  Interactive Complexity: No  Crisis: No       Progress Since Last Session (Related to Symptoms / Goals / Homework):   Symptoms: Similar to in the past.   Homework: Partially completed. Pt was able to transfer 30+ projects from a data DVD to his hard drive. His next step is to copy these projects to an external hard drive.       Episode of Care Goals: Minimal progress - PREPARATION (Decided to change - considering how); Intervened by negotiating a change plan and determining options / strategies for behavior change, identifying triggers, exploring social supports, and working towards setting a date to begin behavior change     Current / Ongoing Stressors and Concerns: We discussed how pt was able to make it earlier to his session this week compared in past weeks. 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 Throughout the remainder of the visit, we discussed his difficulty engaging in his goal of wanting to complete an updated resume. We explored how he has been avoiding this task for fear of eventually having to interview and possibly obtain a job. We discussed the importance of completing a goal that is related to an area of life that is important to him, regardless of whether he would like to obtain a job in the future or not.      Treatment Objective(s) Addressed in This Session:   Identify negative self-talk and behaviors: challenge core beliefs, myths, and actions     Intervention:   CBT: We discussed the importance of thinking in a  "non-judgmental manner. We utilized an example regarding a statement relating to him having difficulty finishing his taxes. He stated, \"I screwed off.\" Instead, he identified a more helpful way of thinking about this situation: \"I avoided my responsibility of completing my taxes one week in March.\" We processed how this gives him more permission to take effective action in the future.       ASSESSMENT: Current Emotional / Mental Status (status of significant symptoms):   Risk status (Self / Other harm or suicidal ideation)   Client denies current fears or concerns for personal safety.   Client denies current or recent suicidal ideation or behaviors.   Client denies current or recent homicidal ideation or behaviors.   Client denies current or recent self injurious behavior or ideation.   Client denies other safety concerns.   Client Client reports there has been no change in risk factors since their last session.     Client Client reports there has been no change in protective factors since their last session.     A safety and risk management plan has not been developed at this time, however client was given the after-hours number / 911 should there be a change in any of these risk factors.     Appearance:   Appropriate    Eye Contact:   Good    Psychomotor Behavior: Hyperactive    Attitude:   Cooperative    Orientation:   All   Speech    Rate / Production: Hyperverbal     Volume:  Normal    Mood:    Anxious    Affect:    Worrisome    Thought Content:  Rumination    Thought Form:  Coherent    Insight:    Fair, improving     Medication Review:   No changes to current psychiatric medication(s)     Medication Compliance:   Yes     Changes in Health Issues:   None reported     Chemical Use Review:  Substance Use: decrease in cannabis.  Client reports frequency using of 0 in the past two weeks.    Tobacco Use: No current tobacco use.      Diagnosis:  1. ADHD (attention deficit hyperactivity disorder), inattentive type  "   2. Moderate major depression (H)      Collateral Reports Completed:   Not Applicable    PLAN: (Client Tasks / Therapist Tasks / Other). Over the next week, pt agrees to identify two new projects he has worked on in the past to use in his resume. Pt also agreed to practice thinking in a non-judgmental manner and will bring one example to our next session     Jose Hudson, AFSHIN, [facility] 4.22.19  Note reviewed and clinical supervision by AFSHIN Weaver [facility] 5/3/2019   _______________________    Treatment Plan     Client's Name: Carrillo Dunlap                   YOB: 1965     Date: 1/21/19     DSM-V Diagnoses: Attention-Deficit/Hyperactivity Disorder  314.01 (F90.2) Combined presentation or 296.31 (F33.0) Major Depressive Disorder, Recurrent Episode, Mild _ and With anxious distress  Psychosocial / Contextual Factors: Limited social support, limited financial support, physical/medical concerns, employment constraints  WHODAS: did not complete during visit     Referral / Collaboration:  Referral to another professional/service is not indicated at this time.     Anticipated number of session or this episode of care: 15        MeasurableTreatment Goal(s) related to diagnosis / functional impairment(s)  Goal 1: Client will begin to work on his work portfolio.     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       Objective #A (Client Action)                Client will schedule times of the day to work on his work portfolio. 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   Status: New - Date: 1/21/19      Intervention(s)  Therapist will assign homework including weekly activity scheduling and CBT thought logs  provide educational materials on CBT  role-play effective communication skills.    Goal 2: Client will decrease his monthly marijuana use.     I  will know I've met my goal when I do not use marijuana around my brother.      Objective #A (Client Action)    Client will identify at least 3 example(s) of how marijuana has resulted in an experience that interferes with person values or goals.  Status: New- 2/19/19    Intervention(s)  Therapist will help client identify how his personal values and goals are interfered when he uses marijuana.     Objective #B  Client will identify triggers and environmental cues contributing to his marijuana usage.     Status: New - Date: 2/19/19     Intervention(s)  Therapist will teach the client how to perform a behavioral chain analysis to better identify triggers and cues to using marijuana.     Objective #C  Client will maintain sobriety for one month.  Status: New - Date: 2.19.19     Intervention(s)  Therapist will provide educational materials for the client to increase his coping skills when he has urges to use marijuana. Therapist will provide additional resources to help him maintain sobriety.         AFSHIN Gomez, [facility] January 21, 2019  Note reviewed and clinical supervision by AFSHIN Weaver [facility] 1/31/2019

## 2019-04-23 ASSESSMENT — ANXIETY QUESTIONNAIRES: GAD7 TOTAL SCORE: 7

## 2019-04-29 ENCOUNTER — OFFICE VISIT (OUTPATIENT)
Dept: PSYCHOLOGY | Facility: CLINIC | Age: 54
End: 2019-04-29
Payer: COMMERCIAL

## 2019-04-29 DIAGNOSIS — F90.0 ADHD (ATTENTION DEFICIT HYPERACTIVITY DISORDER), INATTENTIVE TYPE: Primary | ICD-10-CM

## 2019-04-29 DIAGNOSIS — F32.1 MODERATE MAJOR DEPRESSION (H): ICD-10-CM

## 2019-04-29 PROCEDURE — 90834 PSYTX W PT 45 MINUTES: CPT | Performed by: SOCIAL WORKER

## 2019-04-29 ASSESSMENT — ANXIETY QUESTIONNAIRES
3. WORRYING TOO MUCH ABOUT DIFFERENT THINGS: MORE THAN HALF THE DAYS
7. FEELING AFRAID AS IF SOMETHING AWFUL MIGHT HAPPEN: NOT AT ALL
5. BEING SO RESTLESS THAT IT IS HARD TO SIT STILL: NOT AT ALL
GAD7 TOTAL SCORE: 7
1. FEELING NERVOUS, ANXIOUS, OR ON EDGE: SEVERAL DAYS
2. NOT BEING ABLE TO STOP OR CONTROL WORRYING: SEVERAL DAYS
IF YOU CHECKED OFF ANY PROBLEMS ON THIS QUESTIONNAIRE, HOW DIFFICULT HAVE THESE PROBLEMS MADE IT FOR YOU TO DO YOUR WORK, TAKE CARE OF THINGS AT HOME, OR GET ALONG WITH OTHER PEOPLE: SOMEWHAT DIFFICULT
6. BECOMING EASILY ANNOYED OR IRRITABLE: MORE THAN HALF THE DAYS

## 2019-04-29 ASSESSMENT — PATIENT HEALTH QUESTIONNAIRE - PHQ9
5. POOR APPETITE OR OVEREATING: SEVERAL DAYS
SUM OF ALL RESPONSES TO PHQ QUESTIONS 1-9: 7

## 2019-04-29 NOTE — Clinical Note
Roshan Torres,Can you please review and recommend potential suggestions to this note? Thanks!Panchito

## 2019-04-29 NOTE — PROGRESS NOTES
Progress Note    Client Name: Carrillo Dunlap  Date: 4/29/19         Service Type: Individual  Video Visit: No     Session Start Time: 11:07am  Session End Time: 11:50pm  Session Length: 50 minutes    Session #: 8    Attendees: Client attended alone     Treatment Plan Last Reviewed: reviewed  PHQ-9 / UGO-7 : Reviewed    DATA  Interactive Complexity: No  Crisis: No       Progress Since Last Session (Related to Symptoms / Goals / Homework):   Symptoms: Improving. Pt was able to take perspective during stressful situations and noticed his thoughts and feelings while questioning how to react in a way that is most helpful to his situation.   Homework: Achieved / completed to satisfaction  Partially completed.       Episode of Care Goals: Achieved / completed to satisfaction - ACTION (Actively working towards change); Intervened by reinforcing change plan / affirming steps taken     Current / Ongoing Stressors and Concerns: We discussed pt's trip to visit with his mother and brothers over this past weekend. Discussed how the situation was fairly stressful because they were putting on a garage sale to sell their mother's belongings as she is transitioning to an assisted living facility. 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 Throughout the remainder of the visit, we discussed a few situations where pt was able to practice mindfulness and acceptance skills while with his family over the weekend.      Treatment Objective(s) Addressed in This Session:   Identify negative self-talk and behaviors: challenge core beliefs, myths, and actions   Client will identify 5 fears / thoughts that contribute to feeling anxious.     Intervention:   CBT/ACT: We discussed situation where pt and his brother had to organize CD's at their garage sale. Pt states that they both disagreed about how to get the  "task done. He noticed himself \"getting worked up\" and noted having a tight chest with shallow breathing.  As he took a step back, he practice deep breathing and was able to ask himself, \"how will it help me to argue with my brother.\" Pt states he was able to validate his brother's method of organizing things and \"was able to let his frustration go.\" Pt states that although it was uncomfortable, he felt proud that he was able to \"catch himself\" and control his reactions. As a result, he said he had a mainly happy mood the rest of the evening.       ASSESSMENT: Current Emotional / Mental Status (status of significant symptoms):   Risk status (Self / Other harm or suicidal ideation)   Client denies current fears or concerns for personal safety.   Client denies current or recent suicidal ideation or behaviors.   Client denies current or recent homicidal ideation or behaviors.   Client denies current or recent self injurious behavior or ideation.   Client denies other safety concerns.   Client Client reports there has been no change in risk factors since their last session.     Client Client reports there has been no change in protective factors since their last session.     A safety and risk management plan has not been developed at this time, however client was given the after-hours number / 911 should there be a change in any of these risk factors.     Appearance:   Appropriate    Eye Contact:   Good    Psychomotor Behavior: Hyperactive , less than in the past   Attitude:   Cooperative    Orientation:   All   Speech    Rate / Production: Hyperverbal , less than in the past    Volume:  Normal    Mood:    Anxious    Affect:    Worrisome    Thought Content:  Rumination    Thought Form:  Coherent    Insight:    Fair, improving     Medication Review:   No changes to current psychiatric medication(s)     Medication Compliance:   Yes     Changes in Health Issues:   None reported     Chemical Use Review:  Substance Use: " decrease in cannabis.  Client reports frequency using of 0 in the past two weeks.    Tobacco Use: No current tobacco use.      Diagnosis:  1. ADHD (attention deficit hyperactivity disorder), inattentive type    2. Moderate major depression (H)      Collateral Reports Completed:   Not Applicable    PLAN: (Client Tasks / Therapist Tasks / Other). Pt will continue practicing identifying his thoughts and feelings in distressing situations while questioning what is going to be most helpful for him in any given situation.      AFSHIN Gomez, [facility] 4.29.19  Note reviewed and clinical supervision by AFSHIN Weaver [facility] 5/3/2019   _______________________    Treatment Plan     Client's Name: Carrillo Dunlap                   YOB: 1965     Date: 1/21/19     DSM-V Diagnoses: Attention-Deficit/Hyperactivity Disorder  314.01 (F90.2) Combined presentation or 296.31 (F33.0) Major Depressive Disorder, Recurrent Episode, Mild _ and With anxious distress  Psychosocial / Contextual Factors: Limited social support, limited financial support, physical/medical concerns, employment constraints  WHODAS: did not complete during visit     Referral / Collaboration:  Referral to another professional/service is not indicated at this time.     Anticipated number of session or this episode of care: 15        MeasurableTreatment Goal(s) related to diagnosis / functional impairment(s)  Goal 1: Client will begin to work on his work portfolio.     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       Objective #A (Client Action)                Client will schedule times of the day to work on his work portfolio. 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   Status: New - Date: 1/21/19      Intervention(s)  Therapist will assign homework including weekly activity scheduling  and CBT thought logs  provide educational materials on CBT  role-play effective communication skills.    Goal 2: Client will decrease his monthly marijuana use.     I will know I've met my goal when I do not use marijuana around my brother.      Objective #A (Client Action)    Client will identify at least 3 example(s) of how marijuana has resulted in an experience that interferes with person values or goals.  Status: New- 2/19/19    Intervention(s)  Therapist will help client identify how his personal values and goals are interfered when he uses marijuana.     Objective #B  Client will identify triggers and environmental cues contributing to his marijuana usage.     Status: New - Date: 2/19/19     Intervention(s)  Therapist will teach the client how to perform a behavioral chain analysis to better identify triggers and cues to using marijuana.     Objective #C  Client will maintain sobriety for one month.  Status: New - Date: 2.19.19     Intervention(s)  Therapist will provide educational materials for the client to increase his coping skills when he has urges to use marijuana. Therapist will provide additional resources to help him maintain sobriety.     Goal 3: Client will decrease his anxiety from his baseline GAD7 score.    I will know I've met my goal when I am able to let things go and allow myself permission to relax and not worry.      Objective #A  Client will identify 5 initial signs or symptoms of anxiety.    Status: New - Date: 2/15/19     Intervention(s)  Therapist will provide educational materials on the symptoms of anxiety.    Objective #B (Client Action)    Status: New - Date: 2/19/19     Client will identify 5 fears / thoughts that contribute to feeling anxious.    Intervention(s)  Therapist will teach the client how to perform a behavioral chain analysis in order to identify fears/thoughts contributing to anxious feelings.     Objective #C  Client will use at least 4 coping skills for anxiety  management in the next 10 weeks.  Status: New - Date: 2/19/19    Intervention(s)  Therapist will teach progressive muscle relaxation, cue control relaxation, mindful breathing, and guided imagery. Therapist will also utilize Acceptance and Commitment Therapy by exploring and identifying important values in patient's life, and discuss ways to commit to behavioral activation around these values.        AFSHIN Gomez, SW January 21, 2019  Note reviewed and clinical supervision by AFSHIN Weaver [facility] 1/31/2019

## 2019-04-30 ASSESSMENT — ANXIETY QUESTIONNAIRES: GAD7 TOTAL SCORE: 7

## 2019-05-15 ENCOUNTER — OFFICE VISIT (OUTPATIENT)
Dept: PSYCHOLOGY | Facility: CLINIC | Age: 54
End: 2019-05-15
Payer: COMMERCIAL

## 2019-05-15 DIAGNOSIS — F90.0 ADHD (ATTENTION DEFICIT HYPERACTIVITY DISORDER), INATTENTIVE TYPE: Primary | ICD-10-CM

## 2019-05-15 DIAGNOSIS — F32.1 MODERATE MAJOR DEPRESSION (H): ICD-10-CM

## 2019-05-15 PROCEDURE — 90834 PSYTX W PT 45 MINUTES: CPT | Performed by: SOCIAL WORKER

## 2019-05-15 ASSESSMENT — COLUMBIA-SUICIDE SEVERITY RATING SCALE - C-SSRS
2. HAVE YOU ACTUALLY HAD ANY THOUGHTS OF KILLING YOURSELF LIFETIME?: NO
5. HAVE YOU STARTED TO WORK OUT OR WORKED OUT THE DETAILS OF HOW TO KILL YOURSELF? DO YOU INTEND TO CARRY OUT THIS PLAN?: NO
1. IN THE PAST MONTH, HAVE YOU WISHED YOU WERE DEAD OR WISHED YOU COULD GO TO SLEEP AND NOT WAKE UP?: NO
1. IN THE PAST MONTH, HAVE YOU WISHED YOU WERE DEAD OR WISHED YOU COULD GO TO SLEEP AND NOT WAKE UP?: NO
3. HAVE YOU BEEN THINKING ABOUT HOW YOU MIGHT KILL YOURSELF?: NO
4. HAVE YOU HAD THESE THOUGHTS AND HAD SOME INTENTION OF ACTING ON THEM?: NO
5. HAVE YOU STARTED TO WORK OUT OR WORKED OUT THE DETAILS OF HOW TO KILL YOURSELF? DO YOU INTEND TO CARRY OUT THIS PLAN?: NO
4. HAVE YOU HAD THESE THOUGHTS AND HAD SOME INTENTION OF ACTING ON THEM?: NO
2. HAVE YOU ACTUALLY HAD ANY THOUGHTS OF KILLING YOURSELF?: NO

## 2019-05-15 NOTE — PROGRESS NOTES
"                                           Progress Note    Client Name: Carrillo Dunlap  Date: 5/15/19         Service Type: Individual  Video Visit: No     Session Start Time: 12:40pm  Session End Time: 1:30pm  Session Length: 50 minutes    Session #: 9    Attendees: Client attended alone     Treatment Plan Last Reviewed: reviewed  PHQ-9 / UGO-7 : Reviewed    DATA  Interactive Complexity: No  Crisis: No       Progress Since Last Session (Related to Symptoms / Goals / Homework):   Symptoms: Similar to last session.   Homework: Achieved / completed to satisfaction  Partially completed.       Episode of Care Goals: Achieved / completed to satisfaction - ACTION (Actively working towards change); Intervened by reinforcing change plan / affirming steps taken     Current / Ongoing Stressors and Concerns: Discussed pt's frustration with being unable to structure his days as much as he would like. We practiced a mindfulness exercise during session and pt thought about how he can use this skill throughout his day so that he \"catches\" himself when he jumps from one activity/task to another. For the remainder of the session, we discussed how important the pt's mother is to him and processed the things he does in his life to care for her and how this differs from his other siblings.      Treatment Objective(s) Addressed in This Session:   Identify negative self-talk and behaviors: challenge core beliefs, myths, and actions   Decrease frequency and intensity of feeling down, depressed, hopeless.      Intervention:   Mindfulness: therapist directed pt through a five minute guided mindfulness exercise using breathing techniques as a way to bring the client to the present moment.  Encouraged the client to state, \"anxiety\" on the in-breath and \"my old friend\" on the out breath.    Co-reviewed pt's treatment plan.       ASSESSMENT: Current Emotional / Mental Status (status of significant symptoms):   Risk status (Self / Other " harm or suicidal ideation)   Client denies current fears or concerns for personal safety.   Client denies current or recent suicidal ideation or behaviors.   Client denies current or recent homicidal ideation or behaviors.   Client denies current or recent self injurious behavior or ideation.   Client denies other safety concerns.   Client Client reports there has been no change in risk factors since their last session.     Client Client reports there has been no change in protective factors since their last session.     A safety and risk management plan has not been developed at this time, however client was given the after-hours number / 911 should there be a change in any of these risk factors.     Appearance:   Appropriate    Eye Contact:   Good    Psychomotor Behavior: Hyperactive , less than in the past   Attitude:   Cooperative    Orientation:   All   Speech    Rate / Production: Hyperverbal , less than in the past    Volume:  Normal    Mood:    Anxious    Affect:    Worrisome    Thought Content:  Rumination    Thought Form:  Coherent    Insight:    Fair, improving     Medication Review:   No changes to current psychiatric medication(s)     Medication Compliance:   Yes     Changes in Health Issues:   None reported     Chemical Use Review:  Substance Use: decrease in cannabis.  Client reports frequency using of 0 in the past two weeks.    Tobacco Use: No current tobacco use.      Diagnosis:  1. ADHD (attention deficit hyperactivity disorder), inattentive type    2. Moderate major depression (H)      Collateral Reports Completed:   Not Applicable    PLAN: (Client Tasks / Therapist Tasks / Other). Pt agrees to  resist the urge to engage in a task so that it is perfect. He will practice breathing and mindfulness skills to help break his cycle of jumping from one thing to the next.      Jose Hudson, AFSHIN, SW 5.15.19  _______________________    Treatment Plan     Client's Name: Carrillo Vicente Fabi                    YOB: 1965     Date: 1/21/19; reviewed: 5/15/19     DSM-V Diagnoses: Attention-Deficit/Hyperactivity Disorder  314.01 (F90.2) Combined presentation or 296.31 (F33.0) Major Depressive Disorder, Recurrent Episode, Mild _ and With anxious distress  Psychosocial / Contextual Factors: Limited social support, limited financial support, physical/medical concerns, employment constraints  WHODAS: did not complete during visit     Referral / Collaboration:  Referral to another professional/service is not indicated at this time.     Anticipated number of session or this episode of care: 15        MeasurableTreatment Goal(s) related to diagnosis / functional impairment(s)  Goal 1: Client will begin to work on his work portfolio.     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       Objective #A (Client Action)                Client will schedule times of the day to work on his work portfolio. 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   Status: completed - Date: 5/15/19      Intervention(s)  Therapist will assign homework including weekly activity scheduling and CBT thought logs  provide educational materials on CBT  role-play effective communication skills.    Goal 2: Client will decrease his monthly marijuana use.     I will know I've met my goal when I do not use marijuana around my brother.      Objective #A (Client Action)    Client will identify at least 3 example(s) of how marijuana has resulted in an experience that interferes with person values or goals.  Status: completed- 5/15/19    Intervention(s)  Therapist will help client identify how his personal values and goals are interfered when he uses marijuana.     Objective #B  Client will identify triggers and environmental cues contributing to his marijuana usage.     Status: completed - Date: 5/15/19      Intervention(s)  Therapist will teach the client how to perform a behavioral chain analysis to better identify triggers and cues to using marijuana.     Objective #C  Client will maintain sobriety for one month.  Status: completed - Date: 5.15.19     Intervention(s)  Therapist will provide educational materials for the client to increase his coping skills when he has urges to use marijuana. Therapist will provide additional resources to help him maintain sobriety.     Goal 3: Client will decrease his anxiety from his baseline GAD7 score.    I will know I've met my goal when I am able to let things go and allow myself permission to relax and not worry.      Objective #A  Client will identify 5 initial signs or symptoms of anxiety.    Status: completed - Date: 5/15/19     Intervention(s)  Therapist will provide educational materials on the symptoms of anxiety.    Objective #B (Client Action)    Status: completed - Date: 5/15/19     Client will identify 5 fears / thoughts that contribute to feeling anxious.    Intervention(s)  Therapist will teach the client how to perform a behavioral chain analysis in order to identify fears/thoughts contributing to anxious feelings.     Objective #C  Client will use at least 4 coping skills for anxiety management in the next 10 weeks.  Status: continued - Date: 2/19/19    Intervention(s)  Therapist will teach progressive muscle relaxation, cue control relaxation, mindful breathing, and guided imagery. Therapist will also utilize Acceptance and Commitment Therapy by exploring and identifying important values in patient's life, and discuss ways to commit to behavioral activation around these values.     Goal 4: The client will learn and apply skills to manage the symptoms of depression.    I will know I've met my goal when I am able to let things go and allow myself permission to relax, not worry, and feel better about myself.      Objective #A: Client will Increase interest,  engagement, and pleasure in doing things.  Client will identify negative self-talk and behaviors: challenge core beliefs, myths, and actions.  Decrease frequency and intensity of feeling down, depressed, hopeless.  Status: New- Date(s): 5/15/19    Interventions  Therapist will provide educational materials and handouts on core beliefs, cognitive distortions, and cognitive fusion and defusion.        AFSHIN Gomez, [facility], May 15, 2019  Note reviewed and clinical supervision by AFSHIN Weaver [facility] 1/31/2019

## 2019-05-20 ENCOUNTER — OFFICE VISIT (OUTPATIENT)
Dept: PSYCHOLOGY | Facility: CLINIC | Age: 54
End: 2019-05-20
Payer: COMMERCIAL

## 2019-05-20 DIAGNOSIS — F90.0 ADHD (ATTENTION DEFICIT HYPERACTIVITY DISORDER), INATTENTIVE TYPE: Primary | ICD-10-CM

## 2019-05-20 DIAGNOSIS — F32.1 MODERATE MAJOR DEPRESSION (H): ICD-10-CM

## 2019-05-20 PROCEDURE — 90834 PSYTX W PT 45 MINUTES: CPT | Performed by: SOCIAL WORKER

## 2019-05-20 NOTE — Clinical Note
Good morning Melissa,Can you please review this note and provide potential suggestions? Thanks!Panchito

## 2019-05-21 ASSESSMENT — ANXIETY QUESTIONNAIRES
3. WORRYING TOO MUCH ABOUT DIFFERENT THINGS: MORE THAN HALF THE DAYS
6. BECOMING EASILY ANNOYED OR IRRITABLE: MORE THAN HALF THE DAYS
2. NOT BEING ABLE TO STOP OR CONTROL WORRYING: MORE THAN HALF THE DAYS
IF YOU CHECKED OFF ANY PROBLEMS ON THIS QUESTIONNAIRE, HOW DIFFICULT HAVE THESE PROBLEMS MADE IT FOR YOU TO DO YOUR WORK, TAKE CARE OF THINGS AT HOME, OR GET ALONG WITH OTHER PEOPLE: SOMEWHAT DIFFICULT
7. FEELING AFRAID AS IF SOMETHING AWFUL MIGHT HAPPEN: NOT AT ALL
1. FEELING NERVOUS, ANXIOUS, OR ON EDGE: MORE THAN HALF THE DAYS
GAD7 TOTAL SCORE: 10
5. BEING SO RESTLESS THAT IT IS HARD TO SIT STILL: SEVERAL DAYS

## 2019-05-21 ASSESSMENT — COLUMBIA-SUICIDE SEVERITY RATING SCALE - C-SSRS
5. HAVE YOU STARTED TO WORK OUT OR WORKED OUT THE DETAILS OF HOW TO KILL YOURSELF? DO YOU INTEND TO CARRY OUT THIS PLAN?: NO
2. HAVE YOU ACTUALLY HAD ANY THOUGHTS OF KILLING YOURSELF LIFETIME?: NO
4. HAVE YOU HAD THESE THOUGHTS AND HAD SOME INTENTION OF ACTING ON THEM?: NO
1. IN THE PAST MONTH, HAVE YOU WISHED YOU WERE DEAD OR WISHED YOU COULD GO TO SLEEP AND NOT WAKE UP?: NO
1. IN THE PAST MONTH, HAVE YOU WISHED YOU WERE DEAD OR WISHED YOU COULD GO TO SLEEP AND NOT WAKE UP?: NO
3. HAVE YOU BEEN THINKING ABOUT HOW YOU MIGHT KILL YOURSELF?: NO

## 2019-05-21 ASSESSMENT — PATIENT HEALTH QUESTIONNAIRE - PHQ9
5. POOR APPETITE OR OVEREATING: SEVERAL DAYS
SUM OF ALL RESPONSES TO PHQ QUESTIONS 1-9: 6

## 2019-05-21 NOTE — PROGRESS NOTES
"                                           Progress Note    Client Name: Carrillo Dunlap  Date: 5/21/19         Service Type: Individual  Video Visit: No     Session Start Time: 4:30pm  Session End Time: 5:20pm  Session Length: 50 minutes    Session #: 10    Attendees: Client attended alone     Treatment Plan Last Reviewed: reviewed  PHQ-9 / UGO-7 : Reviewed    DATA  Interactive Complexity: No  Crisis: No       Progress Since Last Session (Related to Symptoms / Goals / Homework):   Symptoms: Similar to last session.   Homework: Completed in session.       Episode of Care Goals: Achieved / completed to satisfaction - ACTION (Actively working towards change); Intervened by reinforcing change plan / affirming steps taken     Current / Ongoing Stressors and Concerns: Discussed pt's ability to prioritize tasks and responsibilities over the next few days to prepare for his trip back home to [place]. Pt states that he usually \"crams\" a lot of things into his trip ranging from business and family responsibilities. In regards to family responsibilities, he is hoping to help his brothers organize and distribute his mother's belongings as she transitionings into an assisted living facility. 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 We also processed how the pt struggles with wanting to build a structured routine into his day because that is \"what everyone says he should do\" and how this differs from his idea that creativity happens for him in more of a spontaneous manner.        Treatment Objective(s) Addressed in This Session:   Identify negative self-talk and behaviors: challenge core beliefs, myths, and actions   Decrease frequency and intensity of feeling down, depressed, hopeless.      Intervention:  Mindfulness: therapist directed pt through a five minute guided " "mindfulness exercise using breathing techniques as a way to bring the client to the present moment. Encouraged pt to notice sounds, thoughts, bodily sensations, and emotions, but to not  them, and just let them pass like clouds in the chapo. As pt reflected on the exercise, he stated, \"I realized that my mind and body have not been connected for a long time.\"   ACT: We discussed pt's frustration with acceptance and reviewed several examples of how he has accepted his limits, without trying to change the circumstances. 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 Pt states that he usually would have not stopped to think about this and instead would have decided to \"push himself\" to be perfect and do it all himself. We discussed how pt is \"catching\" himself more to assess his thoughts and what he is feeling in that moment, and thinking more purposefully about what he does next (his action).          ASSESSMENT: Current Emotional / Mental Status (status of significant symptoms):   Risk status (Self / Other harm or suicidal ideation)   Client denies current fears or concerns for personal safety.   Client denies current or recent suicidal ideation or behaviors.   Client denies current or recent homicidal ideation or behaviors.   Client denies current or recent self injurious behavior or ideation.   Client denies other safety concerns.   Client Client reports there has been no change in risk factors since their last session.     Client Client reports there has been no change in protective factors since their last session.      A safety and risk management has not been developed at this time.  Client was given the Suicide Prevention National Hotline, Text [place] 515384,  the [facility] Crisis Number, or encouraged to Call 911 should there be a change  in these risk factors.  " "     Appearance:   Appropriate    Eye Contact:   Good    Psychomotor Behavior: Restless    Attitude:   Cooperative    Orientation:   All   Speech    Rate / Production: Hyperverbal     Volume:  Normal    Mood:    Anxious  Irritable    Affect:    frustrated    Thought Content:  Rumination    Thought Form:  Coherent    Insight:    Fair, improving     Medication Review:   No changes to current psychiatric medication(s)     Medication Compliance:   Yes     Changes in Health Issues:   None reported     Chemical Use Review:  Substance Use: decrease in cannabis.  Client reports frequency using of 0 in the past two weeks.    Tobacco Use: No current tobacco use.      Diagnosis:  1. ADHD (attention deficit hyperactivity disorder), inattentive type    2. Moderate major depression (H)      Collateral Reports Completed:   Not Applicable    PLAN: (Client Tasks / Therapist Tasks / Other). Pt agreed to practice his guided mindfulness handout \"leaves on a stream\" when he wakes up in the morning. Pt will continue to resist the urge of making things \"perfect\" and will practice breathing and mindfulness skills to help break his cycle of jumping from one thing to the next.      AFSHIN Gomez, [facility] 5.21.19  Note reviewed and clinical supervision by AFSHIN Weaver [facility] 5/29/2019   _______________________    Treatment Plan     Client's Name: Carrillo Dunlap                   YOB: 1965     Date: 1/21/19; reviewed: 5/15/19     DSM-V Diagnoses: Attention-Deficit/Hyperactivity Disorder  314.01 (F90.2) Combined presentation or 296.31 (F33.0) Major Depressive Disorder, Recurrent Episode, Mild _ and With anxious distress  Psychosocial / Contextual Factors: Limited social support, limited financial support, physical/medical concerns, employment constraints  WHODAS: did not complete during visit     Referral / Collaboration:  Referral to another professional/service is not indicated at this " time.     Anticipated number of session or this episode of care: 15        MeasurableTreatment Goal(s) related to diagnosis / functional impairment(s)  Goal 1: Client will begin to work on his work portfolio.     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       Objective #A (Client Action)                Client will schedule times of the day to work on his work portfolio. 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   Status: completed - Date: 5/15/19      Intervention(s)  Therapist will assign homework including weekly activity scheduling and CBT thought logs  provide educational materials on CBT  role-play effective communication skills.    Goal 2: Client will decrease his monthly marijuana use.     I will know I've met my goal when I do not use marijuana around my brother.      Objective #A (Client Action)    Client will identify at least 3 example(s) of how marijuana has resulted in an experience that interferes with person values or goals.  Status: completed- 5/15/19    Intervention(s)  Therapist will help client identify how his personal values and goals are interfered when he uses marijuana.     Objective #B  Client will identify triggers and environmental cues contributing to his marijuana usage.     Status: completed - Date: 5/15/19     Intervention(s)  Therapist will teach the client how to perform a behavioral chain analysis to better identify triggers and cues to using marijuana.     Objective #C  Client will maintain sobriety for one month.  Status: completed - Date: 5.15.19     Intervention(s)  Therapist will provide educational materials for the client to increase his coping skills when he has urges to use marijuana. Therapist will provide additional resources to help him maintain sobriety.     Goal 3: Client will decrease his anxiety from his baseline GAD7  score.    I will know I've met my goal when I am able to let things go and allow myself permission to relax and not worry.      Objective #A  Client will identify 5 initial signs or symptoms of anxiety.    Status: completed - Date: 5/15/19     Intervention(s)  Therapist will provide educational materials on the symptoms of anxiety.    Objective #B (Client Action)    Status: completed - Date: 5/15/19     Client will identify 5 fears / thoughts that contribute to feeling anxious.    Intervention(s)  Therapist will teach the client how to perform a behavioral chain analysis in order to identify fears/thoughts contributing to anxious feelings.     Objective #C  Client will use at least 4 coping skills for anxiety management in the next 10 weeks.  Status: continued - Date: 2/19/19    Intervention(s)  Therapist will teach progressive muscle relaxation, cue control relaxation, mindful breathing, and guided imagery. Therapist will also utilize Acceptance and Commitment Therapy by exploring and identifying important values in patient's life, and discuss ways to commit to behavioral activation around these values.     Goal 4: The client will learn and apply skills to manage the symptoms of depression.    I will know I've met my goal when I am able to let things go and allow myself permission to relax, not worry, and feel better about myself.      Objective #A: Client will Increase interest, engagement, and pleasure in doing things.  Client will identify negative self-talk and behaviors: challenge core beliefs, myths, and actions.  Decrease frequency and intensity of feeling down, depressed, hopeless.  Status: New- Date(s): 5/15/19    Interventions  Therapist will provide educational materials and handouts on core beliefs, cognitive distortions, and cognitive fusion and defusion.        AFSHIN Gomez, [facility], May 15, 2019  Note reviewed and clinical supervision by AFSHIN Weaver [facility] 1/31/2019

## 2019-05-22 ASSESSMENT — ANXIETY QUESTIONNAIRES: GAD7 TOTAL SCORE: 10

## 2019-06-03 ENCOUNTER — OFFICE VISIT (OUTPATIENT)
Dept: PSYCHOLOGY | Facility: CLINIC | Age: 54
End: 2019-06-03
Payer: COMMERCIAL

## 2019-06-03 DIAGNOSIS — F32.1 MODERATE MAJOR DEPRESSION (H): ICD-10-CM

## 2019-06-03 DIAGNOSIS — F90.0 ADHD (ATTENTION DEFICIT HYPERACTIVITY DISORDER), INATTENTIVE TYPE: Primary | ICD-10-CM

## 2019-06-03 PROCEDURE — 90832 PSYTX W PT 30 MINUTES: CPT | Performed by: SOCIAL WORKER

## 2019-06-03 NOTE — PROGRESS NOTES
"                                           Progress Note    Client Name: Carrillo Dunlap  Date: 6/3/19         Service Type: Individual  Video Visit: No     Session Start Time: 11:30am  Session End Time: 12:00pm  Session Length: 50 minutes    Session #: 11    Attendees: Client attended alone     Treatment Plan Last Reviewed: reviewed  PHQ-9 / UGO-7 : Reviewed    DATA  Interactive Complexity: No  Crisis: No       Progress Since Last Session (Related to Symptoms / Goals / Homework):   Symptoms: Similar to last session.   Homework: Completed in session.       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      Treatment Objective(s) Addressed in This Session:   Identify negative self-talk and behaviors: challenge core beliefs, myths, and actions   Decrease frequency and intensity of feeling down, depressed, hopeless.      Intervention:  Interpersonal thearpy: Processed pt's ability to have conversations about various aspects in his life without jumping from topic to topic. Discussed how pt's mind may be going into a different direction than the conversation at hand, but he is able to notice this thought and bring himself back to the present moment.   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 Discussed how he has tried to accomplish this over the past 10 years but was never able to launch it. PT states he believes he is more comfortable with things not being \"perfect\" or \"exactly how he wants them to be.\" " "      ASSESSMENT: Current Emotional / Mental Status (status of significant symptoms):   Risk status (Self / Other harm or suicidal ideation)   Client denies current fears or concerns for personal safety.   Client denies current or recent suicidal ideation or behaviors.   Client denies current or recent homicidal ideation or behaviors.   Client denies current or recent self injurious behavior or ideation.   Client denies other safety concerns.   Client Client reports there has been no change in risk factors since their last session.     Client Client reports there has been no change in protective factors since their last session.      A safety and risk management has not been developed at this time.  Client was given the Suicide Prevention National Hotline, Text [place] 005305,  the [facility] Crisis Number, or encouraged to Call 911 should there be a change  in these risk factors.       Appearance:   Appropriate    Eye Contact:   Good    Psychomotor Behavior: Restless    Attitude:   Cooperative    Orientation:   All   Speech    Rate / Production: Hyperverbal  , less than in past sessions    Volume:  Normal    Mood:    Anxious  Irritable    Affect:    frustrated    Thought Content:  Rumination    Thought Form:  Coherent    Insight:    Fair, improving     Medication Review:   No changes to current psychiatric medication(s)     Medication Compliance:   Yes     Changes in Health Issues:   None reported     Chemical Use Review:  Substance Use: decrease in cannabis.  Client reports frequency using of 0 in the past two weeks.    Tobacco Use: No current tobacco use.      Diagnosis:  1. ADHD (attention deficit hyperactivity disorder), inattentive type    2. Moderate major depression (H)      Collateral Reports Completed:   Not Applicable    PLAN: (Client Tasks / Therapist Tasks / Other). Pt agreed to continue practicing the handout \"leaves on a stream,\" especially when he notices himself having the urge to want to do something to the " extreme, like organizing belongings in his home.      Jose Hudson, MSW, [facility] 6.3.19  Note reviewed and clinical supervision by Kenyetta David MSANA [facility] 6/6/2019   _______________________    Treatment Plan     Client's Name: Carrillo Dunlap                   YOB: 1965     Date: 1/21/19; reviewed: 5/15/19     DSM-V Diagnoses: Attention-Deficit/Hyperactivity Disorder  314.01 (F90.2) Combined presentation or 296.31 (F33.0) Major Depressive Disorder, Recurrent Episode, Mild _ and With anxious distress  Psychosocial / Contextual Factors: Limited social support, limited financial support, physical/medical concerns, employment constraints  WHODAS: did not complete during visit     Referral / Collaboration:  Referral to another professional/service is not indicated at this time.     Anticipated number of session or this episode of care: 15        MeasurableTreatment Goal(s) related to diagnosis / functional impairment(s)  Goal 1: Client will begin to work on his work portfolio.     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       Objective #A (Client Action)                Client will schedule times of the day to work on his work portfolio. 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   Status: completed - Date: 5/15/19      Intervention(s)  Therapist will assign homework including weekly activity scheduling and CBT thought logs  provide educational materials on CBT  role-play effective communication skills.    Goal 2: Client will decrease his monthly marijuana use.     I will know I've met my goal when I do not use marijuana around my brother.      Objective #A (Client Action)    Client will identify at least 3 example(s) of how marijuana has resulted in an experience that interferes with person values or goals.  Status: completed-  5/15/19    Intervention(s)  Therapist will help client identify how his personal values and goals are interfered when he uses marijuana.     Objective #B  Client will identify triggers and environmental cues contributing to his marijuana usage.     Status: completed - Date: 5/15/19     Intervention(s)  Therapist will teach the client how to perform a behavioral chain analysis to better identify triggers and cues to using marijuana.     Objective #C  Client will maintain sobriety for one month.  Status: completed - Date: 5.15.19     Intervention(s)  Therapist will provide educational materials for the client to increase his coping skills when he has urges to use marijuana. Therapist will provide additional resources to help him maintain sobriety.     Goal 3: Client will decrease his anxiety from his baseline GAD7 score.    I will know I've met my goal when I am able to let things go and allow myself permission to relax and not worry.      Objective #A  Client will identify 5 initial signs or symptoms of anxiety.    Status: completed - Date: 5/15/19     Intervention(s)  Therapist will provide educational materials on the symptoms of anxiety.    Objective #B (Client Action)    Status: completed - Date: 5/15/19     Client will identify 5 fears / thoughts that contribute to feeling anxious.    Intervention(s)  Therapist will teach the client how to perform a behavioral chain analysis in order to identify fears/thoughts contributing to anxious feelings.     Objective #C  Client will use at least 4 coping skills for anxiety management in the next 10 weeks.  Status: continued - Date: 2/19/19    Intervention(s)  Therapist will teach progressive muscle relaxation, cue control relaxation, mindful breathing, and guided imagery. Therapist will also utilize Acceptance and Commitment Therapy by exploring and identifying important values in patient's life, and discuss ways to commit to behavioral activation around these values.      Goal 4: The client will learn and apply skills to manage the symptoms of depression.    I will know I've met my goal when I am able to let things go and allow myself permission to relax, not worry, and feel better about myself.      Objective #A: Client will Increase interest, engagement, and pleasure in doing things.  Client will identify negative self-talk and behaviors: challenge core beliefs, myths, and actions.  Decrease frequency and intensity of feeling down, depressed, hopeless.  Status: New- Date(s): 5/15/19    Interventions  Therapist will provide educational materials and handouts on core beliefs, cognitive distortions, and cognitive fusion and defusion.        AFSHIN Gomez, [facility], May 15, 2019  Note reviewed and clinical supervision by AFSHIN Weaver [facility] 1/31/2019

## 2019-06-19 ENCOUNTER — OFFICE VISIT (OUTPATIENT)
Dept: PSYCHOLOGY | Facility: CLINIC | Age: 54
End: 2019-06-19
Payer: COMMERCIAL

## 2019-06-19 DIAGNOSIS — F32.1 MODERATE MAJOR DEPRESSION (H): ICD-10-CM

## 2019-06-19 DIAGNOSIS — F90.0 ADHD (ATTENTION DEFICIT HYPERACTIVITY DISORDER), INATTENTIVE TYPE: Primary | ICD-10-CM

## 2019-06-19 PROCEDURE — 90834 PSYTX W PT 45 MINUTES: CPT | Performed by: SOCIAL WORKER

## 2019-06-19 ASSESSMENT — PATIENT HEALTH QUESTIONNAIRE - PHQ9
SUM OF ALL RESPONSES TO PHQ QUESTIONS 1-9: 7
5. POOR APPETITE OR OVEREATING: MORE THAN HALF THE DAYS

## 2019-06-19 ASSESSMENT — ANXIETY QUESTIONNAIRES
IF YOU CHECKED OFF ANY PROBLEMS ON THIS QUESTIONNAIRE, HOW DIFFICULT HAVE THESE PROBLEMS MADE IT FOR YOU TO DO YOUR WORK, TAKE CARE OF THINGS AT HOME, OR GET ALONG WITH OTHER PEOPLE: SOMEWHAT DIFFICULT
GAD7 TOTAL SCORE: 8
6. BECOMING EASILY ANNOYED OR IRRITABLE: NOT AT ALL
3. WORRYING TOO MUCH ABOUT DIFFERENT THINGS: MORE THAN HALF THE DAYS
5. BEING SO RESTLESS THAT IT IS HARD TO SIT STILL: SEVERAL DAYS
7. FEELING AFRAID AS IF SOMETHING AWFUL MIGHT HAPPEN: NOT AT ALL
1. FEELING NERVOUS, ANXIOUS, OR ON EDGE: MORE THAN HALF THE DAYS
2. NOT BEING ABLE TO STOP OR CONTROL WORRYING: SEVERAL DAYS

## 2019-06-19 NOTE — PROGRESS NOTES
Progress Note    Client Name: Carrillo Dunlap  Date: 6/19/19         Service Type: Individual  Video Visit: No     Session Start Time: 11:30am  Session End Time: 12:00pm    Session Length: 50 minutes    Session #: 12    Attendees: Client attended alone     Treatment Plan Last Reviewed: reviewed  PHQ-9 / UGO-7 : Reviewed    DATA  Interactive Complexity: No  Crisis: No       Progress Since Last Session (Related to Symptoms / Goals / Homework):   Symptoms: Improving. Pt states he has noticed becoming more consciously aware of his thoughts, bodily reactions, and behavior. Pt discussed an incident where he was at the grocery store and usually would buy candy, and although he had of wanting to purchased candy, he resisted the urge and did not act on his thoughts. Pt was also on-time for visit and was proud of himself for this accomplishment.   Homework: Completed in session.       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 Discussed how pt's friend approached him with the opportunity for employment, and although pt did not have much time to think things over, he accepted the job. We processed positives of pt working a job and planned for next steps to ensure pt is successful at making this lifestyle change.      Treatment Objective(s) Addressed in This Session:   Identify negative self-talk and behaviors: challenge core beliefs, myths, and actions   Decrease frequency and intensity of feeling down, depressed, hopeless.      Intervention:  CBT: Discussed how pt believes his boss may be difficult to work for due to his boss not always be forthcoming with his expectations for the job. Discussed how it may be helpful  "for pt to have a formal conversation with his boss regarding his role, responsibility, and expectations for quality of work. Discussed how pt's tendency to want to constantly improve his work \"better,\" may conflict with his ability to priorities work tasks. Pt is open to discussing with his boss about getting clear expectations for when a project/task is \"good enough.\"        ASSESSMENT: Current Emotional / Mental Status (status of significant symptoms):   Risk status (Self / Other harm or suicidal ideation)   Client denies current fears or concerns for personal safety.   Client denies current or recent suicidal ideation or behaviors.   Client denies current or recent homicidal ideation or behaviors.   Client denies current or recent self injurious behavior or ideation.   Client denies other safety concerns.   Client Client reports there has been no change in risk factors since their last session.     Client Client reports there has been no change in protective factors since their last session.      A safety and risk management has not been developed at this time.  Client was given the Suicide Prevention National Hotline, Text [place] 998221,  the [facility] Crisis Number, or encouraged to Call 911 should there be a change  in these risk factors.       Appearance:   Appropriate    Eye Contact:   Good    Psychomotor Behavior: Restless    Attitude:   Cooperative    Orientation:   All   Speech    Rate / Production: Hyperverbal  , less than in past sessions    Volume:  Normal    Mood:    Anxious , however more upbeat   Affect:    Appropriate    Thought Content:  Clear , more clear than past sessions   Thought Form:  Coherent    Insight:    Fair, improving     Medication Review:   No changes to current psychiatric medication(s)     Medication Compliance:   Yes     Changes in Health Issues:   None reported     Chemical Use Review:  Substance Use: decrease in cannabis.  Client reports frequency using of 0 in the past two weeks. "    Tobacco Use: No current tobacco use.      Diagnosis:  1. ADHD (attention deficit hyperactivity disorder), inattentive type    2. Moderate major depression (H)      Collateral Reports Completed:   Not Applicable    PLAN: (Client Tasks / Therapist Tasks / Other). Pt agrees to have a formal conversation with his boss about his job responsibilities, roles, and expectations for the quality of work. Pt agrees to ask his boss to review his work material over the next month and provide feedback about the quality of work to help the pt work more efficiently.     AFSHIN Gomez, [facility] 6.19.19  Note reviewed and clinical supervision by AFSHIN Weaver [facility] 6/26/2019   _______________________    Treatment Plan     Client's Name: Carrillo Dunlap                   YOB: 1965     Date: 1/21/19; reviewed: 5/15/19     DSM-V Diagnoses: Attention-Deficit/Hyperactivity Disorder  314.01 (F90.2) Combined presentation or 296.31 (F33.0) Major Depressive Disorder, Recurrent Episode, Mild _ and With anxious distress  Psychosocial / Contextual Factors: Limited social support, limited financial support, physical/medical concerns, employment constraints  WHODAS: did not complete during visit     Referral / Collaboration:  Referral to another professional/service is not indicated at this time.     Anticipated number of session or this episode of care: 15        MeasurableTreatment Goal(s) related to diagnosis / functional impairment(s)  Goal 1: Client will begin to work on his work portfolio.     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       Objective #A (Client Action)                Client will schedule times of the day to work on his work portfolio. 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   Status: completed - Date: 5/15/19       Intervention(s)  Therapist will assign homework including weekly activity scheduling and CBT thought logs  provide educational materials on CBT  role-play effective communication skills.    Goal 2: Client will decrease his monthly marijuana use.     I will know I've met my goal when I do not use marijuana around my brother.      Objective #A (Client Action)    Client will identify at least 3 example(s) of how marijuana has resulted in an experience that interferes with person values or goals.  Status: completed- 5/15/19    Intervention(s)  Therapist will help client identify how his personal values and goals are interfered when he uses marijuana.     Objective #B  Client will identify triggers and environmental cues contributing to his marijuana usage.     Status: completed - Date: 5/15/19     Intervention(s)  Therapist will teach the client how to perform a behavioral chain analysis to better identify triggers and cues to using marijuana.     Objective #C  Client will maintain sobriety for one month.  Status: completed - Date: 5.15.19     Intervention(s)  Therapist will provide educational materials for the client to increase his coping skills when he has urges to use marijuana. Therapist will provide additional resources to help him maintain sobriety.     Goal 3: Client will decrease his anxiety from his baseline GAD7 score.    I will know I've met my goal when I am able to let things go and allow myself permission to relax and not worry.      Objective #A  Client will identify 5 initial signs or symptoms of anxiety.    Status: completed - Date: 5/15/19     Intervention(s)  Therapist will provide educational materials on the symptoms of anxiety.    Objective #B (Client Action)    Status: completed - Date: 5/15/19     Client will identify 5 fears / thoughts that contribute to feeling anxious.    Intervention(s)  Therapist will teach the client how to perform a behavioral chain analysis in order to identify  fears/thoughts contributing to anxious feelings.     Objective #C  Client will use at least 4 coping skills for anxiety management in the next 10 weeks.  Status: continued - Date: 2/19/19    Intervention(s)  Therapist will teach progressive muscle relaxation, cue control relaxation, mindful breathing, and guided imagery. Therapist will also utilize Acceptance and Commitment Therapy by exploring and identifying important values in patient's life, and discuss ways to commit to behavioral activation around these values.     Goal 4: The client will learn and apply skills to manage the symptoms of depression.    I will know I've met my goal when I am able to let things go and allow myself permission to relax, not worry, and feel better about myself.      Objective #A: Client will Increase interest, engagement, and pleasure in doing things.  Client will identify negative self-talk and behaviors: challenge core beliefs, myths, and actions.  Decrease frequency and intensity of feeling down, depressed, hopeless.  Status: New- Date(s): 5/15/19    Interventions  Therapist will provide educational materials and handouts on core beliefs, cognitive distortions, and cognitive fusion and defusion.        AFSHIN Gomez, [facility], May 15, 2019  Note reviewed and clinical supervision by AFSHIN Weaver [facility] 1/31/2019

## 2019-06-20 ASSESSMENT — ANXIETY QUESTIONNAIRES: GAD7 TOTAL SCORE: 8

## 2019-06-21 ENCOUNTER — HOSPITAL ENCOUNTER (OUTPATIENT)
Dept: GENERAL RADIOLOGY | Facility: CLINIC | Age: 54
Discharge: HOME | End: 2019-06-21
Attending: NURSE PRACTITIONER | Admitting: NURSE PRACTITIONER
Payer: COMMERCIAL

## 2019-06-21 ENCOUNTER — OFFICE VISIT (OUTPATIENT)
Dept: FAMILY MEDICINE | Facility: CLINIC | Age: 54
End: 2019-06-21
Payer: COMMERCIAL

## 2019-06-21 VITALS
HEART RATE: 99 BPM | DIASTOLIC BLOOD PRESSURE: 60 MMHG | TEMPERATURE: 101.6 F | BODY MASS INDEX: 26.45 KG/M2 | SYSTOLIC BLOOD PRESSURE: 110 MMHG | HEIGHT: 69 IN | OXYGEN SATURATION: 99 % | WEIGHT: 178.6 LBS

## 2019-06-21 DIAGNOSIS — R05.9 COUGH WITH FEVER: ICD-10-CM

## 2019-06-21 DIAGNOSIS — F32.1 MODERATE MAJOR DEPRESSION (H): ICD-10-CM

## 2019-06-21 DIAGNOSIS — R50.9 COUGH WITH FEVER: Primary | ICD-10-CM

## 2019-06-21 DIAGNOSIS — F41.1 GENERALIZED ANXIETY DISORDER: ICD-10-CM

## 2019-06-21 DIAGNOSIS — F90.0 ADHD (ATTENTION DEFICIT HYPERACTIVITY DISORDER), INATTENTIVE TYPE: ICD-10-CM

## 2019-06-21 DIAGNOSIS — R05.9 COUGH WITH FEVER: Primary | ICD-10-CM

## 2019-06-21 DIAGNOSIS — R50.9 COUGH WITH FEVER: ICD-10-CM

## 2019-06-21 PROCEDURE — 71046 X-RAY EXAM CHEST 2 VIEWS: CPT

## 2019-06-21 PROCEDURE — 99214 OFFICE O/P EST MOD 30 MIN: CPT | Performed by: NURSE PRACTITIONER

## 2019-06-21 RX ORDER — DEXTROAMPHETAMINE SACCHARATE, AMPHETAMINE ASPARTATE MONOHYDRATE, DEXTROAMPHETAMINE SULFATE AND AMPHETAMINE SULFATE 7.5; 7.5; 7.5; 7.5 MG/1; MG/1; MG/1; MG/1
30 CAPSULE, EXTENDED RELEASE ORAL DAILY
Qty: 30 CAPSULE | Refills: 0 | Status: SHIPPED | OUTPATIENT
Start: 2019-06-21 | End: 2019-09-16 | Stop reason: DRUGHIGH

## 2019-06-21 RX ORDER — DEXTROAMPHETAMINE SACCHARATE, AMPHETAMINE ASPARTATE MONOHYDRATE, DEXTROAMPHETAMINE SULFATE AND AMPHETAMINE SULFATE 7.5; 7.5; 7.5; 7.5 MG/1; MG/1; MG/1; MG/1
30 CAPSULE, EXTENDED RELEASE ORAL DAILY
Qty: 30 CAPSULE | Refills: 0 | Status: SHIPPED | OUTPATIENT
Start: 2019-08-22 | End: 2019-09-16

## 2019-06-21 RX ORDER — DEXTROAMPHETAMINE SACCHARATE, AMPHETAMINE ASPARTATE MONOHYDRATE, DEXTROAMPHETAMINE SULFATE AND AMPHETAMINE SULFATE 5; 5; 5; 5 MG/1; MG/1; MG/1; MG/1
20 CAPSULE, EXTENDED RELEASE ORAL DAILY
Qty: 30 CAPSULE | Refills: 0 | Status: SHIPPED | OUTPATIENT
Start: 2019-06-21 | End: 2019-09-16 | Stop reason: DRUGHIGH

## 2019-06-21 RX ORDER — BUPROPION HYDROCHLORIDE 150 MG/1
TABLET, EXTENDED RELEASE ORAL
COMMUNITY
Start: 2019-03-06 | End: 2019-09-16 | Stop reason: DRUGHIGH

## 2019-06-21 RX ORDER — BUPROPION HYDROCHLORIDE 300 MG/1
300 TABLET ORAL EVERY MORNING
Qty: 90 TABLET | Refills: 0 | Status: SHIPPED | OUTPATIENT
Start: 2019-06-21 | End: 2019-09-16

## 2019-06-21 RX ORDER — DEXTROAMPHETAMINE SACCHARATE, AMPHETAMINE ASPARTATE MONOHYDRATE, DEXTROAMPHETAMINE SULFATE AND AMPHETAMINE SULFATE 5; 5; 5; 5 MG/1; MG/1; MG/1; MG/1
20 CAPSULE, EXTENDED RELEASE ORAL DAILY
Qty: 30 CAPSULE | Refills: 0 | Status: SHIPPED | OUTPATIENT
Start: 2019-07-22 | End: 2019-09-16 | Stop reason: DRUGHIGH

## 2019-06-21 RX ORDER — BUSPIRONE HYDROCHLORIDE 5 MG/1
TABLET ORAL
Qty: 120 TABLET | Refills: 0 | Status: SHIPPED | OUTPATIENT
Start: 2019-06-21 | End: 2019-08-13

## 2019-06-21 RX ORDER — DEXTROAMPHETAMINE SACCHARATE, AMPHETAMINE ASPARTATE MONOHYDRATE, DEXTROAMPHETAMINE SULFATE AND AMPHETAMINE SULFATE 5; 5; 5; 5 MG/1; MG/1; MG/1; MG/1
20 CAPSULE, EXTENDED RELEASE ORAL DAILY
Qty: 30 CAPSULE | Refills: 0 | Status: SHIPPED | OUTPATIENT
Start: 2019-08-22 | End: 2019-09-16

## 2019-06-21 RX ORDER — DEXTROAMPHETAMINE SACCHARATE, AMPHETAMINE ASPARTATE MONOHYDRATE, DEXTROAMPHETAMINE SULFATE AND AMPHETAMINE SULFATE 7.5; 7.5; 7.5; 7.5 MG/1; MG/1; MG/1; MG/1
30 CAPSULE, EXTENDED RELEASE ORAL DAILY
Qty: 30 CAPSULE | Refills: 0 | Status: SHIPPED | OUTPATIENT
Start: 2019-07-22 | End: 2019-09-16 | Stop reason: DRUGHIGH

## 2019-06-21 ASSESSMENT — MIFFLIN-ST. JEOR: SCORE: 1637.56

## 2019-06-21 NOTE — PROGRESS NOTES
"Carrillo Dunlap is a 54 year old male for a medication check and ADHD follow up.      CURRENT CONCERNS:  None right now     Review of past medical history:  Data Unavailable     CURRENT PRESCRIPTIONS:    Adderall XR 20 mg in the morning as well as a 30 mg     MEDICATION BENEFITS:  Controlled symptoms:  Attention span, Distractability and Finishing tasks  Uncontrolled symptoms:  None     MEDICATION SIDE EFFECTS:   Has:  dry mouth  Denies:  None    New job, buddy is realtor and he has little kids he think he got sick from   He is a  and motivational, likes getting out of the house, multimedia  Counseling every 2-3 weeks, like therapist     Stopped buspar felt stomach or woozy feeling after 10 mg   Just as needed      RESPIRATORY SYMPTOMS      Duration: Last week or 2    Description  Drainage into his chest, coughing    Severity: moderate    Accompanying signs and symptoms: Fever    History (predisposing factors):  none    Precipitating or alleviating factors: None    Therapies tried and outcome:  none   smoker     OBJECTIVE:  /60   Pulse 99   Temp 101.6  F (38.7  C) (Oral)   Ht 1.74 m (5' 8.5\")   Wt 81 kg (178 lb 9.6 oz)   SpO2 99%   BMI 26.76 kg/m    EXAM:  GENERAL: mildly ill appearing, alert and in no distress  EYES: Eyes grossly normal to inspection, no discharge. Extraocular movements - intact, and PERRL  HENT: Normocephalic, ear canals- normal; TMs- normal ; No sinus tenderness  Nose- normal with clear rhinnorhea; oropharynx clear without erythema or exudates, Mouth- no ulcers, no lesions and mucous membranes moist  NECK: no tenderness, no adenopathy, no asymmetry, no masses, no stiffness  RESP: lungs clear to auscultation - no rales, no rhonchi, no wheezes  CV: regular rates and rhythm, normal S1 S2, no S3 or S4 and no murmur, no click or rub - peripheral pulses normal and brisk cap refill   ABDOMEN: soft, no tenderness, no hepatosplenomegaly, no masses, normal bowel sounds  MS: " extremities- no gross deformities noted, no edema  SKIN: no suspicious lesions, no rashes, good skin turgor   NEURO:  No tics or tremor.  Normal tone and strength. Normal gait and balance.      ASSESSMENT:    ICD-10-CM    1. Cough with fever R05 XR Chest 2 Views    R50.9    2. Generalized anxiety disorder F41.1 busPIRone (BUSPAR) 5 MG tablet   3. Moderate major depression (H) F32.1 buPROPion (WELLBUTRIN XL) 300 MG 24 hr tablet   4. ADHD (attention deficit hyperactivity disorder), inattentive type F90.0 amphetamine-dextroamphetamine (ADDERALL XR) 20 MG 24 hr capsule     amphetamine-dextroamphetamine (ADDERALL XR) 30 MG 24 hr capsule     amphetamine-dextroamphetamine (ADDERALL XR) 20 MG 24 hr capsule     amphetamine-dextroamphetamine (ADDERALL XR) 20 MG 24 hr capsule     amphetamine-dextroamphetamine (ADDERALL XR) 30 MG 24 hr capsule     amphetamine-dextroamphetamine (ADDERALL XR) 30 MG 24 hr capsule    with high fever and chest congestion, will do CXR and monitor closely, pt is smoker so more risk. CXR years ago but not recently. Otherwise stable, tolering fluids and normal uo.   If any concerns over the weekend go to ER     PLAN:  Medication:  No change in medication. Continue on current Rx.    Follow-up:  Return to Clinic if not improving as expected or any concerns    Adali SYKES CNP

## 2019-07-01 ENCOUNTER — OFFICE VISIT (OUTPATIENT)
Dept: PSYCHOLOGY | Facility: CLINIC | Age: 54
End: 2019-07-01
Payer: COMMERCIAL

## 2019-07-01 DIAGNOSIS — F32.1 MODERATE MAJOR DEPRESSION (H): ICD-10-CM

## 2019-07-01 DIAGNOSIS — F90.0 ADHD (ATTENTION DEFICIT HYPERACTIVITY DISORDER), INATTENTIVE TYPE: Primary | ICD-10-CM

## 2019-07-01 PROCEDURE — 90834 PSYTX W PT 45 MINUTES: CPT | Performed by: SOCIAL WORKER

## 2019-07-01 NOTE — PROGRESS NOTES
"                                           Progress Note    Client Name: Carrillo Dunlap  Date: 7/1/19         Service Type: Individual  Video Visit: No     Session Start Time: 3:30pm  Session End Time: 4:20pm    Session Length: 50 minutes    Session #: 13    Attendees: Client attended alone     Treatment Plan Last Reviewed: reviewed  PHQ-9 / UGO-7 : Reviewed    DATA  Interactive Complexity: No  Crisis: No       Progress Since Last Session (Related to Symptoms / Goals / Homework):  Symptoms: Improving. Pt states that he continues to \"catch himself\" when his thoughts begin to impact how he feels and behaves.   Homework: Completed in session. Pt was unable to have conversation with his boss due to his negative thoughts getting in the way. Spent time during session formally outlining steps towards him having a conversation with his boss.      Episode of Care Goals: Achieved / completed to satisfaction - ACTION (Actively working towards change); Intervened by reinforcing change plan / affirming steps taken     Current / Ongoing Stressors and Concerns: Discussed pt's new job and processed his worries and frustrations related to unclear job expectations and roles within the company. Developed steps to alleviate his concerns by having a formal meeting with his boss, and planned for uncomfortable thoughts, feelings, and bodily sensations that may come up during the conversation.      Treatment Objective(s) Addressed in This Session:   Identify negative self-talk and behaviors: challenge core beliefs, myths, and actions   Decrease frequency and intensity of feeling down, depressed, hopeless.      Intervention:  CBT: Pt outlined in session steps he needs to take in order to initiate the meeting with his boss as well as the content he will discuss during the meeting (unclear role expectations, salary and benefit options). Educated pt on assertiveness including beginning a sentence with what he has observed, how it makes " him feel, what he wants, and how it will benefit him and his boss.     ASSESSMENT: Current Emotional / Mental Status (status of significant symptoms):   Risk status (Self / Other harm or suicidal ideation)   Client denies current fears or concerns for personal safety.   Client denies current or recent suicidal ideation or behaviors.   Client denies current or recent homicidal ideation or behaviors.   Client denies current or recent self injurious behavior or ideation.   Client denies other safety concerns.   Client Client reports there has been no change in risk factors since their last session.     Client Client reports there has been no change in protective factors since their last session.      A safety and risk management has not been developed at this time.  Client was given the Suicide Prevention National Hotline, Text [place] 910871,  the [facility] Crisis Number, or encouraged to Call 911 should there be a change  in these risk factors.       Appearance:   Appropriate    Eye Contact:   Good    Psychomotor Behavior: Restless    Attitude:   Cooperative    Orientation:   All   Speech    Rate / Production: Hyperverbal  , less than in past sessions    Volume:  Normal    Mood:    Anxious    Affect:    Appropriate    Thought Content:  Clear , more clear than past sessions   Thought Form:  Coherent    Insight:    Fair, improving     Medication Review:   No changes to current psychiatric medication(s)     Medication Compliance:   Yes     Changes in Health Issues:   None reported     Chemical Use Review:  Substance Use: decrease in cannabis.  Client reports frequency using of 0 in the past two weeks.    Tobacco Use: No current tobacco use.      Diagnosis:  1. ADHD (attention deficit hyperactivity disorder), inattentive type    2. Moderate major depression (H)      Collateral Reports Completed:   Not Applicable    PLAN: (Client Tasks / Therapist Tasks / Other). Pt agrees to take steps towards his previous goal of having a  formal conversation with his boss about his job responsibilities, roles, and salary and benefit options. Pt will utilize the assertiveness formula practiced in session during this conversation.   Jose Hudson, AFSHIN, [facility] 7.1.19  Note reviewed and clinical supervision by AFSHIN Weaver [facility] 7/15/2019   _______________________    Treatment Plan     Client's Name: Carrillo Dunlap                   YOB: 1965     Date: 1/21/19; reviewed: 5/15/19     DSM-V Diagnoses: Attention-Deficit/Hyperactivity Disorder  314.01 (F90.2) Combined presentation or 296.31 (F33.0) Major Depressive Disorder, Recurrent Episode, Mild _ and With anxious distress  Psychosocial / Contextual Factors: Limited social support, limited financial support, physical/medical concerns, employment constraints  WHODAS: did not complete during visit     Referral / Collaboration:  Referral to another professional/service is not indicated at this time.     Anticipated number of session or this episode of care: 15        MeasurableTreatment Goal(s) related to diagnosis / functional impairment(s)  Goal 1: Client will begin to work on his work portfolio.     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       Objective #A (Client Action)                Client will schedule times of the day to work on his work portfolio. 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   Status: completed - Date: 5/15/19      Intervention(s)  Therapist will assign homework including weekly activity scheduling and CBT thought logs  provide educational materials on CBT  role-play effective communication skills.    Goal 2: Client will decrease his monthly marijuana use.     I will know I've met my goal when I do not use marijuana around my brother.      Objective #A (Client Action)    Client will identify at least  3 example(s) of how marijuana has resulted in an experience that interferes with person values or goals.  Status: completed- 5/15/19    Intervention(s)  Therapist will help client identify how his personal values and goals are interfered when he uses marijuana.     Objective #B  Client will identify triggers and environmental cues contributing to his marijuana usage.     Status: completed - Date: 5/15/19     Intervention(s)  Therapist will teach the client how to perform a behavioral chain analysis to better identify triggers and cues to using marijuana.     Objective #C  Client will maintain sobriety for one month.  Status: completed - Date: 5.15.19     Intervention(s)  Therapist will provide educational materials for the client to increase his coping skills when he has urges to use marijuana. Therapist will provide additional resources to help him maintain sobriety.     Goal 3: Client will decrease his anxiety from his baseline GAD7 score.    I will know I've met my goal when I am able to let things go and allow myself permission to relax and not worry.      Objective #A  Client will identify 5 initial signs or symptoms of anxiety.    Status: completed - Date: 5/15/19     Intervention(s)  Therapist will provide educational materials on the symptoms of anxiety.    Objective #B (Client Action)    Status: completed - Date: 5/15/19     Client will identify 5 fears / thoughts that contribute to feeling anxious.    Intervention(s)  Therapist will teach the client how to perform a behavioral chain analysis in order to identify fears/thoughts contributing to anxious feelings.     Objective #C  Client will use at least 4 coping skills for anxiety management in the next 10 weeks.  Status: continued - Date: 2/19/19    Intervention(s)  Therapist will teach progressive muscle relaxation, cue control relaxation, mindful breathing, and guided imagery. Therapist will also utilize Acceptance and Commitment Therapy by exploring and  identifying important values in patient's life, and discuss ways to commit to behavioral activation around these values.     Goal 4: The client will learn and apply skills to manage the symptoms of depression.    I will know I've met my goal when I am able to let things go and allow myself permission to relax, not worry, and feel better about myself.      Objective #A: Client will Increase interest, engagement, and pleasure in doing things.  Client will identify negative self-talk and behaviors: challenge core beliefs, myths, and actions.  Decrease frequency and intensity of feeling down, depressed, hopeless.  Status: New- Date(s): 5/15/19    Interventions  Therapist will provide educational materials and handouts on core beliefs, cognitive distortions, and cognitive fusion and defusion.        AFSHIN Gomez, [facility], May 15, 2019  Note reviewed and clinical supervision [facility], 7/15/2019

## 2019-07-02 ASSESSMENT — PATIENT HEALTH QUESTIONNAIRE - PHQ9: SUM OF ALL RESPONSES TO PHQ QUESTIONS 1-9: 7

## 2019-07-16 ENCOUNTER — OFFICE VISIT (OUTPATIENT)
Dept: PSYCHOLOGY | Facility: CLINIC | Age: 54
End: 2019-07-16
Payer: COMMERCIAL

## 2019-07-16 DIAGNOSIS — F32.1 MODERATE MAJOR DEPRESSION (H): Primary | ICD-10-CM

## 2019-07-16 DIAGNOSIS — F90.0 ADHD (ATTENTION DEFICIT HYPERACTIVITY DISORDER), INATTENTIVE TYPE: ICD-10-CM

## 2019-07-16 PROCEDURE — 90834 PSYTX W PT 45 MINUTES: CPT | Performed by: SOCIAL WORKER

## 2019-07-16 ASSESSMENT — ANXIETY QUESTIONNAIRES
3. WORRYING TOO MUCH ABOUT DIFFERENT THINGS: MORE THAN HALF THE DAYS
5. BEING SO RESTLESS THAT IT IS HARD TO SIT STILL: SEVERAL DAYS
GAD7 TOTAL SCORE: 12
1. FEELING NERVOUS, ANXIOUS, OR ON EDGE: NEARLY EVERY DAY
2. NOT BEING ABLE TO STOP OR CONTROL WORRYING: MORE THAN HALF THE DAYS
7. FEELING AFRAID AS IF SOMETHING AWFUL MIGHT HAPPEN: SEVERAL DAYS
6. BECOMING EASILY ANNOYED OR IRRITABLE: SEVERAL DAYS

## 2019-07-16 ASSESSMENT — PATIENT HEALTH QUESTIONNAIRE - PHQ9
5. POOR APPETITE OR OVEREATING: MORE THAN HALF THE DAYS
SUM OF ALL RESPONSES TO PHQ QUESTIONS 1-9: 8

## 2019-07-16 NOTE — PROGRESS NOTES
"                                           Progress Note    Client Name: Carrillo Dunlap  Date: 7/16/19         Service Type: Individual  Video Visit: No     Session Start Time: 11:10am  Session End Time: 11:55am    Session Length: 45 minutes    Session #: 14    Attendees: Client attended alone     Treatment Plan Last Reviewed: reviewed  PHQ-9 / UGO-7 : Reviewed; scores increased significantly from last week    DATA  Interactive Complexity: No  Crisis: No       Progress Since Last Session (Related to Symptoms / Goals / Homework):  Symptoms: Improving. Pt states that he continues to \"catch himself\" when his thoughts begin to impact how he feels and behaves.   Homework: Did not complete. Pt was unable to have conversation with his boss. Discussed how pt made several mistakes over the past week and feels uncomfortable asking for clarification about his salary and potential increases because of this. Spent time during session assessing evidence for and against setting up a formal meeting with his boss to talk about job responsibilities, roles, and salary and benefit options.      Episode of Care Goals: Achieved / completed to satisfaction - ACTION (Actively working towards change); Intervened by reinforcing change plan / affirming steps taken     Current / Ongoing Stressors and Concerns: Discussed pt's frustration with his boss. Pt states that his boss often pulls him into many various projects and tasks at work without providing more detail about how or when to complete tasks. Also processed pt's feelings regarding a recent incident where he was not able to complete a project by his bosses deadline.      Treatment Objective(s) Addressed in This Session:   Identify negative self-talk and behaviors: challenge core beliefs, myths, and actions   Decrease frequency and intensity of feeling down, depressed, hopeless.      Intervention:  CBT: Discussed incident where pt did not complete a project within his bosses " "deadline. Pt states that he was learning a new software and was unable to figure out several key functions in the program to complete his task. Pt states that he was communicating with one of his co-workers about the program and although they got sidetracked, (due to getting to know one another) they were unable to solve the problem. Pt states that his boss walked in the room and said, \"you seem to be over thinking things!\" Pt identified the thought, \"I let him down\" and felt somewhat embarrassed and ashamed that he was unable to complete the task. Pt states that he practiced deep breathing and explained to his boss the problem. Pt and his boss were able to identify a solution and the pt was able to complete the task. We processed that pt did not shut down or try to block his embarrassment, and instead was able to defuse from his thoughts/emotions by breathing and was able to think rationally about how to solve the problem.     ASSESSMENT: Current Emotional / Mental Status (status of significant symptoms):   Risk status (Self / Other harm or suicidal ideation)   Client denies current fears or concerns for personal safety.   Client denies current or recent suicidal ideation or behaviors.   Client denies current or recent homicidal ideation or behaviors.   Client denies current or recent self injurious behavior or ideation.   Client denies other safety concerns.   Client Client reports there has been no change in risk factors since their last session.     Client Client reports there has been no change in protective factors since their last session.      A safety and risk management has not been developed at this time.  Client was given the Suicide Prevention National Hotline, Text [place] 656019,  the [facility] Crisis Number, or encouraged to Call 911 should there be a change  in these risk factors.       Appearance:   Appropriate    Eye Contact:   Good    Psychomotor Behavior: Restless , more so than in previous " sessions   Attitude:   Cooperative    Orientation:   All   Speech    Rate / Production: Hyperverbal     Volume:  Normal    Mood:    Anxious    Affect:    Appropriate    Thought Content:  Clear , more clear than past sessions   Thought Form:  Coherent    Insight:    Fair, improving     Medication Review:   No changes to current psychiatric medication(s)     Medication Compliance:   Yes     Changes in Health Issues:   None reported     Chemical Use Review:  Substance Use: decrease in cannabis.  Client reports frequency using of 0 in the past two weeks.    Tobacco Use: No current tobacco use.      Diagnosis:  1. Moderate major depression (H)    2. ADHD (attention deficit hyperactivity disorder), inattentive type      Collateral Reports Completed:   Not Applicable    PLAN: (Client Tasks / Therapist Tasks / Other). Pt agrees to recommit to his goal of having a formal conversation with his boss about his job responsibilities, roles, and salary and benefit options. Pt will utilize the assertiveness formula practiced in session during this conversation.    AFSHIN Gomez, [facility] 7.16.19  Note reviewed and clinical supervision by AFSHIN Weaver [facility] 7/31/2019   _______________________    Treatment Plan     Client's Name: Carrillo Dunlap                   YOB: 1965     Date: 1/21/19; reviewed: 5/15/19     DSM-V Diagnoses: Attention-Deficit/Hyperactivity Disorder  314.01 (F90.2) Combined presentation or 296.31 (F33.0) Major Depressive Disorder, Recurrent Episode, Mild _ and With anxious distress  Psychosocial / Contextual Factors: Limited social support, limited financial support, physical/medical concerns, employment constraints  WHODAS: did not complete during visit     Referral / Collaboration:  Referral to another professional/service is not indicated at this time.     Anticipated number of session or this episode of care: 15        MeasurableTreatment Goal(s) related to diagnosis /  functional impairment(s)  Goal 1: Client will begin to work on his work portfolio.     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       Objective #A (Client Action)                Client will schedule times of the day to work on his work portfolio. 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   Status: completed - Date: 5/15/19      Intervention(s)  Therapist will assign homework including weekly activity scheduling and CBT thought logs  provide educational materials on CBT  role-play effective communication skills.    Goal 2: Client will decrease his monthly marijuana use.     I will know I've met my goal when I do not use marijuana around my brother.      Objective #A (Client Action)    Client will identify at least 3 example(s) of how marijuana has resulted in an experience that interferes with person values or goals.  Status: completed- 5/15/19    Intervention(s)  Therapist will help client identify how his personal values and goals are interfered when he uses marijuana.     Objective #B  Client will identify triggers and environmental cues contributing to his marijuana usage.     Status: completed - Date: 5/15/19     Intervention(s)  Therapist will teach the client how to perform a behavioral chain analysis to better identify triggers and cues to using marijuana.     Objective #C  Client will maintain sobriety for one month.  Status: completed - Date: 5.15.19     Intervention(s)  Therapist will provide educational materials for the client to increase his coping skills when he has urges to use marijuana. Therapist will provide additional resources to help him maintain sobriety.     Goal 3: Client will decrease his anxiety from his baseline GAD7 score.    I will know I've met my goal when I am able to let things go and allow myself permission to relax and not worry.       Objective #A  Client will identify 5 initial signs or symptoms of anxiety.    Status: completed - Date: 5/15/19     Intervention(s)  Therapist will provide educational materials on the symptoms of anxiety.    Objective #B (Client Action)    Status: completed - Date: 5/15/19     Client will identify 5 fears / thoughts that contribute to feeling anxious.    Intervention(s)  Therapist will teach the client how to perform a behavioral chain analysis in order to identify fears/thoughts contributing to anxious feelings.     Objective #C  Client will use at least 4 coping skills for anxiety management in the next 10 weeks.  Status: continued - Date: 2/19/19    Intervention(s)  Therapist will teach progressive muscle relaxation, cue control relaxation, mindful breathing, and guided imagery. Therapist will also utilize Acceptance and Commitment Therapy by exploring and identifying important values in patient's life, and discuss ways to commit to behavioral activation around these values.     Goal 4: The client will learn and apply skills to manage the symptoms of depression.    I will know I've met my goal when I am able to let things go and allow myself permission to relax, not worry, and feel better about myself.      Objective #A: Client will Increase interest, engagement, and pleasure in doing things.  Client will identify negative self-talk and behaviors: challenge core beliefs, myths, and actions.  Decrease frequency and intensity of feeling down, depressed, hopeless.  Status: New- Date(s): 5/15/19    Interventions  Therapist will provide educational materials and handouts on core beliefs, cognitive distortions, and cognitive fusion and defusion.        AFSHIN Gomez, [facility], May 15, 2019  Note reviewed and clinical supervision by AFSHIN Weaver [facility] 1/31/2019, 7/15/2019

## 2019-07-17 ASSESSMENT — ANXIETY QUESTIONNAIRES: GAD7 TOTAL SCORE: 12

## 2019-08-12 DIAGNOSIS — F41.1 GENERALIZED ANXIETY DISORDER: ICD-10-CM

## 2019-08-12 NOTE — TELEPHONE ENCOUNTER
"Requested Prescriptions   Pending Prescriptions Disp Refills     busPIRone (BUSPAR) 5 MG tablet 120 tablet 0     Sig: 10 mg (2 tabs) twice daily and may increase to 15 mg twice a day as tolerated  Last Written Prescription Date:  06/21/2019  Last Fill Quantity: 120,  # refills: 0   Last office visit: 6/21/2019 with prescribing provider:  06/21/2019   Future Office Visit:   Next 5 appointments (look out 90 days)    Aug 13, 2019 12:30 PM CDT  [facility] [place] [address] [place] [address] [address]  [place] [address]  [phone]   Aug 27, 2019  2:30 PM CDT  [facility] [address] [address]  [place] [address]  [phone]   Sep 10, 2019  3:30 PM CDT  [facility] [place] [address] [place] [address] [address]  [place] [address]  [phone]   Sep 16, 2019 12:00 PM CDT  Office Visit with Maria Ines Chaney [facility] ([facility]) [address] [place] [id number] [phone]              Atypical Antidepressants Protocol Passed - 8/12/2019 10:44 AM        Passed - Recent (12 mo) or future (30 days) visit within the authorizing provider's specialty     Patient had office visit in the last 12 months or has a visit in the next 30 days with authorizing provider or within the authorizing provider's specialty.  See \"Patient Info\" tab in inbasket, or \"Choose Columns\" in Meds & Orders section of the refill encounter.              Passed - Medication active on med list        Passed - Patient is age 18 or older        "

## 2019-08-13 ENCOUNTER — OFFICE VISIT (OUTPATIENT)
Dept: PSYCHOLOGY | Facility: CLINIC | Age: 54
End: 2019-08-13
Payer: COMMERCIAL

## 2019-08-13 DIAGNOSIS — F32.1 MODERATE MAJOR DEPRESSION (H): Primary | ICD-10-CM

## 2019-08-13 DIAGNOSIS — F90.0 ADHD (ATTENTION DEFICIT HYPERACTIVITY DISORDER), INATTENTIVE TYPE: ICD-10-CM

## 2019-08-13 PROCEDURE — 90834 PSYTX W PT 45 MINUTES: CPT | Performed by: SOCIAL WORKER

## 2019-08-13 RX ORDER — BUSPIRONE HYDROCHLORIDE 5 MG/1
TABLET ORAL
Qty: 120 TABLET | Refills: 0 | Status: SHIPPED | OUTPATIENT
Start: 2019-08-13 | End: 2019-09-17 | Stop reason: DRUGHIGH

## 2019-08-13 ASSESSMENT — ANXIETY QUESTIONNAIRES
1. FEELING NERVOUS, ANXIOUS, OR ON EDGE: MORE THAN HALF THE DAYS
IF YOU CHECKED OFF ANY PROBLEMS ON THIS QUESTIONNAIRE, HOW DIFFICULT HAVE THESE PROBLEMS MADE IT FOR YOU TO DO YOUR WORK, TAKE CARE OF THINGS AT HOME, OR GET ALONG WITH OTHER PEOPLE: SOMEWHAT DIFFICULT
3. WORRYING TOO MUCH ABOUT DIFFERENT THINGS: MORE THAN HALF THE DAYS
7. FEELING AFRAID AS IF SOMETHING AWFUL MIGHT HAPPEN: NOT AT ALL
6. BECOMING EASILY ANNOYED OR IRRITABLE: SEVERAL DAYS
5. BEING SO RESTLESS THAT IT IS HARD TO SIT STILL: SEVERAL DAYS
GAD7 TOTAL SCORE: 10
2. NOT BEING ABLE TO STOP OR CONTROL WORRYING: MORE THAN HALF THE DAYS

## 2019-08-13 ASSESSMENT — PATIENT HEALTH QUESTIONNAIRE - PHQ9
SUM OF ALL RESPONSES TO PHQ QUESTIONS 1-9: 4
5. POOR APPETITE OR OVEREATING: MORE THAN HALF THE DAYS

## 2019-08-13 NOTE — Clinical Note
2019    Physician No Ref-Primary  No address on file    RE: Jeremy Lainez       Dear Colleague,    I had the pleasure of seeing Jeremy Lainez in the [facility].    CARDIOLOGY CLINIC PROGRESS NOTE    DOS: 19    Jeremy Lainez  : 1972, 46 year old  MRN: [id number]      History:   I had the pleasure of seeing Jeremy Lainez today in follow up.  He is a patient of Dr. Lopez.     Jeremy Lainez is a very pleasant 46 year old male with history of prior tobacco dependence, recreational marijuana use, CAD, s/p PCI 18, some coronary spasm.     He was admitted on 2018 w/ about 1 week history of intermittent substernal chest pain associated with diaphoresis.  He was sent from PMD office to [facility] ER due to abnormal ECG concerning for anterior T inversions.  Mild troponin elevation.  He was hypertensive. Cardiac catheterization 18 with 60% OM1 lesion and 40-50% p-mLAD lesion, negative FFR (0.84) across the latter.  He had recurrent chest discomfort over the weekend.  Echo 6/10/18 without definitive WMAs.     Repeat cath 18: 60% prox LAD with evidence of plaque rupture.  Acetylcholine provocation study with spasm in the prox LAD lesion without significant spasm elsewhere. Given the results and recurrence while on DAPT and medical therapy, he underwent 3.5 x 28 mm synergy KATLYN to prox LAD.   He had some bradycardia with addition of beta-blocker, so Imdur started.     He saw Dr. Lopez 8/10/18.  He was doing well at that time.  LDL was controlled at 30 on atorvastatin 40 mg.     I saw Anthony 19.  He was doing well without concerns.  BP controlled.     19 pt called noting a chest tightness and pressure.  I saw him the following day.   He had dome well since his stent.  Then about 1 week prior he started to have some chest tightness and pressure.  He described it as discomfort, not pain, which is what he had in 2018.  It is not exertional.  It can last from  Roshan Torres,Can you please review this note? Thank you!Panchito minutes to hours.  It can occur any time of day.  It does not seem associated with food.  It can be in his central chest, the left chest or even under his left rib cage.  Some days he does not have any symptoms.   We increased Imdur from 30 mg to 45 mg.       Interval History:   He presents today for follow up.   With the increase in Imdur, chest discomfort episodes are less frequent.   He noticed that they come on when it is hot or humid, for example in a hot car, or he was in [place] and the room had no AC and he felt it coming on.  He took a SL nitroglycerin.  With this and the AC coming on, the feeling went away.  The chest pain is central chest, no radiation to arms or neck.  It is not clearly exertional.  It comes on randomly.   He has not tried GI meds.     He has completely stopped smoking cigarettes.  He has cut down smoking marijuana.  He does vape.    No bleeding on DAPT.   No myalgias.   He has occasional mild headaches.   He is looking for a new PCP.   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 He is looking to increase activity again.       ROS:  Skin:  Positive for bruising   Eyes:  Negative for    ENT:  Negative for    Respiratory:  Negative for     Cardiovascular:    Positive for;fatigue  Gastroenterology: Negative for    Genitourinary:  not assessed    Musculoskeletal:  Negative    Neurologic:  Positive for headaches  Psychiatric:  Negative for    Heme/Lymph/Imm:  Negative    Endocrine:  Negative      PAST MEDICAL HISTORY:  Past Medical History:   Diagnosis Date     CAD (coronary artery disease)      History of tobacco abuse      HTN (hypertension)      Hyperlipidemia      Marijuana use      STEMI (ST elevation myocardial infarction) (H) 06/11/2018     CAD s/p KATLYN to LAD 6/11/18       PAST SURGICAL HISTORY:  Past Surgical History:   Procedure Laterality Date     CARDIAC CATHERIZATION  06/11/2018    KATLYN to LAD       SOCIAL HISTORY:  Social History      Social History     Marital status:      Spouse name: N/A     Number of children: N/A     Years of education: N/A     Social History Main Topics     Smoking status: Former Smoker     Quit date: 6/8/2018     Smokeless tobacco: Never Used     Alcohol use Yes      Comment: 1-2 drinks a night      Drug use: None     Sexual activity: Not Asked     Other Topics Concern     None     Social History Narrative     None       FAMILY HISTORY:  History reviewed. No pertinent family history.    MEDS:   Current Outpatient Medications on File Prior to Visit:  aspirin 81 MG EC tablet Take 1 tablet (81 mg) by mouth daily   atorvastatin (LIPITOR) 40 MG tablet Take 1 tablet (40 mg) by mouth At Bedtime   clopidogrel (PLAVIX) 75 MG tablet Take 1 tablet (75 mg) by mouth daily   isosorbide mononitrate (IMDUR) 30 MG 24 hr tablet Take 1.5 tablets (45 mg) by mouth daily   nitroGLYcerin (NITROSTAT) 0.4 MG sublingual tablet For chest pain place 1 tablet under the tongue every 5 minutes for 3 doses. If symptoms persist 5 minutes after 1st dose call 911.     No current facility-administered medications on file prior to visit.     ALLERGIES: No Known Allergies    PHYSICAL EXAM:  Vitals: /70 (BP Location: Right arm)   Pulse 72   Ht 1.829 m (6')   Wt 84.8 kg (187 lb)   SpO2 96%   BMI 25.36 kg/m     Constitutional:  cooperative, alert and oriented, well developed, well nourished, in no acute distress        Skin:  warm and dry to the touch        Head:  normocephalic, no masses or lesions        Eyes:  pupils equal and round;conjunctivae and lids unremarkable;sclera white;no xanthalasma        ENT:  no pallor or cyanosis, dentition good        Neck:  JVP normal        Respiratory:  normal breath sounds, clear to auscultation, normal A-P diameter, normal symmetry, normal respiratory excursion, no use of accessory muscles        Cardiac: regular rhythm, normal S1/S2, no S3 or S4, apical impulse not displaced, no murmurs, gallops or  rubs                  GI:  abdomen soft;BS normoactive;non-tender        Vascular: pulses full and equal                                      Extremities and Musculoskeletal:  no deformities, clubbing, cyanosis, erythema observed;no edema;no spinal abnormalities noted;normal muscle strength and tone        Neurological:  no gross motor deficits;affect appropriate          DIAGNOSTICS:  ECG 4/25/19:  SR, no ischemic changes    ASSESSMENT/PLAN:  STEMI  CAD  Vasospasm  - anterior ST elevation  - 6/8/18 urgent cath: 40-50% pLAD and 60% OM1 (small vessel)  LAD not found to be flow limiting per FFR (0.84).  No PCI  - developed recurrent CP & palpitations w/ associated ST elevations in V2-V5 w/ T-wave inversions  - ECHO 6/10/18 LVEF 55% possible Mild mid/high lateral, anterior RWMA. Though no definitive WMAs  - Repeat cath 6/11/18: 60% prox LAD. Acetylcholine provocation study with spasm in the prox LAD lesion without significant spasm elsewhere. Given the results and recurrent SINDI while on DAPT and medical therapy he underwent 3.5 x 28 mm synergy KATLYN to prox LAD.   - Has been on ASA, Plavix x 1 year, statin. Also Imdur 30 mg for spasm.   - With some new atypical chest heaviness - atypical in that it is not exertional and that it is not exactly like what he had in June, we increased Imdur to 45 mg.  With this, he thinks episodes are less frequent but still are occurring.   I discussed stress testing with Dr. Lopez.  We will go ahead with stress echo.  Obviously if abnormal, we will consider cath.  But if normal, he will follow up with Dr. Lopez this summer.  - Continued smoking cessation   - Mediterranean style diet     HTN  - Controlled on Imdur 45 mg for BP and spasm.    - Could use Norvasc if needed in the future      Tobacco dependence  - quit at time of admit, though does still use marijuana      Statin started  - FLP 6/11/18: , HDL 51, LDL 79, TG 52  - Started atorvastatin 40 mg  - Repeat FLP 8/10/18: TC 95, HDL  53, LDL 30, TG 61      Follow up:  Looking for PCP   Dr. Lopez in about late June or July, which is about 1 year our from his stenting    Tye Bran PA-C    Thank you for allowing me to participate in the care of your patient.      Sincerely,     Tye Bran PA-C     [facility]    cc:   Tye Bran PA-C  [address] [place] [address]

## 2019-08-13 NOTE — TELEPHONE ENCOUNTER
Prescription approved per [facility] Refill Protocol.    Kindra Tobias RN   [facility]

## 2019-08-13 NOTE — TELEPHONE ENCOUNTER
Per 06/21/19 OV, pt to f/u around 09/21/19.    Medication is being filled for 1 time refill only due to:  Patient needs to be seen because due for f/u around 09/21/19.    Sarah Cobos RN  [facility]

## 2019-08-14 ASSESSMENT — ANXIETY QUESTIONNAIRES: GAD7 TOTAL SCORE: 10

## 2019-08-27 ENCOUNTER — OFFICE VISIT (OUTPATIENT)
Dept: PSYCHOLOGY | Facility: CLINIC | Age: 54
End: 2019-08-27
Payer: COMMERCIAL

## 2019-08-27 DIAGNOSIS — F90.0 ADHD (ATTENTION DEFICIT HYPERACTIVITY DISORDER), INATTENTIVE TYPE: ICD-10-CM

## 2019-08-27 DIAGNOSIS — F32.1 MODERATE MAJOR DEPRESSION (H): Primary | ICD-10-CM

## 2019-08-27 PROCEDURE — 90834 PSYTX W PT 45 MINUTES: CPT | Performed by: SOCIAL WORKER

## 2019-08-27 ASSESSMENT — PATIENT HEALTH QUESTIONNAIRE - PHQ9
5. POOR APPETITE OR OVEREATING: MORE THAN HALF THE DAYS
SUM OF ALL RESPONSES TO PHQ QUESTIONS 1-9: 12

## 2019-08-27 ASSESSMENT — ANXIETY QUESTIONNAIRES
2. NOT BEING ABLE TO STOP OR CONTROL WORRYING: MORE THAN HALF THE DAYS
IF YOU CHECKED OFF ANY PROBLEMS ON THIS QUESTIONNAIRE, HOW DIFFICULT HAVE THESE PROBLEMS MADE IT FOR YOU TO DO YOUR WORK, TAKE CARE OF THINGS AT HOME, OR GET ALONG WITH OTHER PEOPLE: SOMEWHAT DIFFICULT
3. WORRYING TOO MUCH ABOUT DIFFERENT THINGS: MORE THAN HALF THE DAYS
5. BEING SO RESTLESS THAT IT IS HARD TO SIT STILL: MORE THAN HALF THE DAYS
6. BECOMING EASILY ANNOYED OR IRRITABLE: MORE THAN HALF THE DAYS
7. FEELING AFRAID AS IF SOMETHING AWFUL MIGHT HAPPEN: SEVERAL DAYS
GAD7 TOTAL SCORE: 12
1. FEELING NERVOUS, ANXIOUS, OR ON EDGE: SEVERAL DAYS

## 2019-08-27 NOTE — PROGRESS NOTES
"                                           Progress Note    Client Name: Carrillo Dunlap  Date: 8/27/19         Service Type: Individual  Video Visit: No     Session Start Time: 12:50pm  Session End Time: 1:30pm    Session Length: 40 minutes    Session #: 16    Attendees: Client attended alone     Treatment Plan Last Reviewed: updated  PHQ-9 / UGO-7 : Reviewed; scores increased    DATA  Interactive Complexity: No  Crisis: No       Progress Since Last Session (Related to Symptoms / Goals / Homework):  Symptoms: Improving. Pt continues to have increased anxiety, but is partly due to beginning new job.     Homework: Achieved / completed to satisfaction. Pt began working on drafting a written document of items he would like to discuss with his boss in regards to uncertainly about roles, responsibilities, and compensation.      Episode of Care Goals: Achieved / completed to satisfaction - ACTION (Actively working towards change); Intervened by reinforcing change plan / affirming steps taken     Current / Ongoing Stressors and Concerns: Discussed pt's work life and processed feelings of irritation, frustration, and anger with how his boss treats him and other employees. Discussed one incident where pt became frustrated when his boss sent a \"barage\" of e-mails and hidden in the e-mail was a request for him to come to work on Sunday. Pt reports that he got a phone call on Sunday asking where he was. Pt reports that he had other fun plans but had to work 8 hours instead.      Treatment Objective(s) Addressed in This Session:   Identify negative self-talk and behaviors: challenge core beliefs, myths, and actions   Decrease frequency and intensity of feeling down, depressed, hopeless   Client will review and familiarize himself with feeling words. He will use feeling words to describe when her needs are met/not met.      Intervention:  CBT: Continued discussing how pt's confidence is increasing since beginning full-time " work 2 months ago. Processed how is has overcome quite a few challenges and is developing resiliency, as well as interpersonal skills. Discussed how moving forward pt can continue to assert his needs with his boss, and when he does not feel listened too, he can connect with others at his company to mobilize and develop a method to address the concerns with their boss.    ASSESSMENT: Current Emotional / Mental Status (status of significant symptoms):   Risk status (Self / Other harm or suicidal ideation)   Client denies current fears or concerns for personal safety.   Client denies current or recent suicidal ideation or behaviors.   Client denies current or recent homicidal ideation or behaviors.   Client denies current or recent self injurious behavior or ideation.   Client denies other safety concerns.    Client reports there has been no change in risk factors since their last session.      Client reports there has been no change in protective factors since their last session.      A safety and risk management has not been developed at this time.  Client was given the Suicide Prevention National Hotline, Text [place] 965608,  the [facility] Crisis Number, or encouraged to Call 911 should there be a change  in these risk factors.       Appearance:   Appropriate    Eye Contact:   Good    Psychomotor Behavior: Restless , more so than in previous sessions   Attitude:   Cooperative    Orientation:   All   Speech    Rate / Production: Hyperverbal     Volume:  Normal    Mood:    Anxious    Affect:    Appropriate    Thought Content:  Clear , more clear than past sessions   Thought Form:  Coherent    Insight:    Fair, improving     Medication Review:   No changes to current psychiatric medication(s)     Medication Compliance:   Yes     Changes in Health Issues:   None reported     Chemical Use Review:  Substance Use: decrease in cannabis.  Client reports frequency using of 0 in the past two weeks.    Tobacco Use: No current tobacco  use.      Diagnosis:  1. Moderate major depression (H)    2. ADHD (attention deficit hyperactivity disorder), inattentive type      Collateral Reports Completed:   Not Applicable    PLAN: (Client Tasks / Therapist Tasks / Other). Pt will continue developing a document of things he would like to say when he has a formal conversation with his boss about his job responsibilities, roles, and salary and benefit options. Pt will utilize the assertiveness formula practiced in session during this conversation.    AFSHIN Gomez, [facility] 8.27.19  Note reviewed and clinical supervision by AFSHIN Solo [facility] 8/27/2019     ___________________________________________________________________________    Treatment Plan     Client's Name: Carrillo Dunlap                   YOB: 1965     Date: 1/21/19; reviewed: 5/15/19; updated 8/13/19     DSM-V Diagnoses: Attention-Deficit/Hyperactivity Disorder  314.01 (F90.2) Combined presentation or 296.31 (F33.0) Major Depressive Disorder, Recurrent Episode, Mild _ and With anxious distress  Psychosocial / Contextual Factors: Limited social support, limited financial support, physical/medical concerns, employment constraints  WHODAS: did not complete during visit     Referral / Collaboration:  Referral to another professional/service is not indicated at this time.     Anticipated number of session or this episode of care: 15      MeasurableTreatment Goal(s) related to diagnosis / functional impairment(s)  Goal 1: Client will begin to work on his work portfolio.     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       Objective #A (Client Action)                Client will schedule times of the day to work on his work portfolio. 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   Status: completed - Date: 5/15/19       Intervention(s)  Therapist will assign homework including weekly activity scheduling and CBT thought logs  provide educational materials on CBT  role-play effective communication skills.    Goal 2: Client will decrease his monthly marijuana use.     I will know I've met my goal when I do not use marijuana around my brother.      Objective #A (Client Action)    Client will identify at least 3 example(s) of how marijuana has resulted in an experience that interferes with person values or goals.  Status: completed- 5/15/19    Intervention(s)  Therapist will help client identify how his personal values and goals are interfered when he uses marijuana.     Objective #B  Client will identify triggers and environmental cues contributing to his marijuana usage.     Status: completed - Date: 5/15/19     Intervention(s)  Therapist will teach the client how to perform a behavioral chain analysis to better identify triggers and cues to using marijuana.     Objective #C  Client will maintain sobriety for one month.  Status: completed - Date: 5.15.19     Intervention(s)  Therapist will provide educational materials for the client to increase his coping skills when he has urges to use marijuana. Therapist will provide additional resources to help him maintain sobriety.     Goal 3: Client will decrease his anxiety from his baseline GAD7 score.    I will know I've met my goal when I am able to let things go and allow myself permission to relax and not worry.      Objective #A  Client will identify 5 initial signs or symptoms of anxiety.    Status: completed - Date: 5/15/19     Intervention(s)  Therapist will provide educational materials on the symptoms of anxiety.    Objective #B (Client Action)    Status: completed - Date: 5/15/19     Client will identify 5 fears / thoughts that contribute to feeling anxious.    Intervention(s)  Therapist will teach the client how to perform a behavioral chain analysis in order to identify  fears/thoughts contributing to anxious feelings.     Objective #C  Client will use at least 4 coping skills for anxiety management in the next 10 weeks.  Status: continued - Date: 2/19/19    Intervention(s)  Therapist will teach progressive muscle relaxation, cue control relaxation, mindful breathing, and guided imagery. Therapist will also utilize Acceptance and Commitment Therapy by exploring and identifying important values in patient's life, and discuss ways to commit to behavioral activation around these values.     Goal 4: The client will learn and apply skills to manage the symptoms of depression.    I will know I've met my goal when I am able to let things go and allow myself permission to relax, not worry, and feel better about myself.      Objective #A: Client will Increase interest, engagement, and pleasure in doing things.  Client will identify negative self-talk and behaviors: challenge core beliefs, myths, and actions.  Decrease frequency and intensity of feeling down, depressed, hopeless.  Status: continued Date(s): 5/15/19    Interventions  Therapist will provide educational materials and handouts on core beliefs, cognitive distortions, and cognitive fusion and defusion.    Goal 5: Client will explore and resolve issues related to relationship issues with his boss.    I will know I've met my goal when I am able to feel better about my relationship with his boss.      Objective #A (Client Action)    Client will review and familiarize himself with feeling words. He will use feeling words to describe when her needs are met/not met.   Status: New - Date: 8/13/19    Intervention(s)  Therapist will assign emotional recognition/identification including when expressing when his needs are not being met or are being met.    Objective #B  Client will identify negative self-talk and behaviors associated with his relationship with his boss and others, and challenge core beliefs, myths, and actions.  Status:  8/13/19    Intervention(s)  Therapist will provide education and homework on CBT, ACT, and DBT. Therapist will provide strategies to evaluate for cognitive distortions and logical errors. Therapist will also help client begin to accept feeling negative emotions in a way that helps to decrease his suffering.      Objective #C   Client will compile a list of boundaries she will set with his boss and others.   Status: New - Date: 8/13/19    Intervention(s)  Therapist will teach about healthy boundaries. Including information about different communication styles emphasizing assertive communication as the most helpful/healthy style.        AFSHIN Gomez, SW, May 15, 2019  Note reviewed and clinical supervision by AFSHIN Weaver LICSW 1/31/2019, 7/15/2019 , 8/271/9

## 2019-08-28 ASSESSMENT — ANXIETY QUESTIONNAIRES: GAD7 TOTAL SCORE: 12

## 2019-09-10 ENCOUNTER — OFFICE VISIT (OUTPATIENT)
Dept: PSYCHOLOGY | Facility: CLINIC | Age: 54
End: 2019-09-10
Payer: COMMERCIAL

## 2019-09-10 DIAGNOSIS — F32.1 MODERATE MAJOR DEPRESSION (H): Primary | ICD-10-CM

## 2019-09-10 DIAGNOSIS — F90.0 ADHD (ATTENTION DEFICIT HYPERACTIVITY DISORDER), INATTENTIVE TYPE: ICD-10-CM

## 2019-09-10 PROCEDURE — 90834 PSYTX W PT 45 MINUTES: CPT | Performed by: SOCIAL WORKER

## 2019-09-10 NOTE — PROGRESS NOTES
"                                           Progress Note    Client Name: Carrillo Dunlap  Date: 9/10/19         Service Type: Individual  Video Visit: No     Session Start Time: 3:50pm  Session End Time: 4:30pm    Session Length: 40 minutes    Session #: 17    Attendees: Client attended alone     Treatment Plan Last Reviewed: Reviewed  PHQ-9 / UGO-7 : Reviewed; scores increased    DATA  Interactive Complexity: No  Crisis: No       Progress Since Last Session (Related to Symptoms / Goals / Homework):  Symptoms: Improving. Pt continues to have increased anxiety, but is partly due to beginning new job. He reports also having increased rumination which was addressed in the session.    Homework: Achieved / completed to satisfaction. Pt began working on drafting a written document of items he would like to discuss with his boss in regards to uncertainly about roles, responsibilities, and compensation.      Episode of Care Goals: Achieved / completed to satisfaction - ACTION (Actively working towards change); Intervened by reinforcing change plan / affirming steps taken     Current / Ongoing Stressors and Concerns: Discussed pt's work life and processed feelings of irritation, frustration, and anger with how his boss treats him and other employees. Discussed how he often ruminates on interactions he had at work while at home during the evening. We discussed how rumination can be a control strategy to find relief about uncomfortable feelings like guilt, shame, or doubt in ourselves. Discussed how pt can defuse from this process by asking himself, \"how old is this story,\" \"where has this got me before? Did I find an answer, or did it only help to provide short-term relief in hoping I will be able to find an answer.\"      Treatment Objective(s) Addressed in This Session:   Identify negative self-talk and behaviors: challenge core beliefs, myths, and actions   Decrease frequency and intensity of feeling down, depressed, " hopeless   Client will review and familiarize himself with feeling words. He will use feeling words to describe when her needs are met/not met.      Intervention:  DBT: Reviewed DBT TIPP distress tolerance skills and provided psychoeducation on the how the amygdala senses fear through observations in our environment using our senses. Discussed that if a threat is detected our sympathetic nervous system activates, even if we are not in imminent danger. Discussed how the TIPP skills directly activate the parasympathetic nervous system to calm us down.      ASSESSMENT: Current Emotional / Mental Status (status of significant symptoms):   Risk status (Self / Other harm or suicidal ideation)   Client denies current fears or concerns for personal safety.   Client denies current or recent suicidal ideation or behaviors.   Client denies current or recent homicidal ideation or behaviors.   Client denies current or recent self injurious behavior or ideation.   Client denies other safety concerns.    Client reports there has been no change in risk factors since their last session.      Client reports there has been no change in protective factors since their last session.      A safety and risk management has not been developed at this time.  Client was given the Suicide Prevention National Hotline, Text [place] 559082,  the [facility] Crisis Number, or encouraged to Call 911 should there be a change  in these risk factors.       Appearance:   Appropriate    Eye Contact:   Good    Psychomotor Behavior: Restless , more so than in previous sessions   Attitude:   Cooperative    Orientation:   All   Speech    Rate / Production: Hyperverbal     Volume:  Normal    Mood:    Anxious    Affect:    Appropriate    Thought Content:  Clear , more clear than past sessions   Thought Form:  Coherent    Insight:    Fair, improving     Medication Review:   No changes to current psychiatric medication(s)     Medication Compliance:   Yes     Changes in  "Health Issues:   None reported     Chemical Use Review:  Substance Use: decrease in cannabis.  Client reports frequency using of 0 in the past two weeks.    Tobacco Use: No current tobacco use.      Diagnosis:  1. Moderate major depression (H)    2. ADHD (attention deficit hyperactivity disorder), inattentive type      Collateral Reports Completed:   Not Applicable    PLAN: (Client Tasks / Therapist Tasks / Other). Pt will continue developing a document of things he would like to say when he has a formal conversation with his boss about his job responsibilities, roles, and salary and benefit options. Pt will utilize the assertiveness formula practiced in session during last session. Pt will also practice TIPP skills when he notices emotional dysregulation. Pt also agrees to try several defusion strategies such as asking himself,  \"how old is this story,\" \"where has this got me before? Did I find an answer, or did it only help to provide short-term relief in hoping I will be able to find an answer.\"     AFSHIN Gomez, [facility] 9.10.19   Note reviewed and clinical supervision by AFSHIN Solo [facility] 9/16/2019       ___________________________________________________________________________    Treatment Plan     Client's Name: Carrillo Dunlap                   YOB: 1965     Date: 1/21/19; reviewed: 5/15/19; updated 8/13/19     DSM-V Diagnoses: Attention-Deficit/Hyperactivity Disorder  314.01 (F90.2) Combined presentation or 296.31 (F33.0) Major Depressive Disorder, Recurrent Episode, Mild _ and With anxious distress  Psychosocial / Contextual Factors: Limited social support, limited financial support, physical/medical concerns, employment constraints  WHODAS: did not complete during visit     Referral / Collaboration:  Referral to another professional/service is not indicated at this time.     Anticipated number of session or this episode of care: 15      MeasurableTreatment Goal(s) " related to diagnosis / functional impairment(s)  Goal 1: Client will begin to work on his work portfolio.     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       Objective #A (Client Action)                Client will schedule times of the day to work on his work portfolio. 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   Status: completed - Date: 5/15/19      Intervention(s)  Therapist will assign homework including weekly activity scheduling and CBT thought logs  provide educational materials on CBT  role-play effective communication skills.    Goal 2: Client will decrease his monthly marijuana use.     I will know I've met my goal when I do not use marijuana around my brother.      Objective #A (Client Action)    Client will identify at least 3 example(s) of how marijuana has resulted in an experience that interferes with person values or goals.  Status: completed- 5/15/19    Intervention(s)  Therapist will help client identify how his personal values and goals are interfered when he uses marijuana.     Objective #B  Client will identify triggers and environmental cues contributing to his marijuana usage.     Status: completed - Date: 5/15/19     Intervention(s)  Therapist will teach the client how to perform a behavioral chain analysis to better identify triggers and cues to using marijuana.     Objective #C  Client will maintain sobriety for one month.  Status: completed - Date: 5.15.19     Intervention(s)  Therapist will provide educational materials for the client to increase his coping skills when he has urges to use marijuana. Therapist will provide additional resources to help him maintain sobriety.     Goal 3: Client will decrease his anxiety from his baseline GAD7 score.    I will know I've met my goal when I am able to let things go and allow myself permission to  relax and not worry.      Objective #A  Client will identify 5 initial signs or symptoms of anxiety.    Status: completed - Date: 5/15/19     Intervention(s)  Therapist will provide educational materials on the symptoms of anxiety.    Objective #B (Client Action)    Status: completed - Date: 5/15/19     Client will identify 5 fears / thoughts that contribute to feeling anxious.    Intervention(s)  Therapist will teach the client how to perform a behavioral chain analysis in order to identify fears/thoughts contributing to anxious feelings.     Objective #C  Client will use at least 4 coping skills for anxiety management in the next 10 weeks.  Status: continued - Date: 2/19/19    Intervention(s)  Therapist will teach progressive muscle relaxation, cue control relaxation, mindful breathing, and guided imagery. Therapist will also utilize Acceptance and Commitment Therapy by exploring and identifying important values in patient's life, and discuss ways to commit to behavioral activation around these values.     Goal 4: The client will learn and apply skills to manage the symptoms of depression.    I will know I've met my goal when I am able to let things go and allow myself permission to relax, not worry, and feel better about myself.      Objective #A: Client will Increase interest, engagement, and pleasure in doing things.  Client will identify negative self-talk and behaviors: challenge core beliefs, myths, and actions.  Decrease frequency and intensity of feeling down, depressed, hopeless.  Status: continued Date(s): 5/15/19    Interventions  Therapist will provide educational materials and handouts on core beliefs, cognitive distortions, and cognitive fusion and defusion.    Goal 5: Client will explore and resolve issues related to relationship issues with his boss.    I will know I've met my goal when I am able to feel better about my relationship with his boss.      Objective #A (Client Action)    Client will  review and familiarize himself with feeling words. He will use feeling words to describe when her needs are met/not met.   Status: New - Date: 8/13/19    Intervention(s)  Therapist will assign emotional recognition/identification including when expressing when his needs are not being met or are being met.    Objective #B  Client will identify negative self-talk and behaviors associated with his relationship with his boss and others, and challenge core beliefs, myths, and actions.  Status: 8/13/19    Intervention(s)  Therapist will provide education and homework on CBT, ACT, and DBT. Therapist will provide strategies to evaluate for cognitive distortions and logical errors. Therapist will also help client begin to accept feeling negative emotions in a way that helps to decrease his suffering.      Objective #C   Client will compile a list of boundaries she will set with his boss and others.   Status: New - Date: 8/13/19    Intervention(s)  Therapist will teach about healthy boundaries. Including information about different communication styles emphasizing assertive communication as the most helpful/healthy style.        AFSHIN Gomez, [facility], May 15, 2019  Note reviewed and clinical supervision by AFSHIN Weaver [facility] 1[facility]31/2019, 7/15/2019 , 8/271/9

## 2019-09-11 ASSESSMENT — ANXIETY QUESTIONNAIRES
IF YOU CHECKED OFF ANY PROBLEMS ON THIS QUESTIONNAIRE, HOW DIFFICULT HAVE THESE PROBLEMS MADE IT FOR YOU TO DO YOUR WORK, TAKE CARE OF THINGS AT HOME, OR GET ALONG WITH OTHER PEOPLE: SOMEWHAT DIFFICULT
1. FEELING NERVOUS, ANXIOUS, OR ON EDGE: SEVERAL DAYS
5. BEING SO RESTLESS THAT IT IS HARD TO SIT STILL: SEVERAL DAYS
3. WORRYING TOO MUCH ABOUT DIFFERENT THINGS: SEVERAL DAYS
GAD7 TOTAL SCORE: 7
7. FEELING AFRAID AS IF SOMETHING AWFUL MIGHT HAPPEN: SEVERAL DAYS
6. BECOMING EASILY ANNOYED OR IRRITABLE: SEVERAL DAYS
2. NOT BEING ABLE TO STOP OR CONTROL WORRYING: SEVERAL DAYS

## 2019-09-11 ASSESSMENT — PATIENT HEALTH QUESTIONNAIRE - PHQ9
5. POOR APPETITE OR OVEREATING: SEVERAL DAYS
SUM OF ALL RESPONSES TO PHQ QUESTIONS 1-9: 5

## 2019-09-12 ASSESSMENT — ANXIETY QUESTIONNAIRES: GAD7 TOTAL SCORE: 7

## 2019-09-15 ENCOUNTER — MYC MEDICAL ADVICE (OUTPATIENT)
Dept: FAMILY MEDICINE | Facility: CLINIC | Age: 54
End: 2019-09-15

## 2019-09-15 DIAGNOSIS — M54.9 BACK PAIN, UNSPECIFIED BACK LOCATION, UNSPECIFIED BACK PAIN LATERALITY, UNSPECIFIED CHRONICITY: Primary | ICD-10-CM

## 2019-09-16 ENCOUNTER — MYC REFILL (OUTPATIENT)
Dept: FAMILY MEDICINE | Facility: CLINIC | Age: 54
End: 2019-09-16

## 2019-09-16 ENCOUNTER — ALLIED HEALTH/NURSE VISIT (OUTPATIENT)
Dept: PHARMACY | Facility: CLINIC | Age: 54
End: 2019-09-16
Payer: COMMERCIAL

## 2019-09-16 DIAGNOSIS — F41.1 GENERALIZED ANXIETY DISORDER: ICD-10-CM

## 2019-09-16 DIAGNOSIS — F90.0 ADHD (ATTENTION DEFICIT HYPERACTIVITY DISORDER), INATTENTIVE TYPE: ICD-10-CM

## 2019-09-16 DIAGNOSIS — F32.1 MODERATE MAJOR DEPRESSION (H): ICD-10-CM

## 2019-09-16 DIAGNOSIS — F32.1 MODERATE MAJOR DEPRESSION (H): Primary | ICD-10-CM

## 2019-09-16 PROCEDURE — 99607 MTMS BY PHARM ADDL 15 MIN: CPT | Performed by: PHARMACIST

## 2019-09-16 PROCEDURE — 99606 MTMS BY PHARM EST 15 MIN: CPT | Performed by: PHARMACIST

## 2019-09-16 RX ORDER — BUSPIRONE HYDROCHLORIDE 5 MG/1
5 TABLET ORAL 2 TIMES DAILY
COMMUNITY
End: 2019-10-15

## 2019-09-16 NOTE — PROGRESS NOTES
"SUBJECTIVE/OBJECTIVE:                           Carrillo Dunlap is a 53 year old male coming in for follow-up visit for Medication Therapy Management.  He was self-referred to me.    Chief Complaint: \"I think things are getting better\"    Allergies/ADRs: Reviewed in Epic  Tobacco: 0-1 pack per day - is not interested in quittingTobacco Cessation Action Plan: Information offered: Patient not interested at this time   Chantix- lots of irritability  Alcohol: not currently using  Caffeine: down to 1/4 caf coffee  Activity: walking- helpful  PMH: Reviewed in Epic; S/P Sharmin en Y 2008    Medication Adherence/Access: history of adjusting dose timing; no current issues    ADHD: Pt continues taking Adderall XR 50mg in the morning, divided into 20mg first thing in the morning and 30mg after breakfast.  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  He is going to an office, rather than his previous working from home, which he has found helpful to stay on track.  He finds current medications helpful for focus and concentration at work.  Pt feels that reminders for things to do at home have also been working well.    Past meds: Concerta x 1 month in 12/2015- didn't work    Depression/Anxiety: Pt is currently prescribed bupropion ER 300mg QAM and buspirone 10mg BID. He is currently taking buspirone 5mg BID four days per week on a prn basis when he feels stressed/anxious with work.  Pt reported feeling less overwhelmed on days when he takes the medication and denied side effects. 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  He continues to see his therapist, which he finds helpful, and has been discussing such concerns in that " setting.    Citalopram x 6 months in 2010  Sertraline x 4-5 months in 2011    Current labs include:  BP Readings from Last 3 Encounters:   06/21/19 110/60   03/20/19 90/56   03/11/19 100/62     ASSESSMENT:                             Medication reconciliation completed.  Current medications were reviewed today as discussed above.    Medication Adherence: no current issues    ADHD: Symptom management going well in new work environment.  Continue current medication.    Depression/Anxiety:  Discussed how scheduled daily buspirone dose may be helpful for ongoing anxiety management and ease dose administration- pt agreed.      PLAN:                            1. Pt will take scheduled buspirone 5mg BID every day.    I spent 30 minutes with this patient today.  A copy of the visit note was provided to the psychiatry provider.    Will follow up in 6-9 months or sooner if needed.    The patient was given a summary of these recommendations as an after visit summary.     Maria Ines Chaney, PharmD  Medication Therapy Management Pharmacist  [facility]  Phone: [phone]

## 2019-09-16 NOTE — TELEPHONE ENCOUNTER
Adali    See pt MyChart message    Referral cued    Kindra Tobias, RN   [facility]

## 2019-09-16 NOTE — PATIENT INSTRUCTIONS
Recommendations from today's MTM visit:                                                      1. Continue taking current medication, but try taking the buspirone every day.    It was great to speak with you today.  I value your experience and would be very thankful for your time with providing feedback on our clinic survey. You may receive a survey via email or text message in the next few days.     Next MTM visit: 6/17/20 at 2:30pm- feel free to change via [employer] if needed    To schedule another MTM appointment, please call the clinic directly or you may call the MTM scheduling line at [phone] or toll-free at [phone].     My Clinical Pharmacist's contact information:                                                      It was a pleasure talking with you today!  Please feel free to contact me with any questions or concerns you have.      Maria Ines Chaney, PharmD  Medication Therapy Management Pharmacist  [facility]  Phone: [phone]

## 2019-09-16 NOTE — Clinical Note
Hello,I had a follow up visit with this patient for Medication Therapy Management (MTM) to check in on medications.  I encouraged him to take buspirone scheduled, rather than prn, for improved symptom management, to which he agreed.  Please see note for details and let me know if you have questions.Thanks,Maria Ines

## 2019-09-17 VITALS — DIASTOLIC BLOOD PRESSURE: 70 MMHG | HEART RATE: 82 BPM | SYSTOLIC BLOOD PRESSURE: 107 MMHG

## 2019-09-17 NOTE — TELEPHONE ENCOUNTER
"Requested Prescriptions   Pending Prescriptions Disp Refills     buPROPion (WELLBUTRIN XL) 300 MG 24 hr tablet 90 tablet 0     Sig: Take 1 tablet (300 mg) by mouth every morning       SSRIs Protocol Passed - 9/16/2019 10:08 PM        Passed - PHQ-9 score less than 5 in past 6 months     Please review last PHQ-9 score.           Passed - Medication is Bupropion     If the medication is Bupropion (Wellbutrin), and the patient is taking for smoking cessation; OK to refill.          Passed - Medication is active on med list        Passed - Patient is age 18 or older        Passed - Recent (6 mo) or future (30 days) visit within the authorizing provider's specialty     Patient had office visit in the last 6 months or has a visit in the next 30 days with authorizing provider or within the authorizing provider's specialty.  See \"Patient Info\" tab in inbasket, or \"Choose Columns\" in Meds & Orders section of the refill encounter.            amphetamine-dextroamphetamine (ADDERALL XR) 20 MG 24 hr capsule 30 capsule 0     Sig: Take 1 capsule (20 mg) by mouth daily       There is no refill protocol information for this order        amphetamine-dextroamphetamine (ADDERALL XR) 30 MG 24 hr capsule 30 capsule 0     Sig: Take 1 capsule (30 mg) by mouth daily       There is no refill protocol information for this order        "

## 2019-09-17 NOTE — TELEPHONE ENCOUNTER
Adali,  Jorge refill request to provider for review/approval because:  Patient needs to be seen because:  Follow up from 06/21/2019 office visit.     Writer notes at 06/21/2019, patient instructed to return in 3-months. Can patient due E-Visit or does patient need office visit in clinic?    Thank You!  Lyndsay Arroyo RN  Triage Nurse

## 2019-09-18 RX ORDER — DEXTROAMPHETAMINE SACCHARATE, AMPHETAMINE ASPARTATE MONOHYDRATE, DEXTROAMPHETAMINE SULFATE AND AMPHETAMINE SULFATE 5; 5; 5; 5 MG/1; MG/1; MG/1; MG/1
20 CAPSULE, EXTENDED RELEASE ORAL DAILY
Qty: 30 CAPSULE | Refills: 0 | Status: SHIPPED | OUTPATIENT
Start: 2019-09-18 | End: 2020-01-28

## 2019-09-18 RX ORDER — DEXTROAMPHETAMINE SACCHARATE, AMPHETAMINE ASPARTATE MONOHYDRATE, DEXTROAMPHETAMINE SULFATE AND AMPHETAMINE SULFATE 7.5; 7.5; 7.5; 7.5 MG/1; MG/1; MG/1; MG/1
30 CAPSULE, EXTENDED RELEASE ORAL DAILY
Qty: 30 CAPSULE | Refills: 0 | Status: SHIPPED | OUTPATIENT
Start: 2019-09-18 | End: 2020-01-28

## 2019-09-18 RX ORDER — BUPROPION HYDROCHLORIDE 300 MG/1
300 TABLET ORAL EVERY MORNING
Qty: 90 TABLET | Refills: 0 | Status: SHIPPED | OUTPATIENT
Start: 2019-09-18 | End: 2020-06-01

## 2019-09-18 NOTE — TELEPHONE ENCOUNTER
Did refills to hold him over, please have him schedule follow up visit in a month/few weeks before he runs out.

## 2019-09-24 ENCOUNTER — OFFICE VISIT (OUTPATIENT)
Dept: PSYCHOLOGY | Facility: CLINIC | Age: 54
End: 2019-09-24
Payer: COMMERCIAL

## 2019-09-24 DIAGNOSIS — F32.1 MODERATE MAJOR DEPRESSION (H): Primary | ICD-10-CM

## 2019-09-24 DIAGNOSIS — F90.0 ADHD (ATTENTION DEFICIT HYPERACTIVITY DISORDER), INATTENTIVE TYPE: ICD-10-CM

## 2019-09-24 PROCEDURE — 90834 PSYTX W PT 45 MINUTES: CPT | Performed by: SOCIAL WORKER

## 2019-09-24 ASSESSMENT — ANXIETY QUESTIONNAIRES
1. FEELING NERVOUS, ANXIOUS, OR ON EDGE: NEARLY EVERY DAY
IF YOU CHECKED OFF ANY PROBLEMS ON THIS QUESTIONNAIRE, HOW DIFFICULT HAVE THESE PROBLEMS MADE IT FOR YOU TO DO YOUR WORK, TAKE CARE OF THINGS AT HOME, OR GET ALONG WITH OTHER PEOPLE: SOMEWHAT DIFFICULT
3. WORRYING TOO MUCH ABOUT DIFFERENT THINGS: MORE THAN HALF THE DAYS
6. BECOMING EASILY ANNOYED OR IRRITABLE: MORE THAN HALF THE DAYS
7. FEELING AFRAID AS IF SOMETHING AWFUL MIGHT HAPPEN: NOT AT ALL
GAD7 TOTAL SCORE: 14
5. BEING SO RESTLESS THAT IT IS HARD TO SIT STILL: MORE THAN HALF THE DAYS
2. NOT BEING ABLE TO STOP OR CONTROL WORRYING: NEARLY EVERY DAY

## 2019-09-24 ASSESSMENT — PATIENT HEALTH QUESTIONNAIRE - PHQ9
SUM OF ALL RESPONSES TO PHQ QUESTIONS 1-9: 10
5. POOR APPETITE OR OVEREATING: MORE THAN HALF THE DAYS

## 2019-09-24 NOTE — PROGRESS NOTES
Progress Note    Client Name: Carrillo Dunlap  Date: 9/24/19         Service Type: Individual  Video Visit: No     Session Start Time: 3:50pm  Session End Time: 4:30pm    Session Length: 40 minutes    Session #: 18    Attendees: Client attended alone     Treatment Plan Last Reviewed: Reviewed  PHQ-9 / UGO-7 : Reviewed    DATA  Interactive Complexity: No  Crisis: No       Progress Since Last Session (Related to Symptoms / Goals / Homework):  Symptoms: Improving. Pt continues to have increased anxiety, and is partly due to work stress from a demanding job.    Homework: Achieved / completed to satisfaction. Pt finished working on drafting a written document of items he would like to discuss with his boss in regards to uncertainly about roles, responsibilities, and compensation.      Episode of Care Goals: Achieved / completed to satisfaction - ACTION (Actively working towards change); Intervened by reinforcing change plan / affirming steps taken     Current / Ongoing Stressors and Concerns: Discussed pt's work life and processed feelings of irritation, frustration, and anger with how his boss treats him and other employees. He has found himself employing breathing exercises and mindfulness skills when he notices himself becoming tense, especially in his neck and back. Processed how pt has been thinking more about quitting his job. We discussed that he will begin working on his work portfolio at nighttime after work to begin preparing for interviews in the near future.      Treatment Objective(s) Addressed in This Session:   Identify negative self-talk and behaviors: challenge core beliefs, myths, and actions   Decrease frequency and intensity of feeling down, depressed, hopeless   Client will review and familiarize himself with feeling words. He will use feeling words to describe when her needs are met/not met.      Intervention:  DBT: Reviewed DBT TIPP distress tolerance  skills and provided psychoeducation on the how the amygdala senses fear through observations in our environment using our senses. Discussed that if a threat is detected our sympathetic nervous system activates, even if we are not in imminent danger. Discussed how the TIPP skills directly activate the parasympathetic nervous system to calm us down.   Psychoeducation: Provided an interactive handout with a diagram of the various parts of the brain, including the brain stem, the Limbic system, amygdala, and frontal lobe. Discussed how the amygdala senses fear through observations in our environment using our senses, and if we detect a threat it activates the sympathetic nervous system, even if we are not in imminent danger. Discussed how the amygdala learns what is/ is not dangerous only by observations using our senses, and processed how when it is activated the frontal lobe (decision making) has a more difficult time communicating to the amygdala. Discussed how we can strengthen the medial prefrontal cortex by practicing mindfulness and various defusion strategies.      ASSESSMENT: Current Emotional / Mental Status (status of significant symptoms):   Risk status (Self / Other harm or suicidal ideation)   Client denies current fears or concerns for personal safety.   Client denies current or recent suicidal ideation or behaviors.   Client denies current or recent homicidal ideation or behaviors.   Client denies current or recent self injurious behavior or ideation.   Client denies other safety concerns.    Client reports there has been no change in risk factors since their last session.      Client reports there has been no change in protective factors since their last session.      A safety and risk management has not been developed at this time.  Client was given the Suicide Prevention National Hotline, Text [place] 951087,  the [facility] Crisis Number, or encouraged to Call 911 should there be a change  in these risk  "factors.       Appearance:   Appropriate    Eye Contact:   Good    Psychomotor Behavior: Restless , more so than in previous sessions   Attitude:   Cooperative    Orientation:   All   Speech    Rate / Production: Hyperverbal     Volume:  Normal    Mood:    Anxious    Affect:    Appropriate    Thought Content:  Clear , more clear than past sessions   Thought Form:  Coherent    Insight:    Fair, improving     Medication Review:   No changes to current psychiatric medication(s)     Medication Compliance:   Yes     Changes in Health Issues:   None reported     Chemical Use Review:  Substance Use: decrease in cannabis.  Client reports frequency using of 0 in the past two weeks.    Tobacco Use: No current tobacco use.      Diagnosis:  1. Moderate major depression (H)    2. ADHD (attention deficit hyperactivity disorder), inattentive type      Collateral Reports Completed:   Not Applicable    PLAN: (Client Tasks / Therapist Tasks / Other). Pt will continue practicing defusion strategies such as asking himself,  \"how old is this story,\" \"where has this got me before? Did I find an answer, or did it only help to provide short-term relief in hoping I will be able to find an answer.\" Pt will begin working on his work portfolio to begin the process of interviewing for new jobs.     AFSHIN Gomez, [facility] 9.24.19   Note reviewed and clinical supervision by AFSHIN Solo [facility] 9/28/2019      ___________________________________________________________________________    Treatment Plan     Client's Name: Carrillo Dunlap                   YOB: 1965     Date: 1/21/19; reviewed: 5/15/19; updated 8/13/19     DSM-V Diagnoses: Attention-Deficit/Hyperactivity Disorder  314.01 (F90.2) Combined presentation or 296.31 (F33.0) Major Depressive Disorder, Recurrent Episode, Mild _ and With anxious distress  Psychosocial / Contextual Factors: Limited social support, limited financial support, physical/medical " concerns, employment constraints  WHODAS: did not complete during visit     Referral / Collaboration:  Referral to another professional/service is not indicated at this time.     Anticipated number of session or this episode of care: 15      MeasurableTreatment Goal(s) related to diagnosis / functional impairment(s)  Goal 1: Client will begin to work on his work portfolio.     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       Objective #A (Client Action)                Client will schedule times of the day to work on his work portfolio. 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   Status: completed - Date: 5/15/19      Intervention(s)  Therapist will assign homework including weekly activity scheduling and CBT thought logs  provide educational materials on CBT  role-play effective communication skills.    Goal 2: Client will decrease his monthly marijuana use.     I will know I've met my goal when I do not use marijuana around my brother.      Objective #A (Client Action)    Client will identify at least 3 example(s) of how marijuana has resulted in an experience that interferes with person values or goals.  Status: completed- 5/15/19    Intervention(s)  Therapist will help client identify how his personal values and goals are interfered when he uses marijuana.     Objective #B  Client will identify triggers and environmental cues contributing to his marijuana usage.     Status: completed - Date: 5/15/19     Intervention(s)  Therapist will teach the client how to perform a behavioral chain analysis to better identify triggers and cues to using marijuana.     Objective #C  Client will maintain sobriety for one month.  Status: completed - Date: 5.15.19     Intervention(s)  Therapist will provide educational materials for the client to increase his coping skills when he has  urges to use marijuana. Therapist will provide additional resources to help him maintain sobriety.     Goal 3: Client will decrease his anxiety from his baseline GAD7 score.    I will know I've met my goal when I am able to let things go and allow myself permission to relax and not worry.      Objective #A  Client will identify 5 initial signs or symptoms of anxiety.    Status: completed - Date: 5/15/19     Intervention(s)  Therapist will provide educational materials on the symptoms of anxiety.    Objective #B (Client Action)    Status: completed - Date: 5/15/19     Client will identify 5 fears / thoughts that contribute to feeling anxious.    Intervention(s)  Therapist will teach the client how to perform a behavioral chain analysis in order to identify fears/thoughts contributing to anxious feelings.     Objective #C  Client will use at least 4 coping skills for anxiety management in the next 10 weeks.  Status: continued - Date: 2/19/19    Intervention(s)  Therapist will teach progressive muscle relaxation, cue control relaxation, mindful breathing, and guided imagery. Therapist will also utilize Acceptance and Commitment Therapy by exploring and identifying important values in patient's life, and discuss ways to commit to behavioral activation around these values.     Goal 4: The client will learn and apply skills to manage the symptoms of depression.    I will know I've met my goal when I am able to let things go and allow myself permission to relax, not worry, and feel better about myself.      Objective #A: Client will Increase interest, engagement, and pleasure in doing things.  Client will identify negative self-talk and behaviors: challenge core beliefs, myths, and actions.  Decrease frequency and intensity of feeling down, depressed, hopeless.  Status: continued Date(s): 5/15/19    Interventions  Therapist will provide educational materials and handouts on core beliefs, cognitive distortions, and  cognitive fusion and defusion.    Goal 5: Client will explore and resolve issues related to relationship issues with his boss.    I will know I've met my goal when I am able to feel better about my relationship with his boss.      Objective #A (Client Action)    Client will review and familiarize himself with feeling words. He will use feeling words to describe when her needs are met/not met.   Status: New - Date: 8/13/19    Intervention(s)  Therapist will assign emotional recognition/identification including when expressing when his needs are not being met or are being met.    Objective #B  Client will identify negative self-talk and behaviors associated with his relationship with his boss and others, and challenge core beliefs, myths, and actions.  Status: 8/13/19    Intervention(s)  Therapist will provide education and homework on CBT, ACT, and DBT. Therapist will provide strategies to evaluate for cognitive distortions and logical errors. Therapist will also help client begin to accept feeling negative emotions in a way that helps to decrease his suffering.      Objective #C   Client will compile a list of boundaries she will set with his boss and others.   Status: New - Date: 8/13/19    Intervention(s)  Therapist will teach about healthy boundaries. Including information about different communication styles emphasizing assertive communication as the most helpful/healthy style.        AFSHIN Gomez, [facility], May 15, 2019  Note reviewed and clinical supervision by [facility]SHIN [facility] 1[facility], 7/15/2019 , 8/271/9

## 2019-09-25 ASSESSMENT — ANXIETY QUESTIONNAIRES: GAD7 TOTAL SCORE: 14

## 2019-10-14 NOTE — PROGRESS NOTES
Carrillo Dunlap is a 54 year old  male, accompanied by his self for a medication check and ADHD follow up.      CURRENT CONCERNS:  none     Review of past medical history:     ADHD (attention deficit hyperactivity disorder), inattentive type  UGO (generalized anxiety disorder)     CURRENT PRESCRIPTIONS: see list     MEDICATION BENEFITS:  Controlled symptoms:  Distractability  Uncontrolled symptoms:  Distractability and Finishing tasks     MEDICATION SIDE EFFECTS:   Has:  none  Denies:  dry mouth, crabby, and hungry     Depression Followup    How are you doing with your depression since your last visit? Improved from 6 months ago    Are you having other symptoms that might be associated with depression? Yes:  change of job    Have you had a significant life event?  No     Are you feeling anxious or having panic attacks?   No    Do you have any concerns with your use of alcohol or other drugs? No     Saw Psych [facility], was using buspar rarely and now doing regularly 1-twice a day, sometimes takes 10 mg and sometimes 5 twice a day. No longer side effects, helps with ruminating thoughts  Sleeping well now that doing XR only in am, Psych felt Primary Care Provider could take over Rx    Still smoking, was hoping wellbutrin would help with his cravings    cousneling helps too, recently     Social History     Tobacco Use     Smoking status: Current Every Day Smoker     Packs/day: 1.00     Smokeless tobacco: Never Used   Substance Use Topics     Alcohol use: Yes     Comment: rare     Drug use: Yes     Types: Marijuana     Comment: occassionally     PHQ 8/27/2019 9/11/2019 9/24/2019   PHQ-9 Total Score 12 5 10   Q9: Thoughts of better off dead/self-harm past 2 weeks Not at all Not at all Not at all     UGO-7 SCORE 8/27/2019 9/11/2019 9/24/2019   Total Score - - -   Total Score - - -   Total Score 12 7 14       In the past two weeks have you had thoughts of suicide or self-harm?  No.    Do you have concerns about your  "personal safety or the safety of others?   No    Suicide Assessment Five-step Evaluation and Treatment (SAFE-T)      How many servings of fruits and vegetables do you eat daily?  0-1    On average, how many sweetened beverages do you drink each day (soda, juice, sweet tea, etc)?   1    How many days per week do you miss taking your medication? 0            OBJECTIVE:  /72   Pulse 90   Temp 98.2  F (36.8  C) (Temporal)   Resp 16   Ht 1.727 m (5' 8\")   Wt 76.9 kg (169 lb 8 oz)   SpO2 99%   BMI 25.77 kg/m    EXAM:GENERAL:  Alert and interactive., EYES:  Normal extra-ocular movements.  PERRLA, LUNGS:  Clear, HEART:  Normal rate and rhythm.  Normal S1 and S2.  No murmurs., ABDOMEN:  Soft, non-tender, no organomegaly., NEURO:  No tics or tremor.  Normal tone and strength. Normal gait and balance.  and MENTAL HEALTH: Mood and affect are neutral. There is good eye contact with the examiner.  Patient appears relaxed and well groomed.  No psychomotor agitation or retardation.  Thought content seems intact and some insight is demonstrated.  Speech is unpressured.     ASSESSMENT:    ICD-10-CM    1. ADHD (attention deficit hyperactivity disorder), inattentive type F90.0 amphetamine-dextroamphetamine (ADDERALL XR) 30 MG 24 hr capsule     amphetamine-dextroamphetamine (ADDERALL XR) 30 MG 24 hr capsule     amphetamine-dextroamphetamine (ADDERALL XR) 30 MG 24 hr capsule     amphetamine-dextroamphetamine (ADDERALL XR) 20 MG 24 hr capsule     amphetamine-dextroamphetamine (ADDERALL XR) 20 MG 24 hr capsule     amphetamine-dextroamphetamine (ADDERALL XR) 20 MG 24 hr capsule   2. UGO (generalized anxiety disorder) F41.1 busPIRone (BUSPAR) 5 MG tablet         PLAN:  Medication:  Continue, increase buspar as tolerated to 10 mg twice a day, consider increasing to 15 mg twice a day   Continue others and good self care  Continue counseling    Follow-up:  3 months, would consider clinic visit every other if stable next " visit    Adali Merino APRN CNP

## 2019-10-15 ENCOUNTER — OFFICE VISIT (OUTPATIENT)
Dept: PSYCHOLOGY | Facility: CLINIC | Age: 54
End: 2019-10-15
Payer: COMMERCIAL

## 2019-10-15 ENCOUNTER — OFFICE VISIT (OUTPATIENT)
Dept: FAMILY MEDICINE | Facility: CLINIC | Age: 54
End: 2019-10-15
Payer: COMMERCIAL

## 2019-10-15 VITALS
WEIGHT: 169.5 LBS | RESPIRATION RATE: 16 BRPM | HEIGHT: 68 IN | HEART RATE: 90 BPM | OXYGEN SATURATION: 99 % | SYSTOLIC BLOOD PRESSURE: 110 MMHG | BODY MASS INDEX: 25.69 KG/M2 | TEMPERATURE: 98.2 F | DIASTOLIC BLOOD PRESSURE: 72 MMHG

## 2019-10-15 DIAGNOSIS — F32.1 MODERATE MAJOR DEPRESSION (H): Primary | ICD-10-CM

## 2019-10-15 DIAGNOSIS — F90.0 ADHD (ATTENTION DEFICIT HYPERACTIVITY DISORDER), INATTENTIVE TYPE: ICD-10-CM

## 2019-10-15 DIAGNOSIS — F90.0 ADHD (ATTENTION DEFICIT HYPERACTIVITY DISORDER), INATTENTIVE TYPE: Primary | ICD-10-CM

## 2019-10-15 DIAGNOSIS — F41.1 GAD (GENERALIZED ANXIETY DISORDER): ICD-10-CM

## 2019-10-15 PROCEDURE — 99213 OFFICE O/P EST LOW 20 MIN: CPT | Performed by: NURSE PRACTITIONER

## 2019-10-15 PROCEDURE — 90834 PSYTX W PT 45 MINUTES: CPT | Performed by: SOCIAL WORKER

## 2019-10-15 RX ORDER — DEXTROAMPHETAMINE SACCHARATE, AMPHETAMINE ASPARTATE MONOHYDRATE, DEXTROAMPHETAMINE SULFATE AND AMPHETAMINE SULFATE 5; 5; 5; 5 MG/1; MG/1; MG/1; MG/1
20 CAPSULE, EXTENDED RELEASE ORAL DAILY
Qty: 30 CAPSULE | Refills: 0 | Status: SHIPPED | OUTPATIENT
Start: 2019-11-15 | End: 2020-03-04

## 2019-10-15 RX ORDER — DEXTROAMPHETAMINE SACCHARATE, AMPHETAMINE ASPARTATE MONOHYDRATE, DEXTROAMPHETAMINE SULFATE AND AMPHETAMINE SULFATE 7.5; 7.5; 7.5; 7.5 MG/1; MG/1; MG/1; MG/1
30 CAPSULE, EXTENDED RELEASE ORAL DAILY
Qty: 30 CAPSULE | Refills: 0 | Status: SHIPPED | OUTPATIENT
Start: 2019-11-15 | End: 2020-03-04

## 2019-10-15 RX ORDER — DEXTROAMPHETAMINE SACCHARATE, AMPHETAMINE ASPARTATE MONOHYDRATE, DEXTROAMPHETAMINE SULFATE AND AMPHETAMINE SULFATE 5; 5; 5; 5 MG/1; MG/1; MG/1; MG/1
20 CAPSULE, EXTENDED RELEASE ORAL DAILY
Qty: 30 CAPSULE | Refills: 0 | Status: SHIPPED | OUTPATIENT
Start: 2019-12-16 | End: 2020-03-04

## 2019-10-15 RX ORDER — DEXTROAMPHETAMINE SACCHARATE, AMPHETAMINE ASPARTATE MONOHYDRATE, DEXTROAMPHETAMINE SULFATE AND AMPHETAMINE SULFATE 7.5; 7.5; 7.5; 7.5 MG/1; MG/1; MG/1; MG/1
30 CAPSULE, EXTENDED RELEASE ORAL DAILY
Qty: 30 CAPSULE | Refills: 0 | Status: SHIPPED | OUTPATIENT
Start: 2019-12-16 | End: 2020-03-04

## 2019-10-15 RX ORDER — BUSPIRONE HYDROCHLORIDE 5 MG/1
10 TABLET ORAL 2 TIMES DAILY
Qty: 120 TABLET | Refills: 1
Start: 2019-10-15 | End: 2019-12-03

## 2019-10-15 RX ORDER — DEXTROAMPHETAMINE SACCHARATE, AMPHETAMINE ASPARTATE MONOHYDRATE, DEXTROAMPHETAMINE SULFATE AND AMPHETAMINE SULFATE 7.5; 7.5; 7.5; 7.5 MG/1; MG/1; MG/1; MG/1
30 CAPSULE, EXTENDED RELEASE ORAL DAILY
Qty: 30 CAPSULE | Refills: 0 | Status: SHIPPED | OUTPATIENT
Start: 2019-10-15 | End: 2020-03-04

## 2019-10-15 RX ORDER — DEXTROAMPHETAMINE SACCHARATE, AMPHETAMINE ASPARTATE MONOHYDRATE, DEXTROAMPHETAMINE SULFATE AND AMPHETAMINE SULFATE 5; 5; 5; 5 MG/1; MG/1; MG/1; MG/1
20 CAPSULE, EXTENDED RELEASE ORAL DAILY
Qty: 30 CAPSULE | Refills: 0 | Status: SHIPPED | OUTPATIENT
Start: 2019-10-15 | End: 2020-03-04

## 2019-10-15 ASSESSMENT — MIFFLIN-ST. JEOR: SCORE: 1588.35

## 2019-10-15 NOTE — PROGRESS NOTES
"                                           Progress Note    Client Name: Carrillo Dunlap  Date: 10.15.19         Service Type: Individual  Video Visit: No     Session Start Time: 9:10am  Session End Time: 9:50am    Session Length: 40 minutes    Session #: 19    Attendees: Client attended alone     Treatment Plan Last Reviewed: Reviewed  PHQ-9 / UGO-7 : Reviewed    DATA  Interactive Complexity: No  Crisis: No       Progress Since Last Session (Related to Symptoms / Goals / Homework):  Symptoms: Improving. Pt continues to have anxiety and some depressive symptoms.     Homework: Achieved / completed to satisfaction.      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 Pt reports that he had thought he was not a great writer, so it helped to hear that others appreciated his work. We discussed how pt is becoming more accepting that his boss is going to provide negative feedback regardless of whether he is \"on top of my game or not.\" Processed how pt's values of learning, contributing, and being a part of a team are larger motivators to do a productive job at work.     Treatment Objective(s) Addressed in This Session:   Identify negative self-talk and behaviors: challenge core beliefs, myths, and actions   Decrease frequency and intensity of feeling down, depressed, hopeless   Client will review and familiarize himself with feeling words. He will use feeling words to describe when her needs are met/not met.      Intervention:  ACT: Discussed how thoughts can influence our decisions and actions. Processed how sometimes our thoughts are influenced by emotions and they may guide us to make more irritational decisions. Discussed the " importance of making decisions based off our values rather than thoughts.      ASSESSMENT: Current Emotional / Mental Status (status of significant symptoms):   Risk status (Self / Other harm or suicidal ideation)   Client denies current fears or concerns for personal safety.   Client denies current or recent suicidal ideation or behaviors.   Client denies current or recent homicidal ideation or behaviors.   Client denies current or recent self injurious behavior or ideation.   Client denies other safety concerns.    Client reports there has been no change in risk factors since their last session.      Client reports there has been no change in protective factors since their last session.      A safety and risk management has not been developed at this time.  Client was given the Suicide Prevention National Hotline, Text [place] 968301,  the [facility] Crisis Number, or encouraged to Call 911 should there be a change  in these risk factors.       Appearance:   Appropriate    Eye Contact:   Good    Psychomotor Behavior: Restless , more so than in previous sessions   Attitude:   Cooperative    Orientation:   All   Speech    Rate / Production: Hyperverbal     Volume:  Normal    Mood:    Anxious    Affect:    Appropriate    Thought Content:  Clear , more clear than past sessions   Thought Form:  Coherent    Insight:    Fair, improving     Medication Review:   No changes to current psychiatric medication(s)     Medication Compliance:   Yes     Changes in Health Issues:   None reported     Chemical Use Review:  Substance Use: decrease in cannabis.  Client reports frequency using of 0 in the past two weeks.    Tobacco Use: No current tobacco use.      Diagnosis:  1. Moderate major depression (H)    2. ADHD (attention deficit hyperactivity disorder), inattentive type      Collateral Reports Completed:   Not Applicable    PLAN: (Client Tasks / Therapist Tasks / Other). Pt agreed to look over a list of values and to begin to think  about what values may help him guide his decisions at work.     Jose Hudson, AFSHIN, [facility] 10.15.19   Note reviewed and clinical supervision by AFSHIN Solo [facility] 10/25/2019    ___________________________________________________________________________    Treatment Plan     Client's Name: Carrillo Dunlap                   YOB: 1965     Date: 1/21/19; reviewed: 5/15/19; updated 8/13/19     DSM-V Diagnoses: Attention-Deficit/Hyperactivity Disorder  314.01 (F90.2) Combined presentation or 296.31 (F33.0) Major Depressive Disorder, Recurrent Episode, Mild _ and With anxious distress  Psychosocial / Contextual Factors: Limited social support, limited financial support, physical/medical concerns, employment constraints  WHODAS: did not complete during visit     Referral / Collaboration:  Referral to another professional/service is not indicated at this time.     Anticipated number of session or this episode of care: 15      MeasurableTreatment Goal(s) related to diagnosis / functional impairment(s)  Goal 1: Client will begin to work on his work portfolio.     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       Objective #A (Client Action)                Client will schedule times of the day to work on his work portfolio. 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   Status: completed - Date: 5/15/19      Intervention(s)  Therapist will assign homework including weekly activity scheduling and CBT thought logs  provide educational materials on CBT  role-play effective communication skills.    Goal 2: Client will decrease his monthly marijuana use.     I will know I've met my goal when I do not use marijuana around my brother.      Objective #A (Client Action)    Client will identify at least 3 example(s) of how marijuana has resulted in an experience  that interferes with person values or goals.  Status: completed- 5/15/19    Intervention(s)  Therapist will help client identify how his personal values and goals are interfered when he uses marijuana.     Objective #B  Client will identify triggers and environmental cues contributing to his marijuana usage.     Status: completed - Date: 5/15/19     Intervention(s)  Therapist will teach the client how to perform a behavioral chain analysis to better identify triggers and cues to using marijuana.     Objective #C  Client will maintain sobriety for one month.  Status: completed - Date: 5.15.19     Intervention(s)  Therapist will provide educational materials for the client to increase his coping skills when he has urges to use marijuana. Therapist will provide additional resources to help him maintain sobriety.     Goal 3: Client will decrease his anxiety from his baseline GAD7 score.    I will know I've met my goal when I am able to let things go and allow myself permission to relax and not worry.      Objective #A  Client will identify 5 initial signs or symptoms of anxiety.    Status: completed - Date: 5/15/19     Intervention(s)  Therapist will provide educational materials on the symptoms of anxiety.    Objective #B (Client Action)    Status: completed - Date: 5/15/19     Client will identify 5 fears / thoughts that contribute to feeling anxious.    Intervention(s)  Therapist will teach the client how to perform a behavioral chain analysis in order to identify fears/thoughts contributing to anxious feelings.     Objective #C  Client will use at least 4 coping skills for anxiety management in the next 10 weeks.  Status: continued - Date: 2/19/19    Intervention(s)  Therapist will teach progressive muscle relaxation, cue control relaxation, mindful breathing, and guided imagery. Therapist will also utilize Acceptance and Commitment Therapy by exploring and identifying important values in patient's life, and discuss  ways to commit to behavioral activation around these values.     Goal 4: The client will learn and apply skills to manage the symptoms of depression.    I will know I've met my goal when I am able to let things go and allow myself permission to relax, not worry, and feel better about myself.      Objective #A: Client will Increase interest, engagement, and pleasure in doing things.  Client will identify negative self-talk and behaviors: challenge core beliefs, myths, and actions.  Decrease frequency and intensity of feeling down, depressed, hopeless.  Status: continued Date(s): 5/15/19    Interventions  Therapist will provide educational materials and handouts on core beliefs, cognitive distortions, and cognitive fusion and defusion.    Goal 5: Client will explore and resolve issues related to relationship issues with his boss.    I will know I've met my goal when I am able to feel better about my relationship with his boss.      Objective #A (Client Action)    Client will review and familiarize himself with feeling words. He will use feeling words to describe when her needs are met/not met.   Status: New - Date: 8/13/19    Intervention(s)  Therapist will assign emotional recognition/identification including when expressing when his needs are not being met or are being met.    Objective #B  Client will identify negative self-talk and behaviors associated with his relationship with his boss and others, and challenge core beliefs, myths, and actions.  Status: 8/13/19    Intervention(s)  Therapist will provide education and homework on CBT, ACT, and DBT. Therapist will provide strategies to evaluate for cognitive distortions and logical errors. Therapist will also help client begin to accept feeling negative emotions in a way that helps to decrease his suffering.      Objective #C   Client will compile a list of boundaries she will set with his boss and others.   Status: New - Date:  8/13/19    Intervention(s)  Therapist will teach about healthy boundaries. Including information about different communication styles emphasizing assertive communication as the most helpful/healthy style.        AFSHIN Gomez, SW, May 15, 2019  Note reviewed and clinical supervision [facility], 7/15/2019 , 8/271/9

## 2019-10-22 ENCOUNTER — OFFICE VISIT (OUTPATIENT)
Dept: PSYCHOLOGY | Facility: CLINIC | Age: 54
End: 2019-10-22
Payer: COMMERCIAL

## 2019-10-22 DIAGNOSIS — F90.0 ADHD (ATTENTION DEFICIT HYPERACTIVITY DISORDER), INATTENTIVE TYPE: ICD-10-CM

## 2019-10-22 DIAGNOSIS — F32.1 MODERATE MAJOR DEPRESSION (H): Primary | ICD-10-CM

## 2019-10-22 PROCEDURE — 90834 PSYTX W PT 45 MINUTES: CPT | Performed by: SOCIAL WORKER

## 2019-10-22 ASSESSMENT — PATIENT HEALTH QUESTIONNAIRE - PHQ9
5. POOR APPETITE OR OVEREATING: SEVERAL DAYS
SUM OF ALL RESPONSES TO PHQ QUESTIONS 1-9: 12

## 2019-10-22 ASSESSMENT — ANXIETY QUESTIONNAIRES
3. WORRYING TOO MUCH ABOUT DIFFERENT THINGS: SEVERAL DAYS
GAD7 TOTAL SCORE: 8
2. NOT BEING ABLE TO STOP OR CONTROL WORRYING: MORE THAN HALF THE DAYS
1. FEELING NERVOUS, ANXIOUS, OR ON EDGE: SEVERAL DAYS
6. BECOMING EASILY ANNOYED OR IRRITABLE: MORE THAN HALF THE DAYS
5. BEING SO RESTLESS THAT IT IS HARD TO SIT STILL: SEVERAL DAYS
IF YOU CHECKED OFF ANY PROBLEMS ON THIS QUESTIONNAIRE, HOW DIFFICULT HAVE THESE PROBLEMS MADE IT FOR YOU TO DO YOUR WORK, TAKE CARE OF THINGS AT HOME, OR GET ALONG WITH OTHER PEOPLE: SOMEWHAT DIFFICULT
7. FEELING AFRAID AS IF SOMETHING AWFUL MIGHT HAPPEN: NOT AT ALL

## 2019-10-22 NOTE — PROGRESS NOTES
"                                           Progress Note    Client Name: Carrillo Dunlap  Date: 10.22.19         Service Type: Individual  Video Visit: No     Session Start Time: 2:30pm  Session End Time: 3:20pm    Session Length: 40 minutes    Session #: 20    Attendees: Client attended alone     Treatment Plan Last Reviewed: Reviewed  PHQ-9 / UGO-7 : Reviewed    DATA  Interactive Complexity: No  Crisis: No       Progress Since Last Session (Related to Symptoms / Goals / Homework):  Symptoms: Improving. Pt continues to have anxiety and some depressive symptoms.     Homework: Achieved / completed to satisfaction.      Episode of Care Goals: Achieved / completed to satisfaction - ACTION (Actively working towards change); Intervened by reinforcing change plan / affirming steps taken     Current / Ongoing Stressors and Concerns: Discussed pt's work in detail. Continued processing pt's dissatisfaction with his boss. Pt states, \"I want to just quit and walk out.\" Processed how it may be easier for him to quit than to have a difficult conversation with him about differing expectations from what his boss told him the job would be like compared to reality. Also discussed how pt is disappointed with how little he is getting paid.     Treatment Objective(s) Addressed in This Session:   Identify negative self-talk and behaviors: challenge core beliefs, myths, and actions   Decrease frequency and intensity of feeling down, depressed, hopeless   Client will review and familiarize himself with feeling words. He will use feeling words to describe when her needs are met/not met.      Intervention:  ACT: Continued discussed how thoughts can influence our decisions and actions. Processed how sometimes our thoughts are influenced by emotions and they may guide us to make more irritational decisions. Discussed the importance of making decisions based off our values rather than thoughts. Discussed how pt has the thought, \"I just " "want to give him the finger.\" Processed how in by doing this, pt would avoid communicating with his boss about expectations and compensation.      ASSESSMENT: Current Emotional / Mental Status (status of significant symptoms):   Risk status (Self / Other harm or suicidal ideation)   Client denies current fears or concerns for personal safety.   Client denies current or recent suicidal ideation or behaviors.   Client denies current or recent homicidal ideation or behaviors.   Client denies current or recent self injurious behavior or ideation.   Client denies other safety concerns.    Client reports there has been no change in risk factors since their last session.      Client reports there has been no change in protective factors since their last session.      A safety and risk management has not been developed at this time.  Client was given the Suicide Prevention National Hotline, Text [place] 934135,  the [employer] Crisis Number, or encouraged to Call 911 should there be a change  in these risk factors.       Appearance:   Appropriate    Eye Contact:   Good    Psychomotor Behavior: Restless , more so than in previous sessions   Attitude:   Cooperative    Orientation:   All   Speech    Rate / Production: Hyperverbal     Volume:  Normal    Mood:    Anxious    Affect:    Appropriate    Thought Content:  Clear , more clear than past sessions   Thought Form:  Coherent    Insight:    Fair, improving     Medication Review:   No changes to current psychiatric medication(s)     Medication Compliance:   Yes     Changes in Health Issues:   None reported     Chemical Use Review:  Substance Use: decrease in cannabis.  Client reports frequency using of 0 in the past two weeks.    Tobacco Use: No current tobacco use.      Diagnosis:  1. Moderate major depression (H)    2. ADHD (attention deficit hyperactivity disorder), inattentive type      Collateral Reports Completed:   Not Applicable    PLAN: (Client Tasks / Therapist Tasks / " Other). Pt agrees to have a conversation with his boss and will report back next session about his experience.    Jose Hudson, MSW, SW 10.22.19  Note reviewed and clinical supervision by AFSHIN Solo [facility] [place] 10/27/2019     ___________________________________________________________________________    Treatment Plan     Client's Name: Carrillo Dunlap                   YOB: 1965     Date: 1/21/19; reviewed: 5/15/19; updated 8/13/19     DSM-V Diagnoses: Attention-Deficit/Hyperactivity Disorder  314.01 (F90.2) Combined presentation or 296.31 (F33.0) Major Depressive Disorder, Recurrent Episode, Mild _ and With anxious distress  Psychosocial / Contextual Factors: Limited social support, limited financial support, physical/medical concerns, employment constraints  WHODAS: did not complete during visit     Referral / Collaboration:  Referral to another professional/service is not indicated at this time.     Anticipated number of session or this episode of care: 15      MeasurableTreatment Goal(s) related to diagnosis / functional impairment(s)  Goal 1: Client will begin to work on his work portfolio.     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       Objective #A (Client Action)                Client will schedule times of the day to work on his work portfolio. 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   Status: completed - Date: 5/15/19      Intervention(s)  Therapist will assign homework including weekly activity scheduling and CBT thought logs  provide educational materials on CBT  role-play effective communication skills.    Goal 2: Client will decrease his monthly marijuana use.     I will know I've met my goal when I do not use marijuana around my brother.      Objective #A (Client Action)    Client will identify at least 3  example(s) of how marijuana has resulted in an experience that interferes with person values or goals.  Status: completed- 5/15/19    Intervention(s)  Therapist will help client identify how his personal values and goals are interfered when he uses marijuana.     Objective #B  Client will identify triggers and environmental cues contributing to his marijuana usage.     Status: completed - Date: 5/15/19     Intervention(s)  Therapist will teach the client how to perform a behavioral chain analysis to better identify triggers and cues to using marijuana.     Objective #C  Client will maintain sobriety for one month.  Status: completed - Date: 5.15.19     Intervention(s)  Therapist will provide educational materials for the client to increase his coping skills when he has urges to use marijuana. Therapist will provide additional resources to help him maintain sobriety.     Goal 3: Client will decrease his anxiety from his baseline GAD7 score.    I will know I've met my goal when I am able to let things go and allow myself permission to relax and not worry.      Objective #A  Client will identify 5 initial signs or symptoms of anxiety.    Status: completed - Date: 5/15/19     Intervention(s)  Therapist will provide educational materials on the symptoms of anxiety.    Objective #B (Client Action)    Status: completed - Date: 5/15/19     Client will identify 5 fears / thoughts that contribute to feeling anxious.    Intervention(s)  Therapist will teach the client how to perform a behavioral chain analysis in order to identify fears/thoughts contributing to anxious feelings.     Objective #C  Client will use at least 4 coping skills for anxiety management in the next 10 weeks.  Status: continued - Date: 2/19/19    Intervention(s)  Therapist will teach progressive muscle relaxation, cue control relaxation, mindful breathing, and guided imagery. Therapist will also utilize Acceptance and Commitment Therapy by exploring and  identifying important values in patient's life, and discuss ways to commit to behavioral activation around these values.     Goal 4: The client will learn and apply skills to manage the symptoms of depression.    I will know I've met my goal when I am able to let things go and allow myself permission to relax, not worry, and feel better about myself.      Objective #A: Client will Increase interest, engagement, and pleasure in doing things.  Client will identify negative self-talk and behaviors: challenge core beliefs, myths, and actions.  Decrease frequency and intensity of feeling down, depressed, hopeless.  Status: continued Date(s): 5/15/19    Interventions  Therapist will provide educational materials and handouts on core beliefs, cognitive distortions, and cognitive fusion and defusion.    Goal 5: Client will explore and resolve issues related to relationship issues with his boss.    I will know I've met my goal when I am able to feel better about my relationship with his boss.      Objective #A (Client Action)    Client will review and familiarize himself with feeling words. He will use feeling words to describe when her needs are met/not met.   Status: New - Date: 8/13/19    Intervention(s)  Therapist will assign emotional recognition/identification including when expressing when his needs are not being met or are being met.    Objective #B  Client will identify negative self-talk and behaviors associated with his relationship with his boss and others, and challenge core beliefs, myths, and actions.  Status: 8/13/19    Intervention(s)  Therapist will provide education and homework on CBT, ACT, and DBT. Therapist will provide strategies to evaluate for cognitive distortions and logical errors. Therapist will also help client begin to accept feeling negative emotions in a way that helps to decrease his suffering.      Objective #C   Client will compile a list of boundaries she will set with his boss and  others.   Status: New - Date: 8/13/19    Intervention(s)  Therapist will teach about healthy boundaries. Including information about different communication styles emphasizing assertive communication as the most helpful/healthy style.        AFSHIN Gomez, SW, May 15, 2019  Note reviewed and clinical supervision by [facility]IN [facility], 7/15/2019 , 8/271/9

## 2019-10-23 ASSESSMENT — ANXIETY QUESTIONNAIRES: GAD7 TOTAL SCORE: 8

## 2019-10-28 ENCOUNTER — OFFICE VISIT (OUTPATIENT)
Dept: PSYCHOLOGY | Facility: CLINIC | Age: 54
End: 2019-10-28
Payer: COMMERCIAL

## 2019-10-28 DIAGNOSIS — F32.1 MODERATE MAJOR DEPRESSION (H): Primary | ICD-10-CM

## 2019-10-28 DIAGNOSIS — F90.0 ADHD (ATTENTION DEFICIT HYPERACTIVITY DISORDER), INATTENTIVE TYPE: ICD-10-CM

## 2019-10-28 PROCEDURE — 90834 PSYTX W PT 45 MINUTES: CPT | Performed by: SOCIAL WORKER

## 2019-10-28 ASSESSMENT — PATIENT HEALTH QUESTIONNAIRE - PHQ9: SUM OF ALL RESPONSES TO PHQ QUESTIONS 1-9: 10

## 2019-10-28 NOTE — PROGRESS NOTES
"                                           Progress Note    Client Name: Carrillo Dunlap  Date: 10.28.19         Service Type: Individual  Video Visit: No     Session Start Time: 4:30pm  Session End Time: 5:20pm    Session Length: 40 minutes    Session #: 21    Attendees: Client attended alone     Treatment Plan Last Reviewed: Reviewed  PHQ-9 / UGO-7 : Reviewed    DATA  Interactive Complexity: No  Crisis: No       Progress Since Last Session (Related to Symptoms / Goals / Homework):  Symptoms: Improving.     Homework: Achieved / completed to satisfaction.      Episode of Care Goals: Achieved / completed to satisfaction - ACTION (Actively working towards change); Intervened by reinforcing change plan / affirming steps taken     Current / Ongoing Stressors and Concerns: Discussed pt's health insurance will be expiring November 1. Pt filled out paperwork last Friday and is hoping that he will have coverage, but is unsure. Encouraged pt to reach out the [employer] directly to follow next steps to reduce likeliness of not having health insurance. Throughout the remainder of the session, reflected on pt's ability to find ways to manage ADHD symptoms in addition to taken medication.     Treatment Objective(s) Addressed in This Session:   Identify negative self-talk and behaviors: challenge core beliefs, myths, and actions   Decrease frequency and intensity of feeling down, depressed, hopeless   Client will review and familiarize himself with feeling words. He will use feeling words to describe when her needs are met/not met.      Intervention:  CBT: Discussed how pt has been able to resist the urge to spend large amounts on tasks at work to \"get it perfect.\" Processed how it helps to resist the urge by telling himself \"this is good enough.\" Also processed pt's ability to complete an extremely mundane, but important task at work. Processed how he \"found a needle in the haystack\" and it helped to prevent a slowdown at work. " "Discussed how it has been difficult for pt to stay engaged in tasks that are boring and mundane. Encouraged pt to use this most recent example as a motivator in the future when faced with similar boring and mundane tasks, by telling himself \"although this is boring and I can think of many things that would be more interesting to do, I know for a fact that I do have the ability to finish boring and mundane tasks.       ASSESSMENT: Current Emotional / Mental Status (status of significant symptoms):   Risk status (Self / Other harm or suicidal ideation)   Client denies current fears or concerns for personal safety.   Client denies current or recent suicidal ideation or behaviors.   Client denies current or recent homicidal ideation or behaviors.   Client denies current or recent self injurious behavior or ideation.   Client denies other safety concerns.    Client reports there has been no change in risk factors since their last session.      Client reports there has been no change in protective factors since their last session.      A safety and risk management has not been developed at this time.  Client was given the Suicide Prevention National Hotline, Text [place] 027523,  the [facility] Crisis Number, or encouraged to Call 911 should there be a change  in these risk factors.       Appearance:   Appropriate    Eye Contact:   Good    Psychomotor Behavior: Restless , more so than in previous sessions   Attitude:   Cooperative    Orientation:   All   Speech    Rate / Production: Hyperverbal     Volume:  Normal    Mood:    Anxious    Affect:    Appropriate    Thought Content:  Clear , more clear than past sessions   Thought Form:  Coherent    Insight:    Fair, improving     Medication Review:   No changes to current psychiatric medication(s)     Medication Compliance:   Yes     Changes in Health Issues:   None reported     Chemical Use Review:  Substance Use: decrease in cannabis.  Client reports frequency using of 0 in the " "past two weeks.    Tobacco Use: No current tobacco use.      Diagnosis:  1. Moderate major depression (H)    2. ADHD (attention deficit hyperactivity disorder), inattentive type      Collateral Reports Completed:   Not Applicable    PLAN: (Client Tasks / Therapist Tasks / Other).  In the future pt will use this most recent example described in the visit note  when faced with boring and mundane tasks. He will tell himself \"although this is boring and I can think of many things that would be more interesting to do, I know for a fact that I do have the ability to finish boring and mundane tasks.      AFSHIN Gomez, [facility] 10.28.19   Note reviewed and clinical supervision by AFSHIN [facility] 10/31/2019    ___________________________________________________________________________    Treatment Plan     Client's Name: Carrillo Dunlap                   YOB: 1965     Date: 1/21/19; reviewed: 5/15/19; updated 8/13/19     DSM-V Diagnoses: Attention-Deficit/Hyperactivity Disorder  314.01 (F90.2) Combined presentation or 296.31 (F33.0) Major Depressive Disorder, Recurrent Episode, Mild _ and With anxious distress  Psychosocial / Contextual Factors: Limited social support, limited financial support, physical/medical concerns, employment constraints  WHODAS: did not complete during visit     Referral / Collaboration:  Referral to another professional/service is not indicated at this time.     Anticipated number of session or this episode of care: 15      MeasurableTreatment Goal(s) related to diagnosis / functional impairment(s)  Goal 1: Client will begin to work on his work portfolio.     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       Objective #A (Client Action)                Client will schedule times of the day to work on his work portfolio. 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   Status: completed - Date: 5/15/19      Intervention(s)  Therapist will assign homework including weekly activity scheduling and CBT thought logs  provide educational materials on CBT  role-play effective communication skills.    Goal 2: Client will decrease his monthly marijuana use.     I will know I've met my goal when I do not use marijuana around my brother.      Objective #A (Client Action)    Client will identify at least 3 example(s) of how marijuana has resulted in an experience that interferes with person values or goals.  Status: completed- 5/15/19    Intervention(s)  Therapist will help client identify how his personal values and goals are interfered when he uses marijuana.     Objective #B  Client will identify triggers and environmental cues contributing to his marijuana usage.     Status: completed - Date: 5/15/19     Intervention(s)  Therapist will teach the client how to perform a behavioral chain analysis to better identify triggers and cues to using marijuana.     Objective #C  Client will maintain sobriety for one month.  Status: completed - Date: 5.15.19     Intervention(s)  Therapist will provide educational materials for the client to increase his coping skills when he has urges to use marijuana. Therapist will provide additional resources to help him maintain sobriety.     Goal 3: Client will decrease his anxiety from his baseline GAD7 score.    I will know I've met my goal when I am able to let things go and allow myself permission to relax and not worry.      Objective #A  Client will identify 5 initial signs or symptoms of anxiety.    Status: completed - Date: 5/15/19     Intervention(s)  Therapist will provide educational materials on the symptoms of anxiety.    Objective #B (Client Action)    Status: completed - Date: 5/15/19     Client will identify 5 fears / thoughts that contribute to feeling anxious.    Intervention(s)  Therapist  will teach the client how to perform a behavioral chain analysis in order to identify fears/thoughts contributing to anxious feelings.     Objective #C  Client will use at least 4 coping skills for anxiety management in the next 10 weeks.  Status: continued - Date: 2/19/19    Intervention(s)  Therapist will teach progressive muscle relaxation, cue control relaxation, mindful breathing, and guided imagery. Therapist will also utilize Acceptance and Commitment Therapy by exploring and identifying important values in patient's life, and discuss ways to commit to behavioral activation around these values.     Goal 4: The client will learn and apply skills to manage the symptoms of depression.    I will know I've met my goal when I am able to let things go and allow myself permission to relax, not worry, and feel better about myself.      Objective #A: Client will Increase interest, engagement, and pleasure in doing things.  Client will identify negative self-talk and behaviors: challenge core beliefs, myths, and actions.  Decrease frequency and intensity of feeling down, depressed, hopeless.  Status: continued Date(s): 5/15/19    Interventions  Therapist will provide educational materials and handouts on core beliefs, cognitive distortions, and cognitive fusion and defusion.    Goal 5: Client will explore and resolve issues related to relationship issues with his boss.    I will know I've met my goal when I am able to feel better about my relationship with his boss.      Objective #A (Client Action)    Client will review and familiarize himself with feeling words. He will use feeling words to describe when her needs are met/not met.   Status: New - Date: 8/13/19    Intervention(s)  Therapist will assign emotional recognition/identification including when expressing when his needs are not being met or are being met.    Objective #B  Client will identify negative self-talk and behaviors associated with his relationship  with his boss and others, and challenge core beliefs, myths, and actions.  Status: 8/13/19    Intervention(s)  Therapist will provide education and homework on CBT, ACT, and DBT. Therapist will provide strategies to evaluate for cognitive distortions and logical errors. Therapist will also help client begin to accept feeling negative emotions in a way that helps to decrease his suffering.      Objective #C   Client will compile a list of boundaries she will set with his boss and others.   Status: New - Date: 8/13/19    Intervention(s)  Therapist will teach about healthy boundaries. Including information about different communication styles emphasizing assertive communication as the most helpful/healthy style.        AFSHIN Gomez, [facility], May 15, 2019  Note reviewed and clinical supervision by AFSHIN [facility] 1/31/2019, 7/15/2019 , 8/271/9

## 2019-11-08 ENCOUNTER — HEALTH MAINTENANCE LETTER (OUTPATIENT)
Age: 54
End: 2019-11-08

## 2019-12-03 DIAGNOSIS — F41.1 GAD (GENERALIZED ANXIETY DISORDER): ICD-10-CM

## 2019-12-03 RX ORDER — BUSPIRONE HYDROCHLORIDE 5 MG/1
10 TABLET ORAL 2 TIMES DAILY
Qty: 120 TABLET | Refills: 1 | Status: SHIPPED | OUTPATIENT
Start: 2019-12-03 | End: 2020-03-04

## 2019-12-03 NOTE — TELEPHONE ENCOUNTER
"Requested Prescriptions   Pending Prescriptions Disp Refills     busPIRone (BUSPAR) 5 MG tablet 120 tablet 1     Sig: Take 2 tablets (10 mg) by mouth 2 times daily  Last Written Prescription Date:  10/15/2019  Last Fill Quantity: 120,  # refills: 1   Last office visit: 10/15/2019 with prescribing provider:  10/15/2019   Future Office Visit:   Next 5 appointments (look out 90 days)    Dec 31, 2019 11:00 AM CST  [facility] [address] [place] [address] [address]  [facility] [id number]  [phone]   Umesh 15, 2020 11:00 AM CST  [facility] [address] [place] [address] [address]  [facility] [id number]  [phone]              Atypical Antidepressants Protocol Passed - 12/3/2019  8:40 AM        Passed - Recent (12 mo) or future (30 days) visit within the authorizing provider's specialty     Patient has had an office visit with the authorizing provider or a provider within the authorizing providers department within the previous 12 mos or has a future within next 30 days. See \"Patient Info\" tab in inbasket, or \"Choose Columns\" in Meds & Orders section of the refill encounter.              Passed - Medication active on med list        Passed - Patient is age 18 or older        "

## 2019-12-03 NOTE — TELEPHONE ENCOUNTER
Prescription approved per [facility] Refill Protocol.  Carolann Rider RN on 12/3/2019 at 10:14 AM

## 2020-01-03 ENCOUNTER — OFFICE VISIT (OUTPATIENT)
Dept: PSYCHOLOGY | Facility: CLINIC | Age: 55
End: 2020-01-03
Payer: COMMERCIAL

## 2020-01-03 DIAGNOSIS — F90.0 ADHD (ATTENTION DEFICIT HYPERACTIVITY DISORDER), INATTENTIVE TYPE: ICD-10-CM

## 2020-01-03 DIAGNOSIS — F32.1 MODERATE MAJOR DEPRESSION (H): Primary | ICD-10-CM

## 2020-01-03 PROCEDURE — 90834 PSYTX W PT 45 MINUTES: CPT | Performed by: SOCIAL WORKER

## 2020-01-03 ASSESSMENT — PATIENT HEALTH QUESTIONNAIRE - PHQ9
5. POOR APPETITE OR OVEREATING: MORE THAN HALF THE DAYS
SUM OF ALL RESPONSES TO PHQ QUESTIONS 1-9: 15

## 2020-01-03 ASSESSMENT — ANXIETY QUESTIONNAIRES
3. WORRYING TOO MUCH ABOUT DIFFERENT THINGS: MORE THAN HALF THE DAYS
5. BEING SO RESTLESS THAT IT IS HARD TO SIT STILL: SEVERAL DAYS
1. FEELING NERVOUS, ANXIOUS, OR ON EDGE: MORE THAN HALF THE DAYS
2. NOT BEING ABLE TO STOP OR CONTROL WORRYING: MORE THAN HALF THE DAYS
GAD7 TOTAL SCORE: 11
7. FEELING AFRAID AS IF SOMETHING AWFUL MIGHT HAPPEN: NOT AT ALL
IF YOU CHECKED OFF ANY PROBLEMS ON THIS QUESTIONNAIRE, HOW DIFFICULT HAVE THESE PROBLEMS MADE IT FOR YOU TO DO YOUR WORK, TAKE CARE OF THINGS AT HOME, OR GET ALONG WITH OTHER PEOPLE: VERY DIFFICULT
6. BECOMING EASILY ANNOYED OR IRRITABLE: MORE THAN HALF THE DAYS

## 2020-01-03 NOTE — PROGRESS NOTES
"                                           Progress Note    Client Name: Carrillo Dunlap  Date: 1.3.20         Service Type: Individual  Video Visit: No     Session Start Time: 10:00am  Session End Time: 11:00am    Session Length: 60 minutes    Session #: 22    Attendees: Client attended alone     Treatment Plan Last Reviewed: Reviewed  PHQ-9 / UGO-7 : Reviewed    DATA  Interactive Complexity: No  Crisis: No       Progress Since Last Session (Related to Symptoms / Goals / Homework):  Symptoms: Worsening.     Homework: Achieved / completed to satisfaction.      Episode of Care Goals: Achieved / completed to satisfaction - ACTION (Actively working towards change); Intervened by reinforcing change plan / affirming steps taken     Current / Ongoing Stressors and Concerns: Reviewed pt's life since our last session at the end of October. Pt reports stopping his full-time job at the beginning of November. He reports not working during this month. Pt reports networking and finding a job 2 days/week working at a salary that he is satisfied with. 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 Pt reports taking steps to think about how to articulate this to his uncle. 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 We reviewed concepts on perfectionism and discussed pt becoming more aware of resisting the urge to make things \"just right\" and to focus on whether the outcome is his preferred outcome even when he does not put his all into a task.     Treatment Objective(s) Addressed in This Session:   Identify negative self-talk and behaviors: challenge core beliefs, myths, and actions   Decrease frequency and intensity of feeling down, depressed, hopeless   Client will review " and familiarize himself with feeling words. He will use feeling words to describe when her needs are met/not met.      Intervention:  Psychoeducation: Explored with the pt using open ended questioning what the purpose of worry is. Discussed how in order to worry excessively about something it has to be 1. Important to us; 2. We have to be invested in the outcome (we have a preferred outcome). Processed how our minds continue to ruminate on a worry because of the fear of the unknown or uncertain. Our mind searches for different possibilities, in part to ensure that we are capable to cope with the various outcomes, especially if they may cause more pain/suffering in the long run. Discussed how our preferred outcome is the least suffering/ easiest option. Processed however, that many times we are able to cope with the other outcomes, although it is more difficult and there may be more suffering/pain. Pt processed that although this may be the cause, it can help him find resilience in himself and he may grow as a person.       ASSESSMENT: Current Emotional / Mental Status (status of significant symptoms):   Risk status (Self / Other harm or suicidal ideation)   Client denies current fears or concerns for personal safety.   Client denies current or recent suicidal ideation or behaviors.   Client denies current or recent homicidal ideation or behaviors.   Client denies current or recent self injurious behavior or ideation.   Client denies other safety concerns.    Client reports there has been no change in risk factors since their last session.      Client reports there has been no change in protective factors since their last session.      A safety and risk management has not been developed at this time.  Client was given the Suicide Prevention National Hotline, Text [place] [address]56,  the [facility] Crisis Number, or encouraged to Call 911 should there be a change  in these risk factors.       Appearance:   Appropriate    Eye  "Contact:   Good    Psychomotor Behavior: Restless , more so than in previous sessions   Attitude:   Cooperative    Orientation:   All   Speech    Rate / Production: Hyperverbal     Volume:  Normal    Mood:    Anxious    Affect:    Appropriate    Thought Content:  Clear , more clear than past sessions   Thought Form:  Coherent    Insight:    Fair, improving     Medication Review:   No changes to current psychiatric medication(s)     Medication Compliance:   Yes     Changes in Health Issues:   None reported     Chemical Use Review:  Substance Use: decrease in cannabis.  Client reports frequency using of 0 in the past two weeks.    Tobacco Use: No current tobacco use.      Diagnosis:  1. Moderate major depression (H)    2. ADHD (attention deficit hyperactivity disorder), inattentive type      Collateral Reports Completed:   Not Applicable    PLAN: (Client Tasks / Therapist Tasks / Other). Pt will become more aware of resisting the urge to make things \"just right\" and to focus on whether the outcome is his preferred outcome even when he does not put his all into a task.    AFSHIN Gomez, [facility] 1.3.20   Note reviewed and clinical supervision by AFSHIN [facility] 1/9/2020   ___________________________________________________________________________    Treatment Plan     Client's Name: Carrillo Dunlap                   YOB: 1965     Date: 1/21/19; reviewed: 5/15/19; updated 8/13/19; 1.3.20      DSM-V Diagnoses: Attention-Deficit/Hyperactivity Disorder  314.01 (F90.2) Combined presentation or 296.31 (F33.0) Major Depressive Disorder, Recurrent Episode, Mild _ and With anxious distress  Psychosocial / Contextual Factors: Limited social support, limited financial support, physical/medical concerns, employment constraints  WHODAS: did not complete during visit     Referral / Collaboration:  Referral to another professional/service is not indicated at this time.     Anticipated number of " session or this episode of care: 15      MeasurableTreatment Goal(s) related to diagnosis / functional impairment(s)  Goal 1: Client will begin to work on his work portfolio.     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       Objective #A (Client Action)                Client will schedule times of the day to work on his work portfolio. 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   Status: completed - Date: 5/15/19      Intervention(s)  Therapist will assign homework including weekly activity scheduling and CBT thought logs  provide educational materials on CBT  role-play effective communication skills.    Goal 2: Client will decrease his monthly marijuana use.     I will know I've met my goal when I do not use marijuana around my brother.      Objective #A (Client Action)    Client will identify at least 3 example(s) of how marijuana has resulted in an experience that interferes with person values or goals.  Status: completed- 5/15/19    Intervention(s)  Therapist will help client identify how his personal values and goals are interfered when he uses marijuana.     Objective #B  Client will identify triggers and environmental cues contributing to his marijuana usage.     Status: completed - Date: 5/15/19     Intervention(s)  Therapist will teach the client how to perform a behavioral chain analysis to better identify triggers and cues to using marijuana.     Objective #C  Client will maintain sobriety for one month.  Status: completed - Date: 5.15.19     Intervention(s)  Therapist will provide educational materials for the client to increase his coping skills when he has urges to use marijuana. Therapist will provide additional resources to help him maintain sobriety.     Goal 3: Client will decrease his anxiety from his baseline GAD7 score.    I will know I've met my  goal when I am able to let things go and allow myself permission to relax and not worry.      Objective #A  Client will identify 5 initial signs or symptoms of anxiety.    Status: completed - Date: 5/15/19     Intervention(s)  Therapist will provide educational materials on the symptoms of anxiety.    Objective #B (Client Action)    Status: completed - Date: 5/15/19     Client will identify 5 fears / thoughts that contribute to feeling anxious.    Intervention(s)  Therapist will teach the client how to perform a behavioral chain analysis in order to identify fears/thoughts contributing to anxious feelings.     Objective #C  Client will use at least 4 coping skills for anxiety management in the next 10 weeks.  Status: continued - Date: 2/19/19; 1.3.20     Intervention(s)  Therapist will teach progressive muscle relaxation, cue control relaxation, mindful breathing, and guided imagery. Therapist will also utilize Acceptance and Commitment Therapy by exploring and identifying important values in patient's life, and discuss ways to commit to behavioral activation around these values.     Goal 4: The client will learn and apply skills to manage the symptoms of depression.    I will know I've met my goal when I am able to let things go and allow myself permission to relax, not worry, and feel better about myself.      Objective #A: Client will Increase interest, engagement, and pleasure in doing things.  Client will identify negative self-talk and behaviors: challenge core beliefs, myths, and actions.  Decrease frequency and intensity of feeling down, depressed, hopeless.  Status: continued Date(s): 5/15/19    Interventions  Therapist will provide educational materials and handouts on core beliefs, cognitive distortions, and cognitive fusion and defusion.    Goal 5: Client will explore and resolve issues related to relationship issues with his boss.    I will know I've met my goal when I am able to feel better about my  relationship with his boss.      Objective #A (Client Action)    Client will review and familiarize himself with feeling words. He will use feeling words to describe when her needs are met/not met.   Status: New - Date: 8/13/19; 1.3.20     Intervention(s)  Therapist will assign emotional recognition/identification including when expressing when his needs are not being met or are being met.    Objective #B  Client will identify negative self-talk and behaviors associated with his relationship with his boss and others, and challenge core beliefs, myths, and actions.  Status: 8/13/19; 1.3.20     Intervention(s)  Therapist will provide education and homework on CBT, ACT, and DBT. Therapist will provide strategies to evaluate for cognitive distortions and logical errors. Therapist will also help client begin to accept feeling negative emotions in a way that helps to decrease his suffering.      Objective #C   Client will compile a list of boundaries she will set with his boss and others.   Status: New - Date: 8/13/19; 1.3.20     Intervention(s)  Therapist will teach about healthy boundaries. Including information about different communication styles emphasizing assertive communication as the most helpful/healthy style.        AFSHIN Gomez, [facility], May 15, 2019; 1.3.20   Note reviewed and clinical supervision by AFSHIN Weaver [facility] 1/31/2019, 7/15/2019 , 8/271/9

## 2020-01-04 ASSESSMENT — ANXIETY QUESTIONNAIRES: GAD7 TOTAL SCORE: 11

## 2020-01-24 DIAGNOSIS — F90.0 ADHD (ATTENTION DEFICIT HYPERACTIVITY DISORDER), INATTENTIVE TYPE: ICD-10-CM

## 2020-01-27 NOTE — TELEPHONE ENCOUNTER
Controlled Substance Refill Request for amphetamine-dextroamphetamine (ADDERALL XR) 20 MG 24 hr capsule  Problem List Complete:    No     PROVIDER TO CONSIDER COMPLETION OF PROBLEM LIST AND OVERVIEW/CONTROLLED SUBSTANCE AGREEMENT    Last Written Prescription Date:  12/16/2019  Last Fill Quantity: 30,   # refills: 0    THE MOST RECENT OFFICE VISIT MUST BE WITHIN THE PAST 3 MONTHS. AT LEAST ONE FACE TO FACE VISIT MUST OCCUR EVERY 6 MONTHS. ADDITIONAL VISITS CAN BE VIRTUAL.  (THIS STATEMENT SHOULD BE DELETED.)    Last Office Visit with [facility] [place] primary care provider: 10/15/2019    Future Office visit:     Controlled substance agreement:   Encounter-Level CSA:    There are no encounter-level csa.     Patient-Level CSA:    There are no patient-level csa.         Last Urine Drug Screen: No results found for: CDAUT, No results found for: COMDAT,   Cannabinoids (00-vhx-9-carboxy-9-THC)   Date Value Ref Range Status   10/10/2018 Detected, Abnormal Result (A) NDET^Not Detected ng/mL Final     Comment:     Cutoff for a positive cannabinoid is greater than 50 ng/ml.  This is an unconfirmed screening result to be used for medical purposes only.   Order [id number] for confirmation or individual confirmation tests to [employer].       Phencyclidine (Phencyclidine)   Date Value Ref Range Status   10/10/2018 Not Detected NDET^Not Detected ng/mL Final     Comment:     Cutoff for a negative PCP is 25 ng/mL or less.     Cocaine (Benzoylecgonine)   Date Value Ref Range Status   10/10/2018 Not Detected NDET^Not Detected ng/mL Final     Comment:     Cutoff for a negative cocaine is 150 ng/ml or less.     Methamphetamine (d-Methamphetamine)   Date Value Ref Range Status   10/10/2018 Not Detected NDET^Not Detected ng/mL Final     Comment:     Cutoff for a negative methamphetamine is 500 ng/ml or less.     Opiates (Morphine)   Date Value Ref Range Status   10/10/2018 Not Detected NDET^Not Detected ng/mL Final     Comment:     Cutoff for a negative  opiate is 100 ng/ml or less.     Amphetamine (d-Amphetamine)   Date Value Ref Range Status   10/10/2018 Detected, Abnormal Result (A) NDET^Not Detected ng/mL Final     Comment:     Cutoff for a positive amphetamine is greater than 500 ng/ml.  This is an unconfirmed screening result to be used for medical purposes only.   Order [id number] for confirmation or individual confirmation tests to [employer] [facility].       Benzodiazepines (Nordiazepam)   Date Value Ref Range Status   10/10/2018 Not Detected NDET^Not Detected ng/mL Final     Comment:     Cutoff for a negative benzodiazepine is 150 ng/ml or less.     Tricyclic Antidepressants (Desipramine)   Date Value Ref Range Status   10/10/2018 Not Detected NDET^Not Detected ng/mL Final     Comment:     Cutoff for a negative tricyclic antidepressant is 300 ng/ml or less.     Methadone (Methadone)   Date Value Ref Range Status   10/10/2018 Not Detected NDET^Not Detected ng/mL Final     Comment:     Cutoff for a negative methadone is 200 ng/ml or less.     Barbiturates (Butalbital)   Date Value Ref Range Status   10/10/2018 Not Detected NDET^Not Detected ng/mL Final     Comment:     Cutoff for a negative barbituate is 200 ng/ml or less.     Oxycodone (Oxycodone)   Date Value Ref Range Status   10/10/2018 Not Detected NDET^Not Detected ng/mL Final     Comment:     Cutoff for a negative Oxycodone is 100 ng/mL or less.     Propoxyphene (Norpropoxyphene)   Date Value Ref Range Status   10/10/2018 Not Detected NDET^Not Detected ng/mL Final     Comment:     Cutoff for a negative propoxyphene is 300 ng/ml or less     Buprenorphine (Buprenorphine)   Date Value Ref Range Status   10/10/2018 Not Detected NDET^Not Detected ng/mL Final     Comment:     Cutoff for a negative buprenorphine is 10 ng/ml or less        Processing:  Rx to be electronically transmitted to pharmacy by provider      [email or web address]       checked in past 3 months?  No, route to RN    Controlled  Substance Refill Request for amphetamine-dextroamphetamine (ADDERALL XR) 30 MG 24 hr capsule  Problem List Complete:    No     PROVIDER TO CONSIDER COMPLETION OF PROBLEM LIST AND OVERVIEW/CONTROLLED SUBSTANCE AGREEMENT    Last Written Prescription Date:  12/16/2019  Last Fill Quantity: 30,   # refills: 0    THE MOST RECENT OFFICE VISIT MUST BE WITHIN THE PAST 3 MONTHS. AT LEAST ONE FACE TO FACE VISIT MUST OCCUR EVERY 6 MONTHS. ADDITIONAL VISITS CAN BE VIRTUAL.  (THIS STATEMENT SHOULD BE DELETED.)    Last Office Visit with [facility] primary care provider: 10/15/2019    Future Office visit:     Controlled substance agreement:   Encounter-Level CSA:    There are no encounter-level csa.     Patient-Level CSA:    There are no patient-level csa.         Last Urine Drug Screen: No results found for: CDAUT, No results found for: COMDAT,   Cannabinoids (23-nls-8-carboxy-9-THC)   Date Value Ref Range Status   10/10/2018 Detected, Abnormal Result (A) NDET^Not Detected ng/mL Final     Comment:     Cutoff for a positive cannabinoid is greater than 50 ng/ml.  This is an unconfirmed screening result to be used for medical purposes only.   Order [id number] for confirmation or individual confirmation tests to [employer].       Phencyclidine (Phencyclidine)   Date Value Ref Range Status   10/10/2018 Not Detected NDET^Not Detected ng/mL Final     Comment:     Cutoff for a negative PCP is 25 ng/mL or less.     Cocaine (Benzoylecgonine)   Date Value Ref Range Status   10/10/2018 Not Detected NDET^Not Detected ng/mL Final     Comment:     Cutoff for a negative cocaine is 150 ng/ml or less.     Methamphetamine (d-Methamphetamine)   Date Value Ref Range Status   10/10/2018 Not Detected NDET^Not Detected ng/mL Final     Comment:     Cutoff for a negative methamphetamine is 500 ng/ml or less.     Opiates (Morphine)   Date Value Ref Range Status   10/10/2018 Not Detected NDET^Not Detected ng/mL Final     Comment:     Cutoff for a negative opiate is 100  ng/ml or less.     Amphetamine (d-Amphetamine)   Date Value Ref Range Status   10/10/2018 Detected, Abnormal Result (A) NDET^Not Detected ng/mL Final     Comment:     Cutoff for a positive amphetamine is greater than 500 ng/ml.  This is an unconfirmed screening result to be used for medical purposes only.   Order [id number] for confirmation or individual confirmation tests to [employer].       Benzodiazepines (Nordiazepam)   Date Value Ref Range Status   10/10/2018 Not Detected NDET^Not Detected ng/mL Final     Comment:     Cutoff for a negative benzodiazepine is 150 ng/ml or less.     Tricyclic Antidepressants (Desipramine)   Date Value Ref Range Status   10/10/2018 Not Detected NDET^Not Detected ng/mL Final     Comment:     Cutoff for a negative tricyclic antidepressant is 300 ng/ml or less.     Methadone (Methadone)   Date Value Ref Range Status   10/10/2018 Not Detected NDET^Not Detected ng/mL Final     Comment:     Cutoff for a negative methadone is 200 ng/ml or less.     Barbiturates (Butalbital)   Date Value Ref Range Status   10/10/2018 Not Detected NDET^Not Detected ng/mL Final     Comment:     Cutoff for a negative barbituate is 200 ng/ml or less.     Oxycodone (Oxycodone)   Date Value Ref Range Status   10/10/2018 Not Detected NDET^Not Detected ng/mL Final     Comment:     Cutoff for a negative Oxycodone is 100 ng/mL or less.     Propoxyphene (Norpropoxyphene)   Date Value Ref Range Status   10/10/2018 Not Detected NDET^Not Detected ng/mL Final     Comment:     Cutoff for a negative propoxyphene is 300 ng/ml or less     Buprenorphine (Buprenorphine)   Date Value Ref Range Status   10/10/2018 Not Detected NDET^Not Detected ng/mL Final     Comment:     Cutoff for a negative buprenorphine is 10 ng/ml or less        Processing:  Rx to be electronically transmitted to pharmacy by provider      [email or web address]       checked in past 3 months?  No, route to RN

## 2020-01-28 RX ORDER — DEXTROAMPHETAMINE SACCHARATE, AMPHETAMINE ASPARTATE MONOHYDRATE, DEXTROAMPHETAMINE SULFATE AND AMPHETAMINE SULFATE 5; 5; 5; 5 MG/1; MG/1; MG/1; MG/1
20 CAPSULE, EXTENDED RELEASE ORAL DAILY
Qty: 30 CAPSULE | Refills: 0 | Status: SHIPPED | OUTPATIENT
Start: 2020-01-28 | End: 2020-03-03

## 2020-01-28 RX ORDER — DEXTROAMPHETAMINE SACCHARATE, AMPHETAMINE ASPARTATE MONOHYDRATE, DEXTROAMPHETAMINE SULFATE AND AMPHETAMINE SULFATE 7.5; 7.5; 7.5; 7.5 MG/1; MG/1; MG/1; MG/1
30 CAPSULE, EXTENDED RELEASE ORAL DAILY
Qty: 30 CAPSULE | Refills: 0 | Status: SHIPPED | OUTPATIENT
Start: 2020-01-28 | End: 2020-03-03

## 2020-01-28 NOTE — TELEPHONE ENCOUNTER
Routing refill request to provider for review/approval because:  Drug not on the [facility] refill protocol      check last fill 1/2/2020 for 30 for each dose, JEANMARIE Tobias RN   [facility]

## 2020-02-27 ASSESSMENT — ENCOUNTER SYMPTOMS
EYE PAIN: 0
HEMATOCHEZIA: 0
COUGH: 0
HEARTBURN: 0
HEADACHES: 0
WEAKNESS: 0
ARTHRALGIAS: 1
SHORTNESS OF BREATH: 0
FEVER: 0
NERVOUS/ANXIOUS: 1
PARESTHESIAS: 0
HEMATURIA: 0
CHILLS: 0
JOINT SWELLING: 0
NAUSEA: 0
DIARRHEA: 0
ABDOMINAL PAIN: 0
DYSURIA: 0
SORE THROAT: 0
DIZZINESS: 0
PALPITATIONS: 0
FREQUENCY: 0
MYALGIAS: 1
CONSTIPATION: 0

## 2020-03-02 DIAGNOSIS — F90.0 ADHD (ATTENTION DEFICIT HYPERACTIVITY DISORDER), INATTENTIVE TYPE: ICD-10-CM

## 2020-03-03 RX ORDER — DEXTROAMPHETAMINE SACCHARATE, AMPHETAMINE ASPARTATE MONOHYDRATE, DEXTROAMPHETAMINE SULFATE AND AMPHETAMINE SULFATE 5; 5; 5; 5 MG/1; MG/1; MG/1; MG/1
20 CAPSULE, EXTENDED RELEASE ORAL DAILY
Qty: 30 CAPSULE | Refills: 0 | Status: SHIPPED | OUTPATIENT
Start: 2020-03-03 | End: 2020-09-01

## 2020-03-03 RX ORDER — DEXTROAMPHETAMINE SACCHARATE, AMPHETAMINE ASPARTATE MONOHYDRATE, DEXTROAMPHETAMINE SULFATE AND AMPHETAMINE SULFATE 7.5; 7.5; 7.5; 7.5 MG/1; MG/1; MG/1; MG/1
30 CAPSULE, EXTENDED RELEASE ORAL DAILY
Qty: 30 CAPSULE | Refills: 0 | Status: SHIPPED | OUTPATIENT
Start: 2020-03-03 | End: 2020-09-01

## 2020-03-03 NOTE — TELEPHONE ENCOUNTER
Controlled Substance Refill Request for amphetamine-dextroamphetamine (ADDERALL XR) 30 MG 24 hr capsule  Problem List Complete:    No     PROVIDER TO CONSIDER COMPLETION OF PROBLEM LIST AND OVERVIEW/CONTROLLED SUBSTANCE AGREEMENT    Last Written Prescription Date:  01/28/2020  Last Fill Quantity: 30,   # refills: 0    THE MOST RECENT OFFICE VISIT MUST BE WITHIN THE PAST 3 MONTHS. AT LEAST ONE FACE TO FACE VISIT MUST OCCUR EVERY 6 MONTHS. ADDITIONAL VISITS CAN BE VIRTUAL.  (THIS STATEMENT SHOULD BE DELETED.)    Last Office Visit with [facility] [place] [facility] primary care provider: 02/25/2020    Future Office visit:   Next 5 appointments (look out 90 days)    Mar 04, 2020  9:20 AM CST  PHYSICAL with ONEL Uribe CNP  [facility] ([facility]) [address] [place] [id number] [phone]          Controlled substance agreement:   Encounter-Level CSA:    There are no encounter-level csa.     Patient-Level CSA:    There are no patient-level csa.         Last Urine Drug Screen: No results found for: CDAUT, No results found for: COMDAT,   Cannabinoids (99-myj-4-carboxy-9-THC)   Date Value Ref Range Status   10/10/2018 Detected, Abnormal Result (A) NDET^Not Detected ng/mL Final     Comment:     Cutoff for a positive cannabinoid is greater than 50 ng/ml.  This is an unconfirmed screening result to be used for medical purposes only.   Order [id number] for confirmation or individual confirmation tests to [employer].       Phencyclidine (Phencyclidine)   Date Value Ref Range Status   10/10/2018 Not Detected NDET^Not Detected ng/mL Final     Comment:     Cutoff for a negative PCP is 25 ng/mL or less.     Cocaine (Benzoylecgonine)   Date Value Ref Range Status   10/10/2018 Not Detected NDET^Not Detected ng/mL Final     Comment:     Cutoff for a negative cocaine is 150 ng/ml or less.     Methamphetamine (d-Methamphetamine)   Date Value Ref Range Status   10/10/2018 Not Detected  NDET^Not Detected ng/mL Final     Comment:     Cutoff for a negative methamphetamine is 500 ng/ml or less.     Opiates (Morphine)   Date Value Ref Range Status   10/10/2018 Not Detected NDET^Not Detected ng/mL Final     Comment:     Cutoff for a negative opiate is 100 ng/ml or less.     Amphetamine (d-Amphetamine)   Date Value Ref Range Status   10/10/2018 Detected, Abnormal Result (A) NDET^Not Detected ng/mL Final     Comment:     Cutoff for a positive amphetamine is greater than 500 ng/ml.  This is an unconfirmed screening result to be used for medical purposes only.   Order [id number] for confirmation or individual confirmation tests to [employer].       Benzodiazepines (Nordiazepam)   Date Value Ref Range Status   10/10/2018 Not Detected NDET^Not Detected ng/mL Final     Comment:     Cutoff for a negative benzodiazepine is 150 ng/ml or less.     Tricyclic Antidepressants (Desipramine)   Date Value Ref Range Status   10/10/2018 Not Detected NDET^Not Detected ng/mL Final     Comment:     Cutoff for a negative tricyclic antidepressant is 300 ng/ml or less.     Methadone (Methadone)   Date Value Ref Range Status   10/10/2018 Not Detected NDET^Not Detected ng/mL Final     Comment:     Cutoff for a negative methadone is 200 ng/ml or less.     Barbiturates (Butalbital)   Date Value Ref Range Status   10/10/2018 Not Detected NDET^Not Detected ng/mL Final     Comment:     Cutoff for a negative barbituate is 200 ng/ml or less.     Oxycodone (Oxycodone)   Date Value Ref Range Status   10/10/2018 Not Detected NDET^Not Detected ng/mL Final     Comment:     Cutoff for a negative Oxycodone is 100 ng/mL or less.     Propoxyphene (Norpropoxyphene)   Date Value Ref Range Status   10/10/2018 Not Detected NDET^Not Detected ng/mL Final     Comment:     Cutoff for a negative propoxyphene is 300 ng/ml or less     Buprenorphine (Buprenorphine)   Date Value Ref Range Status   10/10/2018 Not Detected NDET^Not Detected ng/mL Final     Comment:      Cutoff for a negative buprenorphine is 10 ng/ml or less        Processing:  Rx to be electronically transmitted to pharmacy by provider      [email or web address]       checked in past 3 months?  No, route to RN    Controlled Substance Refill Request for amphetamine-dextroamphetamine (ADDERALL XR) 20 MG 24 hr capsule  Problem List Complete:    No     PROVIDER TO CONSIDER COMPLETION OF PROBLEM LIST AND OVERVIEW/CONTROLLED SUBSTANCE AGREEMENT    Last Written Prescription Date:  01/28/2020  Last Fill Quantity: 30,   # refills: 0    THE MOST RECENT OFFICE VISIT MUST BE WITHIN THE PAST 3 MONTHS. AT LEAST ONE FACE TO FACE VISIT MUST OCCUR EVERY 6 MONTHS. ADDITIONAL VISITS CAN BE VIRTUAL.  (THIS STATEMENT SHOULD BE DELETED.)    Last Office Visit with [facility] primary care provider: 02/25/2020    Future Office visit:   Next 5 appointments (look out 90 days)    Mar 04, 2020  9:20 AM CST  PHYSICAL with ONEL Uribe CNP  [facility] [place] [address] [place] [id number] [phone]          Controlled substance agreement:   Encounter-Level CSA:    There are no encounter-level csa.     Patient-Level CSA:    There are no patient-level csa.         Last Urine Drug Screen: No results found for: CDAUT, No results found for: COMDAT,   Cannabinoids (83-niu-2-carboxy-9-THC)   Date Value Ref Range Status   10/10/2018 Detected, Abnormal Result (A) NDET^Not Detected ng/mL Final     Comment:     Cutoff for a positive cannabinoid is greater than 50 ng/ml.  This is an unconfirmed screening result to be used for medical purposes only.   Order [id number] for confirmation or individual confirmation tests to [employer].       Phencyclidine (Phencyclidine)   Date Value Ref Range Status   10/10/2018 Not Detected NDET^Not Detected ng/mL Final     Comment:     Cutoff for a negative PCP is 25 ng/mL or less.     Cocaine (Benzoylecgonine)   Date Value Ref Range  Status   10/10/2018 Not Detected NDET^Not Detected ng/mL Final     Comment:     Cutoff for a negative cocaine is 150 ng/ml or less.     Methamphetamine (d-Methamphetamine)   Date Value Ref Range Status   10/10/2018 Not Detected NDET^Not Detected ng/mL Final     Comment:     Cutoff for a negative methamphetamine is 500 ng/ml or less.     Opiates (Morphine)   Date Value Ref Range Status   10/10/2018 Not Detected NDET^Not Detected ng/mL Final     Comment:     Cutoff for a negative opiate is 100 ng/ml or less.     Amphetamine (d-Amphetamine)   Date Value Ref Range Status   10/10/2018 Detected, Abnormal Result (A) NDET^Not Detected ng/mL Final     Comment:     Cutoff for a positive amphetamine is greater than 500 ng/ml.  This is an unconfirmed screening result to be used for medical purposes only.   Order [id number] for confirmation or individual confirmation tests to [employer].       Benzodiazepines (Nordiazepam)   Date Value Ref Range Status   10/10/2018 Not Detected NDET^Not Detected ng/mL Final     Comment:     Cutoff for a negative benzodiazepine is 150 ng/ml or less.     Tricyclic Antidepressants (Desipramine)   Date Value Ref Range Status   10/10/2018 Not Detected NDET^Not Detected ng/mL Final     Comment:     Cutoff for a negative tricyclic antidepressant is 300 ng/ml or less.     Methadone (Methadone)   Date Value Ref Range Status   10/10/2018 Not Detected NDET^Not Detected ng/mL Final     Comment:     Cutoff for a negative methadone is 200 ng/ml or less.     Barbiturates (Butalbital)   Date Value Ref Range Status   10/10/2018 Not Detected NDET^Not Detected ng/mL Final     Comment:     Cutoff for a negative barbituate is 200 ng/ml or less.     Oxycodone (Oxycodone)   Date Value Ref Range Status   10/10/2018 Not Detected NDET^Not Detected ng/mL Final     Comment:     Cutoff for a negative Oxycodone is 100 ng/mL or less.     Propoxyphene (Norpropoxyphene)   Date Value Ref Range Status   10/10/2018 Not Detected NDET^Not  Detected ng/mL Final     Comment:     Cutoff for a negative propoxyphene is 300 ng/ml or less     Buprenorphine (Buprenorphine)   Date Value Ref Range Status   10/10/2018 Not Detected NDET^Not Detected ng/mL Final     Comment:     Cutoff for a negative buprenorphine is 10 ng/ml or less        Processing:  Rx to be electronically transmitted to pharmacy by provider      [email or web address]       checked in past 3 months?  No, route to RN

## 2020-03-03 NOTE — TELEPHONE ENCOUNTER
CHECKED  LAST FILLED ON   01/28/2020, 30 mg, 30 Capsules, 0 refills by Dr. Lizama  01/02/2020, 30 capsules, 0 refills by Adali Merino CNP  01/28/2020, 20 mg, 30 capsules, 0 refills by Dr. Lizama.    01/02/2020, 30 capsules, 0 refills by ALEXEY Donaldson, RN on 3/3/2020 at 9:57 AM

## 2020-03-03 NOTE — PROGRESS NOTES
3  SUBJECTIVE:   CC: aCrrillo Dunlap is an 54 year old male who presents for preventive health visit.     Healthy Habits:  Answers for HPI/ROS submitted by the patient on 2/27/2020   Annual Exam:  Frequency of exercise:: 2-3 days/week  Getting at least 3 servings of Calcium per day:: Yes  Diet:: Regular (no restrictions)  Taking medications regularly:: Yes  Medication side effects:: Not applicable  Bi-annual eye exam:: Yes  Dental care twice a year:: Yes  Sleep apnea or symptoms of sleep apnea:: None  abdominal pain: No  Blood in stool: No  Blood in urine: No  chest pain: No  chills: No  congestion: No  constipation: No  cough: No  diarrhea: No  dizziness: No  ear pain: No  eye pain: No  nervous/anxious: Yes  fever: No  frequency: No  genital sores: No  headaches: No  hearing loss: No  heartburn: No  arthralgias: Yes  joint swelling: No  peripheral edema: No  mood changes: No  myalgias: Yes  nausea: No  dysuria: No  palpitations: No  Skin sensation changes: No  sore throat: No  urgency: No  rash: Yes  shortness of breath: No  visual disturbance: No  weakness: No  impotence: No  penile discharge: No  Additional concerns today:: Yes  Duration of exercise:: Less than 15 minutes    Mom passed away week ago, doing okay on meds  PROBLEMS TO ADD ON... ADHD stable no side effects     Today's PHQ-2 Score:   PHQ-2 ( 1999 [employer]) 2/27/2020 10/15/2019   Q1: Little interest or pleasure in doing things 1 0   Q2: Feeling down, depressed or hopeless 1 0   PHQ-2 Score 2 0   Q1: Little interest or pleasure in doing things Several days -   Q2: Feeling down, depressed or hopeless Several days -   PHQ-2 Score 2 -       Abuse: Current or Past(Physical, Sexual or Emotional)- No  Do you feel safe in your environment? Yes        Social History     Tobacco Use     Smoking status: Current Every Day Smoker     Packs/day: 1.00     Smokeless tobacco: Never Used   Substance Use Topics     Alcohol use: Yes     Comment: rare     If you drink  alcohol do you typically have >3 drinks per day or >7 drinks per week? No                      Last PSA: No results found for: PSA    Reviewed orders with patient. Reviewed health maintenance and updated orders accordingly - Yes  Lab work is in process  Labs reviewed in EPIC  BP Readings from Last 3 Encounters:   03/04/20 120/72   10/15/19 110/72   09/16/19 107/70    Wt Readings from Last 3 Encounters:   03/04/20 74.1 kg (163 lb 6.4 oz)   10/15/19 76.9 kg (169 lb 8 oz)   06/21/19 81 kg (178 lb 9.6 oz)                  Patient Active Problem List   Diagnosis     Dysthymia     Generalized anxiety disorder     Rectal fistula     Hyperlipidemia with target LDL less than 130     ADHD (attention deficit hyperactivity disorder), inattentive type     Excess skin of abdomen     Moderate major depression (H)     Past Surgical History:   Procedure Laterality Date     AMPUTATION  just kidding    be nice to have it pop up again where I simple verify it.     COLONOSCOPY  12/2016     GASTRIC BYPASS      2008     GI SURGERY         Social History     Tobacco Use     Smoking status: Current Every Day Smoker     Packs/day: 1.00     Smokeless tobacco: Never Used   Substance Use Topics     Alcohol use: Yes     Comment: rare     Family History   Problem Relation Age of Onset     Heart Disease Mother      Obesity Mother      C.A.D. Father         MI     Coronary Artery Disease Father      Coronary Artery Disease Paternal Grandfather      Depression/Anxiety Brother      Obesity Brother      Obesity Brother      Coronary Artery Disease Brother         heart blockage      Obesity Brother          Current Outpatient Medications   Medication Sig Dispense Refill     amphetamine-dextroamphetamine (ADDERALL XR) 20 MG 24 hr capsule Take 1 capsule (20 mg) by mouth daily 30 capsule 0     amphetamine-dextroamphetamine (ADDERALL XR) 30 MG 24 hr capsule Take 1 capsule (30 mg) by mouth daily 30 capsule 0     ascorbic acid (VITAMIN C) 500 MG  "tablet Take 500 mg by mouth daily        buPROPion (WELLBUTRIN XL) 300 MG 24 hr tablet Take 1 tablet (300 mg) by mouth every morning 90 tablet 0     busPIRone (BUSPAR) 5 MG tablet Take 2 tablets (10 mg) by mouth 2 times daily 120 tablet 1     Calcium-Magnesium-Vitamin D (CITRACAL CALCIUM+D PO) Take 1 tablet by mouth 3 times daily       cyanocolbalamin (VITAMIN B-12) 1000 MCG tablet Take 1,000 mcg by mouth daily        fish oil-omega-3 fatty acids (FISH OIL) 1000 MG capsule Take 3 g by mouth daily        fluocinonide (LIDEX) 0.05 % solution Apply sparingly to affected area twice daily as needed.  Do not apply to face. 60 mL 5     Multiple Vitamins-Iron (MULTIVITAMIN/IRON PO) Take 1 tablet by mouth daily       zinc 50 MG TABS Take 1 tablet by mouth daily        No Known Allergies    Reviewed and updated as needed this visit by clinical staff         Reviewed and updated as needed this visit by Provider        Past Medical History:   Diagnosis Date     Depressive disorder      Heart disease 07-17-17    My brother just found out about a blockage in his heart.     Obesity, unspecified       Past Surgical History:   Procedure Laterality Date     AMPUTATION  just kidding    be nice to have it pop up again where I simple verify it.     COLONOSCOPY  12/2016     GASTRIC BYPASS      2008     GI SURGERY         ROS:  Constitutional, eye, ENT, skin, respiratory, cardiac, GI, , MSK, neuro, psych, and allergy are normal except as otherwise noted.      OBJECTIVE:   /72   Pulse 88   Temp 97  F (36.1  C) (Oral)   Ht 1.709 m (5' 7.28\")   Wt 74.1 kg (163 lb 6.4 oz)   SpO2 99%   BMI 25.38 kg/m    EXAM:  GENERAL: healthy, alert and no distress  EYES: Eyes grossly normal to inspection, PERRL and conjunctivae and sclerae normal  HENT: ear canals and TM's normal, nose and mouth without ulcers or lesions  NECK: no adenopathy, no asymmetry, masses, or scars and thyroid normal to palpation  RESP: lungs clear to auscultation - no " "rales, rhonchi or wheezes  CV: regular rate and rhythm, normal S1 S2, no S3 or S4, no murmur, click or rub, no peripheral edema and peripheral pulses strong  ABDOMEN: soft, nontender, no hepatosplenomegaly, no masses and bowel sounds normal   (male): normal male genitalia without lesions or urethral discharge, no hernia  MS: no gross musculoskeletal defects noted, no edema  SKIN: no suspicious lesions or rashes  NEURO: Normal strength and tone, mentation intact and speech normal  PSYCH: mentation appears normal, affect normal/bright    Diagnostic Test Results:  Labs reviewed in Epic  No results found for this or any previous visit (from the past 24 hour(s)).    ASSESSMENT/PLAN:       ICD-10-CM    1. Routine general medical examination at a health care facility  Z00.00    2. Lipid screening  Z13.220 Lipid panel reflex to direct LDL Fasting   3. Screening for diabetes mellitus  Z13.1 Glucose   4. ADHD (attention deficit hyperactivity disorder), inattentive type  F90.0 Drug Abuse Screen Panel 13, Urine (Pain Care Package)     amphetamine-dextroamphetamine (ADDERALL XR) 30 MG 24 hr capsule     amphetamine-dextroamphetamine (ADDERALL XR) 30 MG 24 hr capsule     amphetamine-dextroamphetamine (ADDERALL XR) 30 MG 24 hr capsule     amphetamine-dextroamphetamine (ADDERALL XR) 20 MG 24 hr capsule     amphetamine-dextroamphetamine (ADDERALL XR) 20 MG 24 hr capsule     amphetamine-dextroamphetamine (ADDERALL XR) 20 MG 24 hr capsule   5. UGO (generalized anxiety disorder)  F41.1 busPIRone (BUSPAR) 5 MG tablet       COUNSELING:  Reviewed preventive health counseling, as reflected in patient instructions    Estimated body mass index is 25.77 kg/m  as calculated from the following:    Height as of 10/15/19: 1.727 m (5' 8\").    Weight as of 10/15/19: 76.9 kg (169 lb 8 oz).    Weight management plan: Discussed healthy diet and exercise guidelines     reports that he has been smoking. He has been smoking about 1.00 pack per day. He " has never used smokeless tobacco.  Tobacco Cessation Action Plan: Self help information given to patient    Counseling Resources:  ATP IV Guidelines  Pooled Cohorts Equation Calculator  FRAX Risk Assessment  ICSI Preventive Guidelines  Dietary Guidelines for Americans, 2010  USDA's MyPlate  ASA Prophylaxis  Lung CA Screening    ONEL Uribe CNP  [facility] [place]

## 2020-03-04 ENCOUNTER — OFFICE VISIT (OUTPATIENT)
Dept: FAMILY MEDICINE | Facility: CLINIC | Age: 55
End: 2020-03-04
Payer: COMMERCIAL

## 2020-03-04 VITALS
HEART RATE: 88 BPM | DIASTOLIC BLOOD PRESSURE: 72 MMHG | WEIGHT: 163.4 LBS | HEIGHT: 67 IN | OXYGEN SATURATION: 99 % | TEMPERATURE: 97 F | BODY MASS INDEX: 25.65 KG/M2 | SYSTOLIC BLOOD PRESSURE: 120 MMHG

## 2020-03-04 DIAGNOSIS — Z13.220 LIPID SCREENING: ICD-10-CM

## 2020-03-04 DIAGNOSIS — Z00.00 ROUTINE GENERAL MEDICAL EXAMINATION AT A HEALTH CARE FACILITY: Primary | ICD-10-CM

## 2020-03-04 DIAGNOSIS — F41.1 GAD (GENERALIZED ANXIETY DISORDER): ICD-10-CM

## 2020-03-04 DIAGNOSIS — Z13.1 SCREENING FOR DIABETES MELLITUS: ICD-10-CM

## 2020-03-04 DIAGNOSIS — F90.0 ADHD (ATTENTION DEFICIT HYPERACTIVITY DISORDER), INATTENTIVE TYPE: ICD-10-CM

## 2020-03-04 LAB
AMPHETAMINES UR QL: ABNORMAL NG/ML
BARBITURATES UR QL SCN: NOT DETECTED NG/ML
BENZODIAZ UR QL SCN: NOT DETECTED NG/ML
BUPRENORPHINE UR QL: NOT DETECTED NG/ML
CANNABINOIDS UR QL: ABNORMAL NG/ML
CHOLEST SERPL-MCNC: 182 MG/DL
COCAINE UR QL SCN: NOT DETECTED NG/ML
D-METHAMPHET UR QL: NOT DETECTED NG/ML
GLUCOSE SERPL-MCNC: 92 MG/DL (ref 70–99)
HDLC SERPL-MCNC: 41 MG/DL
LDLC SERPL CALC-MCNC: 125 MG/DL
METHADONE UR QL SCN: NOT DETECTED NG/ML
NONHDLC SERPL-MCNC: 141 MG/DL
OPIATES UR QL SCN: NOT DETECTED NG/ML
OXYCODONE UR QL SCN: NOT DETECTED NG/ML
PCP UR QL SCN: NOT DETECTED NG/ML
PROPOXYPH UR QL: NOT DETECTED NG/ML
TRICYCLICS UR QL SCN: NOT DETECTED NG/ML
TRIGL SERPL-MCNC: 79 MG/DL

## 2020-03-04 PROCEDURE — 36415 COLL VENOUS BLD VENIPUNCTURE: CPT | Performed by: NURSE PRACTITIONER

## 2020-03-04 PROCEDURE — 82947 ASSAY GLUCOSE BLOOD QUANT: CPT | Performed by: NURSE PRACTITIONER

## 2020-03-04 PROCEDURE — 99396 PREV VISIT EST AGE 40-64: CPT | Performed by: NURSE PRACTITIONER

## 2020-03-04 PROCEDURE — 80306 DRUG TEST PRSMV INSTRMNT: CPT | Performed by: NURSE PRACTITIONER

## 2020-03-04 PROCEDURE — 80061 LIPID PANEL: CPT | Performed by: NURSE PRACTITIONER

## 2020-03-04 RX ORDER — DEXTROAMPHETAMINE SACCHARATE, AMPHETAMINE ASPARTATE MONOHYDRATE, DEXTROAMPHETAMINE SULFATE AND AMPHETAMINE SULFATE 7.5; 7.5; 7.5; 7.5 MG/1; MG/1; MG/1; MG/1
30 CAPSULE, EXTENDED RELEASE ORAL DAILY
Qty: 30 CAPSULE | Refills: 0 | Status: SHIPPED | OUTPATIENT
Start: 2020-03-04 | End: 2020-04-03

## 2020-03-04 RX ORDER — BUSPIRONE HYDROCHLORIDE 5 MG/1
10 TABLET ORAL 2 TIMES DAILY
Qty: 120 TABLET | Refills: 1 | Status: SHIPPED | OUTPATIENT
Start: 2020-03-04 | End: 2020-10-06

## 2020-03-04 RX ORDER — DEXTROAMPHETAMINE SACCHARATE, AMPHETAMINE ASPARTATE MONOHYDRATE, DEXTROAMPHETAMINE SULFATE AND AMPHETAMINE SULFATE 7.5; 7.5; 7.5; 7.5 MG/1; MG/1; MG/1; MG/1
30 CAPSULE, EXTENDED RELEASE ORAL DAILY
Qty: 30 CAPSULE | Refills: 0 | Status: SHIPPED | OUTPATIENT
Start: 2020-05-05 | End: 2020-06-04

## 2020-03-04 RX ORDER — DEXTROAMPHETAMINE SACCHARATE, AMPHETAMINE ASPARTATE MONOHYDRATE, DEXTROAMPHETAMINE SULFATE AND AMPHETAMINE SULFATE 5; 5; 5; 5 MG/1; MG/1; MG/1; MG/1
20 CAPSULE, EXTENDED RELEASE ORAL DAILY
Qty: 30 CAPSULE | Refills: 0 | Status: SHIPPED | OUTPATIENT
Start: 2020-04-04 | End: 2020-06-01

## 2020-03-04 RX ORDER — DEXTROAMPHETAMINE SACCHARATE, AMPHETAMINE ASPARTATE MONOHYDRATE, DEXTROAMPHETAMINE SULFATE AND AMPHETAMINE SULFATE 5; 5; 5; 5 MG/1; MG/1; MG/1; MG/1
20 CAPSULE, EXTENDED RELEASE ORAL DAILY
Qty: 30 CAPSULE | Refills: 0 | Status: SHIPPED | OUTPATIENT
Start: 2020-03-04 | End: 2020-04-03

## 2020-03-04 RX ORDER — DEXTROAMPHETAMINE SACCHARATE, AMPHETAMINE ASPARTATE MONOHYDRATE, DEXTROAMPHETAMINE SULFATE AND AMPHETAMINE SULFATE 7.5; 7.5; 7.5; 7.5 MG/1; MG/1; MG/1; MG/1
30 CAPSULE, EXTENDED RELEASE ORAL DAILY
Qty: 30 CAPSULE | Refills: 0 | Status: SHIPPED | OUTPATIENT
Start: 2020-04-04 | End: 2020-06-01

## 2020-03-04 RX ORDER — DEXTROAMPHETAMINE SACCHARATE, AMPHETAMINE ASPARTATE MONOHYDRATE, DEXTROAMPHETAMINE SULFATE AND AMPHETAMINE SULFATE 5; 5; 5; 5 MG/1; MG/1; MG/1; MG/1
20 CAPSULE, EXTENDED RELEASE ORAL DAILY
Qty: 30 CAPSULE | Refills: 0 | Status: SHIPPED | OUTPATIENT
Start: 2020-05-05 | End: 2020-06-04

## 2020-03-04 ASSESSMENT — MIFFLIN-ST. JEOR: SCORE: 1549.3

## 2020-04-01 ENCOUNTER — VIRTUAL VISIT (OUTPATIENT)
Dept: PSYCHOLOGY | Facility: CLINIC | Age: 55
End: 2020-04-01
Payer: COMMERCIAL

## 2020-04-01 DIAGNOSIS — F32.1 MODERATE MAJOR DEPRESSION (H): Primary | ICD-10-CM

## 2020-04-01 DIAGNOSIS — F90.0 ADHD (ATTENTION DEFICIT HYPERACTIVITY DISORDER), INATTENTIVE TYPE: ICD-10-CM

## 2020-04-01 PROCEDURE — 90834 PSYTX W PT 45 MINUTES: CPT | Mod: 95 | Performed by: SOCIAL WORKER

## 2020-04-01 NOTE — PROGRESS NOTES
"                  Telemedicine Visit: The patient's condition can be safely assessed and treated via synchronous audio and visual telemedicine encounter.       Reason for Telemedicine Visit: Patient has requested telehealth visit     Originating Site (Patient Location): Patient's home     Distant Site (Provider Location): Provider Remote Setting     Consent:  The patient/guardian has verbally consented to: the potential risks and benefits of telemedicine (video visit) versus in person care; bill my insurance or make self-payment for services provided; and responsibility for payment of non-covered services                             Progress Note    Client Name: Carrillo Dunlap  Date: 4.1.20         Service Type: Individual  Video Visit: No     Session Start Time:  9:00am  Session End Time: 9:50am    Session Length: 60 minutes    Session #: 24    Attendees: Client attended alone     Treatment Plan Last Reviewed: Reviewed  PHQ-9 / UGO-7 : Reviewed    DATA  Interactive Complexity: No  Crisis: No       Progress Since Last Session (Related to Symptoms / Goals / Homework):  Symptoms: Improving.    Homework: Achieved / completed to satisfaction.      Episode of Care Goals: Achieved / completed to satisfaction - ACTION (Actively working towards change); Intervened by reinforcing change plan / affirming steps taken     Current / Ongoing Stressors and Concerns: Pt reports he reached out to his uncle and other family members to check in about potential employment. Pt reports being somewhat emotionally triggered when his uncle said, \"so, you're looking for some projects huh?\" Discussed how this activated pt's core belief of, \"I failed working outside of the [employer] and am not worthy.\" Encouraged pt to put this core belief on trail. Pt identified there being a possibility that his uncle really enjoys and appreciates the pt's skillset and artistic component because he has said this in the past. Pt changed his core " "belief to, \"others in my work see my contributes as valuable and worthwhile.\" Pt reports that his emotional intensity lessened and now \"I feel more motivated to continue to follow up on potential projects.     Treatment Objective(s) Addressed in This Session:   Identify negative self-talk and behaviors: challenge core beliefs, myths, and actions   Decrease frequency and intensity of feeling down, depressed, hopeless   Client will review and familiarize himself with feeling words. He will use feeling words to describe when her needs are met/not met.      Intervention:  Psychoeducation: Explored with the pt using open ended questioning what the purpose of worry is. Discussed how in order to worry excessively about something it has to be 1. Important to us; 2. We have to be invested in the outcome (we have a preferred outcome). Processed how our minds continue to ruminate on a worry because of the fear of the unknown or uncertain. Our mind searches for different possibilities, in part to ensure that we are capable to cope with the various outcomes, especially if they may cause more pain/suffering in the long run. Discussed how our preferred outcome is the least suffering/ easiest option. Processed however, that many times we are able to cope with the other outcomes, although it is more difficult and there may be more suffering/pain. Pt processed that although this may be the cause, it can help him find resilience in himself and he may grow as a person.       ASSESSMENT: Current Emotional / Mental Status (status of significant symptoms):   Risk status (Self / Other harm or suicidal ideation)   Client denies current fears or concerns for personal safety.   Client denies current or recent suicidal ideation or behaviors.   Client denies current or recent homicidal ideation or behaviors.   Client denies current or recent self injurious behavior or ideation.   Client denies other safety concerns.    Client reports there has " "been no change in risk factors since their last session.      Client reports there has been no change in protective factors since their last session.      A safety and risk management has not been developed at this time.  Client was given the Suicide Prevention National Hotline, Text [place] 829830,  the [facility] Crisis Number, or encouraged to Call 911 should there be a change  in these risk factors.       Appearance:   Appropriate    Eye Contact:   Good    Psychomotor Behavior: Restless , more so than in previous sessions   Attitude:   Cooperative    Orientation:   All   Speech    Rate / Production: Hyperverbal     Volume:  Normal    Mood:    Anxious    Affect:    Appropriate    Thought Content:  Clear , more clear than past sessions   Thought Form:  Coherent    Insight:    Fair, improving     Medication Review:   No changes to current psychiatric medication(s)     Medication Compliance:   Yes     Changes in Health Issues:   None reported     Chemical Use Review:  Substance Use: decrease in cannabis.  Client reports frequency using of 0 in the past two weeks.    Tobacco Use: No current tobacco use.      Diagnosis:  1. Moderate major depression (H)    2. ADHD (attention deficit hyperactivity disorder), inattentive type      Collateral Reports Completed:   Not Applicable    PLAN: (Client Tasks / Therapist Tasks / Other). Pt will put his core beliefs on trial by asking himself, \"what evidence contradicts my automatic thought/belief that is negatively based?\"    AFSHIN Gomez, [facility] 4.1.20   Note reviewed and clinical supervision by AFSHIN [facility] 4/6/2020   ___________________________________________________________________________    Treatment Plan     Client's Name: Carrillo Dunlap                   YOB: 1965     Date: 1/21/19; reviewed: 5/15/19; updated 8/13/19; 1.3.20      DSM-V Diagnoses: Attention-Deficit/Hyperactivity Disorder  314.01 (F90.2) Combined presentation or 296.31 " (F33.0) Major Depressive Disorder, Recurrent Episode, Mild _ and With anxious distress  Psychosocial / Contextual Factors: Limited social support, limited financial support, physical/medical concerns, employment constraints  WHODAS: did not complete during visit     Referral / Collaboration:  Referral to another professional/service is not indicated at this time.     Anticipated number of session or this episode of care: 15      MeasurableTreatment Goal(s) related to diagnosis / functional impairment(s)  Goal 1: Client will begin to work on his work portfolio.     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       Objective #A (Client Action)                Client will schedule times of the day to work on his work portfolio. 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   Status: completed - Date: 5/15/19      Intervention(s)  Therapist will assign homework including weekly activity scheduling and CBT thought logs  provide educational materials on CBT  role-play effective communication skills.    Goal 2: Client will decrease his monthly marijuana use.     I will know I've met my goal when I do not use marijuana around my brother.      Objective #A (Client Action)    Client will identify at least 3 example(s) of how marijuana has resulted in an experience that interferes with person values or goals.  Status: completed- 5/15/19    Intervention(s)  Therapist will help client identify how his personal values and goals are interfered when he uses marijuana.     Objective #B  Client will identify triggers and environmental cues contributing to his marijuana usage.     Status: completed - Date: 5/15/19     Intervention(s)  Therapist will teach the client how to perform a behavioral chain analysis to better identify triggers and cues to using marijuana.     Objective #C  Client will  maintain sobriety for one month.  Status: completed - Date: 5.15.19     Intervention(s)  Therapist will provide educational materials for the client to increase his coping skills when he has urges to use marijuana. Therapist will provide additional resources to help him maintain sobriety.     Goal 3: Client will decrease his anxiety from his baseline GAD7 score.    I will know I've met my goal when I am able to let things go and allow myself permission to relax and not worry.      Objective #A  Client will identify 5 initial signs or symptoms of anxiety.    Status: completed - Date: 5/15/19     Intervention(s)  Therapist will provide educational materials on the symptoms of anxiety.    Objective #B (Client Action)    Status: completed - Date: 5/15/19     Client will identify 5 fears / thoughts that contribute to feeling anxious.    Intervention(s)  Therapist will teach the client how to perform a behavioral chain analysis in order to identify fears/thoughts contributing to anxious feelings.     Objective #C  Client will use at least 4 coping skills for anxiety management in the next 10 weeks.  Status: continued - Date: 2/19/19; 1.3.20     Intervention(s)  Therapist will teach progressive muscle relaxation, cue control relaxation, mindful breathing, and guided imagery. Therapist will also utilize Acceptance and Commitment Therapy by exploring and identifying important values in patient's life, and discuss ways to commit to behavioral activation around these values.     Goal 4: The client will learn and apply skills to manage the symptoms of depression.    I will know I've met my goal when I am able to let things go and allow myself permission to relax, not worry, and feel better about myself.      Objective #A: Client will Increase interest, engagement, and pleasure in doing things.  Client will identify negative self-talk and behaviors: challenge core beliefs, myths, and actions.  Decrease frequency and intensity of  feeling down, depressed, hopeless.  Status: continued Date(s): 5/15/19    Interventions  Therapist will provide educational materials and handouts on core beliefs, cognitive distortions, and cognitive fusion and defusion.    Goal 5: Client will explore and resolve issues related to relationship issues with his boss.    I will know I've met my goal when I am able to feel better about my relationship with his boss.      Objective #A (Client Action)    Client will review and familiarize himself with feeling words. He will use feeling words to describe when her needs are met/not met.   Status: New - Date: 8/13/19; 1.3.20     Intervention(s)  Therapist will assign emotional recognition/identification including when expressing when his needs are not being met or are being met.    Objective #B  Client will identify negative self-talk and behaviors associated with his relationship with his boss and others, and challenge core beliefs, myths, and actions.  Status: 8/13/19; 1.3.20     Intervention(s)  Therapist will provide education and homework on CBT, ACT, and DBT. Therapist will provide strategies to evaluate for cognitive distortions and logical errors. Therapist will also help client begin to accept feeling negative emotions in a way that helps to decrease his suffering.      Objective #C   Client will compile a list of boundaries she will set with his boss and others.   Status: New - Date: 8/13/19; 1.3.20     Intervention(s)  Therapist will teach about healthy boundaries. Including information about different communication styles emphasizing assertive communication as the most helpful/healthy style.        AFSHIN Gomez, [facility], May 15, 2019; 1.3.20   Note reviewed and clinical supervision by AFSHIN [facility] 1/31/2019, 7/15/2019 , 8/271/9, 4/6/2020

## 2020-04-24 ENCOUNTER — TELEPHONE (OUTPATIENT)
Dept: PSYCHOLOGY | Facility: CLINIC | Age: 55
End: 2020-04-24

## 2020-04-24 NOTE — TELEPHONE ENCOUNTER
This writer contacted pt to see if interested in scheduling future therapy appointments. Instructed pt to contact [facility] scheduling line if interested.

## 2020-06-01 ENCOUNTER — MYC REFILL (OUTPATIENT)
Dept: FAMILY MEDICINE | Facility: CLINIC | Age: 55
End: 2020-06-01

## 2020-06-01 ENCOUNTER — MYC MEDICAL ADVICE (OUTPATIENT)
Dept: FAMILY MEDICINE | Facility: CLINIC | Age: 55
End: 2020-06-01

## 2020-06-01 DIAGNOSIS — F32.1 MODERATE MAJOR DEPRESSION (H): ICD-10-CM

## 2020-06-01 DIAGNOSIS — F90.0 ADHD (ATTENTION DEFICIT HYPERACTIVITY DISORDER), INATTENTIVE TYPE: ICD-10-CM

## 2020-06-01 RX ORDER — DEXTROAMPHETAMINE SACCHARATE, AMPHETAMINE ASPARTATE MONOHYDRATE, DEXTROAMPHETAMINE SULFATE AND AMPHETAMINE SULFATE 5; 5; 5; 5 MG/1; MG/1; MG/1; MG/1
20 CAPSULE, EXTENDED RELEASE ORAL DAILY
Qty: 30 CAPSULE | Refills: 0 | Status: SHIPPED | OUTPATIENT
Start: 2020-06-04 | End: 2020-07-29

## 2020-06-01 RX ORDER — DEXTROAMPHETAMINE SACCHARATE, AMPHETAMINE ASPARTATE MONOHYDRATE, DEXTROAMPHETAMINE SULFATE AND AMPHETAMINE SULFATE 7.5; 7.5; 7.5; 7.5 MG/1; MG/1; MG/1; MG/1
30 CAPSULE, EXTENDED RELEASE ORAL DAILY
Qty: 30 CAPSULE | Refills: 0 | Status: SHIPPED | OUTPATIENT
Start: 2020-06-04 | End: 2020-07-29

## 2020-06-01 RX ORDER — BUPROPION HYDROCHLORIDE 300 MG/1
300 TABLET ORAL EVERY MORNING
Qty: 90 TABLET | Refills: 0 | Status: SHIPPED | OUTPATIENT
Start: 2020-06-01 | End: 2020-09-02

## 2020-06-01 RX ORDER — DEXTROAMPHETAMINE SACCHARATE, AMPHETAMINE ASPARTATE MONOHYDRATE, DEXTROAMPHETAMINE SULFATE AND AMPHETAMINE SULFATE 5; 5; 5; 5 MG/1; MG/1; MG/1; MG/1
20 CAPSULE, EXTENDED RELEASE ORAL DAILY
Qty: 30 CAPSULE | Refills: 0 | Status: CANCELLED | OUTPATIENT
Start: 2020-06-01

## 2020-06-01 RX ORDER — DEXTROAMPHETAMINE SACCHARATE, AMPHETAMINE ASPARTATE MONOHYDRATE, DEXTROAMPHETAMINE SULFATE AND AMPHETAMINE SULFATE 7.5; 7.5; 7.5; 7.5 MG/1; MG/1; MG/1; MG/1
30 CAPSULE, EXTENDED RELEASE ORAL DAILY
Qty: 30 CAPSULE | Refills: 0 | Status: CANCELLED | OUTPATIENT
Start: 2020-06-01

## 2020-06-01 NOTE — TELEPHONE ENCOUNTER
"Requested Prescriptions   Pending Prescriptions Disp Refills     buPROPion (WELLBUTRIN XL) 300 MG 24 hr tablet 90 tablet 0     Sig: Take 1 tablet (300 mg) by mouth every morning       SSRIs Protocol Failed - 6/1/2020  1:56 PM        Failed - PHQ-9 score less than 5 in past 6 months     Please review last PHQ-9 score.           Passed - Medication is Bupropion     If the medication is Bupropion (Wellbutrin), and the patient is taking for smoking cessation; OK to refill.          Passed - Medication is active on med list        Passed - Patient is age 18 or older        Passed - Recent (6 mo) or future (30 days) visit within the authorizing provider's specialty     Patient had office visit in the last 6 months or has a visit in the next 30 days with authorizing provider or within the authorizing provider's specialty.  See \"Patient Info\" tab in inbasket, or \"Choose Columns\" in Meds & Orders section of the refill encounter.          Routing refill request to provider for review/approval because:  PHQ-9 does not meet RN refill protocol  PHQ-9 score:    PHQ 1/3/2020   PHQ-9 Total Score 15   Q9: Thoughts of better off dead/self-harm past 2 weeks Not at all                    amphetamine-dextroamphetamine (ADDERALL XR) 30 MG 24 hr capsule 30 capsule 0     Sig: Take 1 capsule (30 mg) by mouth daily       There is no refill protocol information for this order        amphetamine-dextroamphetamine (ADDERALL XR) 20 MG 24 hr capsule 30 capsule 0     Sig: Take 1 capsule (20 mg) by mouth daily       There is no refill protocol information for this order      Only cued up one month worth for adderall.  Last Written Prescription Date:  3/4/20  Last Fill Quantity: 30,  # refills: 0   Last office visit: 3/4/2020 with prescribing provider:  Adali Merino   Future Office Visit:      Routing refill request to provider for review/approval because:  Drug not on the [facility] [place], [facility] or [facility] refill protocol or controlled substance     checked, " last fill for 20 mg and 30 m/2 and 5/3  Patient sent my chart about refills as well. Please advise so can respond. Thanks   Shana Cobian RN   [facility]

## 2020-06-15 ENCOUNTER — VIRTUAL VISIT (OUTPATIENT)
Dept: PSYCHOLOGY | Facility: CLINIC | Age: 55
End: 2020-06-15
Payer: COMMERCIAL

## 2020-06-15 DIAGNOSIS — F32.1 MODERATE MAJOR DEPRESSION (H): Primary | ICD-10-CM

## 2020-06-15 DIAGNOSIS — F90.0 ADHD (ATTENTION DEFICIT HYPERACTIVITY DISORDER), INATTENTIVE TYPE: ICD-10-CM

## 2020-06-15 DIAGNOSIS — F41.1 GENERALIZED ANXIETY DISORDER: ICD-10-CM

## 2020-06-15 PROCEDURE — 90834 PSYTX W PT 45 MINUTES: CPT | Mod: 95 | Performed by: SOCIAL WORKER

## 2020-06-15 NOTE — PROGRESS NOTES
Telemedicine Visit: The patient's condition can be safely assessed and treated via synchronous audio and visual telemedicine encounter.       Reason for Telemedicine Visit: Patient has requested telehealth visit     Originating Site (Patient Location): Patient's home     Distant Site (Provider Location): Provider Remote Setting     Consent:  The patient/guardian has verbally consented to: the potential risks and benefits of telemedicine (video visit) versus in person care; bill my insurance or make self-payment for services provided; and responsibility for payment of non-covered services                             Mode of Communication:  Video Conference via [employer]  As the provider I attest to compliance with applicable laws and regulations related to telemedicine.    Progress Note    Client Name: Carrillo Dunlap  Date: 6.15.20         Service Type: Individual  Video Visit: yes     Session Start Time:  10:00am  Session End Time: 10:50am    Session Length: 60 minutes    Session #: 25    Attendees: Client attended alone     Treatment Plan Last Reviewed: Reviewed today  PHQ-9 / UGO-7 : Reviewed    DATA  Interactive Complexity: No  Crisis: No       Progress Since Last Session (Related to Symptoms / Goals / Homework):  Symptoms: Improving.    Homework: Achieved / completed to satisfaction.      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 Pt reports the inventory is now is in his garage and is organized. Pt reports the [employer] company hired him for 1 day a week virtually employed. Continued discussing and exploring evidence for old and new core  "beliefs. Continued encouraging pt to put this core belief on trail. Pt identified there being a possibility that his uncle really enjoys and appreciates the pt's skillset and artistic component because he has said this in the past. Pt changed his core belief to, \"others in my work see my contributes as valuable and worthwhile.\" Pt reports that his emotional intensity lessened and now \"I feel more motivated to continue to follow up on potential projects.     Treatment Objective(s) Addressed in This Session:   Identify negative self-talk and behaviors: challenge core beliefs, myths, and actions   Decrease frequency and intensity of feeling down, depressed, hopeless   Client will review and familiarize himself with feeling words. He will use feeling words to describe when her needs are met/not met.      Intervention:  Psychoeducation: Explored with the pt using open ended questioning what the purpose of worry is. Discussed how in order to worry excessively about something it has to be 1. Important to us; 2. We have to be invested in the outcome (we have a preferred outcome). Processed how our minds continue to ruminate on a worry because of the fear of the unknown or uncertain. Our mind searches for different possibilities, in part to ensure that we are capable to cope with the various outcomes, especially if they may cause more pain/suffering in the long run. Discussed how our preferred outcome is the least suffering/ easiest option. Processed however, that many times we are able to cope with the other outcomes, although it is more difficult and there may be more suffering/pain. Pt processed that although this may be the cause, it can help him find resilience in himself and he may grow as a person.       ASSESSMENT: Current Emotional / Mental Status (status of significant symptoms):   Risk status (Self / Other harm or suicidal ideation)   Client denies current fears or concerns for personal safety.   Client denies " "current or recent suicidal ideation or behaviors.   Client denies current or recent homicidal ideation or behaviors.   Client denies current or recent self injurious behavior or ideation.   Client denies other safety concerns.    Client reports there has been no change in risk factors since their last session.      Client reports there has been no change in protective factors since their last session.      A safety and risk management has not been developed at this time.  Client was given the Suicide Prevention National Hotline, Text [place] 204243,  the [facility] Crisis Number, or encouraged to Call 911 should there be a change  in these risk factors.       Appearance:   Appropriate    Eye Contact:   Good    Psychomotor Behavior: Restless , more so than in previous sessions   Attitude:   Cooperative    Orientation:   All   Speech    Rate / Production: Hyperverbal     Volume:  Normal    Mood:    Anxious    Affect:    Appropriate    Thought Content:  Clear , more clear than past sessions   Thought Form:  Coherent    Insight:    Fair, improving     Medication Review:   No changes to current psychiatric medication(s)     Medication Compliance:   Yes     Changes in Health Issues:   None reported     Chemical Use Review:  Substance Use: decrease in cannabis.  Client reports frequency using of 0 in the past two weeks.    Tobacco Use: No current tobacco use.      Diagnosis:  1. Moderate major depression (H)    2. ADHD (attention deficit hyperactivity disorder), inattentive type    3. Generalized anxiety disorder      Collateral Reports Completed:   Not Applicable    PLAN: (Client Tasks / Therapist Tasks / Other). Pt will continue to reflect on replacing his core belief to, \"others in my work see my contributes as valuable and worthwhile.\"    AFSHIN Gomez, SW 6.15.20   Note reviewed and clinical supervision by AFSHIN Leavitt [facility] 6/22/2020 "   ___________________________________________________________________________    Treatment Plan     Client's Name: Carrillo Dunlap                   YOB: 1965     Date: 1/21/19; reviewed: 5/15/19; updated 8/13/19; 1.3.20 ; 6.15.20     DSM-V Diagnoses: Attention-Deficit/Hyperactivity Disorder  314.01 (F90.2) Combined presentation or 296.31 (F33.0) Major Depressive Disorder, Recurrent Episode, Mild _ and With anxious distress  Psychosocial / Contextual Factors: Limited social support, limited financial support, physical/medical concerns, employment constraints  WHODAS: did not complete during visit     Referral / Collaboration:  Referral to another professional/service is not indicated at this time.     Anticipated number of session or this episode of care: 15      MeasurableTreatment Goal(s) related to diagnosis / functional impairment(s)  Goal 1: Client will begin to work on his work portfolio.     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       Objective #A (Client Action)                Client will schedule times of the day to work on his work portfolio. 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   Status: completed - Date: 5/15/19      Intervention(s)  Therapist will assign homework including weekly activity scheduling and CBT thought logs  provide educational materials on CBT  role-play effective communication skills.    Goal 2: Client will decrease his monthly marijuana use.     I will know I've met my goal when I do not use marijuana around my brother.      Objective #A (Client Action)    Client will identify at least 3 example(s) of how marijuana has resulted in an experience that interferes with person values or goals.  Status: completed- 5/15/19    Intervention(s)  Therapist will help client identify how his personal values and goals are  interfered when he uses marijuana.     Objective #B  Client will identify triggers and environmental cues contributing to his marijuana usage.     Status: completed - Date: 5/15/19     Intervention(s)  Therapist will teach the client how to perform a behavioral chain analysis to better identify triggers and cues to using marijuana.     Objective #C  Client will maintain sobriety for one month.  Status: completed - Date: 5.15.19     Intervention(s)  Therapist will provide educational materials for the client to increase his coping skills when he has urges to use marijuana. Therapist will provide additional resources to help him maintain sobriety.     Goal 3: Client will decrease his anxiety from his baseline GAD7 score.    I will know I've met my goal when I am able to let things go and allow myself permission to relax and not worry.      Objective #A  Client will identify 5 initial signs or symptoms of anxiety.    Status: completed - Date: 5/15/19     Intervention(s)  Therapist will provide educational materials on the symptoms of anxiety.    Objective #B (Client Action)    Status: completed - Date: 5/15/19     Client will identify 5 fears / thoughts that contribute to feeling anxious.    Intervention(s)  Therapist will teach the client how to perform a behavioral chain analysis in order to identify fears/thoughts contributing to anxious feelings.     Objective #C  Client will use at least 4 coping skills for anxiety management in the next 10 weeks.  Status: continued - Date: 2/19/19; 1.3.20; 6.15.20     Intervention(s)  Therapist will teach progressive muscle relaxation, cue control relaxation, mindful breathing, and guided imagery. Therapist will also utilize Acceptance and Commitment Therapy by exploring and identifying important values in patient's life, and discuss ways to commit to behavioral activation around these values.     Goal 4: The client will learn and apply skills to manage the symptoms of  depression.    I will know I've met my goal when I am able to let things go and allow myself permission to relax, not worry, and feel better about myself.      Objective #A: Client will Increase interest, engagement, and pleasure in doing things.  Client will identify negative self-talk and behaviors: challenge core beliefs, myths, and actions.  Decrease frequency and intensity of feeling down, depressed, hopeless.  Status: continued Date(s): 5/15/19; 6.15.20    Interventions  Therapist will provide educational materials and handouts on core beliefs, cognitive distortions, and cognitive fusion and defusion.    Goal 5: Client will explore and resolve issues related to relationship issues with his boss.    I will know I've met my goal when I am able to feel better about my relationship with his boss.      Objective #A (Client Action)    Client will review and familiarize himself with feeling words. He will use feeling words to describe when her needs are met/not met.   Status: New - Date: 8/13/19; 1.3.20 ; 6.15.20    Intervention(s)  Therapist will assign emotional recognition/identification including when expressing when his needs are not being met or are being met.    Objective #B  Client will identify negative self-talk and behaviors associated with his relationship with his boss and others, and challenge core beliefs, myths, and actions.  Status: 8/13/19; 1.3.20 ; 6.15.20    Intervention(s)  Therapist will provide education and homework on CBT, ACT, and DBT. Therapist will provide strategies to evaluate for cognitive distortions and logical errors. Therapist will also help client begin to accept feeling negative emotions in a way that helps to decrease his suffering.      Objective #C   Client will compile a list of boundaries she will set with his boss and others.   Status: New - Date: 8/13/19; 1.3.20 ; 6.15.20    Intervention(s)  Therapist will teach about healthy boundaries. Including information about  different communication styles emphasizing assertive communication as the most helpful/healthy style.        AFSHIN Gomez, [facility], May 15, 2019; 1.3.20 ; 6.15.20  Note reviewed and clinical supervision by AFSHIN Weaver [facility] 1/31/2019, 7/15/2019 , 8/271/9, 4/6/2020, 6/22/2020

## 2020-06-29 ENCOUNTER — VIRTUAL VISIT (OUTPATIENT)
Dept: PSYCHOLOGY | Facility: CLINIC | Age: 55
End: 2020-06-29
Payer: COMMERCIAL

## 2020-06-29 DIAGNOSIS — F90.0 ADHD (ATTENTION DEFICIT HYPERACTIVITY DISORDER), INATTENTIVE TYPE: ICD-10-CM

## 2020-06-29 DIAGNOSIS — F41.1 GENERALIZED ANXIETY DISORDER: ICD-10-CM

## 2020-06-29 DIAGNOSIS — F32.1 MODERATE MAJOR DEPRESSION (H): Primary | ICD-10-CM

## 2020-06-29 PROCEDURE — 90834 PSYTX W PT 45 MINUTES: CPT | Mod: 95 | Performed by: SOCIAL WORKER

## 2020-06-29 NOTE — PROGRESS NOTES
Telemedicine Visit: The patient's condition can be safely assessed and treated via synchronous audio and visual telemedicine encounter.       Reason for Telemedicine Visit: Patient has requested telehealth visit     Originating Site (Patient Location): Patient's home     Distant Site (Provider Location): Provider Remote Setting     Consent:  The patient/guardian has verbally consented to: the potential risks and benefits of telemedicine (video visit) versus in person care; bill my insurance or make self-payment for services provided; and responsibility for payment of non-covered services                             Mode of Communication:  Video Conference via [employer]  As the provider I attest to compliance with applicable laws and regulations related to telemedicine.    Progress Note    Client Name: Carrillo Dunlap  Date: 6.19.20         Service Type: Individual  Video Visit: yes     Session Start Time:  11:00am  Session End Time: 11:50am    Session Length: 60 minutes    Session #: 25    Attendees: Client attended alone     Treatment Plan Last Reviewed: Reviewed  PHQ-9 / UGO-7 : Reviewed    DATA  Interactive Complexity: No  Crisis: No       Progress Since Last Session (Related to Symptoms / Goals / Homework):  Symptoms: Improving.    Homework: Achieved / completed to satisfaction.      Episode of Care Goals: Achieved / completed to satisfaction - ACTION (Actively working towards change); Intervened by reinforcing change plan / affirming steps taken     Current / Ongoing Stressors and Concerns: Continued discussing and exploring evidence for old and new core beliefs. Continued encouraging pt to put this core belief on trail. 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 Encouraged pt in session, to welcome this as counter information to his belief that  "\"I am just at the [employer] because it is a family company and I am there for that reason.\" Discussed how pt has found himself not making a commitment as to whether he will accept more work at the [employer]. Discussed how this has made it more difficult to complete other tasks because of not knowing what his future where hold.      Treatment Objective(s) Addressed in This Session:   Identify negative self-talk and behaviors: challenge core beliefs, myths, and actions   Decrease frequency and intensity of feeling down, depressed, hopeless   Client will review and familiarize himself with feeling words. He will use feeling words to describe when her needs are met/not met.      Intervention:  Psychoeducation: Explored with the pt using open ended questioning what the purpose of worry is. Discussed how in order to worry excessively about something it has to be 1. Important to us; 2. We have to be invested in the outcome (we have a preferred outcome). Processed how our minds continue to ruminate on a worry because of the fear of the unknown or uncertain. Our mind searches for different possibilities, in part to ensure that we are capable to cope with the various outcomes, especially if they may cause more pain/suffering in the long run. Discussed how our preferred outcome is the least suffering/ easiest option. Processed however, that many times we are able to cope with the other outcomes, although it is more difficult and there may be more suffering/pain. Pt processed that although this may be the cause, it can help him find resilience in himself and he may grow as a person.       ASSESSMENT: Current Emotional / Mental Status (status of significant symptoms):   Risk status (Self / Other harm or suicidal ideation)   Client denies current fears or concerns for personal safety.   Client denies current or recent suicidal ideation or behaviors.   Client denies current or recent homicidal ideation or " "behaviors.   Client denies current or recent self injurious behavior or ideation.   Client denies other safety concerns.    Client reports there has been no change in risk factors since their last session.      Client reports there has been no change in protective factors since their last session.      A safety and risk management has not been developed at this time.  Client was given the Suicide Prevention National Hotline, Text [place] 804881,  the [facility] Crisis Number, or encouraged to Call 911 should there be a change  in these risk factors.       Appearance:   Appropriate    Eye Contact:   Good    Psychomotor Behavior: Restless , more so than in previous sessions   Attitude:   Cooperative    Orientation:   All   Speech    Rate / Production: Hyperverbal     Volume:  Normal    Mood:    Anxious    Affect:    Appropriate    Thought Content:  Clear , more clear than past sessions   Thought Form:  Coherent    Insight:    Fair, improving     Medication Review:   No changes to current psychiatric medication(s)     Medication Compliance:   Yes     Changes in Health Issues:   None reported     Chemical Use Review:  Substance Use: decrease in cannabis.  Client reports frequency using of 0 in the past two weeks.    Tobacco Use: No current tobacco use.      Diagnosis:  1. Moderate major depression (H)    2. ADHD (attention deficit hyperactivity disorder), inattentive type    3. Generalized anxiety disorder      Collateral Reports Completed:   Not Applicable    PLAN: (Client Tasks / Therapist Tasks / Other). Pt will continue will recite his newly found core belief when negotiating a contract for the [employer] company, \"others in my work see my contributes as valuable and worthwhile.\"    AFSHIN Gomez, SW 6.29.20   Note reviewed and clinical supervision by AFSHIN Leavitt [facility] 6/30/2020   ___________________________________________________________________________    Treatment Plan     Client's Name: Carrillo Vicente " Fabi                   YOB: 1965     Date: 1/21/19; reviewed: 5/15/19; updated 8/13/19; 1.3.20 ; 6.15.20     DSM-V Diagnoses: Attention-Deficit/Hyperactivity Disorder  314.01 (F90.2) Combined presentation or 296.31 (F33.0) Major Depressive Disorder, Recurrent Episode, Mild _ and With anxious distress  Psychosocial / Contextual Factors: Limited social support, limited financial support, physical/medical concerns, employment constraints  WHODAS: did not complete during visit     Referral / Collaboration:  Referral to another professional/service is not indicated at this time.     Anticipated number of session or this episode of care: 15      MeasurableTreatment Goal(s) related to diagnosis / functional impairment(s)  Goal 1: Client will begin to work on his work portfolio.     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       Objective #A (Client Action)                Client will schedule times of the day to work on his work portfolio. 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   Status: completed - Date: 5/15/19      Intervention(s)  Therapist will assign homework including weekly activity scheduling and CBT thought logs  provide educational materials on CBT  role-play effective communication skills.    Goal 2: Client will decrease his monthly marijuana use.     I will know I've met my goal when I do not use marijuana around my brother.      Objective #A (Client Action)    Client will identify at least 3 example(s) of how marijuana has resulted in an experience that interferes with person values or goals.  Status: completed- 5/15/19    Intervention(s)  Therapist will help client identify how his personal values and goals are interfered when he uses marijuana.     Objective #B  Client will identify triggers and environmental cues contributing to his  marijuana usage.     Status: completed - Date: 5/15/19     Intervention(s)  Therapist will teach the client how to perform a behavioral chain analysis to better identify triggers and cues to using marijuana.     Objective #C  Client will maintain sobriety for one month.  Status: completed - Date: 5.15.19     Intervention(s)  Therapist will provide educational materials for the client to increase his coping skills when he has urges to use marijuana. Therapist will provide additional resources to help him maintain sobriety.     Goal 3: Client will decrease his anxiety from his baseline GAD7 score.    I will know I've met my goal when I am able to let things go and allow myself permission to relax and not worry.      Objective #A  Client will identify 5 initial signs or symptoms of anxiety.    Status: completed - Date: 5/15/19     Intervention(s)  Therapist will provide educational materials on the symptoms of anxiety.    Objective #B (Client Action)    Status: completed - Date: 5/15/19     Client will identify 5 fears / thoughts that contribute to feeling anxious.    Intervention(s)  Therapist will teach the client how to perform a behavioral chain analysis in order to identify fears/thoughts contributing to anxious feelings.     Objective #C  Client will use at least 4 coping skills for anxiety management in the next 10 weeks.  Status: continued - Date: 2/19/19; 1.3.20; 6.15.20     Intervention(s)  Therapist will teach progressive muscle relaxation, cue control relaxation, mindful breathing, and guided imagery. Therapist will also utilize Acceptance and Commitment Therapy by exploring and identifying important values in patient's life, and discuss ways to commit to behavioral activation around these values.     Goal 4: The client will learn and apply skills to manage the symptoms of depression.    I will know I've met my goal when I am able to let things go and allow myself permission to relax, not worry, and feel  better about myself.      Objective #A: Client will Increase interest, engagement, and pleasure in doing things.  Client will identify negative self-talk and behaviors: challenge core beliefs, myths, and actions.  Decrease frequency and intensity of feeling down, depressed, hopeless.  Status: continued Date(s): 5/15/19; 6.15.20    Interventions  Therapist will provide educational materials and handouts on core beliefs, cognitive distortions, and cognitive fusion and defusion.    Goal 5: Client will explore and resolve issues related to relationship issues with his boss.    I will know I've met my goal when I am able to feel better about my relationship with his boss.      Objective #A (Client Action)    Client will review and familiarize himself with feeling words. He will use feeling words to describe when her needs are met/not met.   Status: New - Date: 8/13/19; 1.3.20 ; 6.15.20    Intervention(s)  Therapist will assign emotional recognition/identification including when expressing when his needs are not being met or are being met.    Objective #B  Client will identify negative self-talk and behaviors associated with his relationship with his boss and others, and challenge core beliefs, myths, and actions.  Status: 8/13/19; 1.3.20 ; 6.15.20    Intervention(s)  Therapist will provide education and homework on CBT, ACT, and DBT. Therapist will provide strategies to evaluate for cognitive distortions and logical errors. Therapist will also help client begin to accept feeling negative emotions in a way that helps to decrease his suffering.      Objective #C   Client will compile a list of boundaries she will set with his boss and others.   Status: New - Date: 8/13/19; 1.3.20 ; 6.15.20    Intervention(s)  Therapist will teach about healthy boundaries. Including information about different communication styles emphasizing assertive communication as the most helpful/healthy style.        Jose Hudson, MSANA, LGSW,  May 15, 2019; 1.3.20 ; 6.15.20  Note reviewed and clinical supervision by AFSHIN Weaver [facility] 1/31/2019, 7/15/2019 , 8/271/9, 4/6/2020, 6/22/2020

## 2020-07-13 ENCOUNTER — VIRTUAL VISIT (OUTPATIENT)
Dept: PSYCHOLOGY | Facility: CLINIC | Age: 55
End: 2020-07-13
Payer: COMMERCIAL

## 2020-07-13 DIAGNOSIS — F90.0 ADHD (ATTENTION DEFICIT HYPERACTIVITY DISORDER), INATTENTIVE TYPE: Primary | ICD-10-CM

## 2020-07-13 DIAGNOSIS — F41.1 GENERALIZED ANXIETY DISORDER: ICD-10-CM

## 2020-07-13 DIAGNOSIS — F32.1 MODERATE MAJOR DEPRESSION (H): ICD-10-CM

## 2020-07-13 PROCEDURE — 90834 PSYTX W PT 45 MINUTES: CPT | Mod: 95 | Performed by: SOCIAL WORKER

## 2020-07-13 NOTE — PROGRESS NOTES
Telemedicine Visit: The patient's condition can be safely assessed and treated via synchronous audio and visual telemedicine encounter.       Reason for Telemedicine Visit: Patient has requested telehealth visit     Originating Site (Patient Location): Patient's home     Distant Site (Provider Location): Provider Remote Setting     Consent:  The patient/guardian has verbally consented to: the potential risks and benefits of telemedicine (video visit) versus in person care; bill my insurance or make self-payment for services provided; and responsibility for payment of non-covered services                             Mode of Communication:  Video Conference via [employer]  As the provider I attest to compliance with applicable laws and regulations related to telemedicine.    Progress Note    Client Name: Carrillo Dunlap  Date: 7.13.30         Service Type: Individual  Video Visit: yes     Session Start Time:  10:20am  Session End Time: 11:00am    Session Length: 40 minutes    Session #: 27    Attendees: Client attended alone     Treatment Plan Last Reviewed: Reviewed  PHQ-9 / UGO-7 : Reviewed    DATA  Interactive Complexity: No  Crisis: No       Progress Since Last Session (Related to Symptoms / Goals / Homework):  Symptoms: Improving.    Homework: Achieved / completed to satisfaction.      Episode of Care Goals: Achieved / completed to satisfaction - ACTION (Actively working towards change); Intervened by reinforcing change plan / affirming steps taken     Current / Ongoing Stressors and Concerns: Pt reports that he talked with Bunny about writing a note related to accepting the position to work for the [employer] 1 day a week. Pt reports that he was able to assertively formulate additional questions learning from past job opportunity that did not go well. Validated pt's ability to check in on getting a more clear expectations from him about what is expected of Nirmal in this new role.  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 For the remainder of the session, discussed how pt is internalizing an ability to recognize that he is being critical/criticze himself and is able to pivot and consider other possibilities that arise out of having more empathy/compassion for himself. Discussed how this is different than how he was raised by his father who often carried a stoic manner and approached things with an attitude of \"don't feel, just do.\"    Past session: Continued discussing and exploring evidence for old and new core beliefs. Continued encouraging pt to put this core belief on trail. 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 Encouraged pt in session, to welcome this as counter information to his belief that \"I am just at the [employer] because it is a family company and I am there for that reason.\" Discussed how pt has found himself not making a commitment as to whether he will accept more work at the [employer]. Discussed how this has made it more difficult to complete other tasks because of not knowing what his future where hold.      Treatment Objective(s) Addressed in This Session:   Identify negative self-talk and behaviors: challenge core beliefs, myths, and actions   Decrease frequency and intensity of feeling down, depressed, hopeless   Client will review and familiarize himself with feeling words. He will use feeling words to describe when her needs are met/not met.      Intervention:  Psychoeducation: Explored with the pt using open ended questioning what the purpose of worry is. Discussed how in order to worry excessively about something it has to be 1. Important to us; 2. We have to " be invested in the outcome (we have a preferred outcome). Processed how our minds continue to ruminate on a worry because of the fear of the unknown or uncertain. Our mind searches for different possibilities, in part to ensure that we are capable to cope with the various outcomes, especially if they may cause more pain/suffering in the long run. Discussed how our preferred outcome is the least suffering/ easiest option. Processed however, that many times we are able to cope with the other outcomes, although it is more difficult and there may be more suffering/pain. Pt processed that although this may be the cause, it can help him find resilience in himself and he may grow as a person.       ASSESSMENT: Current Emotional / Mental Status (status of significant symptoms):   Risk status (Self / Other harm or suicidal ideation)   Client denies current fears or concerns for personal safety.   Client denies current or recent suicidal ideation or behaviors.   Client denies current or recent homicidal ideation or behaviors.   Client denies current or recent self injurious behavior or ideation.   Client denies other safety concerns.    Client reports there has been no change in risk factors since their last session.      Client reports there has been no change in protective factors since their last session.      A safety and risk management has not been developed at this time.  Client was given the Suicide Prevention National Hotline, Text [place] 600640,  the [facility] Crisis Number, or encouraged to Call 911 should there be a change  in these risk factors.       Appearance:   Appropriate    Eye Contact:   Good    Psychomotor Behavior: Restless , more so than in previous sessions   Attitude:   Cooperative    Orientation:   All   Speech    Rate / Production: Hyperverbal     Volume:  Normal    Mood:    Anxious    Affect:    Appropriate    Thought Content:  Clear , more clear than past sessions   Thought Form:  Coherent     Insight:    Fair, improving     Medication Review:   No changes to current psychiatric medication(s)     Medication Compliance:   Yes     Changes in Health Issues:   None reported     Chemical Use Review:  Substance Use: decrease in cannabis.  Client reports frequency using of 0 in the past two weeks.    Tobacco Use: No current tobacco use.      Diagnosis:  1. ADHD (attention deficit hyperactivity disorder), inattentive type    2. Generalized anxiety disorder    3. Moderate major depression (H)      Collateral Reports Completed:   Not Applicable    PLAN: (Client Tasks / Therapist Tasks / Other). Pt will continue to explore possibilities that arise out of having compassion/empathy toward himself when he notices he is actively criticizing or being critical of himself.      AFSHIN Gomez, [facility] 7.13.20   Note reviewed and clinical supervision by AFSHIN [facility] 7/17/2020   ___________________________________________________________________________    Treatment Plan     Client's Name: Carrillo Dunlap                   YOB: 1965     Date: 1/21/19; reviewed: 5/15/19; updated 8/13/19; 1.3.20 ; 6.15.20     DSM-V Diagnoses: Attention-Deficit/Hyperactivity Disorder  314.01 (F90.2) Combined presentation or 296.31 (F33.0) Major Depressive Disorder, Recurrent Episode, Mild _ and With anxious distress  Psychosocial / Contextual Factors: Limited social support, limited financial support, physical/medical concerns, employment constraints  WHODAS: did not complete during visit     Referral / Collaboration:  Referral to another professional/service is not indicated at this time.     Anticipated number of session or this episode of care: 15      MeasurableTreatment Goal(s) related to diagnosis / functional impairment(s)  Goal 1: Client will begin to work on his work portfolio.     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       Objective #A (Client Action)                Client will schedule times of the day to work on his work portfolio. 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   Status: completed - Date: 5/15/19      Intervention(s)  Therapist will assign homework including weekly activity scheduling and CBT thought logs  provide educational materials on CBT  role-play effective communication skills.    Goal 2: Client will decrease his monthly marijuana use.     I will know I've met my goal when I do not use marijuana around my brother.      Objective #A (Client Action)    Client will identify at least 3 example(s) of how marijuana has resulted in an experience that interferes with person values or goals.  Status: completed- 5/15/19    Intervention(s)  Therapist will help client identify how his personal values and goals are interfered when he uses marijuana.     Objective #B  Client will identify triggers and environmental cues contributing to his marijuana usage.     Status: completed - Date: 5/15/19     Intervention(s)  Therapist will teach the client how to perform a behavioral chain analysis to better identify triggers and cues to using marijuana.     Objective #C  Client will maintain sobriety for one month.  Status: completed - Date: 5.15.19     Intervention(s)  Therapist will provide educational materials for the client to increase his coping skills when he has urges to use marijuana. Therapist will provide additional resources to help him maintain sobriety.     Goal 3: Client will decrease his anxiety from his baseline GAD7 score.    I will know I've met my goal when I am able to let things go and allow myself permission to relax and not worry.      Objective #A  Client will identify 5 initial signs or symptoms of anxiety.    Status: completed - Date: 5/15/19     Intervention(s)  Therapist will provide educational materials on the symptoms of  anxiety.    Objective #B (Client Action)    Status: completed - Date: 5/15/19     Client will identify 5 fears / thoughts that contribute to feeling anxious.    Intervention(s)  Therapist will teach the client how to perform a behavioral chain analysis in order to identify fears/thoughts contributing to anxious feelings.     Objective #C  Client will use at least 4 coping skills for anxiety management in the next 10 weeks.  Status: continued - Date: 2/19/19; 1.3.20; 6.15.20     Intervention(s)  Therapist will teach progressive muscle relaxation, cue control relaxation, mindful breathing, and guided imagery. Therapist will also utilize Acceptance and Commitment Therapy by exploring and identifying important values in patient's life, and discuss ways to commit to behavioral activation around these values.     Goal 4: The client will learn and apply skills to manage the symptoms of depression.    I will know I've met my goal when I am able to let things go and allow myself permission to relax, not worry, and feel better about myself.      Objective #A: Client will Increase interest, engagement, and pleasure in doing things.  Client will identify negative self-talk and behaviors: challenge core beliefs, myths, and actions.  Decrease frequency and intensity of feeling down, depressed, hopeless.  Status: continued Date(s): 5/15/19; 6.15.20    Interventions  Therapist will provide educational materials and handouts on core beliefs, cognitive distortions, and cognitive fusion and defusion.    Goal 5: Client will explore and resolve issues related to relationship issues with his boss.    I will know I've met my goal when I am able to feel better about my relationship with his boss.      Objective #A (Client Action)    Client will review and familiarize himself with feeling words. He will use feeling words to describe when her needs are met/not met.   Status: New - Date: 8/13/19; 1.3.20 ; 6.15.20    Intervention(s)  Therapist  will assign emotional recognition/identification including when expressing when his needs are not being met or are being met.    Objective #B  Client will identify negative self-talk and behaviors associated with his relationship with his boss and others, and challenge core beliefs, myths, and actions.  Status: 8/13/19; 1.3.20 ; 6.15.20    Intervention(s)  Therapist will provide education and homework on CBT, ACT, and DBT. Therapist will provide strategies to evaluate for cognitive distortions and logical errors. Therapist will also help client begin to accept feeling negative emotions in a way that helps to decrease his suffering.      Objective #C   Client will compile a list of boundaries she will set with his boss and others.   Status: New - Date: 8/13/19; 1.3.20 ; 6.15.20    Intervention(s)  Therapist will teach about healthy boundaries. Including information about different communication styles emphasizing assertive communication as the most helpful/healthy style.        AFSHIN Gomez, [facility], May 15, 2019; 1.3.20 ; 6.15.20  Note reviewed and clinical supervision by AFSHIN [facility] 1/31/2019, 7/15/2019 , 8/271/9, 4/6/2020, 6/22/2020

## 2020-07-27 DIAGNOSIS — F90.0 ADHD (ATTENTION DEFICIT HYPERACTIVITY DISORDER), INATTENTIVE TYPE: ICD-10-CM

## 2020-07-29 RX ORDER — DEXTROAMPHETAMINE SACCHARATE, AMPHETAMINE ASPARTATE MONOHYDRATE, DEXTROAMPHETAMINE SULFATE AND AMPHETAMINE SULFATE 7.5; 7.5; 7.5; 7.5 MG/1; MG/1; MG/1; MG/1
30 CAPSULE, EXTENDED RELEASE ORAL DAILY
Qty: 30 CAPSULE | Refills: 0 | Status: SHIPPED | OUTPATIENT
Start: 2020-07-29 | End: 2020-09-01

## 2020-07-29 RX ORDER — DEXTROAMPHETAMINE SACCHARATE, AMPHETAMINE ASPARTATE MONOHYDRATE, DEXTROAMPHETAMINE SULFATE AND AMPHETAMINE SULFATE 5; 5; 5; 5 MG/1; MG/1; MG/1; MG/1
CAPSULE, EXTENDED RELEASE ORAL
Qty: 30 CAPSULE | Refills: 0 | Status: SHIPPED | OUTPATIENT
Start: 2020-07-29 | End: 2020-08-01

## 2020-08-01 ENCOUNTER — MYC REFILL (OUTPATIENT)
Dept: FAMILY MEDICINE | Facility: CLINIC | Age: 55
End: 2020-08-01

## 2020-08-01 DIAGNOSIS — F90.0 ADHD (ATTENTION DEFICIT HYPERACTIVITY DISORDER), INATTENTIVE TYPE: ICD-10-CM

## 2020-08-04 RX ORDER — DEXTROAMPHETAMINE SACCHARATE, AMPHETAMINE ASPARTATE MONOHYDRATE, DEXTROAMPHETAMINE SULFATE AND AMPHETAMINE SULFATE 5; 5; 5; 5 MG/1; MG/1; MG/1; MG/1
20 CAPSULE, EXTENDED RELEASE ORAL EVERY MORNING
Qty: 30 CAPSULE | Refills: 0 | Status: SHIPPED | OUTPATIENT
Start: 2020-08-04 | End: 2020-09-01

## 2020-08-04 NOTE — TELEPHONE ENCOUNTER
Routing refill request to provider for review/approval because:  Drug not on the [facility] refill protocol      check last fill of the was on 6/29/2020 for 30, Dr Lizama  Last OV 3/4/2020    Kindra Tobias RN   [facility]

## 2020-08-10 ENCOUNTER — VIRTUAL VISIT (OUTPATIENT)
Dept: PSYCHOLOGY | Facility: CLINIC | Age: 55
End: 2020-08-10
Payer: COMMERCIAL

## 2020-08-10 DIAGNOSIS — F32.1 MODERATE MAJOR DEPRESSION (H): ICD-10-CM

## 2020-08-10 DIAGNOSIS — F90.0 ADHD (ATTENTION DEFICIT HYPERACTIVITY DISORDER), INATTENTIVE TYPE: Primary | ICD-10-CM

## 2020-08-10 DIAGNOSIS — F41.1 GENERALIZED ANXIETY DISORDER: ICD-10-CM

## 2020-08-10 PROCEDURE — 90837 PSYTX W PT 60 MINUTES: CPT | Mod: 95 | Performed by: SOCIAL WORKER

## 2020-08-10 NOTE — PROGRESS NOTES
"                  Telemedicine Visit: The patient's condition can be safely assessed and treated via synchronous audio and visual telemedicine encounter.       Reason for Telemedicine Visit: Patient has requested telehealth visit     Originating Site (Patient Location): Patient's home     Distant Site (Provider Location): Provider Remote Setting     Consent:  The patient/guardian has verbally consented to: the potential risks and benefits of telemedicine (video visit) versus in person care; bill my insurance or make self-payment for services provided; and responsibility for payment of non-covered services                             Mode of Communication:  Video Conference via [employer]  As the provider I attest to compliance with applicable laws and regulations related to telemedicine.    Progress Note    Client Name: Carrillo Dunlap  Date: 8.10.20         Service Type: Individual  Video Visit: yes     Session Start Time:  10:00am  Session End Time: 11:00am    Session Length: 60 minutes    Session #: 28    Attendees: Client attended alone     Treatment Plan Last Reviewed: Reviewed  PHQ-9 / UGO-7 : Reviewed    DATA  Interactive Complexity: No  Crisis: No       Progress Since Last Session (Related to Symptoms / Goals / Homework):  Symptoms: Improving.    Homework: Achieved / completed to satisfaction.      Episode of Care Goals: Achieved / completed to satisfaction - ACTION (Actively working towards change); Intervened by reinforcing change plan / affirming steps taken     Current / Ongoing Stressors and Concerns: Pt reports feeling under the weather for the past few weeks and thinks \"I may have had the covid but it was not terrible.\" Pt reports that he provided the [employer] company for a pay rate, and person has not responded for a few weeks. He reports thinking to himself \"I am not worthy\" and pivoted to \"what would Panchito think\" and came up with the thought. Discussed how he noticed how his body began to de " "stress, more accepting of \"what it is\" and was able to pivot to \"doing my taxes instead of avoiding.\"     that he talked with Bunny about writing a note related to accepting the position to work for the [employer] company 1 day a week. Pt reports that he was able to assertively formulate additional questions learning from past job opportunity that did not go well. Validated pt's ability to check in on getting a more clear expectations from him about what is expected of Nirmal in this new role. 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 For the remainder of the session, discussed how pt is internalizing an ability to recognize that he is being critical/criticze himself and is able to pivot and consider other possibilities that arise out of having more empathy/compassion for himself. Discussed how this is different than how he was raised by his father who often carried a stoic manner and approached things with an attitude of \"don't feel, just do.\"    Past session: Continued discussing and exploring evidence for old and new core beliefs. Continued encouraging pt to put this core belief on trail. 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 Encouraged pt in session, to welcome this as counter information to his belief that \"I am just at the [employer] because it is a family company and I am there for that reason.\" Discussed how pt has found himself not making a commitment as to whether he will accept more work at the [employer] company. Discussed how this has made it more difficult to complete other tasks because of not knowing what his future where hold.      Treatment Objective(s) Addressed in This Session:   Identify negative self-talk " and behaviors: challenge core beliefs, myths, and actions   Decrease frequency and intensity of feeling down, depressed, hopeless   Client will review and familiarize himself with feeling words. He will use feeling words to describe when her needs are met/not met.      Intervention:  Psychoeducation: Explored with the pt using open ended questioning what the purpose of worry is. Discussed how in order to worry excessively about something it has to be 1. Important to us; 2. We have to be invested in the outcome (we have a preferred outcome). Processed how our minds continue to ruminate on a worry because of the fear of the unknown or uncertain. Our mind searches for different possibilities, in part to ensure that we are capable to cope with the various outcomes, especially if they may cause more pain/suffering in the long run. Discussed how our preferred outcome is the least suffering/ easiest option. Processed however, that many times we are able to cope with the other outcomes, although it is more difficult and there may be more suffering/pain. Pt processed that although this may be the cause, it can help him find resilience in himself and he may grow as a person.       ASSESSMENT: Current Emotional / Mental Status (status of significant symptoms):   Risk status (Self / Other harm or suicidal ideation)   Client denies current fears or concerns for personal safety.   Client denies current or recent suicidal ideation or behaviors.   Client denies current or recent homicidal ideation or behaviors.   Client denies current or recent self injurious behavior or ideation.   Client denies other safety concerns.    Client reports there has been no change in risk factors since their last session.      Client reports there has been no change in protective factors since their last session.      A safety and risk management has not been developed at this time.  Client was given the Suicide Prevention National Hotline, Text [place]  "995227,  [facility] Number, or encouraged to Call 911 should there be a change  in these risk factors.       Appearance:   Appropriate    Eye Contact:   Good    Psychomotor Behavior: Restless , more so than in previous sessions   Attitude:   Cooperative    Orientation:   All   Speech    Rate / Production: Hyperverbal     Volume:  Normal    Mood:    Anxious    Affect:    Appropriate    Thought Content:  Clear , more clear than past sessions   Thought Form:  Coherent    Insight:    Fair, improving     Medication Review:   No changes to current psychiatric medication(s)     Medication Compliance:   Yes     Changes in Health Issues:   None reported     Chemical Use Review:  Substance Use: decrease in cannabis.  Client reports frequency using of 0 in the past two weeks.    Tobacco Use: No current tobacco use.      Diagnosis:  1. ADHD (attention deficit hyperactivity disorder), inattentive type    2. Generalized anxiety disorder    3. Moderate major depression (H)      Collateral Reports Completed:   Not Applicable    PLAN: (Client Tasks / Therapist Tasks / Other). Pt will think about 'away moves' in avoiding painful thoughts, memories and ask himself \"what is a toward move I can make right now to make my life more valuable.      AFSHIN Gomez, [facility] 8.10.20   Note reviewed and clinical supervision by AFSHIN Leavitt [facility] 8/11/2020   ___________________________________________________________________________    Treatment Plan     Client's Name: Carrillo Dunlap                   YOB: 1965     Date: 1/21/19; reviewed: 5/15/19; updated 8/13/19; 1.3.20 ; 6.15.20     DSM-V Diagnoses: Attention-Deficit/Hyperactivity Disorder  314.01 (F90.2) Combined presentation or 296.31 (F33.0) Major Depressive Disorder, Recurrent Episode, Mild _ and With anxious distress  Psychosocial / Contextual Factors: Limited social support, limited financial support, physical/medical concerns, employment " constraints  WHODAS: did not complete during visit     Referral / Collaboration:  Referral to another professional/service is not indicated at this time.     Anticipated number of session or this episode of care: 15      MeasurableTreatment Goal(s) related to diagnosis / functional impairment(s)  Goal 1: Client will begin to work on his work portfolio.     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       Objective #A (Client Action)                Client will schedule times of the day to work on his work portfolio. 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   Status: completed - Date: 5/15/19      Intervention(s)  Therapist will assign homework including weekly activity scheduling and CBT thought logs  provide educational materials on CBT  role-play effective communication skills.    Goal 2: Client will decrease his monthly marijuana use.     I will know I've met my goal when I do not use marijuana around my brother.      Objective #A (Client Action)    Client will identify at least 3 example(s) of how marijuana has resulted in an experience that interferes with person values or goals.  Status: completed- 5/15/19    Intervention(s)  Therapist will help client identify how his personal values and goals are interfered when he uses marijuana.     Objective #B  Client will identify triggers and environmental cues contributing to his marijuana usage.     Status: completed - Date: 5/15/19     Intervention(s)  Therapist will teach the client how to perform a behavioral chain analysis to better identify triggers and cues to using marijuana.     Objective #C  Client will maintain sobriety for one month.  Status: completed - Date: 5.15.19     Intervention(s)  Therapist will provide educational materials for the client to increase his coping skills when he has urges to use  marijuana. Therapist will provide additional resources to help him maintain sobriety.     Goal 3: Client will decrease his anxiety from his baseline GAD7 score.    I will know I've met my goal when I am able to let things go and allow myself permission to relax and not worry.      Objective #A  Client will identify 5 initial signs or symptoms of anxiety.    Status: completed - Date: 5/15/19     Intervention(s)  Therapist will provide educational materials on the symptoms of anxiety.    Objective #B (Client Action)    Status: completed - Date: 5/15/19     Client will identify 5 fears / thoughts that contribute to feeling anxious.    Intervention(s)  Therapist will teach the client how to perform a behavioral chain analysis in order to identify fears/thoughts contributing to anxious feelings.     Objective #C  Client will use at least 4 coping skills for anxiety management in the next 10 weeks.  Status: continued - Date: 2/19/19; 1.3.20; 6.15.20     Intervention(s)  Therapist will teach progressive muscle relaxation, cue control relaxation, mindful breathing, and guided imagery. Therapist will also utilize Acceptance and Commitment Therapy by exploring and identifying important values in patient's life, and discuss ways to commit to behavioral activation around these values.     Goal 4: The client will learn and apply skills to manage the symptoms of depression.    I will know I've met my goal when I am able to let things go and allow myself permission to relax, not worry, and feel better about myself.      Objective #A: Client will Increase interest, engagement, and pleasure in doing things.  Client will identify negative self-talk and behaviors: challenge core beliefs, myths, and actions.  Decrease frequency and intensity of feeling down, depressed, hopeless.  Status: continued Date(s): 5/15/19; 6.15.20    Interventions  Therapist will provide educational materials and handouts on core beliefs, cognitive  distortions, and cognitive fusion and defusion.    Goal 5: Client will explore and resolve issues related to relationship issues with his boss.    I will know I've met my goal when I am able to feel better about my relationship with his boss.      Objective #A (Client Action)    Client will review and familiarize himself with feeling words. He will use feeling words to describe when her needs are met/not met.   Status: New - Date: 8/13/19; 1.3.20 ; 6.15.20    Intervention(s)  Therapist will assign emotional recognition/identification including when expressing when his needs are not being met or are being met.    Objective #B  Client will identify negative self-talk and behaviors associated with his relationship with his boss and others, and challenge core beliefs, myths, and actions.  Status: 8/13/19; 1.3.20 ; 6.15.20    Intervention(s)  Therapist will provide education and homework on CBT, ACT, and DBT. Therapist will provide strategies to evaluate for cognitive distortions and logical errors. Therapist will also help client begin to accept feeling negative emotions in a way that helps to decrease his suffering.      Objective #C   Client will compile a list of boundaries she will set with his boss and others.   Status: New - Date: 8/13/19; 1.3.20 ; 6.15.20    Intervention(s)  Therapist will teach about healthy boundaries. Including information about different communication styles emphasizing assertive communication as the most helpful/healthy style.        AFSHIN Gomez, [facility], May 15, 2019; 1.3.20 ; 6.15.20  Note reviewed and clinical supervision by AFSHIN Weaver [facility] 1/31/2019, 7/15/2019 , 8/271/9, 4/6/2020, 6/22/2020

## 2020-08-24 ENCOUNTER — VIRTUAL VISIT (OUTPATIENT)
Dept: PSYCHOLOGY | Facility: CLINIC | Age: 55
End: 2020-08-24
Payer: COMMERCIAL

## 2020-08-24 DIAGNOSIS — F90.0 ADHD (ATTENTION DEFICIT HYPERACTIVITY DISORDER), INATTENTIVE TYPE: Primary | ICD-10-CM

## 2020-08-24 DIAGNOSIS — F41.1 GENERALIZED ANXIETY DISORDER: ICD-10-CM

## 2020-08-24 DIAGNOSIS — F32.1 MODERATE MAJOR DEPRESSION (H): ICD-10-CM

## 2020-08-24 PROCEDURE — 90837 PSYTX W PT 60 MINUTES: CPT | Mod: 95 | Performed by: SOCIAL WORKER

## 2020-08-24 NOTE — PROGRESS NOTES
"                  Telemedicine Visit: The patient's condition can be safely assessed and treated via synchronous audio and visual telemedicine encounter.       Reason for Telemedicine Visit: Patient has requested telehealth visit     Originating Site (Patient Location): Patient's home     Distant Site (Provider Location): Provider Remote Setting     Consent:  The patient/guardian has verbally consented to: the potential risks and benefits of telemedicine (video visit) versus in person care; bill my insurance or make self-payment for services provided; and responsibility for payment of non-covered services                             Mode of Communication:  Video Conference via Bjond  As the provider I attest to compliance with applicable laws and regulations related to telemedicine.    Progress Note    Client Name: Carrillo Dunlap  Date: 20         Service Type: Individual  Video Visit: yes     Session Start Time:  10:00am  Session End Time: 11:00am    Session Length: 60 minutes    Session #: 29    Attendees: Client attended alone     Treatment Plan Last Reviewed: Reviewed  PHQ-9 / UGO-7 : Reviewed    DATA  Interactive Complexity: No  Crisis: No       Progress Since Last Session (Related to Symptoms / Goals / Homework):  Symptoms: Improving.    Homework: Achieved / completed to satisfaction.      Episode of Care Goals: Achieved / completed to satisfaction - ACTION (Actively working towards change); Intervened by reinforcing change plan / affirming steps taken     Current / Ongoing Stressors and Concerns: Pt reports they \"finally had  processions for my mother.\" Pt reports this \"affected me more than I thought.\" He reflected on putting work/effort in making a box that they buried her in. 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    Past session: Pt reports feeling under the weather for the past few weeks and thinks \"I may have had the covid but it was not terrible.\" Pt reports that he provided the [employer] company for a pay rate, and person has not responded for a few weeks. He reports thinking to himself \"I am not worthy\" and pivoted to \"what would Panchito think\" and came up with the thought. Discussed how he noticed how his body began to de stress, more accepting of \"what it is\" and was able to pivot to \"doing my taxes instead of avoiding.\"     that he talked with Bunny about writing a note related to accepting the position to work for the [employer] company 1 day a week. Pt reports that he was able to assertively formulate additional questions learning from past job opportunity that did not go well. Validated pt's ability to check in on getting a more clear expectations from him about what is expected of Nirmal in this new role. 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 For the remainder of the session, discussed how pt is internalizing an ability to recognize that he is being critical/criticze himself and is able to pivot and consider other possibilities that arise out of having more empathy/compassion for himself. Discussed how this is different than how he was raised by his father who often carried a stoic manner and approached things with an attitude of \"don't feel, just do.\"    Past session: " "Continued discussing and exploring evidence for old and new core beliefs. Continued encouraging pt to put this core belief on trail. 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 Encouraged pt in session, to welcome this as counter information to his belief that \"I am just at the [employer] because it is a family company and I am there for that reason.\" Discussed how pt has found himself not making a commitment as to whether he will accept more work at the [employer] company. Discussed how this has made it more difficult to complete other tasks because of not knowing what his future where hold.      Treatment Objective(s) Addressed in This Session:   Identify negative self-talk and behaviors: challenge core beliefs, myths, and actions   Decrease frequency and intensity of feeling down, depressed, hopeless   Client will review and familiarize himself with feeling words. He will use feeling words to describe when her needs are met/not met.      Intervention:  Psychoeducation: Explored with the pt using open ended questioning what the purpose of worry is. Discussed how in order to worry excessively about something it has to be 1. Important to us; 2. We have to be invested in the outcome (we have a preferred outcome). Processed how our minds continue to ruminate on a worry because of the fear of the unknown or uncertain. Our mind searches for different possibilities, in part to ensure that we are capable to cope with the various outcomes, especially if they may cause more pain/suffering in the long run. Discussed how our preferred outcome is the least suffering/ easiest option. Processed however, that many times we are able to cope with the other outcomes, although it is more difficult and there may be more suffering/pain. Pt processed that although this may be the cause, it can help " him find resilience in himself and he may grow as a person.       ASSESSMENT: Current Emotional / Mental Status (status of significant symptoms):   Risk status (Self / Other harm or suicidal ideation)   Client denies current fears or concerns for personal safety.   Client denies current or recent suicidal ideation or behaviors.   Client denies current or recent homicidal ideation or behaviors.   Client denies current or recent self injurious behavior or ideation.   Client denies other safety concerns.    Client reports there has been no change in risk factors since their last session.      Client reports there has been no change in protective factors since their last session.      A safety and risk management has not been developed at this time.  Client was given the Suicide Prevention National Hotline, Text [place] 712370,  the [facility] Crisis Number, or encouraged to Call 911 should there be a change  in these risk factors.       Appearance:   Appropriate    Eye Contact:   Good    Psychomotor Behavior: Restless , more so than in previous sessions   Attitude:   Cooperative    Orientation:   All   Speech    Rate / Production: Hyperverbal     Volume:  Normal    Mood:    Anxious    Affect:    Appropriate    Thought Content:  Clear , more clear than past sessions   Thought Form:  Coherent    Insight:    Fair, improving     Medication Review:   No changes to current psychiatric medication(s)     Medication Compliance:   Yes     Changes in Health Issues:   None reported     Chemical Use Review:  Substance Use: decrease in cannabis.  Client reports frequency using of 0 in the past two weeks.    Tobacco Use: No current tobacco use.      Diagnosis:  1. ADHD (attention deficit hyperactivity disorder), inattentive type    2. Generalized anxiety disorder    3. Moderate major depression (H)      Collateral Reports Completed:   Not Applicable    PLAN: (Client Tasks / Therapist Tasks / Other). 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    AFSHIN Gomez, [facility] 8.24.20   Note reviewed and clinical supervision by AFSHIN [facility] 8/31/2020   ___________________________________________________________________________    Treatment Plan     Client's Name: Carrillo Dunlap                   YOB: 1965     Date: 1/21/19; reviewed: 5/15/19; updated 8/13/19; 1.3.20 ; 6.15.20     DSM-V Diagnoses: Attention-Deficit/Hyperactivity Disorder  314.01 (F90.2) Combined presentation or 296.31 (F33.0) Major Depressive Disorder, Recurrent Episode, Mild _ and With anxious distress  Psychosocial / Contextual Factors: Limited social support, limited financial support, physical/medical concerns, employment constraints  WHODAS: did not complete during visit     Referral / Collaboration:  Referral to another professional/service is not indicated at this time.     Anticipated number of session or this episode of care: 15      MeasurableTreatment Goal(s) related to diagnosis / functional impairment(s)  Goal 1: Client will begin to work on his work portfolio.     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       Objective #A (Client Action)                Client will schedule times of the day to work on his work portfolio. 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   Status: completed - Date: 5/15/19      Intervention(s)  Therapist will assign homework including weekly activity scheduling and CBT thought logs  provide educational materials on CBT  role-play effective communication skills.    Goal 2: Client will decrease his monthly marijuana use.     I will know I've met my goal when I do not use marijuana around my brother.      Objective #A (Client Action)    Client will identify at  least 3 example(s) of how marijuana has resulted in an experience that interferes with person values or goals.  Status: completed- 5/15/19    Intervention(s)  Therapist will help client identify how his personal values and goals are interfered when he uses marijuana.     Objective #B  Client will identify triggers and environmental cues contributing to his marijuana usage.     Status: completed - Date: 5/15/19     Intervention(s)  Therapist will teach the client how to perform a behavioral chain analysis to better identify triggers and cues to using marijuana.     Objective #C  Client will maintain sobriety for one month.  Status: completed - Date: 5.15.19     Intervention(s)  Therapist will provide educational materials for the client to increase his coping skills when he has urges to use marijuana. Therapist will provide additional resources to help him maintain sobriety.     Goal 3: Client will decrease his anxiety from his baseline GAD7 score.    I will know I've met my goal when I am able to let things go and allow myself permission to relax and not worry.      Objective #A  Client will identify 5 initial signs or symptoms of anxiety.    Status: completed - Date: 5/15/19     Intervention(s)  Therapist will provide educational materials on the symptoms of anxiety.    Objective #B (Client Action)    Status: completed - Date: 5/15/19     Client will identify 5 fears / thoughts that contribute to feeling anxious.    Intervention(s)  Therapist will teach the client how to perform a behavioral chain analysis in order to identify fears/thoughts contributing to anxious feelings.     Objective #C  Client will use at least 4 coping skills for anxiety management in the next 10 weeks.  Status: continued - Date: 2/19/19; 1.3.20; 6.15.20     Intervention(s)  Therapist will teach progressive muscle relaxation, cue control relaxation, mindful breathing, and guided imagery. Therapist will also utilize Acceptance and Commitment  Therapy by exploring and identifying important values in patient's life, and discuss ways to commit to behavioral activation around these values.     Goal 4: The client will learn and apply skills to manage the symptoms of depression.    I will know I've met my goal when I am able to let things go and allow myself permission to relax, not worry, and feel better about myself.      Objective #A: Client will Increase interest, engagement, and pleasure in doing things.  Client will identify negative self-talk and behaviors: challenge core beliefs, myths, and actions.  Decrease frequency and intensity of feeling down, depressed, hopeless.  Status: continued Date(s): 5/15/19; 6.15.20    Interventions  Therapist will provide educational materials and handouts on core beliefs, cognitive distortions, and cognitive fusion and defusion.    Goal 5: Client will explore and resolve issues related to relationship issues with his boss.    I will know I've met my goal when I am able to feel better about my relationship with his boss.      Objective #A (Client Action)    Client will review and familiarize himself with feeling words. He will use feeling words to describe when her needs are met/not met.   Status: New - Date: 8/13/19; 1.3.20 ; 6.15.20    Intervention(s)  Therapist will assign emotional recognition/identification including when expressing when his needs are not being met or are being met.    Objective #B  Client will identify negative self-talk and behaviors associated with his relationship with his boss and others, and challenge core beliefs, myths, and actions.  Status: 8/13/19; 1.3.20 ; 6.15.20    Intervention(s)  Therapist will provide education and homework on CBT, ACT, and DBT. Therapist will provide strategies to evaluate for cognitive distortions and logical errors. Therapist will also help client begin to accept feeling negative emotions in a way that helps to decrease his suffering.      Objective #C   Client  will compile a list of boundaries she will set with his boss and others.   Status: New - Date: 8/13/19; 1.3.20 ; 6.15.20    Intervention(s)  Therapist will teach about healthy boundaries. Including information about different communication styles emphasizing assertive communication as the most helpful/healthy style.        AFSHIN Gomez, [facility], May 15, 2019; 1.3.20 ; 6.15.20  Note reviewed and clinical supervision by AFSHIN Weaver [facility] 1/31/2019, 7/15/2019 , 8/271/9, 4/6/2020, 6/22/2020

## 2020-08-31 DIAGNOSIS — F32.1 MODERATE MAJOR DEPRESSION (H): ICD-10-CM

## 2020-08-31 DIAGNOSIS — F90.0 ADHD (ATTENTION DEFICIT HYPERACTIVITY DISORDER), INATTENTIVE TYPE: ICD-10-CM

## 2020-09-01 NOTE — TELEPHONE ENCOUNTER
Pending Prescriptions:                       Disp   Refills    amphetamine-dextroamphetamine (ADDERALL X*30 cap*0            Sig: Take 1 capsule (20 mg) by mouth every morning     Routing refill request to provider for review/approval because:  Drug not on the [facility] refill protocol        6/29 20mg & 30mg   7/29( 30mg)   8/4 (20mg)     #30 each     Last OV 3/4/20     Dylan Gil RN   [facility]

## 2020-09-02 DIAGNOSIS — F90.0 ADHD (ATTENTION DEFICIT HYPERACTIVITY DISORDER), INATTENTIVE TYPE: ICD-10-CM

## 2020-09-02 RX ORDER — DEXTROAMPHETAMINE SACCHARATE, AMPHETAMINE ASPARTATE MONOHYDRATE, DEXTROAMPHETAMINE SULFATE AND AMPHETAMINE SULFATE 7.5; 7.5; 7.5; 7.5 MG/1; MG/1; MG/1; MG/1
30 CAPSULE, EXTENDED RELEASE ORAL DAILY
Qty: 30 CAPSULE | Refills: 0 | Status: CANCELLED | OUTPATIENT
Start: 2020-09-02

## 2020-09-02 RX ORDER — BUPROPION HYDROCHLORIDE 300 MG/1
300 TABLET ORAL EVERY MORNING
Qty: 90 TABLET | Refills: 0 | Status: SHIPPED | OUTPATIENT
Start: 2020-09-02 | End: 2020-10-06

## 2020-09-02 RX ORDER — DEXTROAMPHETAMINE SACCHARATE, AMPHETAMINE ASPARTATE MONOHYDRATE, DEXTROAMPHETAMINE SULFATE AND AMPHETAMINE SULFATE 5; 5; 5; 5 MG/1; MG/1; MG/1; MG/1
20 CAPSULE, EXTENDED RELEASE ORAL EVERY MORNING
Qty: 30 CAPSULE | Refills: 0 | Status: SHIPPED | OUTPATIENT
Start: 2020-09-02 | End: 2020-10-05

## 2020-09-02 RX ORDER — DEXTROAMPHETAMINE SACCHARATE, AMPHETAMINE ASPARTATE MONOHYDRATE, DEXTROAMPHETAMINE SULFATE AND AMPHETAMINE SULFATE 7.5; 7.5; 7.5; 7.5 MG/1; MG/1; MG/1; MG/1
30 CAPSULE, EXTENDED RELEASE ORAL DAILY
Qty: 30 CAPSULE | Refills: 0 | Status: SHIPPED | OUTPATIENT
Start: 2020-09-02 | End: 2020-10-06

## 2020-09-02 NOTE — TELEPHONE ENCOUNTER
Updated PHQ-9 needed, sent to patient through my chart    Shana Cobian RN   [facility]

## 2020-09-02 NOTE — TELEPHONE ENCOUNTER
Routing refill request to provider for review/approval because:  PHQ-9 updated, does not meet RN refill protocol  PHQ-9 score:    PHQ 9/2/2020   PHQ-9 Total Score 8   Q9: Thoughts of better off dead/self-harm past 2 weeks Not at all       Shana Cobian RN   [facility]

## 2020-09-21 ENCOUNTER — VIRTUAL VISIT (OUTPATIENT)
Dept: PSYCHOLOGY | Facility: CLINIC | Age: 55
End: 2020-09-21
Payer: COMMERCIAL

## 2020-09-21 DIAGNOSIS — F41.1 GENERALIZED ANXIETY DISORDER: ICD-10-CM

## 2020-09-21 DIAGNOSIS — F32.1 MODERATE MAJOR DEPRESSION (H): ICD-10-CM

## 2020-09-21 DIAGNOSIS — F90.0 ADHD (ATTENTION DEFICIT HYPERACTIVITY DISORDER), INATTENTIVE TYPE: Primary | ICD-10-CM

## 2020-09-21 PROCEDURE — 90834 PSYTX W PT 45 MINUTES: CPT | Mod: 95 | Performed by: SOCIAL WORKER

## 2020-10-05 ENCOUNTER — VIRTUAL VISIT (OUTPATIENT)
Dept: PSYCHOLOGY | Facility: CLINIC | Age: 55
End: 2020-10-05
Payer: COMMERCIAL

## 2020-10-05 ENCOUNTER — MYC REFILL (OUTPATIENT)
Dept: FAMILY MEDICINE | Facility: CLINIC | Age: 55
End: 2020-10-05

## 2020-10-05 DIAGNOSIS — F41.1 GENERALIZED ANXIETY DISORDER: ICD-10-CM

## 2020-10-05 DIAGNOSIS — F32.1 MODERATE MAJOR DEPRESSION (H): ICD-10-CM

## 2020-10-05 DIAGNOSIS — F90.0 ADHD (ATTENTION DEFICIT HYPERACTIVITY DISORDER), INATTENTIVE TYPE: ICD-10-CM

## 2020-10-05 DIAGNOSIS — F90.0 ADHD (ATTENTION DEFICIT HYPERACTIVITY DISORDER), INATTENTIVE TYPE: Primary | ICD-10-CM

## 2020-10-05 PROCEDURE — 90834 PSYTX W PT 45 MINUTES: CPT | Mod: 95 | Performed by: SOCIAL WORKER

## 2020-10-05 RX ORDER — DEXTROAMPHETAMINE SACCHARATE, AMPHETAMINE ASPARTATE MONOHYDRATE, DEXTROAMPHETAMINE SULFATE AND AMPHETAMINE SULFATE 7.5; 7.5; 7.5; 7.5 MG/1; MG/1; MG/1; MG/1
30 CAPSULE, EXTENDED RELEASE ORAL DAILY
Qty: 30 CAPSULE | Refills: 0 | Status: CANCELLED | OUTPATIENT
Start: 2020-10-05

## 2020-10-05 NOTE — PROGRESS NOTES
"                  Telemedicine Visit: The patient's condition can be safely assessed and treated via synchronous audio and visual telemedicine encounter.       Reason for Telemedicine Visit: Patient has requested telehealth visit     Originating Site (Patient Location): Patient's home     Distant Site (Provider Location): Provider Remote Setting     Consent:  The patient/guardian has verbally consented to: the potential risks and benefits of telemedicine (video visit) versus in person care; bill my insurance or make self-payment for services provided; and responsibility for payment of non-covered services                             Mode of Communication:  Video Conference via Minutta  As the provider I attest to compliance with applicable laws and regulations related to telemedicine.    Progress Note    Client Name: Carrillo Dunlap  Date: 10.5.20         Service Type: Individual  Video Visit: yes     Session Start Time:  10:00am  Session End Time: 11:00am    Session Length: 60 minutes    Session #: 31    Attendees: Client attended alone     Treatment Plan Last Reviewed: Reviewed  PHQ-9 / UGO-7 : Reviewed    DATA  Interactive Complexity: No  Crisis: No       Progress Since Last Session (Related to Symptoms / Goals / Homework):  Symptoms: Improving.    Homework: Achieved / completed to satisfaction.      Episode of Care Goals: Achieved / completed to satisfaction - ACTION (Actively working towards change); Intervened by reinforcing change plan / affirming steps taken     Current / Ongoing Stressors and Concerns: Pt reports that he is excited because unemployment from over the spring came through, and he has been busy doing work for the [employer] company. Pt reports he is getting better at realizing when he is done with a task and shifting to \"okay it is good enough\" and realizing when he is wanting to go above and beyond and not follow through with that. For the remainder of the session, discussed pt feeling " graceful and gratitude toward his own self in the moment to where he is improving and taking action toward self growth.     Treatment Objective(s) Addressed in This Session:   Identify negative self-talk and behaviors: challenge core beliefs, myths, and actions   Decrease frequency and intensity of feeling down, depressed, hopeless   Client will review and familiarize himself with feeling words. He will use feeling words to describe when her needs are met/not met.      Intervention:  Psychoeducation: Explored with the pt using open ended questioning what the purpose of worry is. Discussed how in order to worry excessively about something it has to be 1. Important to us; 2. We have to be invested in the outcome (we have a preferred outcome). Processed how our minds continue to ruminate on a worry because of the fear of the unknown or uncertain. Our mind searches for different possibilities, in part to ensure that we are capable to cope with the various outcomes, especially if they may cause more pain/suffering in the long run. Discussed how our preferred outcome is the least suffering/ easiest option. Processed however, that many times we are able to cope with the other outcomes, although it is more difficult and there may be more suffering/pain. Pt processed that although this may be the cause, it can help him find resilience in himself and he may grow as a person.       ASSESSMENT: Current Emotional / Mental Status (status of significant symptoms):   Risk status (Self / Other harm or suicidal ideation)   Client denies current fears or concerns for personal safety.   Client denies current or recent suicidal ideation or behaviors.   Client denies current or recent homicidal ideation or behaviors.   Client denies current or recent self injurious behavior or ideation.   Client denies other safety concerns.    Client reports there has been no change in risk factors since their last session.      Client reports there has  been no change in protective factors since their last session.      A safety and risk management has not been developed at this time.  Client was given the Suicide Prevention National Hotline, Text [place] 775679,  the [facility] Crisis Number, or encouraged to Call 911 should there be a change  in these risk factors.       Appearance:   Appropriate    Eye Contact:   Good    Psychomotor Behavior: Restless , more so than in previous sessions   Attitude:   Cooperative    Orientation:   All   Speech    Rate / Production: Hyperverbal     Volume:  Normal    Mood:    Anxious    Affect:    Appropriate    Thought Content:  Clear , more clear than past sessions   Thought Form:  Coherent    Insight:    Fair, improving     Medication Review:   No changes to current psychiatric medication(s)     Medication Compliance:   Yes     Changes in Health Issues:   None reported     Chemical Use Review:  Substance Use: decrease in cannabis.  Client reports frequency using of 0 in the past two weeks.    Tobacco Use: No current tobacco use.      Diagnosis:  1. ADHD (attention deficit hyperactivity disorder), inattentive type    2. Moderate major depression (H)    3. Generalized anxiety disorder      Collateral Reports Completed:   Not Applicable    PLAN: (Client Tasks / Therapist Tasks / Other). Pt will continue to open up and seek examples in his daily life where he can be graceful and gratitude toward his own self when he is making toward moves toward his personal values.    AFSHIN Gomez, [facility] 10.5.20  Note reviewed and clinical supervision by AFSHIN [facility] 10/13/2020   ___________________________________________________________________________    Treatment Plan     Client's Name: Carrillo Dunlap                   YOB: 1965     Date: 1/21/19; reviewed: 5/15/19; updated 8/13/19; 1.3.20 ; 6.15.20; 9.21.20     DSM-V Diagnoses: Attention-Deficit/Hyperactivity Disorder  314.01 (F90.2) Combined presentation  or 296.31 (F33.0) Major Depressive Disorder, Recurrent Episode, Mild _ and With anxious distress  Psychosocial / Contextual Factors: Limited social support, limited financial support, physical/medical concerns, employment constraints  WHODAS: did not complete during visit     Referral / Collaboration:  Referral to another professional/service is not indicated at this time.     Anticipated number of session or this episode of care: 15      MeasurableTreatment Goal(s) related to diagnosis / functional impairment(s)  Goal 1: Client will begin to work on his work portfolio.     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       Objective #A (Client Action)                Client will schedule times of the day to work on his work portfolio. 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   Status: completed - Date: 5/15/19      Intervention(s)  Therapist will assign homework including weekly activity scheduling and CBT thought logs  provide educational materials on CBT  role-play effective communication skills.    Goal 2: Client will decrease his monthly marijuana use.     I will know I've met my goal when I do not use marijuana around my brother.      Objective #A (Client Action)    Client will identify at least 3 example(s) of how marijuana has resulted in an experience that interferes with person values or goals.  Status: completed- 5/15/19    Intervention(s)  Therapist will help client identify how his personal values and goals are interfered when he uses marijuana.     Objective #B  Client will identify triggers and environmental cues contributing to his marijuana usage.     Status: completed - Date: 5/15/19     Intervention(s)  Therapist will teach the client how to perform a behavioral chain analysis to better identify triggers and cues to using marijuana.     Objective  #C  Client will maintain sobriety for one month.  Status: completed - Date: 5.15.19     Intervention(s)  Therapist will provide educational materials for the client to increase his coping skills when he has urges to use marijuana. Therapist will provide additional resources to help him maintain sobriety.     Goal 3: Client will decrease his anxiety from his baseline GAD7 score.    I will know I've met my goal when I am able to let things go and allow myself permission to relax and not worry.      Objective #A  Client will identify 5 initial signs or symptoms of anxiety.    Status: completed - Date: 5/15/19     Intervention(s)  Therapist will provide educational materials on the symptoms of anxiety.    Objective #B (Client Action)    Status: completed - Date: 5/15/19     Client will identify 5 fears / thoughts that contribute to feeling anxious.    Intervention(s)  Therapist will teach the client how to perform a behavioral chain analysis in order to identify fears/thoughts contributing to anxious feelings.     Objective #C  Client will use at least 4 coping skills for anxiety management in the next 10 weeks.  Status: continued - Date: 2/19/19; 1.3.20; 6.15.20 ; 9.21.20    Intervention(s)  Therapist will teach progressive muscle relaxation, cue control relaxation, mindful breathing, and guided imagery. Therapist will also utilize Acceptance and Commitment Therapy by exploring and identifying important values in patient's life, and discuss ways to commit to behavioral activation around these values.     Goal 4: The client will learn and apply skills to manage the symptoms of depression.    I will know I've met my goal when I am able to let things go and allow myself permission to relax, not worry, and feel better about myself.      Objective #A: Client will Increase interest, engagement, and pleasure in doing things.  Client will identify negative self-talk and behaviors: challenge core beliefs, myths, and  actions.  Decrease frequency and intensity of feeling down, depressed, hopeless.  Status: continued Date(s): 5/15/19; 6.15.20; 9.21.20    Interventions  Therapist will provide educational materials and handouts on core beliefs, cognitive distortions, and cognitive fusion and defusion.    Goal 5: Client will explore and resolve issues related to relationship issues with his boss.    I will know I've met my goal when I am able to feel better about my relationship with his boss.      Objective #A (Client Action)    Client will review and familiarize himself with feeling words. He will use feeling words to describe when her needs are met/not met.   Status: New - Date: 8/13/19; 1.3.20 ; 6.15.20; 9.21.20    Intervention(s)  Therapist will assign emotional recognition/identification including when expressing when his needs are not being met or are being met.    Objective #B  Client will identify negative self-talk and behaviors associated with his relationship with his boss and others, and challenge core beliefs, myths, and actions.  Status: 8/13/19; 1.3.20 ; 6.15.20; 9.21.20    Intervention(s)  Therapist will provide education and homework on CBT, ACT, and DBT. Therapist will provide strategies to evaluate for cognitive distortions and logical errors. Therapist will also help client begin to accept feeling negative emotions in a way that helps to decrease his suffering.      Objective #C   Client will compile a list of boundaries she will set with his boss and others.   Status: New - Date: 8/13/19; 1.3.20 ; 6.15.20; 9.21.20    Intervention(s)  Therapist will teach about healthy boundaries. Including information about different communication styles emphasizing assertive communication as the most helpful/healthy style.        AFSHIN Gomez, [facility], May 15, 2019; 1.3.20 ; 6.15.20; 9.21.20  Note reviewed and clinical supervision by AFSHIN [facility] 9/21/2020

## 2020-10-05 NOTE — TELEPHONE ENCOUNTER
Pt requesting refill   Pending Prescriptions:                       Disp   Refills    amphetamine-dextroamphetamine (ADDERALL X*30 cap*0            Sig: Take 1 capsule (20 mg) by mouth every morning     Last visit 3/4/20   Last refill  9/2/20    Next appt  Needs to schedule     Routing refill request to provider for review/approval because:  Drug not on the [facility] refill protocol        Both doses filled 9/2/20 #30 each     Dylan Gil RN   [facility]

## 2020-10-06 ENCOUNTER — VIRTUAL VISIT (OUTPATIENT)
Dept: FAMILY MEDICINE | Facility: CLINIC | Age: 55
End: 2020-10-06
Payer: COMMERCIAL

## 2020-10-06 DIAGNOSIS — F41.1 GAD (GENERALIZED ANXIETY DISORDER): ICD-10-CM

## 2020-10-06 DIAGNOSIS — Z71.6 ENCOUNTER FOR SMOKING CESSATION COUNSELING: ICD-10-CM

## 2020-10-06 DIAGNOSIS — K59.1 FUNCTIONAL DIARRHEA: ICD-10-CM

## 2020-10-06 DIAGNOSIS — F32.1 MODERATE MAJOR DEPRESSION (H): ICD-10-CM

## 2020-10-06 DIAGNOSIS — F90.0 ADHD (ATTENTION DEFICIT HYPERACTIVITY DISORDER), INATTENTIVE TYPE: Primary | ICD-10-CM

## 2020-10-06 PROCEDURE — 99214 OFFICE O/P EST MOD 30 MIN: CPT | Mod: 95 | Performed by: NURSE PRACTITIONER

## 2020-10-06 RX ORDER — DEXTROAMPHETAMINE SACCHARATE, AMPHETAMINE ASPARTATE MONOHYDRATE, DEXTROAMPHETAMINE SULFATE AND AMPHETAMINE SULFATE 7.5; 7.5; 7.5; 7.5 MG/1; MG/1; MG/1; MG/1
30 CAPSULE, EXTENDED RELEASE ORAL DAILY
Qty: 30 CAPSULE | Refills: 0 | Status: SHIPPED | OUTPATIENT
Start: 2020-10-06 | End: 2021-02-01

## 2020-10-06 RX ORDER — DEXTROAMPHETAMINE SACCHARATE, AMPHETAMINE ASPARTATE MONOHYDRATE, DEXTROAMPHETAMINE SULFATE AND AMPHETAMINE SULFATE 7.5; 7.5; 7.5; 7.5 MG/1; MG/1; MG/1; MG/1
30 CAPSULE, EXTENDED RELEASE ORAL DAILY
Qty: 30 CAPSULE | Refills: 0 | Status: SHIPPED | OUTPATIENT
Start: 2020-10-06 | End: 2020-11-05

## 2020-10-06 RX ORDER — DEXTROAMPHETAMINE SACCHARATE, AMPHETAMINE ASPARTATE MONOHYDRATE, DEXTROAMPHETAMINE SULFATE AND AMPHETAMINE SULFATE 7.5; 7.5; 7.5; 7.5 MG/1; MG/1; MG/1; MG/1
30 CAPSULE, EXTENDED RELEASE ORAL DAILY
Qty: 30 CAPSULE | Refills: 0 | Status: SHIPPED | OUTPATIENT
Start: 2020-12-07 | End: 2021-01-06

## 2020-10-06 RX ORDER — DEXTROAMPHETAMINE SACCHARATE, AMPHETAMINE ASPARTATE MONOHYDRATE, DEXTROAMPHETAMINE SULFATE AND AMPHETAMINE SULFATE 5; 5; 5; 5 MG/1; MG/1; MG/1; MG/1
20 CAPSULE, EXTENDED RELEASE ORAL DAILY
Qty: 30 CAPSULE | Refills: 0 | Status: SHIPPED | OUTPATIENT
Start: 2020-10-06 | End: 2020-11-05

## 2020-10-06 RX ORDER — DEXTROAMPHETAMINE SACCHARATE, AMPHETAMINE ASPARTATE MONOHYDRATE, DEXTROAMPHETAMINE SULFATE AND AMPHETAMINE SULFATE 5; 5; 5; 5 MG/1; MG/1; MG/1; MG/1
20 CAPSULE, EXTENDED RELEASE ORAL DAILY
Qty: 30 CAPSULE | Refills: 0 | Status: SHIPPED | OUTPATIENT
Start: 2020-11-06 | End: 2020-12-06

## 2020-10-06 RX ORDER — BUPROPION HYDROCHLORIDE 300 MG/1
300 TABLET ORAL EVERY MORNING
Qty: 90 TABLET | Refills: 0 | Status: SHIPPED | OUTPATIENT
Start: 2020-10-06 | End: 2021-03-04

## 2020-10-06 RX ORDER — DEXTROAMPHETAMINE SACCHARATE, AMPHETAMINE ASPARTATE MONOHYDRATE, DEXTROAMPHETAMINE SULFATE AND AMPHETAMINE SULFATE 7.5; 7.5; 7.5; 7.5 MG/1; MG/1; MG/1; MG/1
30 CAPSULE, EXTENDED RELEASE ORAL DAILY
Qty: 30 CAPSULE | Refills: 0 | Status: SHIPPED | OUTPATIENT
Start: 2020-11-06 | End: 2020-12-06

## 2020-10-06 RX ORDER — BUSPIRONE HYDROCHLORIDE 5 MG/1
10 TABLET ORAL 2 TIMES DAILY
Qty: 120 TABLET | Refills: 1 | Status: SHIPPED | OUTPATIENT
Start: 2020-10-06 | End: 2022-03-08

## 2020-10-06 RX ORDER — DEXTROAMPHETAMINE SACCHARATE, AMPHETAMINE ASPARTATE MONOHYDRATE, DEXTROAMPHETAMINE SULFATE AND AMPHETAMINE SULFATE 5; 5; 5; 5 MG/1; MG/1; MG/1; MG/1
20 CAPSULE, EXTENDED RELEASE ORAL DAILY
Qty: 30 CAPSULE | Refills: 0 | Status: SHIPPED | OUTPATIENT
Start: 2020-12-07 | End: 2021-01-06

## 2020-10-06 RX ORDER — DEXTROAMPHETAMINE SACCHARATE, AMPHETAMINE ASPARTATE MONOHYDRATE, DEXTROAMPHETAMINE SULFATE AND AMPHETAMINE SULFATE 5; 5; 5; 5 MG/1; MG/1; MG/1; MG/1
20 CAPSULE, EXTENDED RELEASE ORAL EVERY MORNING
Qty: 30 CAPSULE | Refills: 0 | Status: SHIPPED | OUTPATIENT
Start: 2020-10-06 | End: 2021-02-01

## 2020-10-06 NOTE — PROGRESS NOTES
"Carrillo Dunlap is a 55 year old male who is being evaluated via a billable telephone visit.      The patient has been notified of following:     \"This telephone visit will be conducted via a call between you and your physician/provider. We have found that certain health care needs can be provided without the need for a physical exam.  This service lets us provide the care you need with a short phone conversation.  If a prescription is necessary we can send it directly to your pharmacy.  If lab work is needed we can place an order for that and you can then stop by our lab to have the test done at a later time.    Telephone visits are billed at different rates depending on your insurance coverage. During this emergency period, for some insurers they may be billed the same as an in-person visit.  Please reach out to your insurance provider with any questions.    If during the course of the call the physician/provider feels a telephone visit is not appropriate, you will not be charged for this service.\"    Patient has given verbal consent for Telephone visit?  Yes    What phone number would you like to be contacted at? [phone]    How would you like to obtain your AVS? Mail a copy    Subjective     Carrillo Dunlap is a 55 year old male who presents via phone visit today for the following health issues:    HPI     Depression and Anxiety Follow-Up    How are you doing with your depression since your last visit? \"alright\" and \"pretty good\" - has been pretty stressed out during pandemic.  Taking Wellbutrin for quite awhile - approx 5 years.  No deep depressive spells.  Has been having grief from mother's passing right before pandemic.    How are you doing with your anxiety since your last visit?  Worsened - largely related to work.  Buspar is helpful for anxiety - \"turns down the knob\".  Does make his stomach queasy.    Are you having other symptoms that might be associated with depression or anxiety? Yes:  " "diarrhea - attributes to stress - has been unusual that it has gone on for four months. No significant wt loss.  Weighed himself at 165 the other    Have you had a significant life event? 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  Work evaporated in March, but has has picked up. Now very busy.  Tries to get 30 min brisk walk per day which he tracks on his phone.  Finds it amazing how much exercise helps mood.  Also has a treadmill.  Meets with therapist through [facility] every two weeks and this is really helpful - \"the missing key.\"  Lives alone.    ADHD - takes 50 mg total adderall XR    Wants to quit smoking, but not sure what will work - has tired so many things.  Chantix \"freaked me out.\"  Hasn't tried nicotine inhaler    Wt Readings from Last 5 Encounters:   03/04/20 74.1 kg (163 lb 6.4 oz)   10/15/19 76.9 kg (169 lb 8 oz)   06/21/19 81 kg (178 lb 9.6 oz)   03/20/19 84.4 kg (186 lb)   01/02/19 80.7 kg (178 lb)       Social History     Tobacco Use     Smoking status: Current Every Day Smoker     Packs/day: 1.00     Smokeless tobacco: Never Used   Substance Use Topics     Alcohol use: Yes     Comment: rare     Drug use: Yes     Types: Marijuana     Comment: occassionally     PHQ 10/28/2019 1/3/2020 9/2/2020   PHQ-9 Total Score 10 15 8   Q9: Thoughts of better off dead/self-harm past 2 weeks Not at all Not at all Not at all     UGO-7 SCORE 9/24/2019 10/22/2019 1/3/2020   Total Score - - -   Total Score - - -   Total Score 14 8 11           How many days per week do you exercise enough to make your heart beat faster? 7    How many minutes a day do you exercise enough to make your heart beat faster? 30 - 60    I have reviewed and updated the patient's Past Medical History, Social History, Family History and Medication List    Review of Systems   Constitutional, HEENT, cardiovascular, pulmonary, gi and gu systems are negative, except as otherwise noted.     Objective "        Vitals:  No vitals were obtained today due to virtual visit.    healthy, alert and no distress  PSYCH: Alert and oriented times 3; coherent speech, normal   rate and volume, able to articulate logical thoughts, able   to abstract reason, no tangential thoughts, no hallucinations   or delusions  His affect is normal and pleasant  RESP: No cough, no audible wheezing, able to talk in full sentences  Remainder of exam unable to be completed due to telephone visits    none        Assessment/Plan:    Assessment & Plan     (F90.0) ADHD (attention deficit hyperactivity disorder), inattentive type  (primary encounter diagnosis)  Comment:   Plan: amphetamine-dextroamphetamine (ADDERALL XR) 20         MG 24 hr capsule, amphetamine-dextroamphetamine        (ADDERALL XR) 20 MG 24 hr capsule,         amphetamine-dextroamphetamine (ADDERALL XR) 20         MG 24 hr capsule, amphetamine-dextroamphetamine        (ADDERALL XR) 30 MG 24 hr capsule,         amphetamine-dextroamphetamine (ADDERALL XR) 30         MG 24 hr capsule, amphetamine-dextroamphetamine        (ADDERALL XR) 30 MG 24 hr capsule            (F32.1) Moderate major depression (H)  Comment:   Plan: buPROPion (WELLBUTRIN XL) 300 MG 24 hr tablet            (F41.1) UGO (generalized anxiety disorder)  Comment:   Plan: busPIRone (BUSPAR) 5 MG tablet            (K59.1) Functional diarrhea  Comment:   Plan: If continuing another month get labs and stool cultures    (Z71.6) Encounter for smoking cessation counseling  Comment:   Plan: nicotine (NICOTROL) 10 MG inhaler                 Patient Instructions   Continue wellbutrin, buspar, adderall   Continue therapy and exercise  Try the nicotine inhaler to help with stopping smoking    Diarrhea is going on longer than I would want  If it is still going on in the next month I would want to do labs and stool cultures  If you notice weight loss or blood please be seen right away      Return in about 3 months (around 1/6/2021)  for ADHD, mental health check, diarrhea - come into the clinic if still happening.    ONEL Lane [facility] [place]    End:  4:14 PM   Start:  3:49 PM     Phone call duration:  25 minutes

## 2020-10-06 NOTE — PATIENT INSTRUCTIONS
Continue wellbutrin, buspar, adderall   Continue therapy and exercise  Try the nicotine inhaler to help with stopping smoking    Diarrhea is going on longer than I would want  If it is still going on in the next month I would want to do labs and stool cultures  If you notice weight loss or blood please be seen right away

## 2020-10-07 ENCOUNTER — TELEPHONE (OUTPATIENT)
Dept: FAMILY MEDICINE | Facility: CLINIC | Age: 55
End: 2020-10-07

## 2020-10-07 NOTE — TELEPHONE ENCOUNTER
Please ask if pt would like a Quitplan referral or to make an appointment with me, can be virtual. thanks

## 2020-10-07 NOTE — TELEPHONE ENCOUNTER
Prior Authorization Approval    Authorization Effective Date: 9/7/2020  Authorization Expiration Date: 10/7/2021  Medication: nicotine (NICOTROL) 10 MG inhaler- APPROVED   Approved Dose/Quantity:   Reference #:     Insurance Company: [employer] - Phone [phone] Fax [phone]  Expected CoPay:       CoPay Card Available:      Foundation Assistance Needed:    Which Pharmacy is filling the prescription (Not needed for infusion/clinic administered): [id number] [place], [place] - [address]  Pharmacy Notified: Yes  Patient Notified: Comment:  **Instructed pharmacy to notify patient when script is ready to /ship.**

## 2020-10-07 NOTE — TELEPHONE ENCOUNTER
Central Prior Authorization Team  Phone: [phone]    PA Initiation    Medication: nicotine (NICOTROL) 10 MG inhaler  Insurance Company: [employer] - Phone [phone] Fax [phone]  Pharmacy Filling the Rx: [id number] [place], [place] - [address]  Filling Pharmacy Phone: [phone]  Filling Pharmacy Fax:    Start Date: 10/7/2020

## 2020-10-07 NOTE — TELEPHONE ENCOUNTER
Routed for PA    Please initiate a PA for pt medication nicotine (NICOTROL) 10 MG inhaler    Thank you  Kindra Tobias RN   [facility]

## 2020-11-02 ENCOUNTER — VIRTUAL VISIT (OUTPATIENT)
Dept: PSYCHOLOGY | Facility: CLINIC | Age: 55
End: 2020-11-02
Payer: COMMERCIAL

## 2020-11-02 DIAGNOSIS — F90.0 ADHD (ATTENTION DEFICIT HYPERACTIVITY DISORDER), INATTENTIVE TYPE: Primary | ICD-10-CM

## 2020-11-02 DIAGNOSIS — F32.1 MODERATE MAJOR DEPRESSION (H): ICD-10-CM

## 2020-11-02 DIAGNOSIS — F41.1 GENERALIZED ANXIETY DISORDER: ICD-10-CM

## 2020-11-02 PROCEDURE — 90834 PSYTX W PT 45 MINUTES: CPT | Mod: 95 | Performed by: SOCIAL WORKER

## 2020-11-02 NOTE — PROGRESS NOTES
Telemedicine Visit: The patient's condition can be safely assessed and treated via synchronous audio and visual telemedicine encounter.       Reason for Telemedicine Visit: Patient has requested telehealth visit     Originating Site (Patient Location): Patient's home     Distant Site (Provider Location): Provider Remote Setting     Consent:  The patient/guardian has verbally consented to: the potential risks and benefits of telemedicine (video visit) versus in person care; bill my insurance or make self-payment for services provided; and responsibility for payment of non-covered services                             Mode of Communication:  Video Conference via [employer]  As the provider I attest to compliance with applicable laws and regulations related to telemedicine.    Progress Note    [facility]  Date: 11.2.20         Service Type: Individual  Video Visit: yes     Session Start Time:  10:15am  Session End Time: 11:00am    Session Length: 45 minutes    Session #: 32    Attendees: Client attended alone     Treatment Plan Last Reviewed: Reviewed  PHQ-9 / UGO-7 : Reviewed    DATA  Interactive Complexity: No  Crisis: No       Progress Since Last Session (Related to Symptoms / Goals / Homework):  Symptoms: Improving.    Homework: Achieved / completed to satisfaction.      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 He reflected on reading a book Daring Greatly by Ravinder Youssef. Processed emotionally vulnerability, courage, and self compassion.     Treatment Objective(s) Addressed in This Session:   Identify negative self-talk and behaviors: challenge core beliefs, myths, and actions   Decrease frequency and intensity of  feeling down, depressed, hopeless   Client will review and familiarize himself with feeling words. He will use feeling words to describe when her needs are met/not met.   Self compassion     Intervention:  Psychoeducation: Explored with the pt using open ended questioning what the purpose of worry is. Discussed how in order to worry excessively about something it has to be 1. Important to us; 2. We have to be invested in the outcome (we have a preferred outcome). Processed how our minds continue to ruminate on a worry because of the fear of the unknown or uncertain. Our mind searches for different possibilities, in part to ensure that we are capable to cope with the various outcomes, especially if they may cause more pain/suffering in the long run. Discussed how our preferred outcome is the least suffering/ easiest option. Processed however, that many times we are able to cope with the other outcomes, although it is more difficult and there may be more suffering/pain. Pt processed that although this may be the cause, it can help him find resilience in himself and he may grow as a person.       ASSESSMENT: Current Emotional / Mental Status (status of significant symptoms):   Risk status (Self / Other harm or suicidal ideation)   Client denies current fears or concerns for personal safety.   Client denies current or recent suicidal ideation or behaviors.   Client denies current or recent homicidal ideation or behaviors.   Client denies current or recent self injurious behavior or ideation.   Client denies other safety concerns.    Client reports there has been no change in risk factors since their last session.      Client reports there has been no change in protective factors since their last session.      A safety and risk management has not been developed at this time.  Client was given the Suicide Prevention National Hotline, Text [place] 558004,  the [facility] Crisis Number, or encouraged to Call 911 should there be a  change  in these risk factors.       Appearance:   Appropriate    Eye Contact:   Good    Psychomotor Behavior: Restless , more so than in previous sessions   Attitude:   Cooperative    Orientation:   All   Speech    Rate / Production: Hyperverbal     Volume:  Normal    Mood:    Anxious    Affect:    Appropriate    Thought Content:  Clear , more clear than past sessions   Thought Form:  Coherent    Insight:    Fair, improving     Medication Review:   No changes to current psychiatric medication(s)     Medication Compliance:   Yes     Changes in Health Issues:   None reported     Chemical Use Review:  Substance Use: decrease in cannabis.  Client reports frequency using of 0 in the past two weeks.    Tobacco Use: No current tobacco use.      Diagnosis:  1. ADHD (attention deficit hyperactivity disorder), inattentive type    2. Moderate major depression (H)    3. Generalized anxiety disorder      Collateral Reports Completed:   Not Applicable    PLAN: (Client Tasks / Therapist Tasks / Other). Pt will find ways to talk to himself to have self compassion for himself when he is not able to prioritize his tasks.    AFSHIN Gomez, [facility] 11.2.20  Note reviewed and clinical supervision by AFSHIN Leavitt [facility] 11/4/2020   ___________________________________________________________________________    Treatment Plan     Client's Name: Carrillo Dunlap                   YOB: 1965     Date: 1/21/19; reviewed: 5/15/19; updated 8/13/19; 1.3.20 ; 6.15.20; 9.21.20     DSM-V Diagnoses: Attention-Deficit/Hyperactivity Disorder  314.01 (F90.2) Combined presentation or 296.31 (F33.0) Major Depressive Disorder, Recurrent Episode, Mild _ and With anxious distress  Psychosocial / Contextual Factors: Limited social support, limited financial support, physical/medical concerns, employment constraints  WHODAS: did not complete during visit     Referral / Collaboration:  Referral to another professional/service  is not indicated at this time.     Anticipated number of session or this episode of care: 15      MeasurableTreatment Goal(s) related to diagnosis / functional impairment(s)  Goal 1: Client will begin to work on his work portfolio.     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       Objective #A (Client Action)                Client will schedule times of the day to work on his work portfolio. 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   Status: completed - Date: 5/15/19      Intervention(s)  Therapist will assign homework including weekly activity scheduling and CBT thought logs  provide educational materials on CBT  role-play effective communication skills.    Goal 2: Client will decrease his monthly marijuana use.     I will know I've met my goal when I do not use marijuana around my brother.      Objective #A (Client Action)    Client will identify at least 3 example(s) of how marijuana has resulted in an experience that interferes with person values or goals.  Status: completed- 5/15/19    Intervention(s)  Therapist will help client identify how his personal values and goals are interfered when he uses marijuana.     Objective #B  Client will identify triggers and environmental cues contributing to his marijuana usage.     Status: completed - Date: 5/15/19     Intervention(s)  Therapist will teach the client how to perform a behavioral chain analysis to better identify triggers and cues to using marijuana.     Objective #C  Client will maintain sobriety for one month.  Status: completed - Date: 5.15.19     Intervention(s)  Therapist will provide educational materials for the client to increase his coping skills when he has urges to use marijuana. Therapist will provide additional resources to help him maintain sobriety.     Goal 3: Client will decrease his anxiety  from his baseline GAD7 score.    I will know I've met my goal when I am able to let things go and allow myself permission to relax and not worry.      Objective #A  Client will identify 5 initial signs or symptoms of anxiety.    Status: completed - Date: 5/15/19     Intervention(s)  Therapist will provide educational materials on the symptoms of anxiety.    Objective #B (Client Action)    Status: completed - Date: 5/15/19     Client will identify 5 fears / thoughts that contribute to feeling anxious.    Intervention(s)  Therapist will teach the client how to perform a behavioral chain analysis in order to identify fears/thoughts contributing to anxious feelings.     Objective #C  Client will use at least 4 coping skills for anxiety management in the next 10 weeks.  Status: continued - Date: 2/19/19; 1.3.20; 6.15.20 ; 9.21.20    Intervention(s)  Therapist will teach progressive muscle relaxation, cue control relaxation, mindful breathing, and guided imagery. Therapist will also utilize Acceptance and Commitment Therapy by exploring and identifying important values in patient's life, and discuss ways to commit to behavioral activation around these values.     Goal 4: The client will learn and apply skills to manage the symptoms of depression.    I will know I've met my goal when I am able to let things go and allow myself permission to relax, not worry, and feel better about myself.      Objective #A: Client will Increase interest, engagement, and pleasure in doing things.  Client will identify negative self-talk and behaviors: challenge core beliefs, myths, and actions.  Decrease frequency and intensity of feeling down, depressed, hopeless.  Status: continued Date(s): 5/15/19; 6.15.20; 9.21.20    Interventions  Therapist will provide educational materials and handouts on core beliefs, cognitive distortions, and cognitive fusion and defusion.    Goal 5: Client will explore and resolve issues related to relationship  issues with his boss.    I will know I've met my goal when I am able to feel better about my relationship with his boss.      Objective #A (Client Action)    Client will review and familiarize himself with feeling words. He will use feeling words to describe when her needs are met/not met.   Status: New - Date: 8/13/19; 1.3.20 ; 6.15.20; 9.21.20    Intervention(s)  Therapist will assign emotional recognition/identification including when expressing when his needs are not being met or are being met.    Objective #B  Client will identify negative self-talk and behaviors associated with his relationship with his boss and others, and challenge core beliefs, myths, and actions.  Status: 8/13/19; 1.3.20 ; 6.15.20; 9.21.20    Intervention(s)  Therapist will provide education and homework on CBT, ACT, and DBT. Therapist will provide strategies to evaluate for cognitive distortions and logical errors. Therapist will also help client begin to accept feeling negative emotions in a way that helps to decrease his suffering.      Objective #C   Client will compile a list of boundaries she will set with his boss and others.   Status: New - Date: 8/13/19; 1.3.20 ; 6.15.20; 9.21.20    Intervention(s)  Therapist will teach about healthy boundaries. Including information about different communication styles emphasizing assertive communication as the most helpful/healthy style.        AFSHIN Gomez, [facility], May 15, 2019; 1.3.20 ; 6.15.20; 9.21.20  Note reviewed and clinical supervision by AFSHIN Weaver [facility] 9/21/2020

## 2020-11-10 ENCOUNTER — HOSPITAL ENCOUNTER (OUTPATIENT)
Dept: LAB | Facility: CLINIC | Age: 55
Discharge: HOME | End: 2020-11-10
Admitting: OPHTHALMOLOGY
Payer: COMMERCIAL

## 2020-11-10 DIAGNOSIS — H30.003: Primary | ICD-10-CM

## 2020-11-10 LAB — ERYTHROCYTE [SEDIMENTATION RATE] IN BLOOD BY WESTERGREN METHOD: 19 MM/H (ref 0–20)

## 2020-11-10 PROCEDURE — 85652 RBC SED RATE AUTOMATED: CPT | Performed by: OPHTHALMOLOGY

## 2020-11-10 PROCEDURE — 86777 TOXOPLASMA ANTIBODY: CPT | Performed by: OPHTHALMOLOGY

## 2020-11-10 PROCEDURE — 86611 BARTONELLA ANTIBODY: CPT | Mod: 59 | Performed by: OPHTHALMOLOGY

## 2020-11-10 PROCEDURE — 86481 TB AG RESPONSE T-CELL SUSP: CPT | Performed by: OPHTHALMOLOGY

## 2020-11-10 PROCEDURE — 86431 RHEUMATOID FACTOR QUANT: CPT | Performed by: OPHTHALMOLOGY

## 2020-11-10 PROCEDURE — 87389 HIV-1 AG W/HIV-1&-2 AB AG IA: CPT | Performed by: OPHTHALMOLOGY

## 2020-11-10 PROCEDURE — 86780 TREPONEMA PALLIDUM: CPT | Performed by: OPHTHALMOLOGY

## 2020-11-10 PROCEDURE — 86778 TOXOPLASMA ANTIBODY IGM: CPT | Performed by: OPHTHALMOLOGY

## 2020-11-11 LAB
HIV 1+2 AB+HIV1 P24 AG SERPL QL IA: NONREACTIVE
RHEUMATOID FACT SER NEPH-ACNC: <7 IU/ML (ref 0–20)
T GONDII IGG SER-ACNC: <3 IU/ML
T GONDII IGM SER-ACNC: <3 AU/ML
T PALLIDUM AB SER QL: NONREACTIVE

## 2020-11-12 LAB
GAMMA INTERFERON BACKGROUND BLD IA-ACNC: 0.11 IU/ML
M TB IFN-G CD4+ BCKGRND COR BLD-ACNC: 9.89 IU/ML
M TB TUBERC IFN-G BLD QL: NEGATIVE
MITOGEN IGNF BCKGRD COR BLD-ACNC: 0 IU/ML
MITOGEN IGNF BCKGRD COR BLD-ACNC: 0.02 IU/ML

## 2020-11-13 LAB
B HENSELAE IGG TITR SER IF: ABNORMAL {TITER}
B HENSELAE IGM TITR SER IF: ABNORMAL {TITER}
B QUINTANA IGG TITR SER: NORMAL {TITER}
B QUINTANA IGM TITR SER: NORMAL {TITER}

## 2020-11-16 ENCOUNTER — VIRTUAL VISIT (OUTPATIENT)
Dept: PSYCHOLOGY | Facility: CLINIC | Age: 55
End: 2020-11-16
Payer: COMMERCIAL

## 2020-11-16 DIAGNOSIS — F41.1 GENERALIZED ANXIETY DISORDER: ICD-10-CM

## 2020-11-16 DIAGNOSIS — F32.1 MODERATE MAJOR DEPRESSION (H): ICD-10-CM

## 2020-11-16 DIAGNOSIS — F90.0 ADHD (ATTENTION DEFICIT HYPERACTIVITY DISORDER), INATTENTIVE TYPE: Primary | ICD-10-CM

## 2020-11-16 PROCEDURE — 90834 PSYTX W PT 45 MINUTES: CPT | Mod: 95 | Performed by: SOCIAL WORKER

## 2020-11-16 NOTE — PROGRESS NOTES
Telemedicine Visit: The patient's condition can be safely assessed and treated via synchronous audio and visual telemedicine encounter.       Reason for Telemedicine Visit: Patient has requested telehealth visit     Originating Site (Patient Location): Patient's home     Distant Site (Provider Location): Provider Remote Setting     Consent:  The patient/guardian has verbally consented to: the potential risks and benefits of telemedicine (video visit) versus in person care; bill my insurance or make self-payment for services provided; and responsibility for payment of non-covered services                             Mode of Communication:  Video Conference via [employer]  As the provider I attest to compliance with applicable laws and regulations related to telemedicine.    Progress Note    Client Name: Carrillo Dunlap  Date: 11.2.20         Service Type: Individual  Video Visit: yes     Session Start Time:  10:15am  Session End Time: 11:00am    Session Length: 45 minutes    Session #: 32    Attendees: Client attended alone     Treatment Plan Last Reviewed: Reviewed  PHQ-9 / UGO-7 : Reviewed    DATA  Interactive Complexity: No  Crisis: No       Progress Since Last Session (Related to Symptoms / Goals / Homework):  Symptoms: Improving.    Homework: Achieved / completed to satisfaction.      Episode of Care Goals: Achieved / completed to satisfaction - ACTION (Actively working towards change); Intervened by reinforcing change plan / affirming steps taken     Current / Ongoing Stressors and Concerns:  Pt will think about having compassion for himself in recognizing what areas he has values in and being willing to pay extra for accounting.       Treatment Objective(s) Addressed in This Session:   Identify negative self-talk and behaviors: challenge core beliefs, myths, and actions   Decrease frequency and intensity of feeling down, depressed, hopeless   Client will review and familiarize himself  with feeling words. He will use feeling words to describe when her needs are met/not met.   Self compassion     Intervention:  Psychoeducation: Explored with the pt using open ended questioning what the purpose of worry is. Discussed how in order to worry excessively about something it has to be 1. Important to us; 2. We have to be invested in the outcome (we have a preferred outcome). Processed how our minds continue to ruminate on a worry because of the fear of the unknown or uncertain. Our mind searches for different possibilities, in part to ensure that we are capable to cope with the various outcomes, especially if they may cause more pain/suffering in the long run. Discussed how our preferred outcome is the least suffering/ easiest option. Processed however, that many times we are able to cope with the other outcomes, although it is more difficult and there may be more suffering/pain. Pt processed that although this may be the cause, it can help him find resilience in himself and he may grow as a person.       ASSESSMENT: Current Emotional / Mental Status (status of significant symptoms):   Risk status (Self / Other harm or suicidal ideation)   Client denies current fears or concerns for personal safety.   Client denies current or recent suicidal ideation or behaviors.   Client denies current or recent homicidal ideation or behaviors.   Client denies current or recent self injurious behavior or ideation.   Client denies other safety concerns.    Client reports there has been no change in risk factors since their last session.      Client reports there has been no change in protective factors since their last session.      A safety and risk management has not been developed at this time.  Client was given the Suicide Prevention National Hotline, Text [place] 390633,  the [facility] Crisis Number, or encouraged to Call 911 should there be a change  in these risk factors.       Appearance:   Appropriate    Eye  Contact:   Good    Psychomotor Behavior: Restless , more so than in previous sessions   Attitude:   Cooperative    Orientation:   All   Speech    Rate / Production: Hyperverbal     Volume:  Normal    Mood:    Anxious    Affect:    Appropriate    Thought Content:  Clear , more clear than past sessions   Thought Form:  Coherent    Insight:    Fair, improving     Medication Review:   No changes to current psychiatric medication(s)     Medication Compliance:   Yes     Changes in Health Issues:   None reported     Chemical Use Review:  Substance Use: decrease in cannabis.  Client reports frequency using of 0 in the past two weeks.    Tobacco Use: No current tobacco use.      Diagnosis:  1. ADHD (attention deficit hyperactivity disorder), inattentive type    2. Moderate major depression (H)    3. Generalized anxiety disorder      Collateral Reports Completed:   Not Applicable    PLAN: (Client Tasks / Therapist Tasks / Other). Pt will be more accepting about what areas in his life he is willing to be more willing to rely on others, like accounting.    AFSHIN Gomez, [facility] 11.16.20  Note reviewed and clinical supervision by AFSHIN Leavitt [facility] 11/20/2020   ___________________________________________________________________________    Treatment Plan     Client's Name: Carrillo Dunlap                   YOB: 1965     Date: 1/21/19; reviewed: 5/15/19; updated 8/13/19; 1.3.20 ; 6.15.20; 9.21.20     DSM-V Diagnoses: Attention-Deficit/Hyperactivity Disorder  314.01 (F90.2) Combined presentation or 296.31 (F33.0) Major Depressive Disorder, Recurrent Episode, Mild _ and With anxious distress  Psychosocial / Contextual Factors: Limited social support, limited financial support, physical/medical concerns, employment constraints  WHODAS: did not complete during visit     Referral / Collaboration:  Referral to another professional/service is not indicated at this time.     Anticipated number of  session or this episode of care: 15      MeasurableTreatment Goal(s) related to diagnosis / functional impairment(s)  Goal 1: Client will begin to work on his work portfolio.     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       Objective #A (Client Action)                Client will schedule times of the day to work on his work portfolio. 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   Status: completed - Date: 5/15/19      Intervention(s)  Therapist will assign homework including weekly activity scheduling and CBT thought logs  provide educational materials on CBT  role-play effective communication skills.    Goal 2: Client will decrease his monthly marijuana use.     I will know I've met my goal when I do not use marijuana around my brother.      Objective #A (Client Action)    Client will identify at least 3 example(s) of how marijuana has resulted in an experience that interferes with person values or goals.  Status: completed- 5/15/19    Intervention(s)  Therapist will help client identify how his personal values and goals are interfered when he uses marijuana.     Objective #B  Client will identify triggers and environmental cues contributing to his marijuana usage.     Status: completed - Date: 5/15/19     Intervention(s)  Therapist will teach the client how to perform a behavioral chain analysis to better identify triggers and cues to using marijuana.     Objective #C  Client will maintain sobriety for one month.  Status: completed - Date: 5.15.19     Intervention(s)  Therapist will provide educational materials for the client to increase his coping skills when he has urges to use marijuana. Therapist will provide additional resources to help him maintain sobriety.     Goal 3: Client will decrease his anxiety from his baseline GAD7 score.    I will know I've met my  goal when I am able to let things go and allow myself permission to relax and not worry.      Objective #A  Client will identify 5 initial signs or symptoms of anxiety.    Status: completed - Date: 5/15/19     Intervention(s)  Therapist will provide educational materials on the symptoms of anxiety.    Objective #B (Client Action)    Status: completed - Date: 5/15/19     Client will identify 5 fears / thoughts that contribute to feeling anxious.    Intervention(s)  Therapist will teach the client how to perform a behavioral chain analysis in order to identify fears/thoughts contributing to anxious feelings.     Objective #C  Client will use at least 4 coping skills for anxiety management in the next 10 weeks.  Status: continued - Date: 2/19/19; 1.3.20; 6.15.20 ; 9.21.20    Intervention(s)  Therapist will teach progressive muscle relaxation, cue control relaxation, mindful breathing, and guided imagery. Therapist will also utilize Acceptance and Commitment Therapy by exploring and identifying important values in patient's life, and discuss ways to commit to behavioral activation around these values.     Goal 4: The client will learn and apply skills to manage the symptoms of depression.    I will know I've met my goal when I am able to let things go and allow myself permission to relax, not worry, and feel better about myself.      Objective #A: Client will Increase interest, engagement, and pleasure in doing things.  Client will identify negative self-talk and behaviors: challenge core beliefs, myths, and actions.  Decrease frequency and intensity of feeling down, depressed, hopeless.  Status: continued Date(s): 5/15/19; 6.15.20; 9.21.20    Interventions  Therapist will provide educational materials and handouts on core beliefs, cognitive distortions, and cognitive fusion and defusion.    Goal 5: Client will explore and resolve issues related to relationship issues with his boss.    I will know I've met my goal when  I am able to feel better about my relationship with his boss.      Objective #A (Client Action)    Client will review and familiarize himself with feeling words. He will use feeling words to describe when her needs are met/not met.   Status: New - Date: 8/13/19; 1.3.20 ; 6.15.20; 9.21.20    Intervention(s)  Therapist will assign emotional recognition/identification including when expressing when his needs are not being met or are being met.    Objective #B  Client will identify negative self-talk and behaviors associated with his relationship with his boss and others, and challenge core beliefs, myths, and actions.  Status: 8/13/19; 1.3.20 ; 6.15.20; 9.21.20    Intervention(s)  Therapist will provide education and homework on CBT, ACT, and DBT. Therapist will provide strategies to evaluate for cognitive distortions and logical errors. Therapist will also help client begin to accept feeling negative emotions in a way that helps to decrease his suffering.      Objective #C   Client will compile a list of boundaries she will set with his boss and others.   Status: New - Date: 8/13/19; 1.3.20 ; 6.15.20; 9.21.20    Intervention(s)  Therapist will teach about healthy boundaries. Including information about different communication styles emphasizing assertive communication as the most helpful/healthy style.        AFSHIN Gomez, [facility], May 15, 2019; 1.3.20 ; 6.15.20; 9.21.20  Note reviewed and clinical supervision by AFSHIN Weaver [facility] 9/21/2020

## 2020-11-30 ENCOUNTER — VIRTUAL VISIT (OUTPATIENT)
Dept: PSYCHOLOGY | Facility: CLINIC | Age: 55
End: 2020-11-30
Payer: COMMERCIAL

## 2020-11-30 DIAGNOSIS — F32.1 MODERATE MAJOR DEPRESSION (H): ICD-10-CM

## 2020-11-30 DIAGNOSIS — F90.0 ADHD (ATTENTION DEFICIT HYPERACTIVITY DISORDER), INATTENTIVE TYPE: Primary | ICD-10-CM

## 2020-11-30 DIAGNOSIS — F41.1 GENERALIZED ANXIETY DISORDER: ICD-10-CM

## 2020-11-30 PROCEDURE — 90834 PSYTX W PT 45 MINUTES: CPT | Mod: 95 | Performed by: SOCIAL WORKER

## 2020-11-30 NOTE — PROGRESS NOTES
Telemedicine Visit: The patient's condition can be safely assessed and treated via synchronous audio and visual telemedicine encounter.       Reason for Telemedicine Visit: Patient has requested telehealth visit     Originating Site (Patient Location): Patient's home     Distant Site (Provider Location): Provider Remote Setting     Consent:  The patient/guardian has verbally consented to: the potential risks and benefits of telemedicine (video visit) versus in person care; bill my insurance or make self-payment for services provided; and responsibility for payment of non-covered services                             Mode of Communication:  Video Conference via Locu  As the provider I attest to compliance with applicable laws and regulations related to telemedicine.    Progress Note    Client Name: Carrillo Dunlap  Date: 11.30.20         Service Type: Individual  Video Visit: yes     Session Start Time:  10:30am  Session End Time: 11:20am    Session Length: 50 minutes    Session #: 33    Attendees: Client attended alone     Treatment Plan Last Reviewed: Reviewed  PHQ-9 / UGO-7 : Reviewed    DATA  Interactive Complexity: No  Crisis: No       Progress Since Last Session (Related to Symptoms / Goals / Homework):  Symptoms: Improving.    Homework: Achieved / completed to satisfaction.      Episode of Care Goals: Achieved / completed to satisfaction - ACTION (Actively working towards change); Intervened by reinforcing change plan / affirming steps taken     Current / Ongoing Stressors and Concerns:  Discussed concepts of self compassion when noticing he is doing an away move (avoidance) in recognizing there is an element of pain. Discussed having eve when increasing willingness to hold space for pain and reengage with actions that align with core values.       Treatment Objective(s) Addressed in This Session:   Identify negative self-talk and behaviors: challenge core beliefs, myths, and  actions   Decrease frequency and intensity of feeling down, depressed, hopeless   Client will review and familiarize himself with feeling words. He will use feeling words to describe when her needs are met/not met.   Self compassion     Intervention:  Psychoeducation: Explored with the pt using open ended questioning what the purpose of worry is. Discussed how in order to worry excessively about something it has to be 1. Important to us; 2. We have to be invested in the outcome (we have a preferred outcome). Processed how our minds continue to ruminate on a worry because of the fear of the unknown or uncertain. Our mind searches for different possibilities, in part to ensure that we are capable to cope with the various outcomes, especially if they may cause more pain/suffering in the long run. Discussed how our preferred outcome is the least suffering/ easiest option. Processed however, that many times we are able to cope with the other outcomes, although it is more difficult and there may be more suffering/pain. Pt processed that although this may be the cause, it can help him find resilience in himself and he may grow as a person.       ASSESSMENT: Current Emotional / Mental Status (status of significant symptoms):   Risk status (Self / Other harm or suicidal ideation)   Client denies current fears or concerns for personal safety.   Client denies current or recent suicidal ideation or behaviors.   Client denies current or recent homicidal ideation or behaviors.   Client denies current or recent self injurious behavior or ideation.   Client denies other safety concerns.    Client reports there has been no change in risk factors since their last session.      Client reports there has been no change in protective factors since their last session.      A safety and risk management has not been developed at this time.  Client was given the Suicide Prevention National Hotline, Text [place] 118360,  the [facility] Crisis Number,  or encouraged to Call 911 should there be a change  in these risk factors.       Appearance:   Appropriate    Eye Contact:   Good    Psychomotor Behavior: Restless , more so than in previous sessions   Attitude:   Cooperative    Orientation:   All   Speech    Rate / Production: Hyperverbal     Volume:  Normal    Mood:    Anxious    Affect:    Appropriate    Thought Content:  Clear , more clear than past sessions   Thought Form:  Coherent    Insight:    Fair, improving     Medication Review:   No changes to current psychiatric medication(s)     Medication Compliance:   Yes     Changes in Health Issues:   None reported     Chemical Use Review:  Substance Use: decrease in cannabis.  Client reports frequency using of 0 in the past two weeks.    Tobacco Use: No current tobacco use.      Diagnosis:  1. ADHD (attention deficit hyperactivity disorder), inattentive type    2. Moderate major depression (H)    3. Generalized anxiety disorder      Collateral Reports Completed:   Not Applicable    PLAN: (Client Tasks / Therapist Tasks / Other). Pt will have eve when increasing willingness to hold space for pain and reengage with actions that align with core values.      AFSHIN Gomez, [facility] 11.30.20  Note reviewed and clinical supervision by AFSHIN Leavitt [facility] 12/4/2020   ___________________________________________________________________________    Treatment Plan     Client's Name: Carrillo Dunlap                   YOB: 1965     Date: 1/21/19; reviewed: 5/15/19; updated 8/13/19; 1.3.20 ; 6.15.20; 9.21.20     DSM-V Diagnoses: Attention-Deficit/Hyperactivity Disorder  314.01 (F90.2) Combined presentation or 296.31 (F33.0) Major Depressive Disorder, Recurrent Episode, Mild _ and With anxious distress  Psychosocial / Contextual Factors: Limited social support, limited financial support, physical/medical concerns, employment constraints  WHODAS: did not complete during visit     Referral /  Collaboration:  Referral to another professional/service is not indicated at this time.     Anticipated number of session or this episode of care: 15      MeasurableTreatment Goal(s) related to diagnosis / functional impairment(s)  Goal 1: Client will begin to work on his work portfolio.     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       Objective #A (Client Action)                Client will schedule times of the day to work on his work portfolio. 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   Status: completed - Date: 5/15/19      Intervention(s)  Therapist will assign homework including weekly activity scheduling and CBT thought logs  provide educational materials on CBT  role-play effective communication skills.    Goal 2: Client will decrease his monthly marijuana use.     I will know I've met my goal when I do not use marijuana around my brother.      Objective #A (Client Action)    Client will identify at least 3 example(s) of how marijuana has resulted in an experience that interferes with person values or goals.  Status: completed- 5/15/19    Intervention(s)  Therapist will help client identify how his personal values and goals are interfered when he uses marijuana.     Objective #B  Client will identify triggers and environmental cues contributing to his marijuana usage.     Status: completed - Date: 5/15/19     Intervention(s)  Therapist will teach the client how to perform a behavioral chain analysis to better identify triggers and cues to using marijuana.     Objective #C  Client will maintain sobriety for one month.  Status: completed - Date: 5.15.19     Intervention(s)  Therapist will provide educational materials for the client to increase his coping skills when he has urges to use marijuana. Therapist will provide additional resources to help him  maintain sobriety.     Goal 3: Client will decrease his anxiety from his baseline GAD7 score.    I will know I've met my goal when I am able to let things go and allow myself permission to relax and not worry.      Objective #A  Client will identify 5 initial signs or symptoms of anxiety.    Status: completed - Date: 5/15/19     Intervention(s)  Therapist will provide educational materials on the symptoms of anxiety.    Objective #B (Client Action)    Status: completed - Date: 5/15/19     Client will identify 5 fears / thoughts that contribute to feeling anxious.    Intervention(s)  Therapist will teach the client how to perform a behavioral chain analysis in order to identify fears/thoughts contributing to anxious feelings.     Objective #C  Client will use at least 4 coping skills for anxiety management in the next 10 weeks.  Status: continued - Date: 2/19/19; 1.3.20; 6.15.20 ; 9.21.20    Intervention(s)  Therapist will teach progressive muscle relaxation, cue control relaxation, mindful breathing, and guided imagery. Therapist will also utilize Acceptance and Commitment Therapy by exploring and identifying important values in patient's life, and discuss ways to commit to behavioral activation around these values.     Goal 4: The client will learn and apply skills to manage the symptoms of depression.    I will know I've met my goal when I am able to let things go and allow myself permission to relax, not worry, and feel better about myself.      Objective #A: Client will Increase interest, engagement, and pleasure in doing things.  Client will identify negative self-talk and behaviors: challenge core beliefs, myths, and actions.  Decrease frequency and intensity of feeling down, depressed, hopeless.  Status: continued Date(s): 5/15/19; 6.15.20; 9.21.20    Interventions  Therapist will provide educational materials and handouts on core beliefs, cognitive distortions, and cognitive fusion and defusion.    Goal 5:  Client will explore and resolve issues related to relationship issues with his boss.    I will know I've met my goal when I am able to feel better about my relationship with his boss.      Objective #A (Client Action)    Client will review and familiarize himself with feeling words. He will use feeling words to describe when her needs are met/not met.   Status: New - Date: 8/13/19; 1.3.20 ; 6.15.20; 9.21.20    Intervention(s)  Therapist will assign emotional recognition/identification including when expressing when his needs are not being met or are being met.    Objective #B  Client will identify negative self-talk and behaviors associated with his relationship with his boss and others, and challenge core beliefs, myths, and actions.  Status: 8/13/19; 1.3.20 ; 6.15.20; 9.21.20    Intervention(s)  Therapist will provide education and homework on CBT, ACT, and DBT. Therapist will provide strategies to evaluate for cognitive distortions and logical errors. Therapist will also help client begin to accept feeling negative emotions in a way that helps to decrease his suffering.      Objective #C   Client will compile a list of boundaries she will set with his boss and others.   Status: New - Date: 8/13/19; 1.3.20 ; 6.15.20; 9.21.20    Intervention(s)  Therapist will teach about healthy boundaries. Including information about different communication styles emphasizing assertive communication as the most helpful/healthy style.        AFSHIN Gomez, [facility], May 15, 2019; 1.3.20 ; 6.15.20; 9.21.20  Note reviewed and clinical supervision by AFSHIN Weaver [facility] 9/21/2020

## 2020-12-14 ENCOUNTER — VIRTUAL VISIT (OUTPATIENT)
Dept: PSYCHOLOGY | Facility: CLINIC | Age: 55
End: 2020-12-14
Payer: COMMERCIAL

## 2020-12-14 DIAGNOSIS — F41.1 GENERALIZED ANXIETY DISORDER: ICD-10-CM

## 2020-12-14 DIAGNOSIS — F90.0 ADHD (ATTENTION DEFICIT HYPERACTIVITY DISORDER), INATTENTIVE TYPE: Primary | ICD-10-CM

## 2020-12-14 DIAGNOSIS — F32.1 MODERATE MAJOR DEPRESSION (H): ICD-10-CM

## 2020-12-14 PROCEDURE — 90834 PSYTX W PT 45 MINUTES: CPT | Mod: 95 | Performed by: SOCIAL WORKER

## 2020-12-14 NOTE — PROGRESS NOTES
Telemedicine Visit: The patient's condition can be safely assessed and treated via synchronous audio and visual telemedicine encounter.       Reason for Telemedicine Visit: Patient has requested telehealth visit     Originating Site (Patient Location): Patient's home     Distant Site (Provider Location): Provider Remote Setting     Consent:  The patient/guardian has verbally consented to: the potential risks and benefits of telemedicine (video visit) versus in person care; bill my insurance or make self-payment for services provided; and responsibility for payment of non-covered services                             Mode of Communication:  Video Conference via Antria  As the provider I attest to compliance with applicable laws and regulations related to telemedicine.    Progress Note    Client Name: Carrillo Dunlap  Date: 12.14.20         Service Type: Individual  Video Visit: yes     Session Start Time:  10:30am  Session End Time: 11:20am    Session Length: 50 minutes    Session #: 34    Attendees: Client attended alone     Treatment Plan Last Reviewed: Reviewed  PHQ-9 / UGO-7 : Reviewed    DATA  Interactive Complexity: No  Crisis: No       Progress Since Last Session (Related to Symptoms / Goals / Homework):  Symptoms: Improving.    Homework: Achieved / completed to satisfaction.      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 Discussed how pt had accomplished two long term goals. Encouraged pt in session to feel eve/gratitude toward himself and encouraged to do so over the rest of today.       Treatment Objective(s) Addressed in This Session:   Identify  negative self-talk and behaviors: challenge core beliefs, myths, and actions   Decrease frequency and intensity of feeling down, depressed, hopeless   Client will review and familiarize himself with feeling words. He will use feeling words to describe when her needs are met/not met.   Self compassion     Intervention:  Psychoeducation: Explored with the pt using open ended questioning what the purpose of worry is. Discussed how in order to worry excessively about something it has to be 1. Important to us; 2. We have to be invested in the outcome (we have a preferred outcome). Processed how our minds continue to ruminate on a worry because of the fear of the unknown or uncertain. Our mind searches for different possibilities, in part to ensure that we are capable to cope with the various outcomes, especially if they may cause more pain/suffering in the long run. Discussed how our preferred outcome is the least suffering/ easiest option. Processed however, that many times we are able to cope with the other outcomes, although it is more difficult and there may be more suffering/pain. Pt processed that although this may be the cause, it can help him find resilience in himself and he may grow as a person.       ASSESSMENT: Current Emotional / Mental Status (status of significant symptoms):   Risk status (Self / Other harm or suicidal ideation)   Client denies current fears or concerns for personal safety.   Client denies current or recent suicidal ideation or behaviors.   Client denies current or recent homicidal ideation or behaviors.   Client denies current or recent self injurious behavior or ideation.   Client denies other safety concerns.    Client reports there has been no change in risk factors since their last session.      Client reports there has been no change in protective factors since their last session.      A safety and risk management has not been developed at this time.  Client was given the Suicide  Prevention National Hotline, Text [place] 791547,  [facility] Number, or encouraged to Call 911 should there be a change  in these risk factors.       Appearance:   Appropriate    Eye Contact:   Good    Psychomotor Behavior: Restless , more so than in previous sessions   Attitude:   Cooperative    Orientation:   All   Speech    Rate / Production: Hyperverbal     Volume:  Normal    Mood:    Anxious    Affect:    Appropriate    Thought Content:  Clear , more clear than past sessions   Thought Form:  Coherent    Insight:    Fair, improving     Medication Review:   No changes to current psychiatric medication(s)     Medication Compliance:   Yes     Changes in Health Issues:   None reported     Chemical Use Review:  Substance Use: decrease in cannabis.  Client reports frequency using of 0 in the past two weeks.    Tobacco Use: No current tobacco use.      Diagnosis:  1. ADHD (attention deficit hyperactivity disorder), inattentive type    2. Moderate major depression (H)    3. Generalized anxiety disorder      Collateral Reports Completed:   Not Applicable    PLAN: (Client Tasks / Therapist Tasks / Other). Pt will feel eve/gratitude toward himself and encouraged to do so over the rest of today.        AFSHIN Gomez, [facility] 12.14.20   Note reviewed and clinical supervision by AFSHIN Leavitt [facility] 12/22/2020   ___________________________________________________________________________    Treatment Plan     Client's Name: Carrillo Dunlap                   YOB: 1965     Date: 1/21/19; reviewed: 5/15/19; updated 8/13/19; 1.3.20 ; 6.15.20; 9.21.20     DSM-V Diagnoses: Attention-Deficit/Hyperactivity Disorder  314.01 (F90.2) Combined presentation or 296.31 (F33.0) Major Depressive Disorder, Recurrent Episode, Mild _ and With anxious distress  Psychosocial / Contextual Factors: Limited social support, limited financial support, physical/medical concerns, employment constraints  WHODAS: did  not complete during visit     Referral / Collaboration:  Referral to another professional/service is not indicated at this time.     Anticipated number of session or this episode of care: 15      MeasurableTreatment Goal(s) related to diagnosis / functional impairment(s)  Goal 1: Client will begin to work on his work portfolio.     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       Objective #A (Client Action)                Client will schedule times of the day to work on his work portfolio. 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   Status: completed - Date: 5/15/19      Intervention(s)  Therapist will assign homework including weekly activity scheduling and CBT thought logs  provide educational materials on CBT  role-play effective communication skills.    Goal 2: Client will decrease his monthly marijuana use.     I will know I've met my goal when I do not use marijuana around my brother.      Objective #A (Client Action)    Client will identify at least 3 example(s) of how marijuana has resulted in an experience that interferes with person values or goals.  Status: completed- 5/15/19    Intervention(s)  Therapist will help client identify how his personal values and goals are interfered when he uses marijuana.     Objective #B  Client will identify triggers and environmental cues contributing to his marijuana usage.     Status: completed - Date: 5/15/19     Intervention(s)  Therapist will teach the client how to perform a behavioral chain analysis to better identify triggers and cues to using marijuana.     Objective #C  Client will maintain sobriety for one month.  Status: completed - Date: 5.15.19     Intervention(s)  Therapist will provide educational materials for the client to increase his coping skills when he has urges to use marijuana. Therapist will  provide additional resources to help him maintain sobriety.     Goal 3: Client will decrease his anxiety from his baseline GAD7 score.    I will know I've met my goal when I am able to let things go and allow myself permission to relax and not worry.      Objective #A  Client will identify 5 initial signs or symptoms of anxiety.    Status: completed - Date: 5/15/19     Intervention(s)  Therapist will provide educational materials on the symptoms of anxiety.    Objective #B (Client Action)    Status: completed - Date: 5/15/19     Client will identify 5 fears / thoughts that contribute to feeling anxious.    Intervention(s)  Therapist will teach the client how to perform a behavioral chain analysis in order to identify fears/thoughts contributing to anxious feelings.     Objective #C  Client will use at least 4 coping skills for anxiety management in the next 10 weeks.  Status: continued - Date: 2/19/19; 1.3.20; 6.15.20 ; 9.21.20    Intervention(s)  Therapist will teach progressive muscle relaxation, cue control relaxation, mindful breathing, and guided imagery. Therapist will also utilize Acceptance and Commitment Therapy by exploring and identifying important values in patient's life, and discuss ways to commit to behavioral activation around these values.     Goal 4: The client will learn and apply skills to manage the symptoms of depression.    I will know I've met my goal when I am able to let things go and allow myself permission to relax, not worry, and feel better about myself.      Objective #A: Client will Increase interest, engagement, and pleasure in doing things.  Client will identify negative self-talk and behaviors: challenge core beliefs, myths, and actions.  Decrease frequency and intensity of feeling down, depressed, hopeless.  Status: continued Date(s): 5/15/19; 6.15.20; 9.21.20    Interventions  Therapist will provide educational materials and handouts on core beliefs, cognitive distortions, and  cognitive fusion and defusion.    Goal 5: Client will explore and resolve issues related to relationship issues with his boss.    I will know I've met my goal when I am able to feel better about my relationship with his boss.      Objective #A (Client Action)    Client will review and familiarize himself with feeling words. He will use feeling words to describe when her needs are met/not met.   Status: New - Date: 8/13/19; 1.3.20 ; 6.15.20; 9.21.20    Intervention(s)  Therapist will assign emotional recognition/identification including when expressing when his needs are not being met or are being met.    Objective #B  Client will identify negative self-talk and behaviors associated with his relationship with his boss and others, and challenge core beliefs, myths, and actions.  Status: 8/13/19; 1.3.20 ; 6.15.20; 9.21.20    Intervention(s)  Therapist will provide education and homework on CBT, ACT, and DBT. Therapist will provide strategies to evaluate for cognitive distortions and logical errors. Therapist will also help client begin to accept feeling negative emotions in a way that helps to decrease his suffering.      Objective #C   Client will compile a list of boundaries she will set with his boss and others.   Status: New - Date: 8/13/19; 1.3.20 ; 6.15.20; 9.21.20    Intervention(s)  Therapist will teach about healthy boundaries. Including information about different communication styles emphasizing assertive communication as the most helpful/healthy style.        AFSHIN Gomez, [facility], May 15, 2019; 1.3.20 ; 6.15.20; 9.21.20  Note reviewed and clinical supervision by AFSHIN Weaver [facility] 9/21/2020

## 2021-01-04 ENCOUNTER — VIRTUAL VISIT (OUTPATIENT)
Dept: PSYCHOLOGY | Facility: CLINIC | Age: 56
End: 2021-01-04
Payer: COMMERCIAL

## 2021-01-04 DIAGNOSIS — F32.1 MODERATE MAJOR DEPRESSION (H): ICD-10-CM

## 2021-01-04 DIAGNOSIS — F90.0 ADHD (ATTENTION DEFICIT HYPERACTIVITY DISORDER), INATTENTIVE TYPE: Primary | ICD-10-CM

## 2021-01-04 PROCEDURE — 90834 PSYTX W PT 45 MINUTES: CPT | Mod: 95 | Performed by: SOCIAL WORKER

## 2021-01-04 NOTE — PROGRESS NOTES
"                  Telemedicine Visit: The patient's condition can be safely assessed and treated via synchronous audio and visual telemedicine encounter.       Reason for Telemedicine Visit: Patient has requested telehealth visit     Originating Site (Patient Location): Patient's home     Distant Site (Provider Location): Provider Remote Setting     Consent:  The patient/guardian has verbally consented to: the potential risks and benefits of telemedicine (video visit) versus in person care; bill my insurance or make self-payment for services provided; and responsibility for payment of non-covered services                             Mode of Communication:  Video Conference via [employer]  As the provider I attest to compliance with applicable laws and regulations related to telemedicine.    Progress Note    Client Name: Carrillo Dunlap  Date: 1.4.21         Service Type: Individual  Video Visit: yes     Session Start Time:  10:20am  Session End Time: 1105am    Session Length: 45 minutes    Session #: 35    Attendees: Client attended alone     Treatment Plan Last Reviewed: Reviewed  PHQ-9 / UGO-7 : Reviewed    DATA  Interactive Complexity: No  Crisis: No       Progress Since Last Session (Related to Symptoms / Goals / Homework):  Symptoms: Improving.    Homework: Achieved / completed to satisfaction.      Episode of Care Goals: Achieved / completed to satisfaction - ACTION (Actively working towards change); Intervened by reinforcing change plan / affirming steps taken     Current / Ongoing Stressors and Concerns: Discussed pt's ability to follow through with tasks and have compassion toward himself when he only completes something 70-80% of his goal. Encouraged pt to check in to recognize away moves, I.e., going onto youtube, watching TV, and ask with compassion \"what might I be avoiding here, is there emotional pain?\" After that encouraged pt to recognize his courage, bring up imagery to represent courage, " and begin to move his body to enact a toward move, what his ideal self would be doing. Encouraged pt at the end, to recognize and celebrate ability to move from an away move to a toward move, especially if he does not accomplish his goal.     Treatment Objective(s) Addressed in This Session:   Identify negative self-talk and behaviors: challenge core beliefs, myths, and actions   Decrease frequency and intensity of feeling down, depressed, hopeless   Client will review and familiarize himself with feeling words. He will use feeling words to describe when her needs are met/not met.   Self compassion     Intervention:  Psychoeducation: Explored with the pt using open ended questioning what the purpose of worry is. Discussed how in order to worry excessively about something it has to be 1. Important to us; 2. We have to be invested in the outcome (we have a preferred outcome). Processed how our minds continue to ruminate on a worry because of the fear of the unknown or uncertain. Our mind searches for different possibilities, in part to ensure that we are capable to cope with the various outcomes, especially if they may cause more pain/suffering in the long run. Discussed how our preferred outcome is the least suffering/ easiest option. Processed however, that many times we are able to cope with the other outcomes, although it is more difficult and there may be more suffering/pain. Pt processed that although this may be the cause, it can help him find resilience in himself and he may grow as a person.       ASSESSMENT: Current Emotional / Mental Status (status of significant symptoms):   Risk status (Self / Other harm or suicidal ideation)   Client denies current fears or concerns for personal safety.   Client denies current or recent suicidal ideation or behaviors.   Client denies current or recent homicidal ideation or behaviors.   Client denies current or recent self injurious behavior or ideation.   Client denies  other safety concerns.    Client reports there has been no change in risk factors since their last session.      Client reports there has been no change in protective factors since their last session.      A safety and risk management has not been developed at this time.  Client was given the Suicide Prevention National Hotline, Text [place] 457075,  the [facility] Crisis Number, or encouraged to Call 911 should there be a change  in these risk factors.       Appearance:   Appropriate    Eye Contact:   Good    Psychomotor Behavior: Restless , more so than in previous sessions   Attitude:   Cooperative    Orientation:   All   Speech    Rate / Production: Hyperverbal     Volume:  Normal    Mood:    Anxious    Affect:    Appropriate    Thought Content:  Clear , more clear than past sessions   Thought Form:  Coherent    Insight:    Fair, improving     Medication Review:   No changes to current psychiatric medication(s)     Medication Compliance:   Yes     Changes in Health Issues:   None reported     Chemical Use Review:  Substance Use: decrease in cannabis.  Client reports frequency using of 0 in the past two weeks.    Tobacco Use: No current tobacco use.      Diagnosis:  1. ADHD (attention deficit hyperactivity disorder), inattentive type    2. Moderate major depression (H)      Collateral Reports Completed:   Not Applicable    PLAN: (Client Tasks / Therapist Tasks / Other). Encouraged pt to recognize his courage, bring up imagery to represent courage, and begin to move his body to enact a toward move, what his ideal self would be doing when he is in the stage of avoidance. Encouraged pt at the end, to recognize and celebrate ability to move from an away move to a toward move, especially if he does not accomplish his goal.      AFSHIN Gomez, SW 1.4.21  Note reviewed and clinical supervision by AFSHIN [facility] 1/4/2021    ___________________________________________________________________________    Treatment Plan     Client's Name: Carrillo Dunlap                   YOB: 1965     Date: 1/21/19; reviewed: 5/15/19; updated 8/13/19; 1.3.20 ; 6.15.20; 9.21.20; 1.4.20      DSM-V Diagnoses: Attention-Deficit/Hyperactivity Disorder  314.01 (F90.2) Combined presentation or 296.31 (F33.0) Major Depressive Disorder, Recurrent Episode, Mild _ and With anxious distress  Psychosocial / Contextual Factors: Limited social support, limited financial support, physical/medical concerns, employment constraints  WHODAS: did not complete during visit     Referral / Collaboration:  Referral to another professional/service is not indicated at this time.     Anticipated number of session or this episode of care: 15      MeasurableTreatment Goal(s) related to diagnosis / functional impairment(s)  Goal 1: Client will begin to work on his work portfolio.     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       Objective #A (Client Action)                Client will schedule times of the day to work on his work portfolio. 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   Status: completed - Date: 5/15/19      Intervention(s)  Therapist will assign homework including weekly activity scheduling and CBT thought logs  provide educational materials on CBT  role-play effective communication skills.    Goal 2: Client will decrease his monthly marijuana use.     I will know I've met my goal when I do not use marijuana around my brother.      Objective #A (Client Action)    Client will identify at least 3 example(s) of how marijuana has resulted in an experience that interferes with person values or goals.  Status: completed- 5/15/19    Intervention(s)  Therapist will help client identify how his personal  values and goals are interfered when he uses marijuana.     Objective #B  Client will identify triggers and environmental cues contributing to his marijuana usage.     Status: completed - Date: 5/15/19     Intervention(s)  Therapist will teach the client how to perform a behavioral chain analysis to better identify triggers and cues to using marijuana.     Objective #C  Client will maintain sobriety for one month.  Status: completed - Date: 5.15.19     Intervention(s)  Therapist will provide educational materials for the client to increase his coping skills when he has urges to use marijuana. Therapist will provide additional resources to help him maintain sobriety.     Goal 3: Client will decrease his anxiety from his baseline GAD7 score.    I will know I've met my goal when I am able to let things go and allow myself permission to relax and not worry.      Objective #A  Client will identify 5 initial signs or symptoms of anxiety.    Status: completed - Date: 5/15/19     Intervention(s)  Therapist will provide educational materials on the symptoms of anxiety.    Objective #B (Client Action)    Status: completed - Date: 5/15/19     Client will identify 5 fears / thoughts that contribute to feeling anxious.    Intervention(s)  Therapist will teach the client how to perform a behavioral chain analysis in order to identify fears/thoughts contributing to anxious feelings.     Objective #C  Client will use at least 4 coping skills for anxiety management in the next 10 weeks.  Status: continued - Date: 2/19/19; 1.3.20; 6.15.20 ; 9.21.20; 1.4.20    Intervention(s)  Therapist will teach progressive muscle relaxation, cue control relaxation, mindful breathing, and guided imagery. Therapist will also utilize Acceptance and Commitment Therapy by exploring and identifying important values in patient's life, and discuss ways to commit to behavioral activation around these values.     Goal 4: The client will learn and apply  skills to manage the symptoms of depression.    I will know I've met my goal when I am able to let things go and allow myself permission to relax, not worry, and feel better about myself.      Objective #A: Client will Increase interest, engagement, and pleasure in doing things.  Client will identify negative self-talk and behaviors: challenge core beliefs, myths, and actions.  Decrease frequency and intensity of feeling down, depressed, hopeless.  Status: continued Date(s): 5/15/19; 6.15.20; 9.21.20; 1.4.20    Interventions  Therapist will provide educational materials and handouts on core beliefs, cognitive distortions, and cognitive fusion and defusion.    Goal 5: Client will explore and resolve issues related to relationship issues with his boss.    I will know I've met my goal when I am able to feel better about my relationship with his boss.      Objective #A (Client Action)    Client will review and familiarize himself with feeling words. He will use feeling words to describe when her needs are met/not met.   Status: New - Date: 8/13/19; 1.3.20 ; 6.15.20; 9.21.20; 1.4.20    Intervention(s)  Therapist will assign emotional recognition/identification including when expressing when his needs are not being met or are being met.    Objective #B  Client will identify negative self-talk and behaviors associated with his relationship with his boss and others, and challenge core beliefs, myths, and actions.  Status: 8/13/19; 1.3.20 ; 6.15.20; 9.21.20; 1.4.20    Intervention(s)  Therapist will provide education and homework on CBT, ACT, and DBT. Therapist will provide strategies to evaluate for cognitive distortions and logical errors. Therapist will also help client begin to accept feeling negative emotions in a way that helps to decrease his suffering.      Objective #C   Client will compile a list of boundaries she will set with his boss and others.   Status: New - Date: 8/13/19; 1.3.20 ; 6.15.20; 9.21.20;  1.4.20    Intervention(s)  Therapist will teach about healthy boundaries. Including information about different communication styles emphasizing assertive communication as the most helpful/healthy style.        AFSHIN Gomez, [facility], May 15, 2019; 1.3.20 ; 6.15.20; 9.21.20; 1.4.20  Note reviewed and clinical supervision by AFSHIN Weaver [facility] 9/21/2020

## 2021-01-21 NOTE — PROGRESS NOTES
"                                           Progress Note    Client Name: Carrillo Dunlap  Date: 8/13/19         Service Type: Individual  Video Visit: No     Session Start Time: 12:30pm  Session End Time: 1:20pm    Session Length: 50 minutes    Session #: 15    Attendees: Client attended alone     Treatment Plan Last Reviewed: updated at today's session  PHQ-9 / UGO-7 : Reviewed; scores increased    DATA  Interactive Complexity: No  Crisis: No       Progress Since Last Session (Related to Symptoms / Goals / Homework):  Symptoms: Improving. Pt states that he continues to \"catch himself\" when his thoughts begin to impact how he feels and behaves.   Homework: Did not complete. Pt was unable to have conversation with his boss, however, has had significant increases in his self confidence over the past month which is increasing his ability to have a formal conversation about role expectations and a potential salary increase.      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 Pt reports that although it has been challenging and difficult, he is reconnecting with his strengths he developed as a  10+ years ago. For the remainder of the visit, we spent time processing the ways that the pt noticed more intense positive emotions when he choose to experience, acknowledge, and listen to more negative emotions.      Treatment Objective(s) Addressed in This Session:   Identify negative self-talk and behaviors: challenge core beliefs, myths, and actions   Decrease frequency and intensity of feeling down, depressed, hopeless   Client will review and familiarize himself with feeling words. He will use " Error! Needed to Schedule vaccine   feeling words to describe when her needs are met/not met.      Intervention:  CBT: Processed how pt felt guilty after missing the last appointment, and discussed how his guilt helped to motivate him to take extra steps today (setting multiple alarms and calendar reminders) and as a result, he made it to our appointment on time.   CBT: Discussed how pt's confidence has increased significantly since beginning full-time work 2 months ago. Pt states that he initially was overwhelmed, but now that others are coming to him for advice and expertise he is believing that he is more worthy and of value to company. Discussed how the increase in self-confidence may help him to have a formal conversation with his boss about job expectations and a salary increase, and planned with pt for thoughts or feelings that may get in the way of preventing him from having this conversation.  Mindfulness: Ended session with therapist directing pt through a five minute guided mindfulness exercise using breathing techniques as a way to bring the client to the present moment. Helped pt scan his body to identify tension/uncomfortable sensations and directed pt to use breath to exhale excess energy from intense emotions.    ASSESSMENT: Current Emotional / Mental Status (status of significant symptoms):   Risk status (Self / Other harm or suicidal ideation)   Client denies current fears or concerns for personal safety.   Client denies current or recent suicidal ideation or behaviors.   Client denies current or recent homicidal ideation or behaviors.   Client denies current or recent self injurious behavior or ideation.   Client denies other safety concerns.   Client Client reports there has been no change in risk factors since their last session.     Client Client reports there has been no change in protective factors since their last session.      A safety and risk management has not been developed at this time.  Client was given the Suicide  Prevention National Hotline, Text [place] 325613,  [facility] Number, or encouraged to Call 911 should there be a change  in these risk factors.       Appearance:   Appropriate    Eye Contact:   Good    Psychomotor Behavior: Restless , more so than in previous sessions   Attitude:   Cooperative    Orientation:   All   Speech    Rate / Production: Hyperverbal     Volume:  Normal    Mood:    Anxious    Affect:    Appropriate    Thought Content:  Clear , more clear than past sessions   Thought Form:  Coherent    Insight:    Fair, improving     Medication Review:   No changes to current psychiatric medication(s)     Medication Compliance:   Yes     Changes in Health Issues:   None reported     Chemical Use Review:  Substance Use: decrease in cannabis.  Client reports frequency using of 0 in the past two weeks.    Tobacco Use: No current tobacco use.      Diagnosis:  1. Moderate major depression (H)    2. ADHD (attention deficit hyperactivity disorder), inattentive type      Collateral Reports Completed:   Not Applicable    PLAN: (Client Tasks / Therapist Tasks / Other). Pt agrees to recommit to his goal of having a formal conversation with his boss about his job responsibilities, roles, and salary and benefit options. Pt will utilize the assertiveness formula practiced in session during this conversation.    AFSHIN Gomez, [facility] 8.13.19  Note reviewed and clinical supervision by AFSHIN [facility] 8/15/2019    _______________________    Treatment Plan     Client's Name: Carrillo Dunlap                   YOB: 1965     Date: 1/21/19; reviewed: 5/15/19; updated 8/13/19     DSM-V Diagnoses: Attention-Deficit/Hyperactivity Disorder  314.01 (F90.2) Combined presentation or 296.31 (F33.0) Major Depressive Disorder, Recurrent Episode, Mild _ and With anxious distress  Psychosocial / Contextual Factors: Limited social support, limited financial support, physical/medical concerns,  employment constraints  WHODAS: did not complete during visit     Referral / Collaboration:  Referral to another professional/service is not indicated at this time.     Anticipated number of session or this episode of care: 15      MeasurableTreatment Goal(s) related to diagnosis / functional impairment(s)  Goal 1: Client will begin to work on his work portfolio.     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       Objective #A (Client Action)                Client will schedule times of the day to work on his work portfolio. 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   Status: completed - Date: 5/15/19      Intervention(s)  Therapist will assign homework including weekly activity scheduling and CBT thought logs  provide educational materials on CBT  role-play effective communication skills.    Goal 2: Client will decrease his monthly marijuana use.     I will know I've met my goal when I do not use marijuana around my brother.      Objective #A (Client Action)    Client will identify at least 3 example(s) of how marijuana has resulted in an experience that interferes with person values or goals.  Status: completed- 5/15/19    Intervention(s)  Therapist will help client identify how his personal values and goals are interfered when he uses marijuana.     Objective #B  Client will identify triggers and environmental cues contributing to his marijuana usage.     Status: completed - Date: 5/15/19     Intervention(s)  Therapist will teach the client how to perform a behavioral chain analysis to better identify triggers and cues to using marijuana.     Objective #C  Client will maintain sobriety for one month.  Status: completed - Date: 5.15.19     Intervention(s)  Therapist will provide educational materials for the client to increase his coping skills when he has urges to  use marijuana. Therapist will provide additional resources to help him maintain sobriety.     Goal 3: Client will decrease his anxiety from his baseline GAD7 score.    I will know I've met my goal when I am able to let things go and allow myself permission to relax and not worry.      Objective #A  Client will identify 5 initial signs or symptoms of anxiety.    Status: completed - Date: 5/15/19     Intervention(s)  Therapist will provide educational materials on the symptoms of anxiety.    Objective #B (Client Action)    Status: completed - Date: 5/15/19     Client will identify 5 fears / thoughts that contribute to feeling anxious.    Intervention(s)  Therapist will teach the client how to perform a behavioral chain analysis in order to identify fears/thoughts contributing to anxious feelings.     Objective #C  Client will use at least 4 coping skills for anxiety management in the next 10 weeks.  Status: continued - Date: 2/19/19    Intervention(s)  Therapist will teach progressive muscle relaxation, cue control relaxation, mindful breathing, and guided imagery. Therapist will also utilize Acceptance and Commitment Therapy by exploring and identifying important values in patient's life, and discuss ways to commit to behavioral activation around these values.     Goal 4: The client will learn and apply skills to manage the symptoms of depression.    I will know I've met my goal when I am able to let things go and allow myself permission to relax, not worry, and feel better about myself.      Objective #A: Client will Increase interest, engagement, and pleasure in doing things.  Client will identify negative self-talk and behaviors: challenge core beliefs, myths, and actions.  Decrease frequency and intensity of feeling down, depressed, hopeless.  Status: continued Date(s): 5/15/19    Interventions  Therapist will provide educational materials and handouts on core beliefs, cognitive distortions, and cognitive fusion  and defusion.    Goal 5: Client will explore and resolve issues related to relationship issues with his boss.    I will know I've met my goal when I am able to feel better about my relationship with his boss.      Objective #A (Client Action)    Client will review and familiarize himself with feeling words. He will use feeling words to describe when her needs are met/not met.   Status: New - Date: 8/13/19    Intervention(s)  Therapist will assign emotional recognition/identification including when expressing when his needs are not being met or are being met.    Objective #B  Client will identify negative self-talk and behaviors associated with his relationship with his boss and others, and challenge core beliefs, myths, and actions.  Status: 8/13/19    Intervention(s)  Therapist will provide education and homework on CBT, ACT, and DBT. Therapist will provide strategies to evaluate for cognitive distortions and logical errors. Therapist will also help client begin to accept feeling negative emotions in a way that helps to decrease his suffering.      Objective #C   Client will compile a list of boundaries she will set with his boss and others.   Status: New - Date: 8/13/19    Intervention(s)  Therapist will teach about healthy boundaries. Including information about different communication styles emphasizing assertive communication as the most helpful/healthy style.        AFSHIN Gomez, [facility], May 15, 2019  Note reviewed and clinical supervision [facility] 7/15/2019

## 2021-02-01 ENCOUNTER — VIRTUAL VISIT (OUTPATIENT)
Dept: PSYCHOLOGY | Facility: CLINIC | Age: 56
End: 2021-02-01
Payer: COMMERCIAL

## 2021-02-01 ENCOUNTER — MYC REFILL (OUTPATIENT)
Dept: FAMILY MEDICINE | Facility: CLINIC | Age: 56
End: 2021-02-01

## 2021-02-01 DIAGNOSIS — F90.0 ADHD (ATTENTION DEFICIT HYPERACTIVITY DISORDER), INATTENTIVE TYPE: ICD-10-CM

## 2021-02-01 DIAGNOSIS — F41.1 GENERALIZED ANXIETY DISORDER: ICD-10-CM

## 2021-02-01 DIAGNOSIS — F32.1 MODERATE MAJOR DEPRESSION (H): ICD-10-CM

## 2021-02-01 DIAGNOSIS — F90.0 ADHD (ATTENTION DEFICIT HYPERACTIVITY DISORDER), INATTENTIVE TYPE: Primary | ICD-10-CM

## 2021-02-01 PROCEDURE — 90834 PSYTX W PT 45 MINUTES: CPT | Mod: 95 | Performed by: SOCIAL WORKER

## 2021-02-01 NOTE — PROGRESS NOTES
Telemedicine Visit: The patient's condition can be safely assessed and treated via synchronous audio and visual telemedicine encounter.       Reason for Telemedicine Visit: Patient has requested telehealth visit     Originating Site (Patient Location): Patient's home     Distant Site (Provider Location): Provider Remote Setting     Consent:  The patient/guardian has verbally consented to: the potential risks and benefits of telemedicine (video visit) versus in person care; bill my insurance or make self-payment for services provided; and responsibility for payment of non-covered services                             Mode of Communication:  Video Conference via [employer]  As the provider I attest to compliance with applicable laws and regulations related to telemedicine.    Progress Note    Client Name: Carrillo Dunlap  Date: 2.1.21         Service Type: Individual  Video Visit: yes     Session Start Time:  1110am  Session End Time: 12pm    Session Length: 50 minutes    Session #: 36    Attendees: Client attended alone     Treatment Plan Last Reviewed: Reviewed  PHQ-9 / UGO-7 : Reviewed    DATA  Interactive Complexity: No  Crisis: No       Progress Since Last Session (Related to Symptoms / Goals / Homework):  Symptoms: Improving.    Homework: Achieved / completed to satisfaction.      Episode of Care Goals: Achieved / completed to satisfaction - ACTION (Actively working towards change); Intervened by reinforcing change plan / affirming steps taken     Current / Ongoing Stressors and Concerns: Pt reports having much more self compassion for self, and is naming/acknowledging observing making away moves (completing avoiding work with company), related to painful emotional thoughts of berating self/self criticism. Pt is also becoming mindful of shifting from an away move to a toward move by reflecting on acceptance/seriendy prayer and engaging with work in a more flexible/imperfect approach.  Discussed how this has built pt's self confidence and pt is becoming more exploratory about thinking about what he may want in other areas of his life (other life goals).       Treatment Objective(s) Addressed in This Session:   Identify negative self-talk and behaviors: challenge core beliefs, myths, and actions   Decrease frequency and intensity of feeling down, depressed, hopeless   Client will review and familiarize himself with feeling words. He will use feeling words to describe when her needs are met/not met.   Self compassion     Intervention:  Psychoeducation: Explored with the pt using open ended questioning what the purpose of worry is. Discussed how in order to worry excessively about something it has to be 1. Important to us; 2. We have to be invested in the outcome (we have a preferred outcome). Processed how our minds continue to ruminate on a worry because of the fear of the unknown or uncertain. Our mind searches for different possibilities, in part to ensure that we are capable to cope with the various outcomes, especially if they may cause more pain/suffering in the long run. Discussed how our preferred outcome is the least suffering/ easiest option. Processed however, that many times we are able to cope with the other outcomes, although it is more difficult and there may be more suffering/pain. Pt processed that although this may be the cause, it can help him find resilience in himself and he may grow as a person.       ASSESSMENT: Current Emotional / Mental Status (status of significant symptoms):   Risk status (Self / Other harm or suicidal ideation)   Client denies current fears or concerns for personal safety.   Client denies current or recent suicidal ideation or behaviors.   Client denies current or recent homicidal ideation or behaviors.   Client denies current or recent self injurious behavior or ideation.   Client denies other safety concerns.    Client reports there has been no change  in risk factors since their last session.      Client reports there has been no change in protective factors since their last session.      A safety and risk management has not been developed at this time.  Client was given the Suicide Prevention National Hotline, Text [place] 276250,  the [facility] Crisis Number, or encouraged to Call 911 should there be a change  in these risk factors.       Appearance:   Appropriate    Eye Contact:   Good    Psychomotor Behavior: Restless , more so than in previous sessions   Attitude:   Cooperative    Orientation:   All   Speech    Rate / Production: Hyperverbal     Volume:  Normal    Mood:    Anxious    Affect:    Appropriate    Thought Content:  Clear , more clear than past sessions   Thought Form:  Coherent    Insight:    Fair, improving     Medication Review:   No changes to current psychiatric medication(s)     Medication Compliance:   Yes     Changes in Health Issues:   None reported     Chemical Use Review:  Substance Use: decrease in cannabis.  Client reports frequency using of 0 in the past two weeks.    Tobacco Use: No current tobacco use.      Diagnosis:  1. ADHD (attention deficit hyperactivity disorder), inattentive type    2. Generalized anxiety disorder    3. Moderate major depression (H)      Collateral Reports Completed:   Not Applicable    PLAN: (Client Tasks / Therapist Tasks / Other). Pt will continue to recognize his courage, bring up imagery to represent courage, and begin to move his body to enact a toward move, what his ideal self would be doing when he is in the stage of avoidance. Encouraged pt at the end, to recognize and celebrate ability to move from an away move to a toward move, especially if he does not accomplish his goal.      AFSHIN Gomez, LGSW 2.1.21  This note has been reviewed and I agree with the plan of care. This note is co-signed by AFSHIN Pittman, LICSW, Supervisor, on:  2/7/21    ___________________________________________________________________________    Treatment Plan     Client's Name: Carrillo Dunlap                   YOB: 1965     Date: 1/21/19; reviewed: 5/15/19; updated 8/13/19; 1.3.20 ; 6.15.20; 9.21.20; 1.4.20      DSM-V Diagnoses: Attention-Deficit/Hyperactivity Disorder  314.01 (F90.2) Combined presentation or 296.31 (F33.0) Major Depressive Disorder, Recurrent Episode, Mild _ and With anxious distress  Psychosocial / Contextual Factors: Limited social support, limited financial support, physical/medical concerns, employment constraints  WHODAS: did not complete during visit     Referral / Collaboration:  Referral to another professional/service is not indicated at this time.     Anticipated number of session or this episode of care: 15      MeasurableTreatment Goal(s) related to diagnosis / functional impairment(s)  Goal 1: Client will begin to work on his work portfolio.     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       Objective #A (Client Action)                Client will schedule times of the day to work on his work portfolio. 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   Status: completed - Date: 5/15/19      Intervention(s)  Therapist will assign homework including weekly activity scheduling and CBT thought logs  provide educational materials on CBT  role-play effective communication skills.    Goal 2: Client will decrease his monthly marijuana use.     I will know I've met my goal when I do not use marijuana around my brother.      Objective #A (Client Action)    Client will identify at least 3 example(s) of how marijuana has resulted in an experience that interferes with person values or goals.  Status: completed- 5/15/19    Intervention(s)  Therapist will help client identify how his  personal values and goals are interfered when he uses marijuana.     Objective #B  Client will identify triggers and environmental cues contributing to his marijuana usage.     Status: completed - Date: 5/15/19     Intervention(s)  Therapist will teach the client how to perform a behavioral chain analysis to better identify triggers and cues to using marijuana.     Objective #C  Client will maintain sobriety for one month.  Status: completed - Date: 5.15.19     Intervention(s)  Therapist will provide educational materials for the client to increase his coping skills when he has urges to use marijuana. Therapist will provide additional resources to help him maintain sobriety.     Goal 3: Client will decrease his anxiety from his baseline GAD7 score.    I will know I've met my goal when I am able to let things go and allow myself permission to relax and not worry.      Objective #A  Client will identify 5 initial signs or symptoms of anxiety.    Status: completed - Date: 5/15/19     Intervention(s)  Therapist will provide educational materials on the symptoms of anxiety.    Objective #B (Client Action)    Status: completed - Date: 5/15/19     Client will identify 5 fears / thoughts that contribute to feeling anxious.    Intervention(s)  Therapist will teach the client how to perform a behavioral chain analysis in order to identify fears/thoughts contributing to anxious feelings.     Objective #C  Client will use at least 4 coping skills for anxiety management in the next 10 weeks.  Status: continued - Date: 2/19/19; 1.3.20; 6.15.20 ; 9.21.20; 1.4.20    Intervention(s)  Therapist will teach progressive muscle relaxation, cue control relaxation, mindful breathing, and guided imagery. Therapist will also utilize Acceptance and Commitment Therapy by exploring and identifying important values in patient's life, and discuss ways to commit to behavioral activation around these values.     Goal 4: The client will learn and  apply skills to manage the symptoms of depression.    I will know I've met my goal when I am able to let things go and allow myself permission to relax, not worry, and feel better about myself.      Objective #A: Client will Increase interest, engagement, and pleasure in doing things.  Client will identify negative self-talk and behaviors: challenge core beliefs, myths, and actions.  Decrease frequency and intensity of feeling down, depressed, hopeless.  Status: continued Date(s): 5/15/19; 6.15.20; 9.21.20; 1.4.20    Interventions  Therapist will provide educational materials and handouts on core beliefs, cognitive distortions, and cognitive fusion and defusion.    Goal 5: Client will explore and resolve issues related to relationship issues with his boss.    I will know I've met my goal when I am able to feel better about my relationship with his boss.      Objective #A (Client Action)    Client will review and familiarize himself with feeling words. He will use feeling words to describe when her needs are met/not met.   Status: New - Date: 8/13/19; 1.3.20 ; 6.15.20; 9.21.20; 1.4.20    Intervention(s)  Therapist will assign emotional recognition/identification including when expressing when his needs are not being met or are being met.    Objective #B  Client will identify negative self-talk and behaviors associated with his relationship with his boss and others, and challenge core beliefs, myths, and actions.  Status: 8/13/19; 1.3.20 ; 6.15.20; 9.21.20; 1.4.20    Intervention(s)  Therapist will provide education and homework on CBT, ACT, and DBT. Therapist will provide strategies to evaluate for cognitive distortions and logical errors. Therapist will also help client begin to accept feeling negative emotions in a way that helps to decrease his suffering.      Objective #C   Client will compile a list of boundaries she will set with his boss and others.   Status: New - Date: 8/13/19; 1.3.20 ; 6.15.20; 9.21.20;  1.4.20    Intervention(s)  Therapist will teach about healthy boundaries. Including information about different communication styles emphasizing assertive communication as the most helpful/healthy style.        AFSHIN Gomez, [facility], May 15, 2019; 1.3.20 ; 6.15.20; 9.21.20; 1.4.20

## 2021-02-03 ENCOUNTER — MYC REFILL (OUTPATIENT)
Dept: FAMILY MEDICINE | Facility: CLINIC | Age: 56
End: 2021-02-03

## 2021-02-03 ENCOUNTER — MYC MEDICAL ADVICE (OUTPATIENT)
Dept: FAMILY MEDICINE | Facility: CLINIC | Age: 56
End: 2021-02-03

## 2021-02-03 DIAGNOSIS — F90.0 ADHD (ATTENTION DEFICIT HYPERACTIVITY DISORDER), INATTENTIVE TYPE: ICD-10-CM

## 2021-02-04 RX ORDER — DEXTROAMPHETAMINE SACCHARATE, AMPHETAMINE ASPARTATE MONOHYDRATE, DEXTROAMPHETAMINE SULFATE AND AMPHETAMINE SULFATE 5; 5; 5; 5 MG/1; MG/1; MG/1; MG/1
20 CAPSULE, EXTENDED RELEASE ORAL EVERY MORNING
Qty: 30 CAPSULE | Refills: 0 | Status: SHIPPED | OUTPATIENT
Start: 2021-02-04 | End: 2021-03-05

## 2021-02-04 RX ORDER — DEXTROAMPHETAMINE SACCHARATE, AMPHETAMINE ASPARTATE MONOHYDRATE, DEXTROAMPHETAMINE SULFATE AND AMPHETAMINE SULFATE 7.5; 7.5; 7.5; 7.5 MG/1; MG/1; MG/1; MG/1
30 CAPSULE, EXTENDED RELEASE ORAL DAILY
Qty: 30 CAPSULE | Refills: 0 | Status: SHIPPED | OUTPATIENT
Start: 2021-02-04 | End: 2021-03-05

## 2021-02-05 RX ORDER — DEXTROAMPHETAMINE SACCHARATE, AMPHETAMINE ASPARTATE MONOHYDRATE, DEXTROAMPHETAMINE SULFATE AND AMPHETAMINE SULFATE 5; 5; 5; 5 MG/1; MG/1; MG/1; MG/1
20 CAPSULE, EXTENDED RELEASE ORAL EVERY MORNING
Qty: 30 CAPSULE | Refills: 0 | OUTPATIENT
Start: 2021-02-05

## 2021-02-05 RX ORDER — DEXTROAMPHETAMINE SACCHARATE, AMPHETAMINE ASPARTATE MONOHYDRATE, DEXTROAMPHETAMINE SULFATE AND AMPHETAMINE SULFATE 7.5; 7.5; 7.5; 7.5 MG/1; MG/1; MG/1; MG/1
30 CAPSULE, EXTENDED RELEASE ORAL DAILY
Qty: 30 CAPSULE | Refills: 0 | OUTPATIENT
Start: 2021-02-05

## 2021-02-05 NOTE — TELEPHONE ENCOUNTER
Refill was sent to pharm yesterday  E-Prescribing Status: Receipt confirmed by pharmacy (2/4/2021 12:27 PM CST)    This request cancelled    Kindra Tobias RN   [facility]

## 2021-02-09 ENCOUNTER — VIRTUAL VISIT (OUTPATIENT)
Dept: FAMILY MEDICINE | Facility: CLINIC | Age: 56
End: 2021-02-09
Payer: COMMERCIAL

## 2021-02-09 DIAGNOSIS — Z53.9 NO SHOW: Primary | ICD-10-CM

## 2021-02-09 ASSESSMENT — ANXIETY QUESTIONNAIRES
2. NOT BEING ABLE TO STOP OR CONTROL WORRYING: NEARLY EVERY DAY
6. BECOMING EASILY ANNOYED OR IRRITABLE: SEVERAL DAYS
1. FEELING NERVOUS, ANXIOUS, OR ON EDGE: MORE THAN HALF THE DAYS
7. FEELING AFRAID AS IF SOMETHING AWFUL MIGHT HAPPEN: NOT AT ALL
GAD7 TOTAL SCORE: 13
3. WORRYING TOO MUCH ABOUT DIFFERENT THINGS: NEARLY EVERY DAY
5. BEING SO RESTLESS THAT IT IS HARD TO SIT STILL: SEVERAL DAYS
IF YOU CHECKED OFF ANY PROBLEMS ON THIS QUESTIONNAIRE, HOW DIFFICULT HAVE THESE PROBLEMS MADE IT FOR YOU TO DO YOUR WORK, TAKE CARE OF THINGS AT HOME, OR GET ALONG WITH OTHER PEOPLE: SOMEWHAT DIFFICULT

## 2021-02-09 ASSESSMENT — PATIENT HEALTH QUESTIONNAIRE - PHQ9
5. POOR APPETITE OR OVEREATING: NEARLY EVERY DAY
SUM OF ALL RESPONSES TO PHQ QUESTIONS 1-9: 7

## 2021-02-10 ASSESSMENT — ANXIETY QUESTIONNAIRES: GAD7 TOTAL SCORE: 13

## 2021-02-12 ENCOUNTER — TELEPHONE (OUTPATIENT)
Dept: FAMILY MEDICINE | Facility: CLINIC | Age: 56
End: 2021-02-12

## 2021-02-12 NOTE — TELEPHONE ENCOUNTER
Central Prior Authorization Team   Phone: [phone]      PA NOT NEEDED    Medication: amphetamine-dextroamphetamine (ADDERALL XR) 20 MG 24 hr capsule-PA NOT NEEDED  Insurance Company: [employer] - Phone [phone] Fax [phone]  Pharmacy Filling the Rx: [id number] [place], [place] - [address]  Filling Pharmacy Phone: [phone]  Filling Pharmacy Fax:    Start Date: 2/12/2021    Insurance prefers Brand.  Called pharmacy, they already switched the medication and got a paid claim. Pharmacy will notify patient when medication is ready.

## 2021-02-12 NOTE — TELEPHONE ENCOUNTER
Central Prior Authorization Team   Phone: [phone]      Prior Authorization Not Needed per Insurance    02/12/2021  Medication: amphetamine-dextroamphetamine (ADDERALL XR) 30 MG 24 hr capsule - NOT NEEDED  Insurance Company: [employer] - Phone [phone] Fax [phone]  Expected CoPay:      Pharmacy Filling the Rx: [id number] - [place], [place] - [address] [facility]  Pharmacy Notified: Yes  Patient Notified: Yes (**Instructed pharmacy to notify patient when script is ready to /ship.**)    INS prefers BRAND ADDERALL XR - pharmacy received a paid claim.

## 2021-02-12 NOTE — TELEPHONE ENCOUNTER
Prior Authorization Retail Medication Request    Medication/Dose: amphetamine-dextroamphetamine (ADDERALL XR) 30 MG 24 hr capsule  ICD code (if different than what is on RX):    Previously Tried and Failed:    Rationale:      Insurance Name: [employer] sc[employer]s  Insurance ID:  [id number]      Pharmacy Information (if different than what is on RX)  Name:  [facility]  Phone:

## 2021-02-12 NOTE — TELEPHONE ENCOUNTER
Prior Authorization Retail Medication Request    Medication/Dose: amphetamine-dextroamphetamine (ADDERALL XR) 20 MG 24 hr capsule  ICD code (if different than what is on RX):    Previously Tried and Failed:    Rationale:      Insurance Name:  [employer]  Insurance ID:  [id number]      Pharmacy Information (if different than what is on RX)  Name:  [facility]  Phone:  [phone]

## 2021-02-19 ENCOUNTER — MYC MEDICAL ADVICE (OUTPATIENT)
Dept: FAMILY MEDICINE | Facility: CLINIC | Age: 56
End: 2021-02-19

## 2021-02-22 ENCOUNTER — VIRTUAL VISIT (OUTPATIENT)
Dept: FAMILY MEDICINE | Facility: CLINIC | Age: 56
End: 2021-02-22
Payer: COMMERCIAL

## 2021-02-22 ENCOUNTER — VIRTUAL VISIT (OUTPATIENT)
Dept: PSYCHOLOGY | Facility: CLINIC | Age: 56
End: 2021-02-22
Payer: COMMERCIAL

## 2021-02-22 DIAGNOSIS — F90.0 ADHD (ATTENTION DEFICIT HYPERACTIVITY DISORDER), INATTENTIVE TYPE: Primary | ICD-10-CM

## 2021-02-22 DIAGNOSIS — F90.0 ADHD (ATTENTION DEFICIT HYPERACTIVITY DISORDER), INATTENTIVE TYPE: ICD-10-CM

## 2021-02-22 DIAGNOSIS — F32.1 MODERATE MAJOR DEPRESSION (H): ICD-10-CM

## 2021-02-22 DIAGNOSIS — F41.1 GENERALIZED ANXIETY DISORDER: ICD-10-CM

## 2021-02-22 PROCEDURE — 90834 PSYTX W PT 45 MINUTES: CPT | Mod: 95 | Performed by: SOCIAL WORKER

## 2021-02-22 PROCEDURE — 99213 OFFICE O/P EST LOW 20 MIN: CPT | Mod: 95 | Performed by: NURSE PRACTITIONER

## 2021-02-22 NOTE — PROGRESS NOTES
"Nirmal is a 55 year old who is being evaluated via a billable video visit.      How would you like to obtain your AVS? MyChart  If the video visit is dropped, the invitation should be resent by:   Will anyone else be joining your video visit?     Video Start Time: 9:48 AM    Assessment & Plan       ICD-10-CM    1. ADHD (attention deficit hyperactivity disorder), inattentive type  F90.0     Doing well overall   Continue current plan, discussed usually up to insurance to decide whether name brand or generic is covered  Follow up with MTM if was helpful at all  Continue working on good self care and behavioral strategies      Tobacco Cessation:   reports that he has been smoking. He has been smoking about 1.00 pack per day. He has never used smokeless tobacco.  Tobacco Cessation Action Plan: Self help information given to patient    BMI:   Estimated body mass index is 25.38 kg/m  as calculated from the following:    Height as of 3/4/20: 1.709 m (5' 7.28\").    Weight as of 3/4/20: 74.1 kg (163 lb 6.4 oz).       There are no Patient Instructions on file for this visit.    No follow-ups on file.    Adali Merino, ONEL CNP  [facility]    Subjective   Nirmal is a 55 year old who presents for the following health issues     HPI       ADHD and mental health follow up   Pt reports for his med they needed my NPI-number otherwise they said it will be denied  Deejay Rigo counseling every 3 weeks, very helpful as well   Brother surgery went well, Mom's death anniversary  Sugar habit, bubblegum   Salad and tuna, skips some meals     The obsticle is the way-book that helped him        Review of Systems   Constitutional, HEENT, cardiovascular, pulmonary, GI, , musculoskeletal, neuro, skin, endocrine and psych systems are negative, except as otherwise noted.      Objective           Vitals:  No vitals were obtained today due to virtual visit.    Physical Exam   GENERAL: Healthy, alert and no distress  EYES: " Eyes grossly normal to inspection.  No discharge or erythema, or obvious scleral/conjunctival abnormalities.  RESP: No audible wheeze, cough, or visible cyanosis.  No visible retractions or increased work of breathing.    SKIN: Visible skin clear. No significant rash, abnormal pigmentation or lesions.  NEURO: Cranial nerves grossly intact.  Mentation and speech appropriate for age.  PSYCH: Mentation appears normal, affect normal/bright, judgement and insight intact, normal speech and appearance well-groomed.                Video-Visit Details    Type of service:  Video Visit    Video End Time:9:59 AM    Originating Location (pt. Location): Home    Distant Location (provider location):  [facility]     Platform used for Video Visit: [employer]

## 2021-02-22 NOTE — PROGRESS NOTES
"                  Telemedicine Visit: The patient's condition can be safely assessed and treated via synchronous audio and visual telemedicine encounter.       Reason for Telemedicine Visit: Patient has requested telehealth visit     Originating Site (Patient Location): Patient's home     Distant Site (Provider Location): Provider Remote Setting     Consent:  The patient/guardian has verbally consented to: the potential risks and benefits of telemedicine (video visit) versus in person care; bill my insurance or make self-payment for services provided; and responsibility for payment of non-covered services                             Mode of Communication:  Video Conference via [employer]  As the provider I attest to compliance with applicable laws and regulations related to telemedicine.    Progress Note    Client Name: Carrillo Dunlap  Date: 2.22.21         Service Type: Individual  Video Visit: yes     Session Start Time:  1000am  Session End Time: 1050pm    Session Length: 50 minutes    Session #: 37    Attendees: Client attended alone     Treatment Plan Last Reviewed: Reviewed  PHQ-9 / UGO-7 : Reviewed    DATA  Interactive Complexity: No  Crisis: No       Progress Since Last Session (Related to Symptoms / Goals / Homework):  Symptoms: Improving.    Homework: Achieved / completed to satisfaction.      Episode of Care Goals: Achieved / completed to satisfaction - ACTION (Actively working towards change); Intervened by reinforcing change plan / affirming steps taken     Current / Ongoing Stressors and Concerns: Explored pt's thoughts of fear of failure, thinking \"I am a bother or an annoyance,\" \"I want to explain this in a understandable manner.\" Helped pt identify cognitive de fusion strategy by attaching the words \"I am having the thought\" having self compassion in reminding himself that my mind is paying attention to these thoughts because there is an active threat. Helped pt open up to asking himself " "\"why does this matter to me, what does this say about what I value in who I want to be as a co-worker, in my job. Pt reflected on core values of honesty, transparency, responsibility to follow through in archieving goals of wanting to purchase a home himself.       Treatment Objective(s) Addressed in This Session:   Identify negative self-talk and behaviors: challenge core beliefs, myths, and actions   Decrease frequency and intensity of feeling down, depressed, hopeless   Client will review and familiarize himself with feeling words. He will use feeling words to describe when her needs are met/not met.   Self compassion     Intervention:  Psychoeducation: Explored with the pt using open ended questioning what the purpose of worry is. Discussed how in order to worry excessively about something it has to be 1. Important to us; 2. We have to be invested in the outcome (we have a preferred outcome). Processed how our minds continue to ruminate on a worry because of the fear of the unknown or uncertain. Our mind searches for different possibilities, in part to ensure that we are capable to cope with the various outcomes, especially if they may cause more pain/suffering in the long run. Discussed how our preferred outcome is the least suffering/ easiest option. Processed however, that many times we are able to cope with the other outcomes, although it is more difficult and there may be more suffering/pain. Pt processed that although this may be the cause, it can help him find resilience in himself and he may grow as a person.       ASSESSMENT: Current Emotional / Mental Status (status of significant symptoms):   Risk status (Self / Other harm or suicidal ideation)   Client denies current fears or concerns for personal safety.   Client denies current or recent suicidal ideation or behaviors.   Client denies current or recent homicidal ideation or behaviors.   Client denies current or recent self injurious behavior or " ideation.   Client denies other safety concerns.    Client reports there has been no change in risk factors since their last session.      Client reports there has been no change in protective factors since their last session.      A safety and risk management has not been developed at this time.  Client was given the Suicide Prevention National Hotline, Text [place] 689660,  the [facility] Crisis Number, or encouraged to Call 911 should there be a change  in these risk factors.       Appearance:   Appropriate    Eye Contact:   Good    Psychomotor Behavior: Restless , more so than in previous sessions   Attitude:   Cooperative    Orientation:   All   Speech    Rate / Production: Hyperverbal     Volume:  Normal    Mood:    Anxious    Affect:    Appropriate    Thought Content:  Clear , more clear than past sessions   Thought Form:  Coherent    Insight:    Fair, improving     Medication Review:   No changes to current psychiatric medication(s)     Medication Compliance:   Yes     Changes in Health Issues:   None reported     Chemical Use Review:  Substance Use: decrease in cannabis.  Client reports frequency using of 0 in the past two weeks.    Tobacco Use: No current tobacco use.      Diagnosis:  1. ADHD (attention deficit hyperactivity disorder), inattentive type    2. Generalized anxiety disorder    3. Moderate major depression (H)      Collateral Reports Completed:   Not Applicable    PLAN: (Client Tasks / Therapist Tasks / Other). Pt will continue to recognize his courage, bring up imagery to represent courage, and begin to move his body to enact a toward move, what his ideal self would be doing when he is in the stage of avoidance. Encouraged pt at the end, to recognize and celebrate ability to move from an away move to a toward move, especially if he does not accomplish his goal.       Jose Hudson, AFSHIN, LGSW 2.22.21  This note has been reviewed and I agree with the plan of care. This note is co-signed by Chente  AFSHIN Nguyen, [facility], Supervisor, on: 2/26/21    ___________________________________________________________________________    Treatment Plan     Client's Name: Carrillo Dunlap                   YOB: 1965     Date: 1/21/19; reviewed: 5/15/19; updated 8/13/19; 1.3.20 ; 6.15.20; 9.21.20; 1.4.20      DSM-V Diagnoses: Attention-Deficit/Hyperactivity Disorder  314.01 (F90.2) Combined presentation or 296.31 (F33.0) Major Depressive Disorder, Recurrent Episode, Mild _ and With anxious distress  Psychosocial / Contextual Factors: Limited social support, limited financial support, physical/medical concerns, employment constraints  WHODAS: did not complete during visit     Referral / Collaboration:  Referral to another professional/service is not indicated at this time.     Anticipated number of session or this episode of care: 15      MeasurableTreatment Goal(s) related to diagnosis / functional impairment(s)  Goal 1: Client will begin to work on his work portfolio.     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       Objective #A (Client Action)                Client will schedule times of the day to work on his work portfolio. 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   Status: completed - Date: 5/15/19      Intervention(s)  Therapist will assign homework including weekly activity scheduling and CBT thought logs  provide educational materials on CBT  role-play effective communication skills.    Goal 2: Client will decrease his monthly marijuana use.     I will know I've met my goal when I do not use marijuana around my brother.      Objective #A (Client Action)    Client will identify at least 3 example(s) of how marijuana has resulted in an experience that interferes with person values or goals.  Status: completed- 5/15/19    Intervention(s)  Therapist  will help client identify how his personal values and goals are interfered when he uses marijuana.     Objective #B  Client will identify triggers and environmental cues contributing to his marijuana usage.     Status: completed - Date: 5/15/19     Intervention(s)  Therapist will teach the client how to perform a behavioral chain analysis to better identify triggers and cues to using marijuana.     Objective #C  Client will maintain sobriety for one month.  Status: completed - Date: 5.15.19     Intervention(s)  Therapist will provide educational materials for the client to increase his coping skills when he has urges to use marijuana. Therapist will provide additional resources to help him maintain sobriety.     Goal 3: Client will decrease his anxiety from his baseline GAD7 score.    I will know I've met my goal when I am able to let things go and allow myself permission to relax and not worry.      Objective #A  Client will identify 5 initial signs or symptoms of anxiety.    Status: completed - Date: 5/15/19     Intervention(s)  Therapist will provide educational materials on the symptoms of anxiety.    Objective #B (Client Action)    Status: completed - Date: 5/15/19     Client will identify 5 fears / thoughts that contribute to feeling anxious.    Intervention(s)  Therapist will teach the client how to perform a behavioral chain analysis in order to identify fears/thoughts contributing to anxious feelings.     Objective #C  Client will use at least 4 coping skills for anxiety management in the next 10 weeks.  Status: continued - Date: 2/19/19; 1.3.20; 6.15.20 ; 9.21.20; 1.4.20    Intervention(s)  Therapist will teach progressive muscle relaxation, cue control relaxation, mindful breathing, and guided imagery. Therapist will also utilize Acceptance and Commitment Therapy by exploring and identifying important values in patient's life, and discuss ways to commit to behavioral activation around these values.      Goal 4: The client will learn and apply skills to manage the symptoms of depression.    I will know I've met my goal when I am able to let things go and allow myself permission to relax, not worry, and feel better about myself.      Objective #A: Client will Increase interest, engagement, and pleasure in doing things.  Client will identify negative self-talk and behaviors: challenge core beliefs, myths, and actions.  Decrease frequency and intensity of feeling down, depressed, hopeless.  Status: continued Date(s): 5/15/19; 6.15.20; 9.21.20; 1.4.20    Interventions  Therapist will provide educational materials and handouts on core beliefs, cognitive distortions, and cognitive fusion and defusion.    Goal 5: Client will explore and resolve issues related to relationship issues with his boss.    I will know I've met my goal when I am able to feel better about my relationship with his boss.      Objective #A (Client Action)    Client will review and familiarize himself with feeling words. He will use feeling words to describe when her needs are met/not met.   Status: New - Date: 8/13/19; 1.3.20 ; 6.15.20; 9.21.20; 1.4.20    Intervention(s)  Therapist will assign emotional recognition/identification including when expressing when his needs are not being met or are being met.    Objective #B  Client will identify negative self-talk and behaviors associated with his relationship with his boss and others, and challenge core beliefs, myths, and actions.  Status: 8/13/19; 1.3.20 ; 6.15.20; 9.21.20; 1.4.20    Intervention(s)  Therapist will provide education and homework on CBT, ACT, and DBT. Therapist will provide strategies to evaluate for cognitive distortions and logical errors. Therapist will also help client begin to accept feeling negative emotions in a way that helps to decrease his suffering.      Objective #C   Client will compile a list of boundaries she will set with his boss and others.   Status: New - Date:  8/13/19; 1.3.20 ; 6.15.20; 9.21.20; 1.4.20    Intervention(s)  Therapist will teach about healthy boundaries. Including information about different communication styles emphasizing assertive communication as the most helpful/healthy style.        AFSHIN Gomez, [facility], May 15, 2019; 1.3.20 ; 6.15.20; 9.21.20; 1.4.20

## 2021-02-22 NOTE — TELEPHONE ENCOUNTER
Response sent to patient via [employer].     Dylan Gil RN   [facility]

## 2021-03-01 DIAGNOSIS — F90.0 ADHD (ATTENTION DEFICIT HYPERACTIVITY DISORDER), INATTENTIVE TYPE: ICD-10-CM

## 2021-03-01 RX ORDER — DEXTROAMPHETAMINE SACCHARATE, AMPHETAMINE ASPARTATE MONOHYDRATE, DEXTROAMPHETAMINE SULFATE AND AMPHETAMINE SULFATE 7.5; 7.5; 7.5; 7.5 MG/1; MG/1; MG/1; MG/1
30 CAPSULE, EXTENDED RELEASE ORAL DAILY
Qty: 30 CAPSULE | Refills: 0 | Status: CANCELLED | OUTPATIENT
Start: 2021-03-01

## 2021-03-04 DIAGNOSIS — F90.0 ADHD (ATTENTION DEFICIT HYPERACTIVITY DISORDER), INATTENTIVE TYPE: ICD-10-CM

## 2021-03-04 NOTE — TELEPHONE ENCOUNTER
Patient called to inquire about the status of these requests. States he is out of medication. Please assist. Thank!

## 2021-03-05 RX ORDER — DEXTROAMPHETAMINE SACCHARATE, AMPHETAMINE ASPARTATE MONOHYDRATE, DEXTROAMPHETAMINE SULFATE AND AMPHETAMINE SULFATE 7.5; 7.5; 7.5; 7.5 MG/1; MG/1; MG/1; MG/1
30 CAPSULE, EXTENDED RELEASE ORAL DAILY
Qty: 30 CAPSULE | Refills: 0 | Status: SHIPPED | OUTPATIENT
Start: 2021-03-05 | End: 2021-04-09

## 2021-03-05 RX ORDER — DEXTROAMPHETAMINE SACCHARATE, AMPHETAMINE ASPARTATE MONOHYDRATE, DEXTROAMPHETAMINE SULFATE AND AMPHETAMINE SULFATE 5; 5; 5; 5 MG/1; MG/1; MG/1; MG/1
20 CAPSULE, EXTENDED RELEASE ORAL EVERY MORNING
Qty: 30 CAPSULE | Refills: 0 | Status: SHIPPED | OUTPATIENT
Start: 2021-03-05 | End: 2021-04-09

## 2021-03-05 NOTE — TELEPHONE ENCOUNTER
Requested Prescriptions   Pending Prescriptions Disp Refills     amphetamine-dextroamphetamine (ADDERALL XR) 30 MG 24 hr capsule 30 capsule 0     Sig: Take 1 capsule (30 mg) by mouth daily       There is no refill protocol information for this order        Routing refill request to provider for review/approval because:  Drug not on the [facility] refill protocol   [place]  Last refill: 2/4/21  Disp: 30  Concerns: none -  patient requesting GENERIC form of medication - says insurance tries to dispense brand, which is more expensive

## 2021-03-05 NOTE — TELEPHONE ENCOUNTER
Central Prior Authorization Team   Phone: [phone]      PA Initiation    Medication: adderall  Insurance Company: [employer] - Phone [phone] Fax [phone]  Pharmacy Filling the Rx: [id number] [address]  Filling Pharmacy Phone: [phone]  Filling Pharmacy Fax:    Start Date: 3/5/2021    Started PA on CMM and a response of Drug is covered by current benefit plan. No further PA activity needed.  Called and spoke with Mariann at [facility].  There is already a denial on file for the generic from Feb.  The patient did his own PA and forms were faxed to the doctor.  The denial fax is being sent over, but since the PA was not done by our department and due to documentation limitations the central prior authorization team will not be able to do the appeal.

## 2021-03-05 NOTE — TELEPHONE ENCOUNTER
Adali,    Should we send a request to do a Prior auth for generic?    Thanks  Shana Cobian RN   Formerly named [facility]

## 2021-03-05 NOTE — TELEPHONE ENCOUNTER
When Psych was prescribing the med, the PA went through per pat report and now something isn't working, no known reason--see other message for follow up on his ADHD meds please

## 2021-03-05 NOTE — TELEPHONE ENCOUNTER
Per patient something was wrong on the PA and didn't get approved, some number (maybe my NPI #?)  Wasn't on there for me so it was denied    Please forward to PA pool if needed, see other message for details. thanks

## 2021-03-05 NOTE — TELEPHONE ENCOUNTER
PA team,    Please do prior authorization for generic adderall per my chart message. Patient sent attachments from insurance as well    Shana Cobian RN   [facility]

## 2021-03-05 NOTE — TELEPHONE ENCOUNTER
Requested Prescriptions   Pending Prescriptions Disp Refills     amphetamine-dextroamphetamine (ADDERALL XR) 20 MG 24 hr capsule 30 capsule 0     Sig: Take 1 capsule (20 mg) by mouth every morning       There is no refill protocol information for this order        Routing refill request to provider for review/approval because:  Drug not on the [facility] refill protocol   [place] 3[place]5/[place]21  Last refill: 2/4/21  Disp: 30  Concerns: patient requesting GENERIC form of medication - says insurance tries to dispense brand, which is more expensive

## 2021-03-08 NOTE — TELEPHONE ENCOUNTER
PRIOR AUTHORIZATION DENIED    Medication: adderall-DENIAL ALREADY ON FILE    Denial Date: 3/5/2021    Denial Rational:     Appeal Information:

## 2021-03-08 NOTE — TELEPHONE ENCOUNTER
Adali,    See message below from PA team. Looks like patient already did an appeal in February on his own and it was denied. Do you want to do an appeal/letter of medical necessity? Not sure if it is warranted in this situation or not, as he is able to get the brand name for medication, but it is just expensive. Maybe get care coordination involved, or have him try goodRx?    Thanks  Shana Cobian RN   [facility]

## 2021-03-09 ENCOUNTER — TELEPHONE (OUTPATIENT)
Dept: FAMILY MEDICINE | Facility: CLINIC | Age: 56
End: 2021-03-09

## 2021-03-09 NOTE — TELEPHONE ENCOUNTER
[facility] PA Team - see mychart and provider message [refill request 3/4/21]-  please initiate prior authorization as appropriate for:    amphetamine-dextroamphetamine (ADDERALL XR) 20 MG 24 hr capsule    amphetamine-dextroamphetamine (ADDERALL XR) 30 MG 24 hr capsule

## 2021-03-10 NOTE — TELEPHONE ENCOUNTER
I called the pharmacy and they received a paid claim on 3/5. Which was for the brand. I submitted a PA to insurance for the generic and it came back as PA not needed.

## 2021-03-17 ENCOUNTER — VIRTUAL VISIT (OUTPATIENT)
Dept: PSYCHOLOGY | Facility: CLINIC | Age: 56
End: 2021-03-17
Payer: COMMERCIAL

## 2021-03-17 DIAGNOSIS — F90.0 ADHD (ATTENTION DEFICIT HYPERACTIVITY DISORDER), INATTENTIVE TYPE: Primary | ICD-10-CM

## 2021-03-17 PROCEDURE — 90834 PSYTX W PT 45 MINUTES: CPT | Mod: 95 | Performed by: SOCIAL WORKER

## 2021-03-17 NOTE — PROGRESS NOTES
Telemedicine Visit: The patient's condition can be safely assessed and treated via synchronous audio and visual telemedicine encounter.       Reason for Telemedicine Visit: Patient has requested telehealth visit     Originating Site (Patient Location): Patient's home     Distant Site (Provider Location): Provider Remote Setting     Consent:  The patient/guardian has verbally consented to: the potential risks and benefits of telemedicine (video visit) versus in person care; bill my insurance or make self-payment for services provided; and responsibility for payment of non-covered services                             Mode of Communication:  Video Conference via [employer]  As the provider I attest to compliance with applicable laws and regulations related to telemedicine.    Progress Note    Client Name: Carrillo Dunlap  Date: 3.17.21         Service Type: Individual  Video Visit: yes     Session Start Time:  1000am  Session End Time: 1050pm    Session Length: 50 minutes    Session #: 38    Attendees: Client attended alone     Treatment Plan Last Reviewed: Reviewed  PHQ-9 / UGO-7 : Reviewed    DATA  Interactive Complexity: No  Crisis: No       Progress Since Last Session (Related to Symptoms / Goals / Homework):  Symptoms: Improving.    Homework: Achieved / completed to satisfaction.      Episode of Care Goals: Achieved / completed to satisfaction - ACTION (Actively working towards change); Intervened by reinforcing change plan / affirming steps taken     Current / Ongoing Stressors and Concerns: Helped to educate pt on acceptance and commitment therapy process of acknowledging away moves (avoidance behavior), check in and become curious of painful thoughts/bodily sensations, have self compassion in understanding away moves are related to pain, become curious of what would you visualize if you were to accept pain and take one step toward living a valued life in that moment, practice  congratulating self if take steps toward enacting what you visualized regardless if you accomplish what you set out to accomplish.      Treatment Objective(s) Addressed in This Session:   Identify negative self-talk and behaviors: challenge core beliefs, myths, and actions   Decrease frequency and intensity of feeling down, depressed, hopeless   Client will review and familiarize himself with feeling words. He will use feeling words to describe when her needs are met/not met.   Self compassion     Intervention:  Psychoeducation: Explored with the pt using open ended questioning what the purpose of worry is. Discussed how in order to worry excessively about something it has to be 1. Important to us; 2. We have to be invested in the outcome (we have a preferred outcome). Processed how our minds continue to ruminate on a worry because of the fear of the unknown or uncertain. Our mind searches for different possibilities, in part to ensure that we are capable to cope with the various outcomes, especially if they may cause more pain/suffering in the long run. Discussed how our preferred outcome is the least suffering/ easiest option. Processed however, that many times we are able to cope with the other outcomes, although it is more difficult and there may be more suffering/pain. Pt processed that although this may be the cause, it can help him find resilience in himself and he may grow as a person.       ASSESSMENT: Current Emotional / Mental Status (status of significant symptoms):   Risk status (Self / Other harm or suicidal ideation)   Client denies current fears or concerns for personal safety.   Client denies current or recent suicidal ideation or behaviors.   Client denies current or recent homicidal ideation or behaviors.   Client denies current or recent self injurious behavior or ideation.   Client denies other safety concerns.    Client reports there has been no change in risk factors since their last session.       Client reports there has been no change in protective factors since their last session.      A safety and risk management has not been developed at this time.  Client was given the Suicide Prevention National Hotline, Text [place] 488903,  the [facility] Crisis Number, or encouraged to Call 911 should there be a change  in these risk factors.       Appearance:   Appropriate    Eye Contact:   Good    Psychomotor Behavior: Restless , more so than in previous sessions   Attitude:   Cooperative    Orientation:   All   Speech    Rate / Production: Hyperverbal     Volume:  Normal    Mood:    Anxious    Affect:    Appropriate    Thought Content:  Clear , more clear than past sessions   Thought Form:  Coherent    Insight:    Fair, improving     Medication Review:   No changes to current psychiatric medication(s)     Medication Compliance:   Yes     Changes in Health Issues:   None reported     Chemical Use Review:  Substance Use: decrease in cannabis.  Client reports frequency using of 0 in the past two weeks.    Tobacco Use: No current tobacco use.      Diagnosis:  1. ADHD (attention deficit hyperactivity disorder), inattentive type      Collateral Reports Completed:   Not Applicable    PLAN: (Client Tasks / Therapist Tasks / Other). Pt will follow outline of ACT process discussed in the note.       AFSHIN Gomez, SW 3.17.21  This note has been reviewed and I agree with the plan of care. This note is co-signed by AFSHIN Pittman, [facility], Supervisor, on: 3/17/21    ___________________________________________________________________________    Treatment Plan     Client's Name: Carrillo Dunlap                   YOB: 1965     Date: 1/21/19; reviewed: 5/15/19; updated 8/13/19; 1.3.20 ; 6.15.20; 9.21.20; 1.4.20      DSM-V Diagnoses: Attention-Deficit/Hyperactivity Disorder  314.01 (F90.2) Combined presentation or 296.31 (F33.0) Major Depressive Disorder, Recurrent Episode, Mild _ and With anxious  distress  Psychosocial / Contextual Factors: Limited social support, limited financial support, physical/medical concerns, employment constraints  WHODAS: did not complete during visit     Referral / Collaboration:  Referral to another professional/service is not indicated at this time.     Anticipated number of session or this episode of care: 15      MeasurableTreatment Goal(s) related to diagnosis / functional impairment(s)  Goal 1: Client will begin to work on his work portfolio.     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       Objective #A (Client Action)                Client will schedule times of the day to work on his work portfolio. 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   Status: completed - Date: 5/15/19      Intervention(s)  Therapist will assign homework including weekly activity scheduling and CBT thought logs  provide educational materials on CBT  role-play effective communication skills.    Goal 2: Client will decrease his monthly marijuana use.     I will know I've met my goal when I do not use marijuana around my brother.      Objective #A (Client Action)    Client will identify at least 3 example(s) of how marijuana has resulted in an experience that interferes with person values or goals.  Status: completed- 5/15/19    Intervention(s)  Therapist will help client identify how his personal values and goals are interfered when he uses marijuana.     Objective #B  Client will identify triggers and environmental cues contributing to his marijuana usage.     Status: completed - Date: 5/15/19     Intervention(s)  Therapist will teach the client how to perform a behavioral chain analysis to better identify triggers and cues to using marijuana.     Objective #C  Client will maintain sobriety for one month.  Status: completed - Date: 5.15.19      Intervention(s)  Therapist will provide educational materials for the client to increase his coping skills when he has urges to use marijuana. Therapist will provide additional resources to help him maintain sobriety.     Goal 3: Client will decrease his anxiety from his baseline GAD7 score.    I will know I've met my goal when I am able to let things go and allow myself permission to relax and not worry.      Objective #A  Client will identify 5 initial signs or symptoms of anxiety.    Status: completed - Date: 5/15/19     Intervention(s)  Therapist will provide educational materials on the symptoms of anxiety.    Objective #B (Client Action)    Status: completed - Date: 5/15/19     Client will identify 5 fears / thoughts that contribute to feeling anxious.    Intervention(s)  Therapist will teach the client how to perform a behavioral chain analysis in order to identify fears/thoughts contributing to anxious feelings.     Objective #C  Client will use at least 4 coping skills for anxiety management in the next 10 weeks.  Status: continued - Date: 2/19/19; 1.3.20; 6.15.20 ; 9.21.20; 1.4.20    Intervention(s)  Therapist will teach progressive muscle relaxation, cue control relaxation, mindful breathing, and guided imagery. Therapist will also utilize Acceptance and Commitment Therapy by exploring and identifying important values in patient's life, and discuss ways to commit to behavioral activation around these values.     Goal 4: The client will learn and apply skills to manage the symptoms of depression.    I will know I've met my goal when I am able to let things go and allow myself permission to relax, not worry, and feel better about myself.      Objective #A: Client will Increase interest, engagement, and pleasure in doing things.  Client will identify negative self-talk and behaviors: challenge core beliefs, myths, and actions.  Decrease frequency and intensity of feeling down, depressed,  hopeless.  Status: continued Date(s): 5/15/19; 6.15.20; 9.21.20; 1.4.20    Interventions  Therapist will provide educational materials and handouts on core beliefs, cognitive distortions, and cognitive fusion and defusion.    Goal 5: Client will explore and resolve issues related to relationship issues with his boss.    I will know I've met my goal when I am able to feel better about my relationship with his boss.      Objective #A (Client Action)    Client will review and familiarize himself with feeling words. He will use feeling words to describe when her needs are met/not met.   Status: New - Date: 8/13/19; 1.3.20 ; 6.15.20; 9.21.20; 1.4.20    Intervention(s)  Therapist will assign emotional recognition/identification including when expressing when his needs are not being met or are being met.    Objective #B  Client will identify negative self-talk and behaviors associated with his relationship with his boss and others, and challenge core beliefs, myths, and actions.  Status: 8/13/19; 1.3.20 ; 6.15.20; 9.21.20; 1.4.20    Intervention(s)  Therapist will provide education and homework on CBT, ACT, and DBT. Therapist will provide strategies to evaluate for cognitive distortions and logical errors. Therapist will also help client begin to accept feeling negative emotions in a way that helps to decrease his suffering.      Objective #C   Client will compile a list of boundaries she will set with his boss and others.   Status: New - Date: 8/13/19; 1.3.20 ; 6.15.20; 9.21.20; 1.4.20    Intervention(s)  Therapist will teach about healthy boundaries. Including information about different communication styles emphasizing assertive communication as the most helpful/healthy style.        AFSHIN Gomez, [facility], May 15, 2019; 1.3.20 ; 6.15.20; 9.21.20; 1.4.20

## 2021-03-21 NOTE — TELEPHONE ENCOUNTER
Ok, hopefully someone got the message to the pt that for some insurances brand name is cheaper and some the generic, not much we can do there.     Please close if no further questions, or consider LSW/Care coordination if any financial concerns    THanks,   Adali SYKES CNP

## 2021-04-08 DIAGNOSIS — F90.0 ADHD (ATTENTION DEFICIT HYPERACTIVITY DISORDER), INATTENTIVE TYPE: ICD-10-CM

## 2021-04-09 RX ORDER — DEXTROAMPHETAMINE SACCHARATE, AMPHETAMINE ASPARTATE MONOHYDRATE, DEXTROAMPHETAMINE SULFATE AND AMPHETAMINE SULFATE 7.5; 7.5; 7.5; 7.5 MG/1; MG/1; MG/1; MG/1
30 CAPSULE, EXTENDED RELEASE ORAL DAILY
Qty: 30 CAPSULE | Refills: 0 | Status: SHIPPED | OUTPATIENT
Start: 2021-04-09 | End: 2021-05-05

## 2021-04-09 RX ORDER — DEXTROAMPHETAMINE SACCHARATE, AMPHETAMINE ASPARTATE MONOHYDRATE, DEXTROAMPHETAMINE SULFATE AND AMPHETAMINE SULFATE 5; 5; 5; 5 MG/1; MG/1; MG/1; MG/1
20 CAPSULE, EXTENDED RELEASE ORAL EVERY MORNING
Qty: 30 CAPSULE | Refills: 0 | Status: SHIPPED | OUTPATIENT
Start: 2021-04-09 | End: 2021-05-05

## 2021-04-11 ENCOUNTER — HEALTH MAINTENANCE LETTER (OUTPATIENT)
Age: 56
End: 2021-04-11

## 2021-04-13 ENCOUNTER — IMMUNIZATION (OUTPATIENT)
Dept: NURSING | Facility: CLINIC | Age: 56
End: 2021-04-13
Payer: COMMERCIAL

## 2021-04-13 PROCEDURE — 0001A PR COVID VAC PFIZER DIL RECON 30 MCG/0.3 ML IM: CPT

## 2021-04-13 PROCEDURE — 91300 PR COVID VAC PFIZER DIL RECON 30 MCG/0.3 ML IM: CPT

## 2021-05-03 DIAGNOSIS — F90.0 ADHD (ATTENTION DEFICIT HYPERACTIVITY DISORDER), INATTENTIVE TYPE: ICD-10-CM

## 2021-05-04 ENCOUNTER — IMMUNIZATION (OUTPATIENT)
Dept: NURSING | Facility: CLINIC | Age: 56
End: 2021-05-04
Attending: INTERNAL MEDICINE
Payer: COMMERCIAL

## 2021-05-04 PROCEDURE — 91300 PR COVID VAC PFIZER DIL RECON 30 MCG/0.3 ML IM: CPT

## 2021-05-04 PROCEDURE — 0002A PR COVID VAC PFIZER DIL RECON 30 MCG/0.3 ML IM: CPT

## 2021-05-05 RX ORDER — DEXTROAMPHETAMINE SULFATE, DEXTROAMPHETAMINE SACCHARATE, AMPHETAMINE SULFATE AND AMPHETAMINE ASPARTATE 5; 5; 5; 5 MG/1; MG/1; MG/1; MG/1
CAPSULE, EXTENDED RELEASE ORAL
Qty: 30 CAPSULE | Refills: 0 | Status: SHIPPED | OUTPATIENT
Start: 2021-05-05 | End: 2021-06-05

## 2021-05-05 RX ORDER — DEXTROAMPHETAMINE SULFATE, DEXTROAMPHETAMINE SACCHARATE, AMPHETAMINE SULFATE AND AMPHETAMINE ASPARTATE 7.5; 7.5; 7.5; 7.5 MG/1; MG/1; MG/1; MG/1
CAPSULE, EXTENDED RELEASE ORAL
Qty: 30 CAPSULE | Refills: 0 | Status: SHIPPED | OUTPATIENT
Start: 2021-05-05 | End: 2021-06-05

## 2021-05-24 ENCOUNTER — VIRTUAL VISIT (OUTPATIENT)
Dept: PSYCHOLOGY | Facility: CLINIC | Age: 56
End: 2021-05-24
Payer: COMMERCIAL

## 2021-05-24 DIAGNOSIS — F90.0 ADHD (ATTENTION DEFICIT HYPERACTIVITY DISORDER), INATTENTIVE TYPE: Primary | ICD-10-CM

## 2021-05-24 DIAGNOSIS — F32.1 MODERATE MAJOR DEPRESSION (H): ICD-10-CM

## 2021-05-24 DIAGNOSIS — F41.1 GENERALIZED ANXIETY DISORDER: ICD-10-CM

## 2021-05-24 PROCEDURE — 90837 PSYTX W PT 60 MINUTES: CPT | Mod: 95 | Performed by: SOCIAL WORKER

## 2021-05-24 NOTE — PROGRESS NOTES
"                  Telemedicine Visit: The patient's condition can be safely assessed and treated via synchronous audio and visual telemedicine encounter.       Reason for Telemedicine Visit: Patient has requested telehealth visit     Originating Site (Patient Location): Patient's home     Distant Site (Provider Location): Provider Remote Setting     Consent:  The patient/guardian has verbally consented to: the potential risks and benefits of telemedicine (video visit) versus in person care; bill my insurance or make self-payment for services provided; and responsibility for payment of non-covered services                             Mode of Communication:  Video Conference via [employer]  As the provider I attest to compliance with applicable laws and regulations related to telemedicine.    Progress Note    Client Name: Carrillo Dunlap  Date: 5.24.21         Service Type: Individual  Video Visit: yes     Session Start Time:  1100am  Session End Time: 1200pm    Session Length: 50 minutes    Session #: 39    Attendees: Client attended alone     Treatment Plan Last Reviewed: Reviewed  PHQ-9 / UGO-7 : Reviewed    DATA  Interactive Complexity: No  Crisis: No       Progress Since Last Session (Related to Symptoms / Goals / Homework):  Symptoms: Improving.    Homework: Achieved / completed to satisfaction.      Episode of Care Goals: Achieved / completed to satisfaction - ACTION (Actively working towards change); Intervened by reinforcing change plan / affirming steps taken     Current / Ongoing Stressors and Concerns: Pt reflected on questioning his life, and having the thought \"this is it?\" Pt denies suicidal intent. Pt reports reflecting on how engaging with work can sometimes be an away move, that is acknowledgment of avoidance behavior in confrontation with pain. Processed how pain is related to feelings of loneliness. Processed how out of this pain, pt was able to make room for recognizing one of his needs, " "importance of connecting with others outside of work, and how pt is not fully connecting with this need. Processed how pt was able to make a \"toward\" move out of this pain, the action of making a list of activities that he might begin to engage with that involves others. For remainder of visit, spent time processing thoughts, feelings that pt will accept may be there as he continues to make movements/actions that bring him closer to idealizing these activities.       Treatment Objective(s) Addressed in This Session:   Identify negative self-talk and behaviors: challenge core beliefs, myths, and actions   Decrease frequency and intensity of feeling down, depressed, hopeless   Client will review and familiarize himself with feeling words. He will use feeling words to describe when her needs are met/not met.   Self compassion     Intervention:  Psychoeducation: Explored with the pt using open ended questioning what the purpose of worry is. Discussed how in order to worry excessively about something it has to be 1. Important to us; 2. We have to be invested in the outcome (we have a preferred outcome). Processed how our minds continue to ruminate on a worry because of the fear of the unknown or uncertain. Our mind searches for different possibilities, in part to ensure that we are capable to cope with the various outcomes, especially if they may cause more pain/suffering in the long run. Discussed how our preferred outcome is the least suffering/ easiest option. Processed however, that many times we are able to cope with the other outcomes, although it is more difficult and there may be more suffering/pain. Pt processed that although this may be the cause, it can help him find resilience in himself and he may grow as a person.       ASSESSMENT: Current Emotional / Mental Status (status of significant symptoms):   Risk status (Self / Other harm or suicidal ideation)   Client denies current fears or concerns for personal " safety.   Client denies current or recent suicidal ideation or behaviors.   Client denies current or recent homicidal ideation or behaviors.   Client denies current or recent self injurious behavior or ideation.   Client denies other safety concerns.    Client reports there has been no change in risk factors since their last session.      Client reports there has been no change in protective factors since their last session.      A safety and risk management has not been developed at this time.  Client was given the Suicide Prevention National Hotline, Text [place] 601545,  the [facility] Crisis Number, or encouraged to Call 911 should there be a change  in these risk factors.       Appearance:   Appropriate    Eye Contact:   Good    Psychomotor Behavior: Restless , more so than in previous sessions   Attitude:   Cooperative    Orientation:   All   Speech    Rate / Production: Hyperverbal     Volume:  Normal    Mood:    Anxious    Affect:    Appropriate    Thought Content:  Clear , more clear than past sessions   Thought Form:  Coherent    Insight:    Fair, improving     Medication Review:   No changes to current psychiatric medication(s)     Medication Compliance:   Yes     Changes in Health Issues:   None reported     Chemical Use Review:  Substance Use: decrease in cannabis.  Client reports frequency using of 0 in the past two weeks.    Tobacco Use: No current tobacco use.      Diagnosis:  1. ADHD (attention deficit hyperactivity disorder), inattentive type    2. Generalized anxiety disorder    3. Moderate major depression (H)      Collateral Reports Completed:   Not Applicable     PLAN: (Client Tasks / Therapist Tasks / Other). Pt will practice acceptance of thoughts, feelings that may be present as he continues to make movements/actions that bring him closer idealizing activities that connect with others outside of work.       AFSHIN Gomez, LGSW 5.24.21  This note has been reviewed and I agree with the plan  of care. This note is co-signed by AFSHIN Pittman, [facility], Supervisor, on: 5/25/21    ___________________________________________________________________________    Treatment Plan     Client's Name: Carrillo Dunlap                   YOB: 1965     Date: 1/21/19; reviewed: 5/15/19; updated 8/13/19; 1.3.20 ; 6.15.20; 9.21.20; 1.4.20 ; 5.24.21     DSM-V Diagnoses: Attention-Deficit/Hyperactivity Disorder  314.01 (F90.2) Combined presentation or 296.31 (F33.0) Major Depressive Disorder, Recurrent Episode, Mild _ and With anxious distress  Psychosocial / Contextual Factors: Limited social support, limited financial support, physical/medical concerns, employment constraints  WHODAS: did not complete during visit     Referral / Collaboration:  Referral to another professional/service is not indicated at this time.     Anticipated number of session or this episode of care: 15      MeasurableTreatment Goal(s) related to diagnosis / functional impairment(s)  Goal 1: Client will begin to work on his work portfolio.     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       Objective #A (Client Action)                Client will schedule times of the day to work on his work portfolio. 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   Status: completed - Date: 5/15/19      Intervention(s)  Therapist will assign homework including weekly activity scheduling and CBT thought logs  provide educational materials on CBT  role-play effective communication skills.    Goal 2: Client will decrease his monthly marijuana use.     I will know I've met my goal when I do not use marijuana around my brother.      Objective #A (Client Action)    Client will identify at least 3 example(s) of how marijuana has resulted in an experience that interferes with person values or goals.  Status:  completed- 5/15/19    Intervention(s)  Therapist will help client identify how his personal values and goals are interfered when he uses marijuana.     Objective #B  Client will identify triggers and environmental cues contributing to his marijuana usage.     Status: completed - Date: 5/15/19     Intervention(s)  Therapist will teach the client how to perform a behavioral chain analysis to better identify triggers and cues to using marijuana.     Objective #C  Client will maintain sobriety for one month.  Status: completed - Date: 5.15.19     Intervention(s)  Therapist will provide educational materials for the client to increase his coping skills when he has urges to use marijuana. Therapist will provide additional resources to help him maintain sobriety.     Goal 3: Client will decrease his anxiety from his baseline GAD7 score.    I will know I've met my goal when I am able to let things go and allow myself permission to relax and not worry.      Objective #A  Client will identify 5 initial signs or symptoms of anxiety.    Status: completed - Date: 5/15/19     Intervention(s)  Therapist will provide educational materials on the symptoms of anxiety.    Objective #B (Client Action)    Status: completed - Date: 5/15/19     Client will identify 5 fears / thoughts that contribute to feeling anxious.    Intervention(s)  Therapist will teach the client how to perform a behavioral chain analysis in order to identify fears/thoughts contributing to anxious feelings.     Objective #C  Client will use at least 4 coping skills for anxiety management in the next 10 weeks.  Status: continued - Date: 2/19/19; 1.3.20; 6.15.20 ; 9.21.20; 1.4.20    Intervention(s)  Therapist will teach progressive muscle relaxation, cue control relaxation, mindful breathing, and guided imagery. Therapist will also utilize Acceptance and Commitment Therapy by exploring and identifying important values in patient's life, and discuss ways to commit to  behavioral activation around these values.     Goal 4: The client will learn and apply skills to manage the symptoms of depression.    I will know I've met my goal when I am able to let things go and allow myself permission to relax, not worry, and feel better about myself.      Objective #A: Client will Increase interest, engagement, and pleasure in doing things.  Client will identify negative self-talk and behaviors: challenge core beliefs, myths, and actions.  Decrease frequency and intensity of feeling down, depressed, hopeless.  Status: continued Date(s): 5/15/19; 6.15.20; 9.21.20; 1.4.20; 5.24.21    Interventions  Therapist will provide educational materials and handouts on core beliefs, cognitive distortions, and cognitive fusion and defusion.    Goal 5: Client will explore and resolve issues related to relationship issues with his boss.    I will know I've met my goal when I am able to feel better about my relationship with his boss.      Objective #A (Client Action)    Client will review and familiarize himself with feeling words. He will use feeling words to describe when her needs are met/not met.   Status: New - Date: 8/13/19; 1.3.20 ; 6.15.20; 9.21.20; 1.4.20    Intervention(s)  Therapist will assign emotional recognition/identification including when expressing when his needs are not being met or are being met.    Objective #B  Client will identify negative self-talk and behaviors associated with his relationship with his boss and others, and challenge core beliefs, myths, and actions.  Status: continued - 8/13/19; 1.3.20 ; 6.15.20; 9.21.20; 1.4.20; 5.24.21    Intervention(s)  Therapist will provide education and homework on CBT, ACT, and DBT. Therapist will provide strategies to evaluate for cognitive distortions and logical errors. Therapist will also help client begin to accept feeling negative emotions in a way that helps to decrease his suffering.      Objective #C   Client will compile a list of  boundaries she will set with his boss and others.   Status: continued - Date: 8/13/19; 1.3.20 ; 6.15.20; 9.21.20; 1.4.20; 5.24.21    Intervention(s)  Therapist will teach about healthy boundaries. Including information about different communication styles emphasizing assertive communication as the most helpful/healthy style.        AFSHIN Gomez, [facility], May 15, 2019; 1.3.20 ; 6.15.20; 9.21.20; 1.4.20; 5.24.21

## 2021-06-05 ENCOUNTER — MYC REFILL (OUTPATIENT)
Dept: FAMILY MEDICINE | Facility: CLINIC | Age: 56
End: 2021-06-05

## 2021-06-05 DIAGNOSIS — F90.0 ADHD (ATTENTION DEFICIT HYPERACTIVITY DISORDER), INATTENTIVE TYPE: ICD-10-CM

## 2021-06-07 RX ORDER — DEXTROAMPHETAMINE SACCHARATE, AMPHETAMINE ASPARTATE MONOHYDRATE, DEXTROAMPHETAMINE SULFATE AND AMPHETAMINE SULFATE 7.5; 7.5; 7.5; 7.5 MG/1; MG/1; MG/1; MG/1
30 CAPSULE, EXTENDED RELEASE ORAL EVERY MORNING
Qty: 30 CAPSULE | Refills: 0 | Status: SHIPPED | OUTPATIENT
Start: 2021-06-07 | End: 2021-07-01

## 2021-06-07 RX ORDER — DEXTROAMPHETAMINE SACCHARATE, AMPHETAMINE ASPARTATE MONOHYDRATE, DEXTROAMPHETAMINE SULFATE AND AMPHETAMINE SULFATE 5; 5; 5; 5 MG/1; MG/1; MG/1; MG/1
20 CAPSULE, EXTENDED RELEASE ORAL DAILY
Qty: 30 CAPSULE | Refills: 0 | Status: SHIPPED | OUTPATIENT
Start: 2021-06-07 | End: 2021-07-01

## 2021-07-01 ENCOUNTER — VIRTUAL VISIT (OUTPATIENT)
Dept: PSYCHOLOGY | Facility: CLINIC | Age: 56
End: 2021-07-01
Payer: COMMERCIAL

## 2021-07-01 DIAGNOSIS — F90.0 ADHD (ATTENTION DEFICIT HYPERACTIVITY DISORDER), INATTENTIVE TYPE: Primary | ICD-10-CM

## 2021-07-01 DIAGNOSIS — F32.1 MODERATE MAJOR DEPRESSION (H): ICD-10-CM

## 2021-07-01 DIAGNOSIS — F41.1 GENERALIZED ANXIETY DISORDER: ICD-10-CM

## 2021-07-01 PROCEDURE — 90834 PSYTX W PT 45 MINUTES: CPT | Mod: 95 | Performed by: SOCIAL WORKER

## 2021-07-01 NOTE — PROGRESS NOTES
"                  Telemedicine Visit: The patient's condition can be safely assessed and treated via synchronous audio and visual telemedicine encounter.       Reason for Telemedicine Visit: Patient has requested telehealth visit     Originating Site (Patient Location): Patient's home     Distant Site (Provider Location): Provider Remote Setting     Consent:  The patient/guardian has verbally consented to: the potential risks and benefits of telemedicine (video visit) versus in person care; bill my insurance or make self-payment for services provided; and responsibility for payment of non-covered services                             Mode of Communication:  Video Conference via [employer]  As the provider I attest to compliance with applicable laws and regulations related to telemedicine.    Progress Note    Client Name: Carrillo Dunlap  Date: 7.1.21         Service Type: Individual  Video Visit: yes     Session Start Time:  1215pm  Session End Time: 100pm    Session Length: 45 minutes    Session #: 40    Attendees: Client attended alone     Treatment Plan Last Reviewed: Reviewed  PHQ-9 / UGO-7 : Reviewed    DATA  Interactive Complexity: No  Crisis: No       Progress Since Last Session (Related to Symptoms / Goals / Homework):  Symptoms: Improving.    Homework: Achieved / completed to satisfaction.      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 Discussed pt feeling surprised and excited that he was able to do this. Discussed the pain of \"pulling yourself up front Processed pt's motivations for this including " "being a full-time employee to get benefits. Discussed pt accepting and be willing to feel pain of \"going back home.\" Encouraged pt to recognize his ability to come into touch with his willingness and processed pt voicing self compassion in visit to help to encourage pt moving forward.     Treatment Objective(s) Addressed in This Session:   Identify negative self-talk and behaviors: challenge core beliefs, myths, and actions   Decrease frequency and intensity of feeling down, depressed, hopeless   Client will review and familiarize himself with feeling words. He will use feeling words to describe when her needs are met/not met.   Self compassion     Intervention:  Psychoeducation: Explored with the pt using open ended questioning what the purpose of worry is. Discussed how in order to worry excessively about something it has to be 1. Important to us; 2. We have to be invested in the outcome (we have a preferred outcome). Processed how our minds continue to ruminate on a worry because of the fear of the unknown or uncertain. Our mind searches for different possibilities, in part to ensure that we are capable to cope with the various outcomes, especially if they may cause more pain/suffering in the long run. Discussed how our preferred outcome is the least suffering/ easiest option. Processed however, that many times we are able to cope with the other outcomes, although it is more difficult and there may be more suffering/pain. Pt processed that although this may be the cause, it can help him find resilience in himself and he may grow as a person.       ASSESSMENT: Current Emotional / Mental Status (status of significant symptoms):   Risk status (Self / Other harm or suicidal ideation)   Client denies current fears or concerns for personal safety.   Client denies current or recent suicidal ideation or behaviors.   Client denies current or recent homicidal ideation or behaviors.   Client denies current or recent self " injurious behavior or ideation.   Client denies other safety concerns.    Client reports there has been no change in risk factors since their last session.      Client reports there has been no change in protective factors since their last session.      A safety and risk management has not been developed at this time.  Client was given the Suicide Prevention National Hotline, Text [place] 323345,  the [facility] Crisis Number, or encouraged to Call 911 should there be a change  in these risk factors.       Appearance:   Appropriate    Eye Contact:   Good    Psychomotor Behavior: Restless , more so than in previous sessions   Attitude:   Cooperative    Orientation:   All   Speech    Rate / Production: Hyperverbal     Volume:  Normal    Mood:    Anxious    Affect:    Appropriate    Thought Content:  Clear , more clear than past sessions   Thought Form:  Coherent    Insight:    Fair, improving     Medication Review:   No changes to current psychiatric medication(s)     Medication Compliance:   Yes     Changes in Health Issues:   None reported     Chemical Use Review:  Substance Use: decrease in cannabis.  Client reports frequency using of 0 in the past two weeks.    Tobacco Use: No current tobacco use.      Diagnosis:  1. ADHD (attention deficit hyperactivity disorder), inattentive type    2. Generalized anxiety disorder    3. Moderate major depression (H)      Collateral Reports Completed:   Not Applicable     PLAN: (Client Tasks / Therapist Tasks / Other). Pt will continue to practice self compassion and acceptance with himself in what the value he has to offer others.       AFSHIN Gomez, LGSW 7.1.21  This note has been reviewed and I agree with the plan of care. This note is co-signed by AFSHIN Pittman, [facility], Supervisor, on: 7/5/21    ___________________________________________________________________________    Treatment Plan     Client's Name: Carrillo Dunlap                   Date Of Birth:  1965     Date: 1/21/19; reviewed: 5/15/19; updated 8/13/19; 1.3.20 ; 6.15.20; 9.21.20; 1.4.20 ; 5.24.21     DSM-V Diagnoses: Attention-Deficit/Hyperactivity Disorder  314.01 (F90.2) Combined presentation or 296.31 (F33.0) Major Depressive Disorder, Recurrent Episode, Mild _ and With anxious distress  Psychosocial / Contextual Factors: Limited social support, limited financial support, physical/medical concerns, employment constraints  WHODAS: did not complete during visit     Referral / Collaboration:  Referral to another professional/service is not indicated at this time.     Anticipated number of session or this episode of care: 15      MeasurableTreatment Goal(s) related to diagnosis / functional impairment(s)  Goal 1: Client will begin to work on his work portfolio.     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       Objective #A (Client Action)                Client will schedule times of the day to work on his work portfolio. 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   Status: completed - Date: 5/15/19      Intervention(s)  Therapist will assign homework including weekly activity scheduling and CBT thought logs  provide educational materials on CBT  role-play effective communication skills.    Goal 2: Client will decrease his monthly marijuana use.     I will know I've met my goal when I do not use marijuana around my brother.      Objective #A (Client Action)    Client will identify at least 3 example(s) of how marijuana has resulted in an experience that interferes with person values or goals.  Status: completed- 5/15/19    Intervention(s)  Therapist will help client identify how his personal values and goals are interfered when he uses marijuana.     Objective #B  Client will identify triggers and environmental cues contributing to his marijuana usage.      Status: completed - Date: 5/15/19     Intervention(s)  Therapist will teach the client how to perform a behavioral chain analysis to better identify triggers and cues to using marijuana.     Objective #C  Client will maintain sobriety for one month.  Status: completed - Date: 5.15.19     Intervention(s)  Therapist will provide educational materials for the client to increase his coping skills when he has urges to use marijuana. Therapist will provide additional resources to help him maintain sobriety.     Goal 3: Client will decrease his anxiety from his baseline GAD7 score.    I will know I've met my goal when I am able to let things go and allow myself permission to relax and not worry.      Objective #A  Client will identify 5 initial signs or symptoms of anxiety.    Status: completed - Date: 5/15/19     Intervention(s)  Therapist will provide educational materials on the symptoms of anxiety.    Objective #B (Client Action)    Status: completed - Date: 5/15/19     Client will identify 5 fears / thoughts that contribute to feeling anxious.    Intervention(s)  Therapist will teach the client how to perform a behavioral chain analysis in order to identify fears/thoughts contributing to anxious feelings.     Objective #C  Client will use at least 4 coping skills for anxiety management in the next 10 weeks.  Status: continued - Date: 2/19/19; 1.3.20; 6.15.20 ; 9.21.20; 1.4.20    Intervention(s)  Therapist will teach progressive muscle relaxation, cue control relaxation, mindful breathing, and guided imagery. Therapist will also utilize Acceptance and Commitment Therapy by exploring and identifying important values in patient's life, and discuss ways to commit to behavioral activation around these values.     Goal 4: The client will learn and apply skills to manage the symptoms of depression.    I will know I've met my goal when I am able to let things go and allow myself permission to relax, not worry, and feel  better about myself.      Objective #A: Client will Increase interest, engagement, and pleasure in doing things.  Client will identify negative self-talk and behaviors: challenge core beliefs, myths, and actions.  Decrease frequency and intensity of feeling down, depressed, hopeless.  Status: continued Date(s): 5/15/19; 6.15.20; 9.21.20; 1.4.20; 5.24.21    Interventions  Therapist will provide educational materials and handouts on core beliefs, cognitive distortions, and cognitive fusion and defusion.    Goal 5: Client will explore and resolve issues related to relationship issues with his boss.    I will know I've met my goal when I am able to feel better about my relationship with his boss.      Objective #A (Client Action)    Client will review and familiarize himself with feeling words. He will use feeling words to describe when her needs are met/not met.   Status: New - Date: 8/13/19; 1.3.20 ; 6.15.20; 9.21.20; 1.4.20    Intervention(s)  Therapist will assign emotional recognition/identification including when expressing when his needs are not being met or are being met.    Objective #B  Client will identify negative self-talk and behaviors associated with his relationship with his boss and others, and challenge core beliefs, myths, and actions.  Status: continued - 8/13/19; 1.3.20 ; 6.15.20; 9.21.20; 1.4.20; 5.24.21    Intervention(s)  Therapist will provide education and homework on CBT, ACT, and DBT. Therapist will provide strategies to evaluate for cognitive distortions and logical errors. Therapist will also help client begin to accept feeling negative emotions in a way that helps to decrease his suffering.      Objective #C   Client will compile a list of boundaries she will set with his boss and others.   Status: continued - Date: 8/13/19; 1.3.20 ; 6.15.20; 9.21.20; 1.4.20; 5.24.21    Intervention(s)  Therapist will teach about healthy boundaries. Including information about different communication  styles emphasizing assertive communication as the most helpful/healthy style.        AFSHIN Gomez, [facility], May 15, 2019; 1.3.20 ; 6.15.20; 9.21.20; 1.4.20; 5.24.21

## 2021-08-02 NOTE — PROGRESS NOTES
3  SUBJECTIVE:   CC: Carrillo Dunlap is an 56 year old male who presents for preventive health visit.     {  Patient has been advised of split billing requirements and indicates understanding: Yes  Healthy Habits:  Answers for HPI/ROS submitted by the patient on 8/3/2021  If you checked off any problems, how difficult have these problems made it for you to do your work, take care of things at home, or get along with other people?: Somewhat difficult   PHQ9 TOTAL SCORE: 6  Frequency of exercise:: None  Getting at least 3 servings of Calcium per day:: Yes  Diet:: Regular (no restrictions)  Taking medications regularly:: Yes  Medication side effects:: Not applicable  Bi-annual eye exam:: NO  Dental care twice a year:: NO  Sleep apnea or symptoms of sleep apnea:: None  Additional concerns today:: No      Eating and things are off, creative type so sometimes works all night and then he is off schedule  Likely moving to [place] back to his old job he realized he needs to work with other people and his friends all moved away from [place] anyway,  Financial concerns working for himself  Did not take ADHD med today  Poor diet, disorganized    Depression and Anxiety Follow-Up  How are you doing with your depression since your last visit? No change  How are you doing with your anxiety since your last visit?  No change  Are you having other symptoms that might be associated with depression or anxiety? Yes:  self talk has been bad, beating himself up in his head  Have you had a significant life event? Job Concerns and Financial Concerns   Do you have any concerns with your use of alcohol or other drugs? No    Social History     Tobacco Use     Smoking status: Current Every Day Smoker     Packs/day: 1.00     Smokeless tobacco: Never Used   Substance Use Topics     Alcohol use: Yes     Comment: rare     Drug use: Yes     Types: Marijuana     Comment: occassionally     PHQ 9/2/2020 2/9/2021 8/3/2021   PHQ-9 Total Score 8 7 6   Q9:  Thoughts of better off dead/self-harm past 2 weeks Not at all Not at all Not at all     UGO-7 SCORE 10/22/2019 1/3/2020 2/9/2021   Total Score - - -   Total Score - - -   Total Score 8 11 13     Last PHQ-9 8/3/2021   1.  Little interest or pleasure in doing things 1   2.  Feeling down, depressed, or hopeless 0   3.  Trouble falling or staying asleep, or sleeping too much 1   4.  Feeling tired or having little energy 1   5.  Poor appetite or overeating 1   6.  Feeling bad about yourself 1   7.  Trouble concentrating 1   8.  Moving slowly or restless 0   Q9: Thoughts of better off dead/self-harm past 2 weeks 0   PHQ-9 Total Score 6   Difficulty at work, home, or with people -     UGO-7  2/9/2021   1. Feeling nervous, anxious, or on edge 2   2. Not being able to stop or control worrying 3   3. Worrying too much about different things 3   4. Trouble relaxing 3   5. Being so restless that it is hard to sit still 1   6. Becoming easily annoyed or irritable 1   7. Feeling afraid, as if something awful might happen 0   UGO-7 Total Score 13   If you checked any problems, how difficult have they made it for you to do your work, take care of things at home, or get along with other people? Somewhat difficult       Suicide Assessment Five-step Evaluation and Treatment (SAFE-T)      Today's PHQ-2 Score:   PHQ-2 ( 1999 [employer]) 8/3/2021 3/4/2020   Q1: Little interest or pleasure in doing things 3 1   Q2: Feeling down, depressed or hopeless 1 1   PHQ-2 Score 4 2   Q1: Little interest or pleasure in doing things Nearly every day -   Q2: Feeling down, depressed or hopeless Several days -   PHQ-2 Score 4 -       Abuse: Current or Past(Physical, Sexual or Emotional)- No  Do you feel safe in your environment? Yes    Have you ever done Advance Care Planning? (For example, a Health Directive, POLST, or a discussion with a medical provider or your loved ones about your wishes): Yes, advance care planning is on file.    Social History      Tobacco Use     Smoking status: Current Every Day Smoker     Packs/day: 1.00     Smokeless tobacco: Never Used   Substance Use Topics     Alcohol use: Yes     Comment: rare     If you drink alcohol do you typically have >3 drinks per day or >7 drinks per week? No                      Last PSA: No results found for: PSA    Reviewed orders with patient. Reviewed health maintenance and updated orders accordingly - Yes  Lab work is in process  Labs reviewed in EPIC  BP Readings from Last 3 Encounters:   08/03/21 110/64   03/04/20 120/72   10/15/19 110/72    Wt Readings from Last 3 Encounters:   08/03/21 74.2 kg (163 lb 8 oz)   03/04/20 74.1 kg (163 lb 6.4 oz)   10/15/19 76.9 kg (169 lb 8 oz)                  Patient Active Problem List   Diagnosis     Dysthymia     Generalized anxiety disorder     Rectal fistula     Hyperlipidemia with target LDL less than 130     ADHD (attention deficit hyperactivity disorder), inattentive type     Excess skin of abdomen     Moderate major depression (H)     Past Surgical History:   Procedure Laterality Date     AMPUTATION  just kidding    be nice to have it pop up again where I simple verify it.     COLONOSCOPY  12/2016     GASTRIC BYPASS      2008     GI SURGERY         Social History     Tobacco Use     Smoking status: Current Every Day Smoker     Packs/day: 1.00     Smokeless tobacco: Never Used   Substance Use Topics     Alcohol use: Yes     Comment: rare     Family History   Problem Relation Age of Onset     Heart Disease Mother      Obesity Mother      C.A.D. Father         MI     Coronary Artery Disease Father      Coronary Artery Disease Paternal Grandfather      Depression/Anxiety Brother      Obesity Brother      Obesity Brother      Coronary Artery Disease Brother         heart blockage      Obesity Brother          Current Outpatient Medications   Medication Sig Dispense Refill     amphetamine-dextroamphetamine (ADDERALL XR) 20 MG 24 hr capsule Take 1 capsule  (20 mg) by mouth daily 30 capsule 0     amphetamine-dextroamphetamine (ADDERALL XR) 30 MG 24 hr capsule Take 1 capsule (30 mg) by mouth every morning 30 capsule 0     ascorbic acid (VITAMIN C) 500 MG tablet Take 500 mg by mouth daily        buPROPion (WELLBUTRIN XL) 300 MG 24 hr tablet Take 1 tablet (300 mg) by mouth every morning 90 tablet 1     busPIRone (BUSPAR) 5 MG tablet Take 2 tablets (10 mg) by mouth 2 times daily 120 tablet 1     Calcium-Magnesium-Vitamin D (CITRACAL CALCIUM+D PO) Take 1 tablet by mouth 3 times daily       cyanocolbalamin (VITAMIN B-12) 1000 MCG tablet Take 1,000 mcg by mouth daily        fish oil-omega-3 fatty acids (FISH OIL) 1000 MG capsule Take 3 g by mouth daily        fluocinonide (LIDEX) 0.05 % solution Apply sparingly to affected area twice daily as needed.  Do not apply to face. 60 mL 5     Multiple Vitamins-Iron (MULTIVITAMIN/IRON PO) Take 1 tablet by mouth daily       nicotine (NICOTROL) 10 MG inhaler Use 1 cartridge as needed for urge to smoke by puffing over course of 20min.  Use 6-16 cart/day; reduce number of cart/day over 6-12 weeks. 6 each 1     zinc 50 MG TABS Take 1 tablet by mouth daily        No Known Allergies    Reviewed and updated as needed this visit by clinical staff  Tobacco  Allergies  Meds   Med Hx  Surg Hx  Fam Hx  Soc Hx        Reviewed and updated as needed this visit by Provider                Past Medical History:   Diagnosis Date     Depressive disorder      Heart disease 07-17-17    My brother just found out about a blockage in his heart.     Obesity, unspecified       Past Surgical History:   Procedure Laterality Date     AMPUTATION  just kidding    be nice to have it pop up again where I simple verify it.     COLONOSCOPY  12/2016     GASTRIC BYPASS      2008     GI SURGERY         ROS:  Constitutional, eye, ENT, skin, breast, respiratory, cardiac, GI, , MSK, neuro, psych, and allergy are normal except as otherwise noted.      OBJECTIVE:   BP  "110/64   Pulse 74   Temp 98.2  F (36.8  C) (Temporal)   Ht 1.732 m (5' 8.19\")   Wt 74.2 kg (163 lb 8 oz)   SpO2 97%   BMI 24.72 kg/m    EXAM:  GENERAL: healthy, alert and no distress  EYES: Eyes grossly normal to inspection, PERRL and conjunctivae and sclerae normal  HENT: ear canals and TM's normal, nose and mouth without ulcers or lesions  NECK: no adenopathy, no asymmetry, masses, or scars and thyroid normal to palpation  RESP: lungs clear to auscultation - no rales, rhonchi or wheezes  CV: regular rate and rhythm, normal S1 S2, no S3 or S4, no murmur, click or rub, no peripheral edema and peripheral pulses strong  ABDOMEN: soft, nontender, no hepatosplenomegaly, no masses and bowel sounds normal   (male): normal male genitalia without lesions or urethral discharge, no hernia  MS: no gross musculoskeletal defects noted, no edema  SKIN: 3 dry erythematous on back of head/neck area no suspicious lesions or rashes  NEURO: Normal strength and tone, mentation intact and speech normal  PSYCH: mentation appears normal, affect normal/bright    Diagnostic Test Results:  Labs reviewed in Epic  No results found for this or any previous visit (from the past 24 hour(s)).    ASSESSMENT/PLAN:       ICD-10-CM    1. Routine general medical examination at a health care facility  Z00.00    2. Moderate late onset persistent depressive disorder in partial remission with anxious distress and intermittent major depressive episodes without current episode  F34.1 buPROPion (WELLBUTRIN XL) 300 MG 24 hr tablet     OFFICE/OUTPT VISIT,EST,LEVL IV   3. ADHD (attention deficit hyperactivity disorder), inattentive type  F90.0 amphetamine-dextroamphetamine (ADDERALL XR) 20 MG 24 hr capsule     amphetamine-dextroamphetamine (ADDERALL XR) 20 MG 24 hr capsule     amphetamine-dextroamphetamine (ADDERALL XR) 20 MG 24 hr capsule     amphetamine-dextroamphetamine (ADDERALL XR) 30 MG 24 hr capsule     amphetamine-dextroamphetamine (ADDERALL XR) " "30 MG 24 hr capsule     amphetamine-dextroamphetamine (ADDERALL XR) 30 MG 24 hr capsule     Drug Abuse Screen Panel 13, Urine (Pain Care Package) - lab collect     OFFICE/OUTPT VISIT,EST,LEVL IV   4. Lipid screening  Z13.220 Lipid panel reflex to direct LDL Fasting   5. Screening for diabetes mellitus  Z13.1    6. Nutritional deficiency  E63.9 Comprehensive metabolic panel (BMP + Alb, Alk Phos, ALT, AST, Total. Bili, TP)     CBC with platelets and differential     Ferritin     OFFICE/OUTPT VISIT,EST,LEVL IV   7. Status post bariatric surgery  Z98.84 Comprehensive metabolic panel (BMP + Alb, Alk Phos, ALT, AST, Total. Bili, TP)     CBC with platelets and differential     Ferritin     OFFICE/OUTPT VISIT,EST,LEVL IV   8. Rash and nonspecific skin eruption  R21 triamcinolone (KENALOG) 0.1 % external cream     OFFICE/OUTPT VISIT,EST,LEVL IV   Did not take ADHD med today, discussed not taking it later in the day, and the importance of monitoring how he is doing when he does take it--he will take it next visit to check BP and pulse on the med  Future lab orders as pt not fasting, will get Urine tox screen at that time  Keep regular sleep habits, continue behavior modification/ life coaching and counseling     Weight stable, History of bariatric surgery years ago and reports not eating well or taking supplements, labs     Scalp area likely scabbed from scratching or picking, may have started as eczema or folliculitis, doesn't appear infected or concerning at this time. Trial topical steroid and follow up if not resolving with this    Patient has been advised of split billing requirements and indicates understanding: Yes  COUNSELING:  Reviewed preventive health counseling, as reflected in patient instructions    Estimated body mass index is 24.72 kg/m  as calculated from the following:    Height as of this encounter: 1.732 m (5' 8.19\").    Weight as of this encounter: 74.2 kg (163 lb 8 oz).        He reports that he has been " smoking. He has been smoking about 1.00 pack per day. He has never used smokeless tobacco.  Tobacco Cessation Action Plan:   Self help information given to patient      Counseling Resources:  ATP IV Guidelines  Pooled Cohorts Equation Calculator  FRAX Risk Assessment  ICSI Preventive Guidelines  Dietary Guidelines for Americans, 2010  USDA's MyPlate  ASA Prophylaxis  Lung CA Screening    ONEL Uribe CNP  [facility]

## 2021-08-03 ENCOUNTER — OFFICE VISIT (OUTPATIENT)
Dept: FAMILY MEDICINE | Facility: CLINIC | Age: 56
End: 2021-08-03
Payer: COMMERCIAL

## 2021-08-03 VITALS
HEIGHT: 68 IN | TEMPERATURE: 98.2 F | DIASTOLIC BLOOD PRESSURE: 64 MMHG | HEART RATE: 74 BPM | OXYGEN SATURATION: 97 % | WEIGHT: 163.5 LBS | SYSTOLIC BLOOD PRESSURE: 110 MMHG | BODY MASS INDEX: 24.78 KG/M2

## 2021-08-03 DIAGNOSIS — Z13.220 LIPID SCREENING: ICD-10-CM

## 2021-08-03 DIAGNOSIS — E63.9 NUTRITIONAL DEFICIENCY: ICD-10-CM

## 2021-08-03 DIAGNOSIS — Z00.00 ROUTINE GENERAL MEDICAL EXAMINATION AT A HEALTH CARE FACILITY: Primary | ICD-10-CM

## 2021-08-03 DIAGNOSIS — Z13.1 SCREENING FOR DIABETES MELLITUS: ICD-10-CM

## 2021-08-03 DIAGNOSIS — F34.1: ICD-10-CM

## 2021-08-03 DIAGNOSIS — Z98.84 STATUS POST BARIATRIC SURGERY: ICD-10-CM

## 2021-08-03 DIAGNOSIS — R21 RASH AND NONSPECIFIC SKIN ERUPTION: ICD-10-CM

## 2021-08-03 DIAGNOSIS — F90.0 ADHD (ATTENTION DEFICIT HYPERACTIVITY DISORDER), INATTENTIVE TYPE: ICD-10-CM

## 2021-08-03 PROCEDURE — 99396 PREV VISIT EST AGE 40-64: CPT | Mod: 25 | Performed by: NURSE PRACTITIONER

## 2021-08-03 PROCEDURE — 90471 IMMUNIZATION ADMIN: CPT | Performed by: NURSE PRACTITIONER

## 2021-08-03 PROCEDURE — 90670 PCV13 VACCINE IM: CPT | Performed by: NURSE PRACTITIONER

## 2021-08-03 PROCEDURE — 99214 OFFICE O/P EST MOD 30 MIN: CPT | Mod: 25 | Performed by: NURSE PRACTITIONER

## 2021-08-03 RX ORDER — BUPROPION HYDROCHLORIDE 300 MG/1
300 TABLET ORAL EVERY MORNING
Qty: 90 TABLET | Refills: 1 | Status: SHIPPED | OUTPATIENT
Start: 2021-08-03 | End: 2021-11-19

## 2021-08-03 RX ORDER — DEXTROAMPHETAMINE SACCHARATE, AMPHETAMINE ASPARTATE MONOHYDRATE, DEXTROAMPHETAMINE SULFATE AND AMPHETAMINE SULFATE 5; 5; 5; 5 MG/1; MG/1; MG/1; MG/1
20 CAPSULE, EXTENDED RELEASE ORAL DAILY
Qty: 30 CAPSULE | Refills: 0 | Status: SHIPPED | OUTPATIENT
Start: 2021-09-03 | End: 2021-10-03

## 2021-08-03 RX ORDER — DEXTROAMPHETAMINE SACCHARATE, AMPHETAMINE ASPARTATE MONOHYDRATE, DEXTROAMPHETAMINE SULFATE AND AMPHETAMINE SULFATE 7.5; 7.5; 7.5; 7.5 MG/1; MG/1; MG/1; MG/1
30 CAPSULE, EXTENDED RELEASE ORAL DAILY
Qty: 30 CAPSULE | Refills: 0 | Status: SHIPPED | OUTPATIENT
Start: 2021-09-03 | End: 2021-10-03

## 2021-08-03 RX ORDER — DEXTROAMPHETAMINE SACCHARATE, AMPHETAMINE ASPARTATE MONOHYDRATE, DEXTROAMPHETAMINE SULFATE AND AMPHETAMINE SULFATE 7.5; 7.5; 7.5; 7.5 MG/1; MG/1; MG/1; MG/1
30 CAPSULE, EXTENDED RELEASE ORAL DAILY
Qty: 30 CAPSULE | Refills: 0 | Status: SHIPPED | OUTPATIENT
Start: 2021-08-03 | End: 2021-09-02

## 2021-08-03 RX ORDER — TRIAMCINOLONE ACETONIDE 1 MG/G
CREAM TOPICAL 2 TIMES DAILY
Qty: 30 G | Refills: 0 | Status: SHIPPED | OUTPATIENT
Start: 2021-08-03 | End: 2022-08-19

## 2021-08-03 RX ORDER — DEXTROAMPHETAMINE SACCHARATE, AMPHETAMINE ASPARTATE MONOHYDRATE, DEXTROAMPHETAMINE SULFATE AND AMPHETAMINE SULFATE 7.5; 7.5; 7.5; 7.5 MG/1; MG/1; MG/1; MG/1
30 CAPSULE, EXTENDED RELEASE ORAL DAILY
Qty: 30 CAPSULE | Refills: 0 | Status: SHIPPED | OUTPATIENT
Start: 2021-10-04 | End: 2021-11-08

## 2021-08-03 RX ORDER — DEXTROAMPHETAMINE SACCHARATE, AMPHETAMINE ASPARTATE MONOHYDRATE, DEXTROAMPHETAMINE SULFATE AND AMPHETAMINE SULFATE 5; 5; 5; 5 MG/1; MG/1; MG/1; MG/1
20 CAPSULE, EXTENDED RELEASE ORAL DAILY
Qty: 30 CAPSULE | Refills: 0 | Status: SHIPPED | OUTPATIENT
Start: 2021-08-03 | End: 2021-09-02

## 2021-08-03 RX ORDER — DEXTROAMPHETAMINE SACCHARATE, AMPHETAMINE ASPARTATE MONOHYDRATE, DEXTROAMPHETAMINE SULFATE AND AMPHETAMINE SULFATE 5; 5; 5; 5 MG/1; MG/1; MG/1; MG/1
20 CAPSULE, EXTENDED RELEASE ORAL DAILY
Qty: 30 CAPSULE | Refills: 0 | Status: SHIPPED | OUTPATIENT
Start: 2021-10-04 | End: 2021-11-08

## 2021-08-03 ASSESSMENT — PATIENT HEALTH QUESTIONNAIRE - PHQ9
10. IF YOU CHECKED OFF ANY PROBLEMS, HOW DIFFICULT HAVE THESE PROBLEMS MADE IT FOR YOU TO DO YOUR WORK, TAKE CARE OF THINGS AT HOME, OR GET ALONG WITH OTHER PEOPLE: SOMEWHAT DIFFICULT
SUM OF ALL RESPONSES TO PHQ QUESTIONS 1-9: 6
SUM OF ALL RESPONSES TO PHQ QUESTIONS 1-9: 6

## 2021-08-03 ASSESSMENT — MIFFLIN-ST. JEOR: SCORE: 1554.13

## 2021-08-04 ASSESSMENT — PATIENT HEALTH QUESTIONNAIRE - PHQ9: SUM OF ALL RESPONSES TO PHQ QUESTIONS 1-9: 6

## 2021-08-05 ENCOUNTER — MYC MEDICAL ADVICE (OUTPATIENT)
Dept: FAMILY MEDICINE | Facility: CLINIC | Age: 56
End: 2021-08-05

## 2021-08-05 ENCOUNTER — LAB (OUTPATIENT)
Dept: LAB | Facility: CLINIC | Age: 56
End: 2021-08-05
Payer: COMMERCIAL

## 2021-08-05 DIAGNOSIS — Z98.84 STATUS POST BARIATRIC SURGERY: ICD-10-CM

## 2021-08-05 DIAGNOSIS — E63.9 NUTRITIONAL DEFICIENCY: ICD-10-CM

## 2021-08-05 DIAGNOSIS — Z13.220 LIPID SCREENING: ICD-10-CM

## 2021-08-05 DIAGNOSIS — F90.0 ADHD (ATTENTION DEFICIT HYPERACTIVITY DISORDER), INATTENTIVE TYPE: ICD-10-CM

## 2021-08-05 LAB
AMPHETAMINES UR QL: DETECTED
BARBITURATES UR QL SCN: NOT DETECTED
BASOPHILS # BLD AUTO: 0 10E3/UL (ref 0–0.2)
BASOPHILS NFR BLD AUTO: 0 %
BENZODIAZ UR QL SCN: NOT DETECTED
BUPRENORPHINE UR QL: NOT DETECTED
CANNABINOIDS UR QL: DETECTED
COCAINE UR QL SCN: NOT DETECTED
D-METHAMPHET UR QL: NOT DETECTED
EOSINOPHIL # BLD AUTO: 0.2 10E3/UL (ref 0–0.7)
EOSINOPHIL NFR BLD AUTO: 2 %
ERYTHROCYTE [DISTWIDTH] IN BLOOD BY AUTOMATED COUNT: 15.7 % (ref 10–15)
HCT VFR BLD AUTO: 42 % (ref 40–53)
HGB BLD-MCNC: 13.8 G/DL (ref 13.3–17.7)
LYMPHOCYTES # BLD AUTO: 4.2 10E3/UL (ref 0.8–5.3)
LYMPHOCYTES NFR BLD AUTO: 39 %
MCH RBC QN AUTO: 30.3 PG (ref 26.5–33)
MCHC RBC AUTO-ENTMCNC: 32.9 G/DL (ref 31.5–36.5)
MCV RBC AUTO: 92 FL (ref 78–100)
METHADONE UR QL SCN: NOT DETECTED
MONOCYTES # BLD AUTO: 1 10E3/UL (ref 0–1.3)
MONOCYTES NFR BLD AUTO: 9 %
NEUTROPHILS # BLD AUTO: 5.3 10E3/UL (ref 1.6–8.3)
NEUTROPHILS NFR BLD AUTO: 49 %
OPIATES UR QL SCN: NOT DETECTED
OXYCODONE UR QL SCN: NOT DETECTED
PCP UR QL SCN: NOT DETECTED
PLATELET # BLD AUTO: 329 10E3/UL (ref 150–450)
PROPOXYPH UR QL: NOT DETECTED
RBC # BLD AUTO: 4.56 10E6/UL (ref 4.4–5.9)
TRICYCLICS UR QL SCN: NOT DETECTED
WBC # BLD AUTO: 10.8 10E3/UL (ref 4–11)

## 2021-08-05 PROCEDURE — 80061 LIPID PANEL: CPT

## 2021-08-05 PROCEDURE — 80306 DRUG TEST PRSMV INSTRMNT: CPT

## 2021-08-05 PROCEDURE — 82728 ASSAY OF FERRITIN: CPT

## 2021-08-05 PROCEDURE — 36415 COLL VENOUS BLD VENIPUNCTURE: CPT

## 2021-08-05 PROCEDURE — 85025 COMPLETE CBC W/AUTO DIFF WBC: CPT

## 2021-08-05 PROCEDURE — 80053 COMPREHEN METABOLIC PANEL: CPT

## 2021-08-06 LAB
ALBUMIN SERPL-MCNC: 3.6 G/DL (ref 3.4–5)
ALP SERPL-CCNC: 106 U/L (ref 40–150)
ALT SERPL W P-5'-P-CCNC: 34 U/L (ref 0–70)
ANION GAP SERPL CALCULATED.3IONS-SCNC: 5 MMOL/L (ref 3–14)
AST SERPL W P-5'-P-CCNC: 25 U/L (ref 0–45)
BILIRUB SERPL-MCNC: 0.5 MG/DL (ref 0.2–1.3)
BUN SERPL-MCNC: 12 MG/DL (ref 7–30)
CALCIUM SERPL-MCNC: 9.2 MG/DL (ref 8.5–10.1)
CHLORIDE BLD-SCNC: 105 MMOL/L (ref 94–109)
CHOLEST SERPL-MCNC: 218 MG/DL
CO2 SERPL-SCNC: 28 MMOL/L (ref 20–32)
CREAT SERPL-MCNC: 0.98 MG/DL (ref 0.66–1.25)
FASTING STATUS PATIENT QL REPORTED: YES
FERRITIN SERPL-MCNC: 10 NG/ML (ref 26–388)
GFR SERPL CREATININE-BSD FRML MDRD: 86 ML/MIN/1.73M2
GLUCOSE BLD-MCNC: 89 MG/DL (ref 70–99)
HDLC SERPL-MCNC: 47 MG/DL
LDLC SERPL CALC-MCNC: 150 MG/DL
NONHDLC SERPL-MCNC: 171 MG/DL
POTASSIUM BLD-SCNC: 4.5 MMOL/L (ref 3.4–5.3)
PROT SERPL-MCNC: 7.2 G/DL (ref 6.8–8.8)
SODIUM SERPL-SCNC: 138 MMOL/L (ref 133–144)
TRIGL SERPL-MCNC: 105 MG/DL

## 2021-08-09 NOTE — TELEPHONE ENCOUNTER
Adali    See pt MyChart message, labs are resulted    Kindra Tobias, RN   [facility]

## 2021-09-20 ENCOUNTER — VIRTUAL VISIT (OUTPATIENT)
Dept: PSYCHOLOGY | Facility: CLINIC | Age: 56
End: 2021-09-20
Payer: COMMERCIAL

## 2021-09-20 DIAGNOSIS — F90.0 ADHD (ATTENTION DEFICIT HYPERACTIVITY DISORDER), INATTENTIVE TYPE: Primary | ICD-10-CM

## 2021-09-20 DIAGNOSIS — F32.1 MODERATE MAJOR DEPRESSION (H): ICD-10-CM

## 2021-09-20 DIAGNOSIS — F41.1 GENERALIZED ANXIETY DISORDER: ICD-10-CM

## 2021-09-20 PROCEDURE — 90834 PSYTX W PT 45 MINUTES: CPT | Mod: 95 | Performed by: SOCIAL WORKER

## 2021-09-20 NOTE — PROGRESS NOTES
Telemedicine Visit: The patient's condition can be safely assessed and treated via synchronous audio and visual telemedicine encounter.       Reason for Telemedicine Visit: Patient has requested telehealth visit     Originating Site (Patient Location): Patient's home     Distant Site (Provider Location): Provider Remote Setting     Consent:  The patient/guardian has verbally consented to: the potential risks and benefits of telemedicine (video visit) versus in person care; bill my insurance or make self-payment for services provided; and responsibility for payment of non-covered services                             Mode of Communication:  Video Conference via [employer]  As the provider I attest to compliance with applicable laws and regulations related to telemedicine.    Progress Note    Client Name: Carrillo Dunlap  Date: 9.20.21         Service Type: Individual  Video Visit: yes     Session Start Time:  11am  Session End Time: 1150am    Session Length: 45 minutes  Session #: 42    Attendees: Client attended alone     Treatment Plan Last Reviewed: Reviewed  PHQ-9 / UGO-7 : Reviewed    DATA  Interactive Complexity: No  Crisis: No       Progress Since Last Session (Related to Symptoms / Goals / Homework):  Symptoms: Improving.    Homework: Achieved / completed to satisfaction.      Episode of Care Goals: Achieved / completed to satisfaction - ACTION (Actively working towards change); Intervened by reinforcing change plan / affirming steps taken     Current / Ongoing Stressors and Concerns: Discussed pt's experience getting ready to think more critically about moving to [place]. Processed pt's toward moves of having a conversation with co-director who was on board and willing to have pt work for them. 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 Processed how pt may be feeling shame and this is sometimes driving him to add  "on to the videos \"wanting to impress them and feel good about himself.\" This writer helped to remind pt the importance of self compassion and self kindness. Encouraged pt to focus on possibility that he does not need to impress them, they are already accepting and value his work.      Treatment Objective(s) Addressed in This Session:   Identify negative self-talk and behaviors: challenge core beliefs, myths, and actions   Decrease frequency and intensity of feeling down, depressed, hopeless   Client will review and familiarize himself with feeling words. He will use feeling words to describe when her needs are met/not met.   Self compassion     Intervention:  Psychoeducation: Explored with the pt using open ended questioning what the purpose of worry is. Discussed how in order to worry excessively about something it has to be 1. Important to us; 2. We have to be invested in the outcome (we have a preferred outcome). Processed how our minds continue to ruminate on a worry because of the fear of the unknown or uncertain. Our mind searches for different possibilities, in part to ensure that we are capable to cope with the various outcomes, especially if they may cause more pain/suffering in the long run. Discussed how our preferred outcome is the least suffering/ easiest option. Processed however, that many times we are able to cope with the other outcomes, although it is more difficult and there may be more suffering/pain. Pt processed that although this may be the cause, it can help him find resilience in himself and he may grow as a person.       ASSESSMENT: Current Emotional / Mental Status (status of significant symptoms):   Risk status (Self / Other harm or suicidal ideation)   Client denies current fears or concerns for personal safety.   Client denies current or recent suicidal ideation or behaviors.   Client denies current or recent homicidal ideation or behaviors.   Client denies current or recent self " injurious behavior or ideation.   Client denies other safety concerns.    Client reports there has been no change in risk factors since their last session.      Client reports there has been no change in protective factors since their last session.      A safety and risk management has not been developed at this time.  Client was given the Suicide Prevention National Hotline, Text [place] 541075,  the [facility] Crisis Number, or encouraged to Call 911 should there be a change  in these risk factors.       Appearance:   Appropriate    Eye Contact:   Good    Psychomotor Behavior: Restless , more so than in previous sessions   Attitude:   Cooperative    Orientation:   All   Speech    Rate / Production: Hyperverbal     Volume:  Normal    Mood:    Anxious    Affect:    Appropriate    Thought Content:  Clear , more clear than past sessions   Thought Form:  Coherent    Insight:    Fair, improving     Medication Review:   No changes to current psychiatric medication(s)     Medication Compliance:   Yes     Changes in Health Issues:   None reported     Chemical Use Review:  Substance Use: decrease in cannabis.  Client reports frequency using of 0 in the past two weeks.    Tobacco Use: No current tobacco use.      Diagnosis:  1. ADHD (attention deficit hyperactivity disorder), inattentive type    2. Generalized anxiety disorder    3. Moderate major depression (H)      Collateral Reports Completed:   Not Applicable     PLAN: (Client Tasks / Therapist Tasks / Other). pt to focus on possibility that he does not need to impress them (potential employer), they are already accepting and value his work.        Jose Hudson, AFSHIN, [facility], 9.20.21    ___________________________________________________________________________    Treatment Plan     Client's Name: Carrillo Dunlap                   YOB: 1965     Date: 1/21/19; reviewed: 5/15/19; updated 8/13/19; 1.3.20 ; 6.15.20; 9.21.20; 1.4.20 ; 5.24.21     DSM-V  Diagnoses: Attention-Deficit/Hyperactivity Disorder  314.01 (F90.2) Combined presentation or 296.31 (F33.0) Major Depressive Disorder, Recurrent Episode, Mild _ and With anxious distress  Psychosocial / Contextual Factors: Limited social support, limited financial support, physical/medical concerns, employment constraints  WHODAS: did not complete during visit     Referral / Collaboration:  Referral to another professional/service is not indicated at this time.     Anticipated number of session or this episode of care: 15      MeasurableTreatment Goal(s) related to diagnosis / functional impairment(s)  Goal 1: Client will begin to work on his work portfolio.     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       Objective #A (Client Action)                Client will schedule times of the day to work on his work portfolio. 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   Status: completed - Date: 5/15/19      Intervention(s)  Therapist will assign homework including weekly activity scheduling and CBT thought logs  provide educational materials on CBT  role-play effective communication skills.    Goal 2: Client will decrease his monthly marijuana use.     I will know I've met my goal when I do not use marijuana around my brother.      Objective #A (Client Action)    Client will identify at least 3 example(s) of how marijuana has resulted in an experience that interferes with person values or goals.  Status: completed- 5/15/19    Intervention(s)  Therapist will help client identify how his personal values and goals are interfered when he uses marijuana.     Objective #B  Client will identify triggers and environmental cues contributing to his marijuana usage.     Status: completed - Date: 5/15/19     Intervention(s)  Therapist will teach the client how to perform a behavioral  chain analysis to better identify triggers and cues to using marijuana.     Objective #C  Client will maintain sobriety for one month.  Status: completed - Date: 5.15.19     Intervention(s)  Therapist will provide educational materials for the client to increase his coping skills when he has urges to use marijuana. Therapist will provide additional resources to help him maintain sobriety.     Goal 3: Client will decrease his anxiety from his baseline GAD7 score.    I will know I've met my goal when I am able to let things go and allow myself permission to relax and not worry.      Objective #A  Client will identify 5 initial signs or symptoms of anxiety.    Status: completed - Date: 5/15/19     Intervention(s)  Therapist will provide educational materials on the symptoms of anxiety.    Objective #B (Client Action)    Status: completed - Date: 5/15/19     Client will identify 5 fears / thoughts that contribute to feeling anxious.    Intervention(s)  Therapist will teach the client how to perform a behavioral chain analysis in order to identify fears/thoughts contributing to anxious feelings.     Objective #C  Client will use at least 4 coping skills for anxiety management in the next 10 weeks.  Status: continued - Date: 2/19/19; 1.3.20; 6.15.20 ; 9.21.20; 1.4.20    Intervention(s)  Therapist will teach progressive muscle relaxation, cue control relaxation, mindful breathing, and guided imagery. Therapist will also utilize Acceptance and Commitment Therapy by exploring and identifying important values in patient's life, and discuss ways to commit to behavioral activation around these values.     Goal 4: The client will learn and apply skills to manage the symptoms of depression.    I will know I've met my goal when I am able to let things go and allow myself permission to relax, not worry, and feel better about myself.      Objective #A: Client will Increase interest, engagement, and pleasure in doing things.  Client  will identify negative self-talk and behaviors: challenge core beliefs, myths, and actions.  Decrease frequency and intensity of feeling down, depressed, hopeless.  Status: continued Date(s): 5/15/19; 6.15.20; 9.21.20; 1.4.20; 5.24.21    Interventions  Therapist will provide educational materials and handouts on core beliefs, cognitive distortions, and cognitive fusion and defusion.    Goal 5: Client will explore and resolve issues related to relationship issues with his boss.    I will know I've met my goal when I am able to feel better about my relationship with his boss.      Objective #A (Client Action)    Client will review and familiarize himself with feeling words. He will use feeling words to describe when her needs are met/not met.   Status: New - Date: 8/13/19; 1.3.20 ; 6.15.20; 9.21.20; 1.4.20    Intervention(s)  Therapist will assign emotional recognition/identification including when expressing when his needs are not being met or are being met.    Objective #B  Client will identify negative self-talk and behaviors associated with his relationship with his boss and others, and challenge core beliefs, myths, and actions.  Status: continued - 8/13/19; 1.3.20 ; 6.15.20; 9.21.20; 1.4.20; 5.24.21    Intervention(s)  Therapist will provide education and homework on CBT, ACT, and DBT. Therapist will provide strategies to evaluate for cognitive distortions and logical errors. Therapist will also help client begin to accept feeling negative emotions in a way that helps to decrease his suffering.      Objective #C   Client will compile a list of boundaries she will set with his boss and others.   Status: continued - Date: 8/13/19; 1.3.20 ; 6.15.20; 9.21.20; 1.4.20; 5.24.21    Intervention(s)  Therapist will teach about healthy boundaries. Including information about different communication styles emphasizing assertive communication as the most helpful/healthy style.        AFSHIN Gomez, [facility], May 15,  2019; 1.3.20 ; 6.15.20; 9.21.20; 1.4.20; 5.24.21

## 2021-09-25 ENCOUNTER — HEALTH MAINTENANCE LETTER (OUTPATIENT)
Age: 56
End: 2021-09-25

## 2021-10-18 ENCOUNTER — VIRTUAL VISIT (OUTPATIENT)
Dept: PSYCHOLOGY | Facility: CLINIC | Age: 56
End: 2021-10-18
Payer: COMMERCIAL

## 2021-10-18 DIAGNOSIS — F90.0 ADHD (ATTENTION DEFICIT HYPERACTIVITY DISORDER), INATTENTIVE TYPE: Primary | ICD-10-CM

## 2021-10-18 DIAGNOSIS — F41.1 GENERALIZED ANXIETY DISORDER: ICD-10-CM

## 2021-10-18 DIAGNOSIS — F32.1 MODERATE MAJOR DEPRESSION (H): ICD-10-CM

## 2021-10-18 PROCEDURE — 90834 PSYTX W PT 45 MINUTES: CPT | Mod: 95 | Performed by: SOCIAL WORKER

## 2021-10-18 NOTE — PROGRESS NOTES
"                  Telemedicine Visit: The patient's condition can be safely assessed and treated via synchronous audio and visual telemedicine encounter.       Reason for Telemedicine Visit: Patient has requested telehealth visit     Originating Site (Patient Location): Patient's home     Distant Site (Provider Location): Provider Remote Setting     Consent:  The patient/guardian has verbally consented to: the potential risks and benefits of telemedicine (video visit) versus in person care; bill my insurance or make self-payment for services provided; and responsibility for payment of non-covered services                             Mode of Communication:  Video Conference via [employer]  As the provider I attest to compliance with applicable laws and regulations related to telemedicine.    Progress Note    Client Name: [employer]  Date: 10.18.21         Service Type: Individual  Video Visit: yes     Session Start Time:  1115am  Session End Time: 1200pm    Session Length: 45 minutes  Session #: 43    Attendees: Client attended alone     Treatment Plan Last Reviewed: Reviewed  PHQ-9 / UGO-7 : Reviewed    DATA  Interactive Complexity: No  Crisis: No       Progress Since Last Session (Related to Symptoms / Goals / Homework):  Symptoms: Improving.    Homework: Achieved / completed to satisfaction.      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 Processed pt noticing that he had a pause, a week where he did not find himself being as productive as he would have liked. Processed how at this time pt was able to practice self compassion saying \"I may have been burned out from the " "video work.\"    Discussed pt's experience getting ready to think more critically about moving to [place]. Processed pt's toward moves of having a conversation with co-director who was on board and willing to have pt work for them. 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 Processed how pt may be feeling shame and this is sometimes driving him to add on to the videos \"wanting to impress them and feel good about himself.\" This writer helped to remind pt the importance of self compassion and self kindness. Encouraged pt to focus on possibility that he does not need to impress them, they are already accepting and value his work. Encouraged pt to find areas in his life to participate in in order to build buffers into his life to aid in looking forward to feeling valued, outside of life.     Treatment Objective(s) Addressed in This Session:   Identify negative self-talk and behaviors: challenge core beliefs, myths, and actions   Decrease frequency and intensity of feeling down, depressed, hopeless   Client will review and familiarize himself with feeling words. He will use feeling words to describe when her needs are met/not met.   Self compassion     Intervention:  Psychoeducation: Explored with the pt using open ended questioning what the purpose of worry is. Discussed how in order to worry excessively about something it has to be 1. Important to us; 2. We have to be invested in the outcome (we have a preferred outcome). Processed how our minds continue to ruminate on a worry because of the fear of the unknown or uncertain. Our mind searches for different possibilities, in part to ensure that we are capable to cope with the various outcomes, especially if they may cause more pain/suffering in the long run. Discussed how our preferred outcome is the least suffering/ easiest option. Processed however, that many times we are able to cope with the other " outcomes, although it is more difficult and there may be more suffering/pain. Pt processed that although this may be the cause, it can help him find resilience in himself and he may grow as a person.       ASSESSMENT: Current Emotional / Mental Status (status of significant symptoms):   Risk status (Self / Other harm or suicidal ideation)   Client denies current fears or concerns for personal safety.   Client denies current or recent suicidal ideation or behaviors.   Client denies current or recent homicidal ideation or behaviors.   Client denies current or recent self injurious behavior or ideation.   Client denies other safety concerns.    Client reports there has been no change in risk factors since their last session.      Client reports there has been no change in protective factors since their last session.      A safety and risk management has not been developed at this time.  Client was given the Suicide Prevention National Hotline, Text [place] 098880,  the [facility] Crisis Number, or encouraged to Call 911 should there be a change  in these risk factors.       Appearance:   Appropriate    Eye Contact:   Good    Psychomotor Behavior: Restless , more so than in previous sessions   Attitude:   Cooperative    Orientation:   All   Speech    Rate / Production: Hyperverbal     Volume:  Normal    Mood:    Anxious    Affect:    Appropriate    Thought Content:  Clear , more clear than past sessions   Thought Form:  Coherent    Insight:    Fair, improving     Medication Review:   No changes to current psychiatric medication(s)     Medication Compliance:   Yes     Changes in Health Issues:   None reported     Chemical Use Review:  Substance Use: decrease in cannabis.  Client reports frequency using of 0 in the past two weeks.    Tobacco Use: No current tobacco use.      Diagnosis:  1. ADHD (attention deficit hyperactivity disorder), inattentive type    2. Generalized anxiety disorder    3. Moderate major depression (H)       Collateral Reports Completed:   Not Applicable     PLAN: (Client Tasks / Therapist Tasks / Other). Pt will find areas in his life to participate in in order to build buffers into his life to aid in looking forward to feeling valued, outside of life.       Jose Hudson, MSW, [facility], 10.18.21    ___________________________________________________________________________    Treatment Plan     Client's Name: Carrillo Dunlap                   YOB: 1965     Date: 1/21/19; reviewed: 5/15/19; updated 8/13/19; 1.3.20 ; 6.15.20; 9.21.20; 1.4.20 ; 5.24.21; 10.18.21     DSM-V Diagnoses: Attention-Deficit/Hyperactivity Disorder  314.01 (F90.2) Combined presentation or 296.31 (F33.0) Major Depressive Disorder, Recurrent Episode, Mild _ and With anxious distress  Psychosocial / Contextual Factors: Limited social support, limited financial support, physical/medical concerns, employment constraints  WHODAS: did not complete during visit     Referral / Collaboration:  Referral to another professional/service is not indicated at this time.     Anticipated number of session or this episode of care: 15      MeasurableTreatment Goal(s) related to diagnosis / functional impairment(s)  Goal 1: Client will begin to work on his work portfolio.     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       Objective #A (Client Action)                Client will schedule times of the day to work on his work portfolio. 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   Status: completed - Date: 5/15/19      Intervention(s)  Therapist will assign homework including weekly activity scheduling and CBT thought logs  provide educational materials on CBT  role-play effective communication skills.    Goal 2: Client will decrease his monthly marijuana use.     I will know I've met my goal when  I do not use marijuana around my brother.      Objective #A (Client Action)    Client will identify at least 3 example(s) of how marijuana has resulted in an experience that interferes with person values or goals.  Status: completed- 5/15/19    Intervention(s)  Therapist will help client identify how his personal values and goals are interfered when he uses marijuana.     Objective #B  Client will identify triggers and environmental cues contributing to his marijuana usage.     Status: completed - Date: 5/15/19     Intervention(s)  Therapist will teach the client how to perform a behavioral chain analysis to better identify triggers and cues to using marijuana.     Objective #C  Client will maintain sobriety for one month.  Status: completed - Date: 5.15.19     Intervention(s)  Therapist will provide educational materials for the client to increase his coping skills when he has urges to use marijuana. Therapist will provide additional resources to help him maintain sobriety.     Goal 3: Client will decrease his anxiety from his baseline GAD7 score.    I will know I've met my goal when I am able to let things go and allow myself permission to relax and not worry.      Objective #A  Client will identify 5 initial signs or symptoms of anxiety.    Status: completed - Date: 5/15/19     Intervention(s)  Therapist will provide educational materials on the symptoms of anxiety.    Objective #B (Client Action)    Status: completed - Date: 5/15/19     Client will identify 5 fears / thoughts that contribute to feeling anxious.    Intervention(s)  Therapist will teach the client how to perform a behavioral chain analysis in order to identify fears/thoughts contributing to anxious feelings.     Objective #C  Client will use at least 4 coping skills for anxiety management in the next 10 weeks.  Status: continued - Date: 2/19/19; 1.3.20; 6.15.20 ; 9.21.20; 1.4.20; 10.18.21    Intervention(s)  Therapist will teach progressive muscle  relaxation, cue control relaxation, mindful breathing, and guided imagery. Therapist will also utilize Acceptance and Commitment Therapy by exploring and identifying important values in patient's life, and discuss ways to commit to behavioral activation around these values.     Goal 4: The client will learn and apply skills to manage the symptoms of depression.    I will know I've met my goal when I am able to let things go and allow myself permission to relax, not worry, and feel better about myself.      Objective #A: Client will Increase interest, engagement, and pleasure in doing things.  Client will identify negative self-talk and behaviors: challenge core beliefs, myths, and actions.  Decrease frequency and intensity of feeling down, depressed, hopeless.  Status: continued Date(s): 5/15/19; 6.15.20; 9.21.20; 1.4.20; 5.24.21    Interventions  Therapist will provide educational materials and handouts on core beliefs, cognitive distortions, and cognitive fusion and defusion.    Goal 5: Client will explore and resolve issues related to relationship issues with his boss.    I will know I've met my goal when I am able to feel better about my relationship with his boss.      Objective #A (Client Action)    Client will review and familiarize himself with feeling words. He will use feeling words to describe when her needs are met/not met.   Status: complete - Date: 8/13/19; 1.3.20 ; 6.15.20; 9.21.20; 1.4.20; 10.18.21    Intervention(s)  Therapist will assign emotional recognition/identification including when expressing when his needs are not being met or are being met.    Objective #B  Client will identify negative self-talk and behaviors associated with his relationship with his boss and others, and challenge core beliefs, myths, and actions.  Status: continued - 8/13/19; 1.3.20 ; 6.15.20; 9.21.20; 1.4.20; 5.24.21; 10.18.21    Intervention(s)  Therapist will provide education and homework on CBT, ACT, and DBT.  Therapist will provide strategies to evaluate for cognitive distortions and logical errors. Therapist will also help client begin to accept feeling negative emotions in a way that helps to decrease his suffering.      Objective #C   Client will compile a list of boundaries she will set with his boss and others.   Status: continued - Date: 8/13/19; 1.3.20 ; 6.15.20; 9.21.20; 1.4.20; 5.24.21; 10.18.21    Intervention(s)  Therapist will teach about healthy boundaries. Including information about different communication styles emphasizing assertive communication as the most helpful/healthy style.        AFSHIN Gomez, [facility], May 15, 2019; 1.3.20 ; 6.15.20; 9.21.20; 1.4.20; 5.24.21; 10.18.21

## 2021-10-19 PROBLEM — F32.9 MAJOR DEPRESSION: Status: ACTIVE | Noted: 2018-09-12

## 2021-11-09 DIAGNOSIS — F90.0 ADHD (ATTENTION DEFICIT HYPERACTIVITY DISORDER), INATTENTIVE TYPE: ICD-10-CM

## 2021-11-09 NOTE — TELEPHONE ENCOUNTER
Reason for Call:  Medication or medication refill:    Do you use a [facility]?  Name of the pharmacy and phone number for the current request:  [id number] - [place], [place] - [address]    Name of the medication requested: amphetamine-dextroamphetamine (ADDERALL XR) 20 MG 24 hr capsule  amphetamine-dextroamphetamine (ADDERALL XR) 30 MG 24 hr capsule    Other request: NA    Can we leave a detailed message on this number? YES    Phone number patient can be reached at: Home number on file [phone] (home)    Best Time: ANY    Call taken on 11/9/2021 at 8:01 AM by Beena Dutton

## 2021-11-09 NOTE — TELEPHONE ENCOUNTER
Requested Prescriptions   Pending Prescriptions Disp Refills     amphetamine-dextroamphetamine (ADDERALL XR) 20 MG 24 hr capsule 30 capsule 0     Sig: Take 1 capsule (20 mg) by mouth daily       There is no refill protocol information for this order        amphetamine-dextroamphetamine (ADDERALL XR) 30 MG 24 hr capsule 30 capsule 0     Sig: Take 1 capsule (30 mg) by mouth daily       There is no refill protocol information for this order        Routing refill request to provider for review/approval because:  Drug not on the [facility] refill protocol       Antonella Avila RN  [facility]

## 2021-11-10 ENCOUNTER — TELEPHONE (OUTPATIENT)
Dept: FAMILY MEDICINE | Facility: CLINIC | Age: 56
End: 2021-11-10
Payer: COMMERCIAL

## 2021-11-10 NOTE — TELEPHONE ENCOUNTER
Refill done for 3 mo supply, please have pt schedule in person for his 6 month check up at that point. thanks

## 2021-11-11 NOTE — TELEPHONE ENCOUNTER
Reason for Call:  Other appointment    Detailed comments: CALLED AND [facility] TO SCHEDULE APPT IN FEB. FOR 6MO F/U    Phone Number Patient can be reached at: Home number on file [phone] (home)    Best Time:  ANY    Can we leave a detailed message on this number? YES    Call taken on 11/11/2021 at 3:39 PM by Beena Dutton

## 2021-11-12 RX ORDER — DEXTROAMPHETAMINE SACCHARATE, AMPHETAMINE ASPARTATE MONOHYDRATE, DEXTROAMPHETAMINE SULFATE AND AMPHETAMINE SULFATE 5; 5; 5; 5 MG/1; MG/1; MG/1; MG/1
20 CAPSULE, EXTENDED RELEASE ORAL DAILY
Qty: 30 CAPSULE | Refills: 0 | OUTPATIENT
Start: 2021-11-12

## 2021-11-12 RX ORDER — DEXTROAMPHETAMINE SACCHARATE, AMPHETAMINE ASPARTATE MONOHYDRATE, DEXTROAMPHETAMINE SULFATE AND AMPHETAMINE SULFATE 7.5; 7.5; 7.5; 7.5 MG/1; MG/1; MG/1; MG/1
30 CAPSULE, EXTENDED RELEASE ORAL DAILY
Qty: 30 CAPSULE | Refills: 0 | OUTPATIENT
Start: 2021-11-12

## 2021-11-16 ENCOUNTER — VIRTUAL VISIT (OUTPATIENT)
Dept: PSYCHOLOGY | Facility: CLINIC | Age: 56
End: 2021-11-16
Payer: COMMERCIAL

## 2021-11-16 DIAGNOSIS — F90.0 ADHD (ATTENTION DEFICIT HYPERACTIVITY DISORDER), INATTENTIVE TYPE: Primary | ICD-10-CM

## 2021-11-16 DIAGNOSIS — F41.1 GENERALIZED ANXIETY DISORDER: ICD-10-CM

## 2021-11-16 DIAGNOSIS — F32.1 MODERATE MAJOR DEPRESSION (H): ICD-10-CM

## 2021-11-16 PROCEDURE — 90834 PSYTX W PT 45 MINUTES: CPT | Mod: 95 | Performed by: SOCIAL WORKER

## 2021-11-16 NOTE — PROGRESS NOTES
"                  Telemedicine Visit: The patient's condition can be safely assessed and treated via synchronous audio and visual telemedicine encounter.       Reason for Telemedicine Visit: Patient has requested telehealth visit     Originating Site (Patient Location): Patient's home     Distant Site (Provider Location): Provider Remote Setting     Consent:  The patient/guardian has verbally consented to: the potential risks and benefits of telemedicine (video visit) versus in person care; bill my insurance or make self-payment for services provided; and responsibility for payment of non-covered services                             Mode of Communication:  Video Conference via [employer]  As the provider I attest to compliance with applicable laws and regulations related to telemedicine.    Progress Note    Client Name: Carrillo Dunlap  Date: 11.16.21         Service Type: Individual  Video Visit: yes     Session Start Time:  1010am  Session End Time: 11am    Session Length: 50 minutes  Session #: 44    Attendees: Client attended alone     Treatment Plan Last Reviewed: Reviewed  PHQ-9 / UGO-7 : Reviewed    DATA  Interactive Complexity: No  Crisis: No       Progress Since Last Session (Related to Symptoms / Goals / Homework):  Symptoms: Improving.    Homework: Achieved / completed to satisfaction.      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 Processed today's self compassion pt has for himself of \"I would encourage/be proud of my passion/desire to create artistic movies/shots. Pt reflected on current day and his desire to move back to his " hometown in [place]. Processed pt's emotions related to thinking about moving after [place] the cities; processed pt and human's attaching to the familiar, it brings a sense of comfort and yet holding space for realizing the ways it may be preventing him from connecting with other valued experiences. For remainder of session, discussed pt's value of contribution and processed pt's legacy; what he wants to be known for, remembered by in his community.     Treatment Objective(s) Addressed in This Session:   Identify negative self-talk and behaviors: challenge core beliefs, myths, and actions   Decrease frequency and intensity of feeling down, depressed, hopeless   Client will review and familiarize himself with feeling words. He will use feeling words to describe when her needs are met/not met.   Self compassion     Intervention:  Psychoeducation: Explored with the pt using open ended questioning what the purpose of worry is. Discussed how in order to worry excessively about something it has to be 1. Important to us; 2. We have to be invested in the outcome (we have a preferred outcome). Processed how our minds continue to ruminate on a worry because of the fear of the unknown or uncertain. Our mind searches for different possibilities, in part to ensure that we are capable to cope with the various outcomes, especially if they may cause more pain/suffering in the long run. Discussed how our preferred outcome is the least suffering/ easiest option. Processed however, that many times we are able to cope with the other outcomes, although it is more difficult and there may be more suffering/pain. Pt processed that although this may be the cause, it can help him find resilience in himself and he may grow as a person.       ASSESSMENT: Current Emotional / Mental Status (status of significant symptoms):   Risk status (Self / Other harm or suicidal ideation)   Client denies current fears or concerns for  personal safety.   Client denies current or recent suicidal ideation or behaviors.   Client denies current or recent homicidal ideation or behaviors.   Client denies current or recent self injurious behavior or ideation.   Client denies other safety concerns.    Client reports there has been no change in risk factors since their last session.      Client reports there has been no change in protective factors since their last session.      A safety and risk management has not been developed at this time.  Client was given the Suicide Prevention National Hotline, Text [place] 942896,  the [facility] Crisis Number, or encouraged to Call 911 should there be a change  in these risk factors.       Appearance:   Appropriate    Eye Contact:   Good    Psychomotor Behavior: Restless , more so than in previous sessions   Attitude:   Cooperative    Orientation:   All   Speech    Rate / Production: Hyperverbal     Volume:  Normal    Mood:    Anxious    Affect:    Appropriate    Thought Content:  Clear , more clear than past sessions   Thought Form:  Coherent    Insight:    Fair, improving     Medication Review:   No changes to current psychiatric medication(s)     Medication Compliance:   Yes     Changes in Health Issues:   None reported     Chemical Use Review:  Substance Use: decrease in cannabis.  Client reports frequency using of 0 in the past two weeks.    Tobacco Use: No current tobacco use.      Diagnosis:  1. ADHD (attention deficit hyperactivity disorder), inattentive type    2. Generalized anxiety disorder    3. Moderate major depression (H)      Collateral Reports Completed:   Not Applicable     PLAN: (Client Tasks / Therapist Tasks / Other). Pt will reflect on value of contribution and processed pt's legacy; what he wants to be known for, remembered by in his community.       Jose Hudson, AFSHIN, [facility], 11.16.21    ___________________________________________________________________________    Treatment Plan     Client's  Name: Carrillo Dunlap                   YOB: 1965     Date: 1/21/19; reviewed: 5/15/19; updated 8/13/19; 1.3.20 ; 6.15.20; 9.21.20; 1.4.20 ; 5.24.21; 10.18.21     DSM-V Diagnoses: Attention-Deficit/Hyperactivity Disorder  314.01 (F90.2) Combined presentation or 296.31 (F33.0) Major Depressive Disorder, Recurrent Episode, Mild _ and With anxious distress  Psychosocial / Contextual Factors: Limited social support, limited financial support, physical/medical concerns, employment constraints  WHODAS: did not complete during visit     Referral / Collaboration:  Referral to another professional/service is not indicated at this time.     Anticipated number of session or this episode of care: 15      MeasurableTreatment Goal(s) related to diagnosis / functional impairment(s)  Goal 1: Client will begin to work on his work portfolio.     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       Objective #A (Client Action)                Client will schedule times of the day to work on his work portfolio. 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   Status: completed - Date: 5/15/19      Intervention(s)  Therapist will assign homework including weekly activity scheduling and CBT thought logs  provide educational materials on CBT  role-play effective communication skills.    Goal 2: Client will decrease his monthly marijuana use.     I will know I've met my goal when I do not use marijuana around my brother.      Objective #A (Client Action)    Client will identify at least 3 example(s) of how marijuana has resulted in an experience that interferes with person values or goals.  Status: completed- 5/15/19    Intervention(s)  Therapist will help client identify how his personal values and goals are interfered when he uses marijuana.     Objective #B  Client will identify  triggers and environmental cues contributing to his marijuana usage.     Status: completed - Date: 5/15/19     Intervention(s)  Therapist will teach the client how to perform a behavioral chain analysis to better identify triggers and cues to using marijuana.     Objective #C  Client will maintain sobriety for one month.  Status: completed - Date: 5.15.19     Intervention(s)  Therapist will provide educational materials for the client to increase his coping skills when he has urges to use marijuana. Therapist will provide additional resources to help him maintain sobriety.     Goal 3: Client will decrease his anxiety from his baseline GAD7 score.    I will know I've met my goal when I am able to let things go and allow myself permission to relax and not worry.      Objective #A  Client will identify 5 initial signs or symptoms of anxiety.    Status: completed - Date: 5/15/19     Intervention(s)  Therapist will provide educational materials on the symptoms of anxiety.    Objective #B (Client Action)    Status: completed - Date: 5/15/19     Client will identify 5 fears / thoughts that contribute to feeling anxious.    Intervention(s)  Therapist will teach the client how to perform a behavioral chain analysis in order to identify fears/thoughts contributing to anxious feelings.     Objective #C  Client will use at least 4 coping skills for anxiety management in the next 10 weeks.  Status: continued - Date: 2/19/19; 1.3.20; 6.15.20 ; 9.21.20; 1.4.20; 10.18.21    Intervention(s)  Therapist will teach progressive muscle relaxation, cue control relaxation, mindful breathing, and guided imagery. Therapist will also utilize Acceptance and Commitment Therapy by exploring and identifying important values in patient's life, and discuss ways to commit to behavioral activation around these values.     Goal 4: The client will learn and apply skills to manage the symptoms of depression.    I will know I've met my goal when I am  able to let things go and allow myself permission to relax, not worry, and feel better about myself.      Objective #A: Client will Increase interest, engagement, and pleasure in doing things.  Client will identify negative self-talk and behaviors: challenge core beliefs, myths, and actions.  Decrease frequency and intensity of feeling down, depressed, hopeless.  Status: continued Date(s): 5/15/19; 6.15.20; 9.21.20; 1.4.20; 5.24.21    Interventions  Therapist will provide educational materials and handouts on core beliefs, cognitive distortions, and cognitive fusion and defusion.    Goal 5: Client will explore and resolve issues related to relationship issues with his boss.    I will know I've met my goal when I am able to feel better about my relationship with his boss.      Objective #A (Client Action)    Client will review and familiarize himself with feeling words. He will use feeling words to describe when her needs are met/not met.   Status: complete - Date: 8/13/19; 1.3.20 ; 6.15.20; 9.21.20; 1.4.20; 10.18.21    Intervention(s)  Therapist will assign emotional recognition/identification including when expressing when his needs are not being met or are being met.    Objective #B  Client will identify negative self-talk and behaviors associated with his relationship with his boss and others, and challenge core beliefs, myths, and actions.  Status: continued - 8/13/19; 1.3.20 ; 6.15.20; 9.21.20; 1.4.20; 5.24.21; 10.18.21    Intervention(s)  Therapist will provide education and homework on CBT, ACT, and DBT. Therapist will provide strategies to evaluate for cognitive distortions and logical errors. Therapist will also help client begin to accept feeling negative emotions in a way that helps to decrease his suffering.      Objective #C   Client will compile a list of boundaries she will set with his boss and others.   Status: continued - Date: 8/13/19; 1.3.20 ; 6.15.20; 9.21.20; 1.4.20; 5.24.21;  10.18.21    Intervention(s)  Therapist will teach about healthy boundaries. Including information about different communication styles emphasizing assertive communication as the most helpful/healthy style.        AFSHIN Gomez, [facility], May 15, 2019; 1.3.20 ; 6.15.20; 9.21.20; 1.4.20; 5.24.21; 10.18.21

## 2021-11-19 ENCOUNTER — VIRTUAL VISIT (OUTPATIENT)
Dept: FAMILY MEDICINE | Facility: CLINIC | Age: 56
End: 2021-11-19
Payer: COMMERCIAL

## 2021-11-19 DIAGNOSIS — F90.0 ADHD (ATTENTION DEFICIT HYPERACTIVITY DISORDER), INATTENTIVE TYPE: Primary | ICD-10-CM

## 2021-11-19 DIAGNOSIS — Z71.6 ENCOUNTER FOR SMOKING CESSATION COUNSELING: ICD-10-CM

## 2021-11-19 DIAGNOSIS — F34.1: ICD-10-CM

## 2021-11-19 DIAGNOSIS — Z87.891 PERSONAL HISTORY OF TOBACCO USE: ICD-10-CM

## 2021-11-19 PROCEDURE — 99214 OFFICE O/P EST MOD 30 MIN: CPT | Mod: 95 | Performed by: NURSE PRACTITIONER

## 2021-11-19 PROCEDURE — 99406 BEHAV CHNG SMOKING 3-10 MIN: CPT | Mod: 95 | Performed by: NURSE PRACTITIONER

## 2021-11-19 RX ORDER — DEXTROAMPHETAMINE SACCHARATE, AMPHETAMINE ASPARTATE MONOHYDRATE, DEXTROAMPHETAMINE SULFATE AND AMPHETAMINE SULFATE 5; 5; 5; 5 MG/1; MG/1; MG/1; MG/1
20 CAPSULE, EXTENDED RELEASE ORAL DAILY
Qty: 30 CAPSULE | Refills: 0 | Status: SHIPPED | OUTPATIENT
Start: 2022-01-20 | End: 2022-02-14

## 2021-11-19 RX ORDER — DEXTROAMPHETAMINE SACCHARATE, AMPHETAMINE ASPARTATE MONOHYDRATE, DEXTROAMPHETAMINE SULFATE AND AMPHETAMINE SULFATE 7.5; 7.5; 7.5; 7.5 MG/1; MG/1; MG/1; MG/1
30 CAPSULE, EXTENDED RELEASE ORAL DAILY
Qty: 30 CAPSULE | Refills: 0 | Status: SHIPPED | OUTPATIENT
Start: 2021-12-20 | End: 2022-01-19

## 2021-11-19 RX ORDER — DEXTROAMPHETAMINE SACCHARATE, AMPHETAMINE ASPARTATE MONOHYDRATE, DEXTROAMPHETAMINE SULFATE AND AMPHETAMINE SULFATE 5; 5; 5; 5 MG/1; MG/1; MG/1; MG/1
20 CAPSULE, EXTENDED RELEASE ORAL DAILY
Qty: 30 CAPSULE | Refills: 0 | Status: SHIPPED | OUTPATIENT
Start: 2021-11-19 | End: 2021-12-19

## 2021-11-19 RX ORDER — DEXTROAMPHETAMINE SACCHARATE, AMPHETAMINE ASPARTATE MONOHYDRATE, DEXTROAMPHETAMINE SULFATE AND AMPHETAMINE SULFATE 7.5; 7.5; 7.5; 7.5 MG/1; MG/1; MG/1; MG/1
30 CAPSULE, EXTENDED RELEASE ORAL DAILY
Qty: 30 CAPSULE | Refills: 0 | Status: SHIPPED | OUTPATIENT
Start: 2022-01-20 | End: 2022-02-14

## 2021-11-19 RX ORDER — DEXTROAMPHETAMINE SACCHARATE, AMPHETAMINE ASPARTATE MONOHYDRATE, DEXTROAMPHETAMINE SULFATE AND AMPHETAMINE SULFATE 7.5; 7.5; 7.5; 7.5 MG/1; MG/1; MG/1; MG/1
30 CAPSULE, EXTENDED RELEASE ORAL DAILY
Qty: 30 CAPSULE | Refills: 0 | Status: SHIPPED | OUTPATIENT
Start: 2021-11-19 | End: 2021-12-19

## 2021-11-19 RX ORDER — BUPROPION HYDROCHLORIDE 300 MG/1
300 TABLET ORAL EVERY MORNING
Qty: 90 TABLET | Refills: 1 | Status: SHIPPED | OUTPATIENT
Start: 2021-11-19 | End: 2022-08-19

## 2021-11-19 RX ORDER — DEXTROAMPHETAMINE SACCHARATE, AMPHETAMINE ASPARTATE MONOHYDRATE, DEXTROAMPHETAMINE SULFATE AND AMPHETAMINE SULFATE 5; 5; 5; 5 MG/1; MG/1; MG/1; MG/1
20 CAPSULE, EXTENDED RELEASE ORAL DAILY
Qty: 30 CAPSULE | Refills: 0 | Status: SHIPPED | OUTPATIENT
Start: 2021-12-20 | End: 2022-01-19

## 2021-11-19 NOTE — PATIENT INSTRUCTIONS

## 2021-11-19 NOTE — PROGRESS NOTES
Nirmal is a 56 year old who is being evaluated via a billable video visit.      How would you like to obtain your AVS? MyChart  If the video visit is dropped, the invitation should be resent by:   Will anyone else be joining your video visit?     Video Start Time: 10:50 AM    Assessment & Plan       ICD-10-CM    1. ADHD (attention deficit hyperactivity disorder), inattentive type  F90.0 amphetamine-dextroamphetamine (ADDERALL XR) 20 MG 24 hr capsule     amphetamine-dextroamphetamine (ADDERALL XR) 20 MG 24 hr capsule     amphetamine-dextroamphetamine (ADDERALL XR) 20 MG 24 hr capsule     amphetamine-dextroamphetamine (ADDERALL XR) 30 MG 24 hr capsule     amphetamine-dextroamphetamine (ADDERALL XR) 30 MG 24 hr capsule     amphetamine-dextroamphetamine (ADDERALL XR) 30 MG 24 hr capsule   2. Moderate late onset persistent depressive disorder in partial remission with anxious distress and intermittent major depressive episodes without current episode  F34.1 buPROPion (WELLBUTRIN XL) 300 MG 24 hr tablet   3. Encounter for smoking cessation counseling  Z71.6    4. Personal history of tobacco use  Z87.891 Prof fee: Shared Decisionmaking for Lung Cancer Screening     CT Chest Lung Cancer Scrn Low Dose wo    rstart Vit D, iron    Exercise more  Stop smoking  Keep working on healthy food choices  Add buspar back in 5 mg twice a day and titrate up as needed for anxiety   Discussed many workarounds, life coaching, resources      Regular exercise  See Patient Instructions    Return in 3 months (on 2/19/2022) for 3 month E-visit, 6 month ADHD in person check up.    ONEL Uribe CNP  [facility]    Felisha Kinney is a 56 year old who presents for the following health issues     History of Present Illness       He eats 2-3 servings of fruits and vegetables daily.He consumes 0 sweetened beverage(s) daily.He exercises with enough effort to increase his heart rate 10 to 19 minutes per day.  He  exercises with enough effort to increase his heart rate 3 or less days per week.   He is taking medications regularly.       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, will be doing some video and creative things also   Anxiety and in past buspar helps    Did quitplan, has nicotrol and will pick quit date  Want screening lungs     ADHD doses well    Social History     Tobacco Use     Smoking status: Current Every Day Smoker     Packs/day: 1.00     Smokeless tobacco: Never Used   Substance Use Topics     Alcohol use: Yes     Comment: rare     Drug use: Yes     Types: Marijuana     Comment: occassionally     PHQ 9/2/2020 2/9/2021 8/3/2021   PHQ-9 Total Score 8 7 6   Q9: Thoughts of better off dead/self-harm past 2 weeks Not at all Not at all Not at all     UGO-7 SCORE 10/22/2019 1/3/2020 2/9/2021   Total Score - - -   Total Score - - -   Total Score 8 11 13     Last PHQ-9 8/3/2021   1.  Little interest or pleasure in doing things 1   2.  Feeling down, depressed, or hopeless 0   3.  Trouble falling or staying asleep, or sleeping too much 1   4.  Feeling tired or having little energy 1   5.  Poor appetite or overeating 1   6.  Feeling bad about yourself 1   7.  Trouble concentrating 1   8.  Moving slowly or restless 0   Q9: Thoughts of better off dead/self-harm past 2 weeks 0   PHQ-9 Total Score 6   Difficulty at work, home, or with people -     UGO-7  2/9/2021   1. Feeling nervous, anxious, or on edge 2   2. Not being able to stop or control worrying 3   3. Worrying too much about different things 3   4. Trouble relaxing 3   5. Being so restless that it is hard to sit still 1   6. Becoming easily annoyed or irritable 1   7. Feeling afraid, as if something awful might happen 0   UGO-7 Total Score 13   If you checked any problems, how difficult have they made it for you to do your work, take care of things at home, or get along  with other people? Somewhat difficult     Book 5 second rule  Sparkle Delacruz     No SI      Review of Systems   Constitutional, HEENT, cardiovascular, pulmonary, GI, , musculoskeletal, neuro, skin, endocrine and psych systems are negative, except as otherwise noted.      Objective           Vitals:  No vitals were obtained today due to virtual visit.    Physical Exam   GENERAL: Healthy, alert and no distress  EYES: Eyes grossly normal to inspection.  No discharge or erythema, or obvious scleral/conjunctival abnormalities.  RESP: No audible wheeze, cough, or visible cyanosis.  No visible retractions or increased work of breathing.    SKIN: Visible skin clear. No significant rash, abnormal pigmentation or lesions.  NEURO: Cranial nerves grossly intact.  Mentation and speech appropriate for age.  PSYCH: Mentation appears normal, affect normal/bright and a bit anxious appearing, judgement and insight intact, normal speech and appearance well-groomed.                Video-Visit Details    Type of service:  Video Visit    Video End Time:11:27 AM    Originating Location (pt. Location): Home    Distant Location (provider location):  [facility]     Platform used for Video Visit: Makara  Answers for HPI/ROS submitted by the patient on 11/16/2021  How many servings of fruits and vegetables do you eat daily?: 2-3  On average, how many sweetened beverages do you drink each day (Examples: soda, juice, sweet tea, etc.  Do NOT count diet or artificially sweetened beverages)?: 0  How many minutes a day do you exercise enough to make your heart beat faster?: 10 to 19  How many days a week do you exercise enough to make your heart beat faster?: 3 or less  How many days per week do you miss taking your medication?: 0      Lung Cancer Screening Shared Decision Making Visit     Carrillo Dunlap is eligible for lung cancer screening on the basis of the information provided in my signed lung cancer screening  order.     I have discussed with patient the risks and benefits of screening for lung cancer with low-dose CT.     The risks include:  radiation exposure: one low dose chest CT has as much ionizing radiation as about 15 chest x-rays or 6 months of background radiation living in [place]    false positives: 96% of positive findings/nodules are NOT cancer, but some might still require additional diagnostic evaluation, including biopsy  over-diagnosis: some slow growing cancers that might never have been clinically significant will be detected and treated unnecessarily     The benefit of early detection of lung cancer is contingent upon adherence to annual screening or more frequent follow up if indicated.     Furthermore, reaping the benefits of screening requires Carrillo Dunlap to be willing and physically able to undergo diagnostic procedures, if indicated. Although no specific guide is available for determining severity of comorbidities, it is reasonable to withhold screening in patients who have greater mortality risk from other diseases.     We did discuss that the only way to prevent lung cancer is to not smoke. Smoking cessation counseling was given, duration 3-10 minutes.      I did offer risk estimation using a calculator such as this one:    ShouldIScreen

## 2021-12-01 ENCOUNTER — ANCILLARY PROCEDURE (OUTPATIENT)
Dept: CT IMAGING | Facility: CLINIC | Age: 56
End: 2021-12-01
Attending: NURSE PRACTITIONER
Payer: COMMERCIAL

## 2021-12-01 ENCOUNTER — EXTERNAL RECORD (OUTPATIENT)
Dept: OTHER | Age: 56
End: 2021-12-01

## 2021-12-01 DIAGNOSIS — Z87.891 PERSONAL HISTORY OF TOBACCO USE: ICD-10-CM

## 2021-12-01 PROCEDURE — 71271 CT THORAX LUNG CANCER SCR C-: CPT | Mod: GC | Performed by: RADIOLOGY

## 2021-12-09 ENCOUNTER — IMMUNIZATION (OUTPATIENT)
Dept: NURSING | Facility: CLINIC | Age: 56
End: 2021-12-09
Payer: COMMERCIAL

## 2021-12-09 ENCOUNTER — TELEPHONE (OUTPATIENT)
Dept: FAMILY MEDICINE | Facility: CLINIC | Age: 56
End: 2021-12-09

## 2021-12-09 DIAGNOSIS — R91.8 ABNORMAL CT LUNG SCREENING: ICD-10-CM

## 2021-12-09 PROCEDURE — 0064A COVID-19,PF,MODERNA (18+ YRS BOOSTER .25ML): CPT

## 2021-12-09 PROCEDURE — 91306 COVID-19,PF,MODERNA (18+ YRS BOOSTER .25ML): CPT

## 2021-12-09 NOTE — TELEPHONE ENCOUNTER
[facility] Radiology Lung Cancer Screening CT result notification:     LDCT/Lung Cancer Screening CT Exam date: 12/1/21  Radiologist Impression AND Recommendations:   ACR Assessment Category (v1.1):  Lung-RADS Category 3. Probably  benign finding(s)    Recommendation:  Lung-RADS Category 3. Probably benign finding(s)-  short term follow up suggested 6 month low dose CT Chest without  contrast (please order exam code [id number]).    Pertinent Findings:  Nodules: 6  The largest of these nodule(s) are as follows:   - 4  mm solid nodule in the right upper lobe on series:  2 image: 124.   - 4 mm solid nodule with pleural attachment in the right upper lobe on  series:  2 image:  149.   - 4 mm solid nodule in the left upper lobe on series:  2 image:  251.   - 4 mm solid nodule with pleural attachment in the left upper lobe on  series:  2 image:  254.     Ordering Provider: Adali Merino APRN CNP  Carrillo Dunlap did not receive the remaining radiology results from her provider.     Lung Nodule Program Protocol recommendation [Pertaining to lung nodules]:    Lung RADS 3 Protocol: Results RN to notify Patient of results/recommendations and place order for 6 month CT ([id number]) - MD dorsey  o CT Chest order ([id number]) placed to be completed within 6 months (Yes/No):  yes due on or after 6/9/22.  Image scheduling will contact Patient 1 month prior to due date.    RN communicated the lung nodule finding to the patient (Yes/No):  He requested that I call him back in a couple of hours.  At 2:35A, Nirmal notified of nodules  The patient had the following questions: it would be nice if the information was a bit more detailed  Correct letter sent as per [facility] Lung nodule protocol (Yes/No):  Yes  Did Patient have any CT's of chest previous? (inquire only if no CT's were used for comparison) (Yes/No/NA) no comparisons    If scheduling LDCT only, RN will contact Image Scheduling Team  Hours available (all sites  below):  Mon-Fri 7A to 7P; Sat 7A to 3:30P.  No schedulers available on Sunday.    [facility] and [place]): [phone]    North region ([place], [place]): [phone]    South region ([facility]): [phone]    East region ([facility]): [phone]    Evert Gunter RN  [facility]  Lung Nodule and Emergency Dept Lab Result RN  Ph# [phone]

## 2022-02-13 ASSESSMENT — ANXIETY QUESTIONNAIRES
1. FEELING NERVOUS, ANXIOUS, OR ON EDGE: NEARLY EVERY DAY
GAD7 TOTAL SCORE: 15
5. BEING SO RESTLESS THAT IT IS HARD TO SIT STILL: SEVERAL DAYS
4. TROUBLE RELAXING: MORE THAN HALF THE DAYS
GAD7 TOTAL SCORE: 15
2. NOT BEING ABLE TO STOP OR CONTROL WORRYING: NEARLY EVERY DAY
GAD7 TOTAL SCORE: 15
3. WORRYING TOO MUCH ABOUT DIFFERENT THINGS: MORE THAN HALF THE DAYS
7. FEELING AFRAID AS IF SOMETHING AWFUL MIGHT HAPPEN: SEVERAL DAYS
7. FEELING AFRAID AS IF SOMETHING AWFUL MIGHT HAPPEN: SEVERAL DAYS
6. BECOMING EASILY ANNOYED OR IRRITABLE: NEARLY EVERY DAY

## 2022-02-13 ASSESSMENT — PATIENT HEALTH QUESTIONNAIRE - PHQ9
SUM OF ALL RESPONSES TO PHQ QUESTIONS 1-9: 13
SUM OF ALL RESPONSES TO PHQ QUESTIONS 1-9: 13
10. IF YOU CHECKED OFF ANY PROBLEMS, HOW DIFFICULT HAVE THESE PROBLEMS MADE IT FOR YOU TO DO YOUR WORK, TAKE CARE OF THINGS AT HOME, OR GET ALONG WITH OTHER PEOPLE: VERY DIFFICULT

## 2022-02-14 ENCOUNTER — MYC MEDICAL ADVICE (OUTPATIENT)
Dept: FAMILY MEDICINE | Facility: CLINIC | Age: 57
End: 2022-02-14

## 2022-02-14 ENCOUNTER — VIRTUAL VISIT (OUTPATIENT)
Dept: FAMILY MEDICINE | Facility: CLINIC | Age: 57
End: 2022-02-14
Payer: COMMERCIAL

## 2022-02-14 DIAGNOSIS — F32.9 MAJOR DEPRESSIVE DISORDER WITH CURRENT ACTIVE EPISODE, UNSPECIFIED DEPRESSION EPISODE SEVERITY, UNSPECIFIED WHETHER RECURRENT: ICD-10-CM

## 2022-02-14 DIAGNOSIS — Z71.6 ENCOUNTER FOR SMOKING CESSATION COUNSELING: ICD-10-CM

## 2022-02-14 DIAGNOSIS — F90.0 ADHD (ATTENTION DEFICIT HYPERACTIVITY DISORDER), INATTENTIVE TYPE: Primary | ICD-10-CM

## 2022-02-14 PROCEDURE — 96127 BRIEF EMOTIONAL/BEHAV ASSMT: CPT | Mod: 95 | Performed by: NURSE PRACTITIONER

## 2022-02-14 PROCEDURE — 99214 OFFICE O/P EST MOD 30 MIN: CPT | Mod: 95 | Performed by: NURSE PRACTITIONER

## 2022-02-14 RX ORDER — DEXTROAMPHETAMINE SACCHARATE, AMPHETAMINE ASPARTATE MONOHYDRATE, DEXTROAMPHETAMINE SULFATE AND AMPHETAMINE SULFATE 5; 5; 5; 5 MG/1; MG/1; MG/1; MG/1
20 CAPSULE, EXTENDED RELEASE ORAL DAILY
Qty: 30 CAPSULE | Refills: 0 | Status: SHIPPED | OUTPATIENT
Start: 2022-02-14 | End: 2022-04-14

## 2022-02-14 RX ORDER — DEXTROAMPHETAMINE SACCHARATE, AMPHETAMINE ASPARTATE MONOHYDRATE, DEXTROAMPHETAMINE SULFATE AND AMPHETAMINE SULFATE 7.5; 7.5; 7.5; 7.5 MG/1; MG/1; MG/1; MG/1
30 CAPSULE, EXTENDED RELEASE ORAL DAILY
Qty: 30 CAPSULE | Refills: 0 | Status: SHIPPED | OUTPATIENT
Start: 2022-02-14 | End: 2022-04-14

## 2022-02-14 ASSESSMENT — ANXIETY QUESTIONNAIRES: GAD7 TOTAL SCORE: 15

## 2022-02-14 ASSESSMENT — PATIENT HEALTH QUESTIONNAIRE - PHQ9: SUM OF ALL RESPONSES TO PHQ QUESTIONS 1-9: 13

## 2022-02-14 NOTE — TELEPHONE ENCOUNTER
Arelis    See pt Mychart message and attached media    Kindra Tobias RN   [facility]

## 2022-02-14 NOTE — PROGRESS NOTES
Nirmal is a 56 year old who is being evaluated via a billable video visit.      How would you like to obtain your AVS? MyChart  Will anyone else be joining your video visit? No      Video Start Time: 1:04 PM    Assessment & Plan     ADHD (attention deficit hyperactivity disorder), inattentive type  Currently stable on his medications. Updated urine toxicology and also will update his controlled substance agreement.  - Medications refilled.    - amphetamine-dextroamphetamine (ADDERALL XR) 20 MG 24 hr capsule; Take 1 capsule (20 mg) by mouth daily  - amphetamine-dextroamphetamine (ADDERALL XR) 30 MG 24 hr capsule; Take 1 capsule (30 mg) by mouth daily    Encounter for smoking cessation counseling  Would like to work on smoking cessation. Looking for some hypnosis options which worked for his mother in the past.   - nicotine (NICOTROL) 10 MG inhaler; Use 1 cartridge as needed for urge to smoke by puffing over course of 20min.  Use 6-16 cart/day; reduce number of cart/day over 6-12 weeks.    Major depressive disorder with current active episode, unspecified depression episode severity, unspecified whether recurrent  Stable while on his bupropion, will continue to monitor.     Prescription drug management  I spent a total of 33 minutes on the day of the visit.   Time spent doing chart review, history and exam, documentation and further activities per the note       Patient Instructions   - Medications refilled.       No follow-ups on file.    ONEL Prieto CNP  [facility]    Felisha Kinney is a 56 year old who presents for the following health issues     HPI     Answers for HPI/ROS submitted by the patient on 2/13/2022  If you checked off any problems, how difficult have these problems made it for you to do your work, take care of things at home, or get along with other people?: Very difficult  PHQ9 TOTAL SCORE: 13  UGO 7 TOTAL SCORE: 15    Annual depression assessment: Notes that  his mood has been going ok. Sleep has been good abut 6-7 hours per night.   Appetite: no issues. Cooks 99% of the time.   Exercise: walking every 30 minutes daily when its nice outside.     Medications renewal: Discussed urine toxicology and also having a controlled substance in his medical records for medication refill.     CT Lung cancer screening and findings. Would like a copy of the CT for himself. Referred to medical records for his records. Smoking about a pack per day.   Chantix- did not work.   Nicotrol has been used in the past, this was somewhat helpful.     Review of Systems   Constitutional, HEENT, cardiovascular, pulmonary, gi and gu systems are negative, except as otherwise noted.      Objective         Vitals:  No vitals were obtained today due to virtual visit.    Physical Exam   GENERAL: Healthy, alert and no distress  EYES: Eyes grossly normal to inspection.  No discharge or erythema, or obvious scleral/conjunctival abnormalities.  RESP: No audible wheeze, cough, or visible cyanosis.  No visible retractions or increased work of breathing.    MS: No gross musculoskeletal defects noted.  Normal range of motion.  No visible edema.  SKIN: Visible skin clear. No significant rash, abnormal pigmentation or lesions.  NEURO: Cranial nerves grossly intact.  Mentation and speech appropriate for age.  PSYCH: Mentation appears normal, affect normal/bright, judgement and insight intact, normal speech and appearance well-groomed.    Labs reviewed.       Video-Visit Details    Type of service:  Video Visit    Video End Time:1:37 PM    Originating Location (pt. Location): Home    Distant Location (provider location):  [facility]     Platform used for Video Visit: [employer]

## 2022-02-14 NOTE — LETTER
Opioid / Opioid Plus Controlled Substance Agreement    This is an agreement between you and your provider about the safe and appropriate use of controlled substance/opioids prescribed by your care team. Controlled substances are medicines that can cause physical and mental dependence (abuse).    There are strict laws about having and using these medicines. We here at [facility] are committing to working with you in your efforts to get better. To support you in this work, we ll help you schedule regular office appointments for medicine refills. If we must cancel or change your appointment for any reason, we ll make sure you have enough medicine to last until your next appointment.     As a Provider, I will:    Listen carefully to your concerns and treat you with respect.     Recommend a treatment plan that I believe is in your best interest. This plan may involve therapies other than opioid pain medication.     Talk with you often about the possible benefits, and the risk of harm of any medicine that we prescribe for you.     Provide a plan on how to taper (discontinue or go off) using this medicine if the decision is made to stop its use.    As a Patient, I understand that opioid(s):     Are a controlled substance prescribed by my care team to help me function or work and manage my condition(s).     Are strong medicines and can cause serious side effects such as:    Drowsiness, which can seriously affect my driving ability    A lower breathing rate, enough to cause death    Harm to my thinking ability     Depression     Abuse of and addiction to this medicine    Need to be taken exactly as prescribed. Combining opioids with certain medicines or chemicals (such as illegal drugs, sedatives, sleeping pills, and benzodiazepines) can be dangerous or even fatal. If I stop opioids suddenly, I may have severe withdrawal symptoms.    Do not work for all types of pain nor for all patients. If they re not helpful, I may  be asked to stop them.    I am also being prescribed a stimulant controlled substance to help manage symptoms of attention deficit, appetite suppression, and/or fatigue.    The risks, benefits and side effects of these medicine(s) were explained to me. I agree that:  1. I will take part in other treatments as advised by my care team. This may be psychiatry or counseling, physical therapy, behavioral therapy, group treatment or a referral to a specialist.     2. I will keep all my appointments. I understand that this is part of the monitoring of opioids. My care team may require an office visit for EVERY opioid/controlled substance refill. If I miss appointments or don t follow instructions, my care team may stop my medicine.    3. I will take my medicines as prescribed. I will not change the dose or schedule unless my care team tells me to. There will be no refills if I run out early.     4. I may be asked to come to the clinic and complete a urine drug test or complete a pill count at any time. If I don t give a urine sample or participate in a pill count, the care team may stop my medicine.    5. I will only receive prescriptions from this clinic for chronic pain. If I am treated by another provider for acute pain issues, I will tell them that I am taking opioid pain medication for chronic pain and that I have a treatment agreement with this provider. I will inform my [facility] care team within one business day if I am given a prescription for any pain medication by another healthcare provider. My [facility] care team can contact other providers and pharmacists about my use of any medicines.    6. It is up to me to make sure that I don t run out of my medicines on weekends or holidays. If my care team is willing to refill my opioid prescription without a visit, I must request refills only during office hours. Refills may take up to 3 business days to process. I will use one pharmacy to fill all my  opioid and other controlled substance prescriptions. I will notify the clinic about any changes to my insurance or medication availability.    7. I am responsible for my prescriptions. If the medicine/prescription is lost, stolen or destroyed, it will not be replaced. I also agree not to share controlled substance medicines with anyone.    8. I am aware I should not use any illegal or recreational drugs. I agree not to drink alcohol unless my care team says I can.       9. If I enroll in the [place] Medical Cannabis program, I will tell my care team prior to my next refill.     10. I will tell my care team right away if I become pregnant, have a new medical problem treated outside of my regular clinic, or have a change in my medications.    11. I understand that this medicine can affect my thinking, judgment and reaction time. Alcohol and drugs affect the brain and body, which can affect the safety of my driving. Being under the influence of alcohol or drugs can affect my decision-making, behaviors, personal safety, and the safety of others. Driving while impaired (DWI) can occur if a person is driving, operating, or in physical control of a car, motorcycle, boat, snowmobile, ATV, motorbike, off-road vehicle, or any other motor vehicle ([place] Statute 169A.20). I understand the risk if I choose to drive or operate any vehicle or machinery.    I understand that if I do not follow any of the conditions above, my prescriptions or treatment may be stopped or changed.          Opioids  What You Need to Know    What are opioids?   Opioids are pain medicines that must be prescribed by a doctor. They are also known as narcotics.     Examples are:   1. morphine (MS Contin, Rosy)  2. oxycodone (Oxycontin)  3. oxycodone and acetaminophen (Percocet)  4. hydrocodone and acetaminophen (Vicodin, Norco)   5. fentanyl patch (Duragesic)   6. hydromorphone (Dilaudid)   7. methadone  8. codeine (Tylenol #3)     What do opioids do well?    Opioids are best for severe short-term pain such as after a surgery or injury. They may work well for cancer pain. They may help some people with long-lasting (chronic) pain.     What do opioids NOT do well?   Opioids never get rid of pain entirely, and they don t work well for most patients with chronic pain. Opioids don t reduce swelling, one of the causes of pain.                                    Other ways to manage chronic pain and improve function include:       Treat the health problem that may be causing pain    Anti-inflammation medicines, which reduce swelling and tenderness, such as ibuprofen (Advil, Motrin) or naproxen (Aleve)    Acetaminophen (Tylenol)    Antidepressants and anti-seizure medicines, especially for nerve pain    Topical treatments such as patches or creams    Injections or nerve blocks    Chiropractic or osteopathic treatment    Acupuncture, massage, deep breathing, meditation, visual imagery, aromatherapy    Use heat or ice at the pain site    Physical therapy     Exercise    Stop smoking    Take part in therapy       Risks and side effects     Talk to your doctor before you start or decide to keep taking opioids. Possible side effects include:      Lowering your breathing rate enough to cause death    Overdose, including death, especially if taking higher than prescribed doses    Worse depression symptoms; less pleasure in things you usually enjoy    Feeling tired or sluggish    Slower thoughts or cloudy thinking    Being more sensitive to pain over time; pain is harder to control    Trouble sleeping or restless sleep    Changes in hormone levels (for example, less testosterone)    Changes in sex drive or ability to have sex    Constipation    Unsafe driving    Itching and sweating    Dizziness    Nausea, throwing up and dry mouth    What else should I know about opioids?    Opioids may lead to dependence, tolerance, or addiction.      Dependence means that if you stop or reduce the  medicine too quickly, you will have withdrawal symptoms. These include loose poop (diarrhea), jitters, flu-like symptoms, nervousness and tremors. Dependence is not the same as addiction.                       Tolerance means needing higher doses over time to get the same effect. This may increase the chance of serious side effects.      Addiction is when people improperly use a substance that harms their body, their mind or their relations with others. Use of opiates can cause a relapse of addiction if you have a history of drug or alcohol abuse.      People who have used opioids for a long time may have a lower quality of life, worse depression, higher levels of pain and more visits to doctors.    You can overdose on opioids. Take these steps to lower your risk of overdose:    1. Recognize the signs:  Signs of overdose include decrease or loss of consciousness (blackout), slowed breathing, trouble waking up and blue lips. If someone is worried about overdose, they should call 911.    2. Talk to your doctor about Narcan (naloxone).   If you are at risk for overdose, you may be given a prescription for Narcan. This medicine very quickly reverses the effects of opioids.   If you overdose, a friend or family member can give you Narcan while waiting for the ambulance. They need to know the signs of overdose and how to give Narcan.     3. Don't use alcohol or street drugs.   Taking them with opioids can cause death.    4. Do not take any of these medicines unless your doctor says it s OK. Taking these with opioids can cause death:    Benzodiazepines, such as lorazepam (Ativan), alprazolam (Xanax) or diazepam (Valium)    Muscle relaxers, such as cyclobenzaprine (Flexeril)    Sleeping pills like zolpidem (Ambien)     Other opioids      How to keep you and other people safe while taking opioids:    1. Never share your opioids with others.  Opioid medicines are regulated by the Drug Enforcement Agency (JUSTO). Selling or  sharing medications is a criminal act.    2. Be sure to store opioids in a secure place, locked up if possible. Young children can easily swallow them and overdose.    3. When you are traveling with your medicines, keep them in the original bottles. If you use a pill box, be sure you also carry a copy of your medicine list from your clinic or pharmacy.    4. Safe disposal of opioids    Most pharmacies have places to get rid of medicine, called disposal kiosks. Medicine disposal options are also available in every [facility]. Search your county and  medication disposal  to find more options. You can find more details at:  [email or web address]-[email or web address]     I agree that my provider, clinic care team, and pharmacy may work with any city, state or federal law enforcement agency that investigates the misuse, sale, or other diversion of my controlled medicine. I will allow my provider to discuss my care with, or share a copy of, this agreement with any other treating provider, pharmacy or emergency room where I receive care.    I have read this agreement and have asked questions about anything I did not understand.    _______________________________________________________  Patient Signature - Carrillo Dunlap _____________________                   Date     _______________________________________________________  Provider Signature - ONEL Prieto CNP   _____________________                   Date     _______________________________________________________  Witness Signature (required if provider not present while patient signing)   _____________________                   Date

## 2022-02-14 NOTE — Clinical Note
Hey, this patient is looking for hypnosis for smoking cessation. Any ideas? He is open to try anything.  Thanks! Hope you had a nice weekend and enjoyed the half time show:)  Arelis.

## 2022-02-14 NOTE — TELEPHONE ENCOUNTER
Reason for Call:  Medication or medication refill:    Do you use a [facility]?  Name of the pharmacy and phone number for the current request:  [id number] - [address] AT [facility] [address]    Name of the medication requested: nicotine (NICOTROL) 10 MG inhaler    Other request: N/A    Can we leave a detailed message on this number? YES    Phone number patient can be reached at: Home number on file [phone] (home)    Best Time: Any    Call taken on 2/14/2022 at 3:05 PM by Apple Thompson

## 2022-02-15 NOTE — TELEPHONE ENCOUNTER
Prescription approved per [facility] Refill Protocol.    Kindra Tobias RN   [facility]

## 2022-03-08 DIAGNOSIS — F41.1 GAD (GENERALIZED ANXIETY DISORDER): ICD-10-CM

## 2022-03-08 RX ORDER — BUSPIRONE HYDROCHLORIDE 5 MG/1
10 TABLET ORAL 2 TIMES DAILY
Qty: 120 TABLET | Refills: 5 | Status: SHIPPED | OUTPATIENT
Start: 2022-03-08

## 2022-03-08 NOTE — TELEPHONE ENCOUNTER
"Requested Prescriptions   Signed Prescriptions Disp Refills    busPIRone (BUSPAR) 5 MG tablet 120 tablet 5     Sig: Take 2 tablets (10 mg) by mouth 2 times daily       Atypical Antidepressants Protocol Passed - 3/8/2022  2:11 PM        Passed - Recent (12 mo) or future (30 days) visit within the authorizing provider's specialty     Patient has had an office visit with the authorizing provider or a provider within the authorizing providers department within the previous 12 mos or has a future within next 30 days. See \"Patient Info\" tab in inbasket, or \"Choose Columns\" in Meds & Orders section of the refill encounter.              Passed - Medication active on med list        Passed - Patient is age 18 or older           Rena Yoder RN  [facility]    "

## 2022-03-08 NOTE — TELEPHONE ENCOUNTER
Reason for Call:  Medication or medication refill:    Do you use a [facility]?  Name of the pharmacy and phone number for the current request:  [id number] - [place], [place] - [address]    Name of the medication requested: busPIRone (BUSPAR) 5 MG tablet    Other request: N/A    Can we leave a detailed message on this number? YES    Phone number patient can be reached at: Home number on file [phone] (home)    Best Time: ANY    Call taken on 3/8/2022 at 2:11 PM by Apple Thompson

## 2022-04-14 ENCOUNTER — MYC REFILL (OUTPATIENT)
Dept: FAMILY MEDICINE | Facility: CLINIC | Age: 57
End: 2022-04-14
Payer: COMMERCIAL

## 2022-04-14 DIAGNOSIS — F90.0 ADHD (ATTENTION DEFICIT HYPERACTIVITY DISORDER), INATTENTIVE TYPE: ICD-10-CM

## 2022-04-14 DIAGNOSIS — Z71.6 ENCOUNTER FOR SMOKING CESSATION COUNSELING: ICD-10-CM

## 2022-04-15 RX ORDER — DEXTROAMPHETAMINE SACCHARATE, AMPHETAMINE ASPARTATE MONOHYDRATE, DEXTROAMPHETAMINE SULFATE AND AMPHETAMINE SULFATE 7.5; 7.5; 7.5; 7.5 MG/1; MG/1; MG/1; MG/1
30 CAPSULE, EXTENDED RELEASE ORAL DAILY
Qty: 30 CAPSULE | Refills: 0 | Status: SHIPPED | OUTPATIENT
Start: 2022-04-15 | End: 2022-05-12

## 2022-04-15 RX ORDER — DEXTROAMPHETAMINE SACCHARATE, AMPHETAMINE ASPARTATE MONOHYDRATE, DEXTROAMPHETAMINE SULFATE AND AMPHETAMINE SULFATE 5; 5; 5; 5 MG/1; MG/1; MG/1; MG/1
20 CAPSULE, EXTENDED RELEASE ORAL DAILY
Qty: 30 CAPSULE | Refills: 0 | Status: SHIPPED | OUTPATIENT
Start: 2022-04-15 | End: 2022-05-12

## 2022-04-15 NOTE — TELEPHONE ENCOUNTER
Requested Prescriptions   Pending Prescriptions Disp Refills     amphetamine-dextroamphetamine (ADDERALL XR) 20 MG 24 hr capsule 30 capsule 0     Sig: Take 1 capsule (20 mg) by mouth daily       There is no refill protocol information for this order        amphetamine-dextroamphetamine (ADDERALL XR) 30 MG 24 hr capsule 30 capsule 0     Sig: Take 1 capsule (30 mg) by mouth daily       There is no refill protocol information for this order        Routing refill request to provider for review/approval because:  Drug not on the [facility] refill protocol     Antonella Avila RN  [facility]

## 2022-04-15 NOTE — TELEPHONE ENCOUNTER
"Requested Prescriptions   Signed Prescriptions Disp Refills    nicotine (NICOTROL) 10 MG inhaler 168 each 0     Sig: Use 1 cartridge as needed for urge to smoke by puffing over course of 20min.  Use 6-16 cart/day; reduce number of cart/day over 6-12 weeks.       Partial Cholinergic Nicotinic Agonist Agents Passed - 4/14/2022  8:33 PM        Passed - Blood pressure under 140/90 in past 12 months     BP Readings from Last 3 Encounters:   08/03/21 110/64   03/04/20 120/72   10/15/19 110/72                 Passed - Recent (12 mo) or future (30 days) visit within the authorizing provider's specialty     Patient has had an office visit with the authorizing provider or a provider within the authorizing providers department within the previous 12 mos or has a future within next 30 days. See \"Patient Info\" tab in inbasket, or \"Choose Columns\" in Meds & Orders section of the refill encounter.              Passed - Medication is active on med list        Passed - Patient is 18 years of age or older           Antonella Avila RN  [facility]     "

## 2022-04-15 NOTE — TELEPHONE ENCOUNTER
Please verify if he is still in [place] and am I still his Primary Care Provider? He had planned to move...    Usually we give 30 day supply to hold people over until they have a new Primary Care Provider

## 2022-04-15 NOTE — TELEPHONE ENCOUNTER
Adali Dee is still in [place] and he stated you are still his provider    Med cued    FYI he hasnt moved and no plans in the near future  Maybe 2-4 months , but he will let you know    Kindra Tobias RN   [facility]

## 2022-05-12 ENCOUNTER — MYC REFILL (OUTPATIENT)
Dept: FAMILY MEDICINE | Facility: CLINIC | Age: 57
End: 2022-05-12
Payer: COMMERCIAL

## 2022-05-12 DIAGNOSIS — F90.0 ADHD (ATTENTION DEFICIT HYPERACTIVITY DISORDER), INATTENTIVE TYPE: ICD-10-CM

## 2022-05-13 NOTE — TELEPHONE ENCOUNTER
Routing refill request to provider for review/approval because:  Drug not on the [facility] refill protocol   Edna MONROE RN

## 2022-05-18 RX ORDER — DEXTROAMPHETAMINE SACCHARATE, AMPHETAMINE ASPARTATE MONOHYDRATE, DEXTROAMPHETAMINE SULFATE AND AMPHETAMINE SULFATE 5; 5; 5; 5 MG/1; MG/1; MG/1; MG/1
20 CAPSULE, EXTENDED RELEASE ORAL DAILY
Qty: 30 CAPSULE | Refills: 0 | Status: SHIPPED | OUTPATIENT
Start: 2022-05-18 | End: 2022-06-13

## 2022-05-18 RX ORDER — DEXTROAMPHETAMINE SACCHARATE, AMPHETAMINE ASPARTATE MONOHYDRATE, DEXTROAMPHETAMINE SULFATE AND AMPHETAMINE SULFATE 7.5; 7.5; 7.5; 7.5 MG/1; MG/1; MG/1; MG/1
30 CAPSULE, EXTENDED RELEASE ORAL DAILY
Qty: 30 CAPSULE | Refills: 0 | Status: SHIPPED | OUTPATIENT
Start: 2022-05-18 | End: 2022-06-13

## 2022-06-01 NOTE — TELEPHONE ENCOUNTER
Monica Spears- you saw him in Oct; can you take this one?  Thanks New   
Requested Prescriptions   Pending Prescriptions Disp Refills     amphetamine-dextroamphetamine (ADDERALL XR) 20 MG 24 hr capsule 30 capsule 0     Sig: Take 1 capsule (20 mg) by mouth every morning       There is no refill protocol information for this order        amphetamine-dextroamphetamine (ADDERALL XR) 30 MG 24 hr capsule 30 capsule 0     Sig: Take 1 capsule (30 mg) by mouth daily       There is no refill protocol information for this order        Routing refill request to provider for review/approval because:  Drug not on the [facility] refill protocol   [place]  reviewed 2/2/2021 for both 20 & 30 mg  Last refill: 1/2/21  Disp: 30  Concerns: none          
3

## 2022-06-08 ENCOUNTER — ANCILLARY PROCEDURE (OUTPATIENT)
Dept: CT IMAGING | Facility: CLINIC | Age: 57
End: 2022-06-08
Attending: NURSE PRACTITIONER
Payer: COMMERCIAL

## 2022-06-08 ENCOUNTER — IMMUNIZATION (OUTPATIENT)
Dept: NURSING | Facility: CLINIC | Age: 57
End: 2022-06-08

## 2022-06-08 DIAGNOSIS — R91.8 ABNORMAL CT LUNG SCREENING: ICD-10-CM

## 2022-06-08 PROCEDURE — 0054A COVID-19,PF,PFIZER (12+ YRS): CPT

## 2022-06-08 PROCEDURE — 71250 CT THORAX DX C-: CPT | Performed by: RADIOLOGY

## 2022-06-08 PROCEDURE — 91305 COVID-19,PF,PFIZER (12+ YRS): CPT

## 2022-06-13 ENCOUNTER — MYC REFILL (OUTPATIENT)
Dept: FAMILY MEDICINE | Facility: CLINIC | Age: 57
End: 2022-06-13
Payer: COMMERCIAL

## 2022-06-13 DIAGNOSIS — F90.0 ADHD (ATTENTION DEFICIT HYPERACTIVITY DISORDER), INATTENTIVE TYPE: ICD-10-CM

## 2022-06-13 RX ORDER — DEXTROAMPHETAMINE SACCHARATE, AMPHETAMINE ASPARTATE MONOHYDRATE, DEXTROAMPHETAMINE SULFATE AND AMPHETAMINE SULFATE 5; 5; 5; 5 MG/1; MG/1; MG/1; MG/1
20 CAPSULE, EXTENDED RELEASE ORAL DAILY
Qty: 30 CAPSULE | Refills: 0 | Status: SHIPPED | OUTPATIENT
Start: 2022-06-13 | End: 2022-07-19

## 2022-06-13 RX ORDER — DEXTROAMPHETAMINE SACCHARATE, AMPHETAMINE ASPARTATE MONOHYDRATE, DEXTROAMPHETAMINE SULFATE AND AMPHETAMINE SULFATE 7.5; 7.5; 7.5; 7.5 MG/1; MG/1; MG/1; MG/1
30 CAPSULE, EXTENDED RELEASE ORAL DAILY
Qty: 30 CAPSULE | Refills: 0 | Status: SHIPPED | OUTPATIENT
Start: 2022-06-13 | End: 2022-07-19

## 2022-07-19 ENCOUNTER — MYC REFILL (OUTPATIENT)
Dept: FAMILY MEDICINE | Facility: CLINIC | Age: 57
End: 2022-07-19

## 2022-07-19 DIAGNOSIS — F90.0 ADHD (ATTENTION DEFICIT HYPERACTIVITY DISORDER), INATTENTIVE TYPE: ICD-10-CM

## 2022-07-19 RX ORDER — DEXTROAMPHETAMINE SACCHARATE, AMPHETAMINE ASPARTATE MONOHYDRATE, DEXTROAMPHETAMINE SULFATE AND AMPHETAMINE SULFATE 5; 5; 5; 5 MG/1; MG/1; MG/1; MG/1
20 CAPSULE, EXTENDED RELEASE ORAL DAILY
Qty: 30 CAPSULE | Refills: 0 | Status: SHIPPED | OUTPATIENT
Start: 2022-07-19

## 2022-07-19 RX ORDER — DEXTROAMPHETAMINE SACCHARATE, AMPHETAMINE ASPARTATE MONOHYDRATE, DEXTROAMPHETAMINE SULFATE AND AMPHETAMINE SULFATE 7.5; 7.5; 7.5; 7.5 MG/1; MG/1; MG/1; MG/1
30 CAPSULE, EXTENDED RELEASE ORAL DAILY
Qty: 30 CAPSULE | Refills: 0 | Status: SHIPPED | OUTPATIENT
Start: 2022-07-19

## 2022-07-19 NOTE — TELEPHONE ENCOUNTER
Please have pt check in with virtual for further refills to check in on how he's doing    Thanks,   Adali SYKES CNP

## 2022-07-19 NOTE — TELEPHONE ENCOUNTER
Routing refill request to provider for review/approval because:  Drug not on the [facility] refill protocol refill request for adderall    Rose Hernández RN  [facility]

## 2022-07-27 ENCOUNTER — TELEPHONE (OUTPATIENT)
Dept: FAMILY MEDICINE | Facility: CLINIC | Age: 57
End: 2022-07-27

## 2022-07-27 DIAGNOSIS — Z13.220 LIPID SCREENING: Primary | ICD-10-CM

## 2022-07-27 NOTE — TELEPHONE ENCOUNTER
Cholesterol order placed.  Not sure what else would need to be ordered after review of chart.  Has upcoming visit with PCP and labs can be done at that time as well.    Jules Pablo MD

## 2022-07-27 NOTE — TELEPHONE ENCOUNTER
Lab requesting cholesterol orders for placed for patient - has appt for labs Friday 7/29    Labs pended for review/approval    Rose Hernández RN  [facility]

## 2022-07-29 ENCOUNTER — LAB (OUTPATIENT)
Dept: LAB | Facility: CLINIC | Age: 57
End: 2022-07-29
Payer: COMMERCIAL

## 2022-07-29 DIAGNOSIS — Z13.220 LIPID SCREENING: ICD-10-CM

## 2022-07-29 LAB
CHOLEST SERPL-MCNC: 177 MG/DL
FASTING STATUS PATIENT QL REPORTED: YES
HDLC SERPL-MCNC: 53 MG/DL
LDLC SERPL CALC-MCNC: 111 MG/DL
NONHDLC SERPL-MCNC: 124 MG/DL
TRIGL SERPL-MCNC: 63 MG/DL

## 2022-07-29 PROCEDURE — 36415 COLL VENOUS BLD VENIPUNCTURE: CPT

## 2022-07-29 PROCEDURE — 80061 LIPID PANEL: CPT

## 2022-08-19 ENCOUNTER — VIRTUAL VISIT (OUTPATIENT)
Dept: FAMILY MEDICINE | Facility: CLINIC | Age: 57
End: 2022-08-19
Payer: COMMERCIAL

## 2022-08-19 DIAGNOSIS — F34.1: ICD-10-CM

## 2022-08-19 DIAGNOSIS — F90.0 ADHD (ATTENTION DEFICIT HYPERACTIVITY DISORDER), INATTENTIVE TYPE: Primary | ICD-10-CM

## 2022-08-19 DIAGNOSIS — F41.1 GAD (GENERALIZED ANXIETY DISORDER): ICD-10-CM

## 2022-08-19 PROCEDURE — 99213 OFFICE O/P EST LOW 20 MIN: CPT | Mod: 95 | Performed by: NURSE PRACTITIONER

## 2022-08-19 RX ORDER — DEXTROAMPHETAMINE SACCHARATE, AMPHETAMINE ASPARTATE MONOHYDRATE, DEXTROAMPHETAMINE SULFATE AND AMPHETAMINE SULFATE 7.5; 7.5; 7.5; 7.5 MG/1; MG/1; MG/1; MG/1
30 CAPSULE, EXTENDED RELEASE ORAL DAILY
Qty: 30 CAPSULE | Refills: 0 | Status: SHIPPED | OUTPATIENT
Start: 2022-08-19 | End: 2022-09-18

## 2022-08-19 RX ORDER — BUPROPION HYDROCHLORIDE 300 MG/1
300 TABLET ORAL EVERY MORNING
Qty: 90 TABLET | Refills: 1 | Status: SHIPPED | OUTPATIENT
Start: 2022-08-19

## 2022-08-19 RX ORDER — DEXTROAMPHETAMINE SACCHARATE, AMPHETAMINE ASPARTATE MONOHYDRATE, DEXTROAMPHETAMINE SULFATE AND AMPHETAMINE SULFATE 7.5; 7.5; 7.5; 7.5 MG/1; MG/1; MG/1; MG/1
30 CAPSULE, EXTENDED RELEASE ORAL DAILY
Qty: 30 CAPSULE | Refills: 0 | Status: SHIPPED | OUTPATIENT
Start: 2022-09-19 | End: 2022-10-19

## 2022-08-19 RX ORDER — DEXTROAMPHETAMINE SACCHARATE, AMPHETAMINE ASPARTATE MONOHYDRATE, DEXTROAMPHETAMINE SULFATE AND AMPHETAMINE SULFATE 7.5; 7.5; 7.5; 7.5 MG/1; MG/1; MG/1; MG/1
30 CAPSULE, EXTENDED RELEASE ORAL DAILY
Qty: 30 CAPSULE | Refills: 0 | Status: SHIPPED | OUTPATIENT
Start: 2022-10-20 | End: 2022-11-19

## 2022-08-19 ASSESSMENT — ANXIETY QUESTIONNAIRES
7. FEELING AFRAID AS IF SOMETHING AWFUL MIGHT HAPPEN: NOT AT ALL
6. BECOMING EASILY ANNOYED OR IRRITABLE: NOT AT ALL
IF YOU CHECKED OFF ANY PROBLEMS ON THIS QUESTIONNAIRE, HOW DIFFICULT HAVE THESE PROBLEMS MADE IT FOR YOU TO DO YOUR WORK, TAKE CARE OF THINGS AT HOME, OR GET ALONG WITH OTHER PEOPLE: NOT DIFFICULT AT ALL
1. FEELING NERVOUS, ANXIOUS, OR ON EDGE: NOT AT ALL
3. WORRYING TOO MUCH ABOUT DIFFERENT THINGS: NOT AT ALL
GAD7 TOTAL SCORE: 0
2. NOT BEING ABLE TO STOP OR CONTROL WORRYING: NOT AT ALL
5. BEING SO RESTLESS THAT IT IS HARD TO SIT STILL: NOT AT ALL
GAD7 TOTAL SCORE: 0

## 2022-08-19 ASSESSMENT — PATIENT HEALTH QUESTIONNAIRE - PHQ9
SUM OF ALL RESPONSES TO PHQ QUESTIONS 1-9: 2
5. POOR APPETITE OR OVEREATING: NOT AT ALL

## 2022-08-19 NOTE — PROGRESS NOTES
Nirmal is a 57 year old who is being evaluated via a billable telephone visit.      What phone number would you like to be contacted at? [phone]  How would you like to obtain your AVS? Yanethhart    Assessment & Plan       ICD-10-CM    1. ADHD (attention deficit hyperactivity disorder), inattentive type  F90.0 amphetamine-dextroamphetamine (ADDERALL XR) 30 MG 24 hr capsule     amphetamine-dextroamphetamine (ADDERALL XR) 30 MG 24 hr capsule     amphetamine-dextroamphetamine (ADDERALL XR) 30 MG 24 hr capsule   2. UGO (generalized anxiety disorder)  F41.1    3. Moderate late onset persistent depressive disorder in partial remission with anxious distress and intermittent major depressive episodes without current episode  F34.1 buPROPion (WELLBUTRIN XL) 300 MG 24 hr tablet    continue current dosages, doing well  Get counseling scheduled again to deal with stressors and ADHD  Likely new Primary Care Provider in [place] when move is complete             Regular exercise  See Patient Instructions    No follow-ups on file.    ONEL Uribe [facility]    Felisha Kinney is a 57 year old, presenting for the following health issues:  Follow Up (ADHD Follow up/)      HPI     Quit smoking in 2/2022 and wants chart updated  Also cut down on alcohol and weed use too   Travelling from [place] to [place] a lot, work good, didn't move yet not sure of plan    Doses great, Psych MTM was okay with continuing and no side effects of the combo of buspar, buproprion and adderall    Past counseling, was too hectic to continue for a while, now could do again    Too many cookies, gained 10 lb but feels well          Review of Systems   Constitutional, HEENT, cardiovascular, pulmonary, GI, , musculoskeletal, neuro, skin, endocrine and psych systems are negative, except as otherwise noted.      Objective           Vitals:  No vitals were obtained today due to virtual visit.    Physical Exam   healthy, alert and no  distress  PSYCH: Alert and oriented times 3; coherent speech, normal   rate and volume, able to articulate logical thoughts, able   to abstract reason, no tangential thoughts, no hallucinations   or delusions  His affect is normal  RESP: No cough, no audible wheezing, able to talk in full sentences  Remainder of exam unable to be completed due to telephone visits    Lab on 07/29/2022   Component Date Value Ref Range Status     Cholesterol 07/29/2022 177  <200 mg/dL Final     Triglycerides 07/29/2022 63  <150 mg/dL Final     Direct Measure HDL 07/29/2022 53  >=40 mg/dL Final     LDL Cholesterol Calculated 07/29/2022 111 (A) <=100 mg/dL Final     Non HDL Cholesterol 07/29/2022 124  <130 mg/dL Final     Patient Fasting > 8hrs? 07/29/2022 Yes   Final               Phone call duration: 17 minutes    .  ..

## 2022-10-19 ENCOUNTER — MYC REFILL (OUTPATIENT)
Dept: FAMILY MEDICINE | Facility: CLINIC | Age: 57
End: 2022-10-19

## 2022-10-19 DIAGNOSIS — F90.0 ADHD (ATTENTION DEFICIT HYPERACTIVITY DISORDER), INATTENTIVE TYPE: ICD-10-CM

## 2022-10-19 RX ORDER — DEXTROAMPHETAMINE SACCHARATE, AMPHETAMINE ASPARTATE MONOHYDRATE, DEXTROAMPHETAMINE SULFATE AND AMPHETAMINE SULFATE 5; 5; 5; 5 MG/1; MG/1; MG/1; MG/1
20 CAPSULE, EXTENDED RELEASE ORAL DAILY
Qty: 30 CAPSULE | Refills: 0 | Status: SHIPPED | OUTPATIENT
Start: 2022-10-19

## 2022-12-12 ENCOUNTER — TELEPHONE (OUTPATIENT)
Dept: FAMILY MEDICINE | Facility: CLINIC | Age: 57
End: 2022-12-12

## 2022-12-12 NOTE — TELEPHONE ENCOUNTER
----- Message from Nitin Adler NP sent at 12/12/2022  4:02 PM CST -----  Hello,  I got some refill requests for Adderall for this patient; it is not clear to me if he has moved yet to [place], or if this is a permanent move--but if he is moving to there, then he should establish care in [place]; the virtual visit provider also recommends he establish there. I did fill a one-time prescription of Adderall there, but I advised him that I want to keep the pharmacy in [place], for ease of monitoring.  If he is hoping to continue to get refills with me, then he should establish care with me (but he would need to be present in [place]). It looks like the virtual provider in [place] is advising the same thing. Could you just let him know?   Thanks,  Nitin

## 2022-12-12 NOTE — TELEPHONE ENCOUNTER
Spoke to patient who stated he is in middle of move but spending more time in [place] and has been trying to get set up with a primary provider for future refills but hasn't had any luck yet.. Saying he will be in [place] the next few days but then will be back in [place]. Pt stated he understands the need to have an established provider for future refills, stating he can go without if need be while establishing care.      Thanks!  Carrillo Joseph RN   [facility]

## 2023-01-07 ENCOUNTER — HEALTH MAINTENANCE LETTER (OUTPATIENT)
Age: 58
End: 2023-01-07

## 2023-02-09 ENCOUNTER — OFFICE VISIT (OUTPATIENT)
Dept: FAMILY MEDICINE | Age: 58
End: 2023-02-09

## 2023-02-09 VITALS
DIASTOLIC BLOOD PRESSURE: 72 MMHG | HEIGHT: 68 IN | SYSTOLIC BLOOD PRESSURE: 122 MMHG | TEMPERATURE: 97.2 F | BODY MASS INDEX: 27.66 KG/M2 | WEIGHT: 182.5 LBS | OXYGEN SATURATION: 100 % | HEART RATE: 93 BPM

## 2023-02-09 DIAGNOSIS — F33.0 MILD RECURRENT MAJOR DEPRESSION (CMD): ICD-10-CM

## 2023-02-09 DIAGNOSIS — Z12.12 SCREENING FOR COLORECTAL CANCER: ICD-10-CM

## 2023-02-09 DIAGNOSIS — Z12.11 SCREENING FOR COLORECTAL CANCER: ICD-10-CM

## 2023-02-09 DIAGNOSIS — F41.1 GENERALIZED ANXIETY DISORDER: ICD-10-CM

## 2023-02-09 DIAGNOSIS — E78.5 HYPERLIPIDEMIA, UNSPECIFIED HYPERLIPIDEMIA TYPE: ICD-10-CM

## 2023-02-09 DIAGNOSIS — F90.0 ATTENTION DEFICIT HYPERACTIVITY DISORDER (ADHD), PREDOMINANTLY INATTENTIVE TYPE: Primary | ICD-10-CM

## 2023-02-09 PROCEDURE — 99204 OFFICE O/P NEW MOD 45 MIN: CPT | Performed by: STUDENT IN AN ORGANIZED HEALTH CARE EDUCATION/TRAINING PROGRAM

## 2023-02-09 RX ORDER — BUSPIRONE HYDROCHLORIDE 5 MG/1
5 TABLET ORAL 2 TIMES DAILY
COMMUNITY
End: 2023-04-18 | Stop reason: SDUPTHER

## 2023-02-09 RX ORDER — BUPROPION HYDROCHLORIDE 300 MG/1
300 TABLET ORAL DAILY
COMMUNITY
End: 2023-05-11 | Stop reason: SDUPTHER

## 2023-02-09 RX ORDER — DEXTROAMPHETAMINE SACCHARATE, AMPHETAMINE ASPARTATE MONOHYDRATE, DEXTROAMPHETAMINE SULFATE AND AMPHETAMINE SULFATE 5; 5; 5; 5 MG/1; MG/1; MG/1; MG/1
CAPSULE, EXTENDED RELEASE ORAL
Qty: 60 CAPSULE | Refills: 0 | Status: SHIPPED | OUTPATIENT
Start: 2023-02-09 | End: 2023-03-17 | Stop reason: SDUPTHER

## 2023-02-09 RX ORDER — DEXTROAMPHETAMINE SACCHARATE, AMPHETAMINE ASPARTATE MONOHYDRATE, DEXTROAMPHETAMINE SULFATE AND AMPHETAMINE SULFATE 5; 5; 5; 5 MG/1; MG/1; MG/1; MG/1
20 CAPSULE, EXTENDED RELEASE ORAL DAILY
COMMUNITY
End: 2023-02-09 | Stop reason: DRUGHIGH

## 2023-02-09 RX ORDER — DEXTROAMPHETAMINE SACCHARATE, AMPHETAMINE ASPARTATE MONOHYDRATE, DEXTROAMPHETAMINE SULFATE AND AMPHETAMINE SULFATE 7.5; 7.5; 7.5; 7.5 MG/1; MG/1; MG/1; MG/1
30 CAPSULE, EXTENDED RELEASE ORAL DAILY
COMMUNITY
End: 2023-02-09 | Stop reason: DRUGHIGH

## 2023-02-09 RX ORDER — FLUOXETINE HYDROCHLORIDE 20 MG/1
20 CAPSULE ORAL DAILY
Qty: 90 CAPSULE | Refills: 3 | Status: SHIPPED | OUTPATIENT
Start: 2023-02-09

## 2023-02-09 ASSESSMENT — ANXIETY QUESTIONNAIRES
2. NOT BEING ABLE TO STOP OR CONTROL WORRYING: NEARLY EVERY DAY
4. TROUBLE RELAXING: 3
GAD7 TOTAL SCORE: 17
7. FEELING AFRAID AS IF SOMETHING AWFUL MIGHT HAPPEN: NOT AT ALL
5. BEING SO RESTLESS THAT IT IS HARD TO SIT STILL: MORE THAN HALF THE DAYS
3. WORRYING TOO MUCH ABOUT DIFFERENT THINGS: 3
2. NOT BEING ABLE TO STOP OR CONTROL WORRYING: 3
7. FEELING AFRAID AS IF SOMETHING AWFUL MIGHT HAPPEN: 0
IF YOU CHECKED OFF ANY PROBLEMS ON THIS QUESTIONNAIRE, HOW DIFFICULT HAVE THESE PROBLEMS MADE IT FOR YOU TO DO YOUR WORK, TAKE CARE OF THINGS AT HOME, OR GET ALONG WITH OTHER PEOPLE: VERY DIFFICULT
5. BEING SO RESTLESS THAT IT IS HARD TO SIT STILL: 2
1. FEELING NERVOUS, ANXIOUS, OR ON EDGE: NEARLY EVERY DAY
1. FEELING NERVOUS, ANXIOUS, OR ON EDGE: 3
6. BECOMING EASILY ANNOYED OR IRRITABLE: NEARLY EVERY DAY
6. BECOMING EASILY ANNOYED OR IRRITABLE: 3
4. TROUBLE RELAXING: NEARLY EVERY DAY
3. WORRYING TOO MUCH ABOUT DIFFERENT THINGS: NEARLY EVERY DAY

## 2023-02-09 ASSESSMENT — PATIENT HEALTH QUESTIONNAIRE - PHQ9
8. MOVING OR SPEAKING SO SLOWLY THAT OTHER PEOPLE COULD HAVE NOTICED. OR THE OPPOSITE, BEING SO FIGETY OR RESTLESS THAT YOU HAVE BEEN MOVING AROUND A LOT MORE THAN USUAL: NOT AT ALL
SUM OF ALL RESPONSES TO PHQ9 QUESTIONS 1 AND 2: 3
2. FEELING DOWN, DEPRESSED OR HOPELESS: SEVERAL DAYS
SUM OF ALL RESPONSES TO PHQ QUESTIONS 1-9: 14
CLINICAL INTERPRETATION OF PHQ9 SCORE: MODERATE DEPRESSION
CLINICAL INTERPRETATION OF PHQ2 SCORE: FURTHER SCREENING NEEDED
SUM OF ALL RESPONSES TO PHQ9 QUESTIONS 1 AND 2: 3
4. FEELING TIRED OR HAVING LITTLE ENERGY: NEARLY EVERY DAY
1. LITTLE INTEREST OR PLEASURE IN DOING THINGS: MORE THAN HALF THE DAYS
6. FEELING BAD ABOUT YOURSELF - OR THAT YOU ARE A FAILURE OR HAVE LET YOURSELF OR YOUR FAMILY DOWN: MORE THAN HALF THE DAYS
9. THOUGHTS THAT YOU WOULD BE BETTER OFF DEAD, OR OF HURTING YOURSELF: NOT AT ALL
10. IF YOU CHECKED OFF ANY PROBLEMS, HOW DIFFICULT HAVE THESE PROBLEMS MADE IT FOR YOU TO DO YOUR WORK, TAKE CARE OF THINGS AT HOME, OR GET ALONG WITH OTHER PEOPLE: EXTREMELY DIFFICULT
5. POOR APPETITE, WEIGHT LOSS, OR OVEREATING: MORE THAN HALF THE DAYS
7. TROUBLE CONCENTRATING ON THINGS, SUCH AS READING THE NEWSPAPER OR WATCHING TELEVISION: NEARLY EVERY DAY
3. TROUBLE FALLING OR STAYING ASLEEP OR SLEEPING TOO MUCH: SEVERAL DAYS

## 2023-03-14 ENCOUNTER — E-ADVICE (OUTPATIENT)
Dept: FAMILY MEDICINE | Age: 58
End: 2023-03-14

## 2023-03-14 DIAGNOSIS — F90.0 ATTENTION DEFICIT HYPERACTIVITY DISORDER (ADHD), PREDOMINANTLY INATTENTIVE TYPE: ICD-10-CM

## 2023-03-14 DIAGNOSIS — F90.0 ATTENTION DEFICIT HYPERACTIVITY DISORDER (ADHD), PREDOMINANTLY INATTENTIVE TYPE: Primary | ICD-10-CM

## 2023-03-14 DIAGNOSIS — E61.1 LOW IRON: ICD-10-CM

## 2023-03-14 RX ORDER — DEXTROAMPHETAMINE SACCHARATE, AMPHETAMINE ASPARTATE MONOHYDRATE, DEXTROAMPHETAMINE SULFATE AND AMPHETAMINE SULFATE 5; 5; 5; 5 MG/1; MG/1; MG/1; MG/1
CAPSULE, EXTENDED RELEASE ORAL
Qty: 60 CAPSULE | Refills: 0 | Status: CANCELLED | OUTPATIENT
Start: 2023-03-14

## 2023-03-15 ENCOUNTER — TELEPHONE (OUTPATIENT)
Dept: GASTROENTEROLOGY | Age: 58
End: 2023-03-15

## 2023-03-15 DIAGNOSIS — F33.0 MILD RECURRENT MAJOR DEPRESSION (CMD): Primary | ICD-10-CM

## 2023-03-15 DIAGNOSIS — E78.5 HYPERLIPIDEMIA, UNSPECIFIED HYPERLIPIDEMIA TYPE: ICD-10-CM

## 2023-03-15 DIAGNOSIS — Z12.5 PROSTATE CANCER SCREENING: ICD-10-CM

## 2023-03-17 PROBLEM — Z98.84 HX OF GASTRIC BYPASS: Status: ACTIVE | Noted: 2023-01-04

## 2023-03-17 PROBLEM — I70.0 AORTIC ATHEROSCLEROSIS (CMD): Status: ACTIVE | Noted: 2023-01-04

## 2023-03-17 PROBLEM — R91.8 PULMONARY NODULES: Status: ACTIVE | Noted: 2023-01-04

## 2023-03-17 RX ORDER — DEXTROAMPHETAMINE SACCHARATE, AMPHETAMINE ASPARTATE MONOHYDRATE, DEXTROAMPHETAMINE SULFATE AND AMPHETAMINE SULFATE 5; 5; 5; 5 MG/1; MG/1; MG/1; MG/1
CAPSULE, EXTENDED RELEASE ORAL
Qty: 60 CAPSULE | Refills: 0 | Status: SHIPPED | OUTPATIENT
Start: 2023-03-17 | End: 2023-04-18 | Stop reason: SDUPTHER

## 2023-04-05 ENCOUNTER — LAB SERVICES (OUTPATIENT)
Dept: LAB | Age: 58
End: 2023-04-05

## 2023-04-05 DIAGNOSIS — E61.1 LOW IRON: ICD-10-CM

## 2023-04-05 DIAGNOSIS — R79.0 LOW FERRITIN LEVEL: ICD-10-CM

## 2023-04-05 DIAGNOSIS — E78.5 HYPERLIPIDEMIA, UNSPECIFIED HYPERLIPIDEMIA TYPE: ICD-10-CM

## 2023-04-05 DIAGNOSIS — Z12.5 PROSTATE CANCER SCREENING: ICD-10-CM

## 2023-04-05 DIAGNOSIS — F33.0 MILD RECURRENT MAJOR DEPRESSION (CMD): ICD-10-CM

## 2023-04-05 LAB
ALBUMIN SERPL-MCNC: 3.6 G/DL (ref 3.6–5.1)
ALBUMIN/GLOB SERPL: 1.1 {RATIO} (ref 1–2.4)
ALP SERPL-CCNC: 103 UNITS/L (ref 45–117)
ALT SERPL-CCNC: 60 UNITS/L
ANION GAP SERPL CALC-SCNC: 16 MMOL/L (ref 7–19)
AST SERPL-CCNC: 37 UNITS/L
BILIRUB SERPL-MCNC: 0.8 MG/DL (ref 0.2–1)
BUN SERPL-MCNC: 14 MG/DL (ref 6–20)
BUN/CREAT SERPL: 16 (ref 7–25)
CALCIUM SERPL-MCNC: 8.8 MG/DL (ref 8.4–10.2)
CHLORIDE SERPL-SCNC: 103 MMOL/L (ref 97–110)
CHOLEST SERPL-MCNC: 141 MG/DL
CHOLEST/HDLC SERPL: 2.6 {RATIO}
CO2 SERPL-SCNC: 26 MMOL/L (ref 21–32)
CREAT SERPL-MCNC: 0.9 MG/DL (ref 0.67–1.17)
DEPRECATED RDW RBC: 45.7 FL (ref 39–50)
ERYTHROCYTE [DISTWIDTH] IN BLOOD: 13.3 % (ref 11–15)
FASTING DURATION TIME PATIENT: 10 HOURS (ref 0–999)
FASTING DURATION TIME PATIENT: 10 HOURS (ref 0–999)
FERRITIN SERPL-MCNC: 17 NG/ML (ref 26–388)
GFR SERPLBLD BASED ON 1.73 SQ M-ARVRAT: >90 ML/MIN
GLOBULIN SER-MCNC: 3.3 G/DL (ref 2–4)
GLUCOSE SERPL-MCNC: 106 MG/DL (ref 70–99)
HCT VFR BLD CALC: 40.1 % (ref 39–51)
HDLC SERPL-MCNC: 55 MG/DL
HGB BLD-MCNC: 13.4 G/DL (ref 13–17)
LDLC SERPL CALC-MCNC: 74 MG/DL
MCH RBC QN AUTO: 30.8 PG (ref 26–34)
MCHC RBC AUTO-ENTMCNC: 33.4 G/DL (ref 32–36.5)
MCV RBC AUTO: 92.2 FL (ref 78–100)
NONHDLC SERPL-MCNC: 86 MG/DL
PLATELET # BLD AUTO: 305 K/MCL (ref 140–450)
POTASSIUM SERPL-SCNC: 3.6 MMOL/L (ref 3.4–5.1)
PROT SERPL-MCNC: 6.9 G/DL (ref 6.4–8.2)
RBC # BLD: 4.35 MIL/MCL (ref 4.5–5.9)
SODIUM SERPL-SCNC: 141 MMOL/L (ref 135–145)
TRIGL SERPL-MCNC: 61 MG/DL
TSH SERPL-ACNC: 1.78 MCUNITS/ML (ref 0.35–5)
WBC # BLD: 7.7 K/MCL (ref 4.2–11)

## 2023-04-05 PROCEDURE — 82728 ASSAY OF FERRITIN: CPT | Performed by: INTERNAL MEDICINE

## 2023-04-05 PROCEDURE — 83540 ASSAY OF IRON: CPT | Performed by: INTERNAL MEDICINE

## 2023-04-05 PROCEDURE — 36415 COLL VENOUS BLD VENIPUNCTURE: CPT | Performed by: INTERNAL MEDICINE

## 2023-04-05 PROCEDURE — 80050 GENERAL HEALTH PANEL: CPT | Performed by: INTERNAL MEDICINE

## 2023-04-05 PROCEDURE — 84153 ASSAY OF PSA TOTAL: CPT | Performed by: INTERNAL MEDICINE

## 2023-04-05 PROCEDURE — 82306 VITAMIN D 25 HYDROXY: CPT | Performed by: INTERNAL MEDICINE

## 2023-04-05 PROCEDURE — 83550 IRON BINDING TEST: CPT | Performed by: INTERNAL MEDICINE

## 2023-04-05 PROCEDURE — 80061 LIPID PANEL: CPT | Performed by: INTERNAL MEDICINE

## 2023-04-06 LAB
25(OH)D3+25(OH)D2 SERPL-MCNC: 33.3 NG/ML (ref 30–100)
PSA SERPL-MCNC: 1.51 NG/ML

## 2023-04-10 ENCOUNTER — OFFICE VISIT (OUTPATIENT)
Dept: FAMILY MEDICINE | Age: 58
End: 2023-04-10

## 2023-04-10 VITALS
HEIGHT: 68 IN | TEMPERATURE: 95 F | DIASTOLIC BLOOD PRESSURE: 72 MMHG | SYSTOLIC BLOOD PRESSURE: 102 MMHG | WEIGHT: 187.17 LBS | BODY MASS INDEX: 28.37 KG/M2 | OXYGEN SATURATION: 97 % | HEART RATE: 84 BPM

## 2023-04-10 DIAGNOSIS — Z12.11 SCREENING FOR COLORECTAL CANCER: ICD-10-CM

## 2023-04-10 DIAGNOSIS — Z12.12 SCREENING FOR COLORECTAL CANCER: ICD-10-CM

## 2023-04-10 DIAGNOSIS — F41.1 GENERALIZED ANXIETY DISORDER: ICD-10-CM

## 2023-04-10 DIAGNOSIS — R79.0 LOW FERRITIN LEVEL: ICD-10-CM

## 2023-04-10 DIAGNOSIS — F90.0 ATTENTION DEFICIT HYPERACTIVITY DISORDER (ADHD), PREDOMINANTLY INATTENTIVE TYPE: Primary | ICD-10-CM

## 2023-04-10 DIAGNOSIS — E78.5 HYPERLIPIDEMIA, UNSPECIFIED HYPERLIPIDEMIA TYPE: ICD-10-CM

## 2023-04-10 DIAGNOSIS — F33.0 MILD RECURRENT MAJOR DEPRESSION (CMD): ICD-10-CM

## 2023-04-10 LAB
IRON SATN MFR SERPL: 35 % (ref 15–45)
IRON SERPL-MCNC: 145 MCG/DL (ref 65–175)
TIBC SERPL-MCNC: 413 MCG/DL (ref 250–450)

## 2023-04-10 PROCEDURE — 99214 OFFICE O/P EST MOD 30 MIN: CPT | Performed by: STUDENT IN AN ORGANIZED HEALTH CARE EDUCATION/TRAINING PROGRAM

## 2023-04-10 RX ORDER — MULTIVIT WITH MINERALS/LUTEIN
1000 TABLET ORAL DAILY
Status: ON HOLD | COMMUNITY
End: 2023-09-01 | Stop reason: HOSPADM

## 2023-04-10 RX ORDER — PNV NO.95/FERROUS FUM/FOLIC AC 28MG-0.8MG
3000 TABLET ORAL
Status: ON HOLD | COMMUNITY
End: 2023-09-01 | Stop reason: HOSPADM

## 2023-04-10 RX ORDER — UBIDECARENONE 75 MG
50 CAPSULE ORAL DAILY
Status: ON HOLD | COMMUNITY
End: 2023-09-01 | Stop reason: HOSPADM

## 2023-04-10 ASSESSMENT — PATIENT HEALTH QUESTIONNAIRE - PHQ9
CLINICAL INTERPRETATION OF PHQ2 SCORE: NO FURTHER SCREENING NEEDED
SUM OF ALL RESPONSES TO PHQ QUESTIONS 1-9: 7
SUM OF ALL RESPONSES TO PHQ9 QUESTIONS 1 AND 2: 1
5. POOR APPETITE, WEIGHT LOSS, OR OVEREATING: SEVERAL DAYS
2. FEELING DOWN, DEPRESSED OR HOPELESS: NOT AT ALL
4. FEELING TIRED OR HAVING LITTLE ENERGY: SEVERAL DAYS
10. IF YOU CHECKED OFF ANY PROBLEMS, HOW DIFFICULT HAVE THESE PROBLEMS MADE IT FOR YOU TO DO YOUR WORK, TAKE CARE OF THINGS AT HOME, OR GET ALONG WITH OTHER PEOPLE: SOMEWHAT DIFFICULT
7. TROUBLE CONCENTRATING ON THINGS, SUCH AS READING THE NEWSPAPER OR WATCHING TELEVISION: SEVERAL DAYS
SUM OF ALL RESPONSES TO PHQ9 QUESTIONS 1 AND 2: 1
CLINICAL INTERPRETATION OF PHQ9 SCORE: MILD DEPRESSION
1. LITTLE INTEREST OR PLEASURE IN DOING THINGS: SEVERAL DAYS
8. MOVING OR SPEAKING SO SLOWLY THAT OTHER PEOPLE COULD HAVE NOTICED. OR THE OPPOSITE, BEING SO FIGETY OR RESTLESS THAT YOU HAVE BEEN MOVING AROUND A LOT MORE THAN USUAL: NOT AT ALL
9. THOUGHTS THAT YOU WOULD BE BETTER OFF DEAD, OR OF HURTING YOURSELF: NOT AT ALL
3. TROUBLE FALLING OR STAYING ASLEEP OR SLEEPING TOO MUCH: MORE THAN HALF THE DAYS
6. FEELING BAD ABOUT YOURSELF - OR THAT YOU ARE A FAILURE OR HAVE LET YOURSELF OR YOUR FAMILY DOWN: SEVERAL DAYS

## 2023-04-10 ASSESSMENT — ANXIETY QUESTIONNAIRES
7. FEELING AFRAID AS IF SOMETHING AWFUL MIGHT HAPPEN: NOT AT ALL
3. WORRYING TOO MUCH ABOUT DIFFERENT THINGS: SEVERAL DAYS
2. NOT BEING ABLE TO STOP OR CONTROL WORRYING: 2
4. TROUBLE RELAXING: 1
1. FEELING NERVOUS, ANXIOUS, OR ON EDGE: 1
6. BECOMING EASILY ANNOYED OR IRRITABLE: 1
4. TROUBLE RELAXING: SEVERAL DAYS
5. BEING SO RESTLESS THAT IT IS HARD TO SIT STILL: MORE THAN HALF THE DAYS
7. FEELING AFRAID AS IF SOMETHING AWFUL MIGHT HAPPEN: 0
1. FEELING NERVOUS, ANXIOUS, OR ON EDGE: SEVERAL DAYS
IF YOU CHECKED OFF ANY PROBLEMS ON THIS QUESTIONNAIRE, HOW DIFFICULT HAVE THESE PROBLEMS MADE IT FOR YOU TO DO YOUR WORK, TAKE CARE OF THINGS AT HOME, OR GET ALONG WITH OTHER PEOPLE: SOMEWHAT DIFFICULT
2. NOT BEING ABLE TO STOP OR CONTROL WORRYING: MORE THAN HALF THE DAYS
5. BEING SO RESTLESS THAT IT IS HARD TO SIT STILL: 2
3. WORRYING TOO MUCH ABOUT DIFFERENT THINGS: 1
GAD7 TOTAL SCORE: 8
6. BECOMING EASILY ANNOYED OR IRRITABLE: SEVERAL DAYS

## 2023-04-11 ENCOUNTER — TELEPHONE (OUTPATIENT)
Dept: FAMILY MEDICINE | Age: 58
End: 2023-04-11

## 2023-04-18 DIAGNOSIS — F90.0 ATTENTION DEFICIT HYPERACTIVITY DISORDER (ADHD), PREDOMINANTLY INATTENTIVE TYPE: ICD-10-CM

## 2023-04-19 RX ORDER — DEXTROAMPHETAMINE SACCHARATE, AMPHETAMINE ASPARTATE MONOHYDRATE, DEXTROAMPHETAMINE SULFATE AND AMPHETAMINE SULFATE 5; 5; 5; 5 MG/1; MG/1; MG/1; MG/1
CAPSULE, EXTENDED RELEASE ORAL
Qty: 60 CAPSULE | Refills: 0 | Status: SHIPPED | OUTPATIENT
Start: 2023-04-19 | End: 2023-05-16 | Stop reason: SDUPTHER

## 2023-04-19 RX ORDER — BUSPIRONE HYDROCHLORIDE 5 MG/1
5 TABLET ORAL 2 TIMES DAILY
Qty: 60 TABLET | Refills: 5 | Status: ON HOLD | OUTPATIENT
Start: 2023-04-19 | End: 2023-09-01 | Stop reason: HOSPADM

## 2023-05-04 LAB — NONINV COLON CA DNA+OCC BLD SCRN STL QL: POSITIVE

## 2023-05-05 ENCOUNTER — TELEPHONE (OUTPATIENT)
Dept: FAMILY MEDICINE | Age: 58
End: 2023-05-05

## 2023-05-05 DIAGNOSIS — Z12.11 COLON CANCER SCREENING: Primary | ICD-10-CM

## 2023-05-11 DIAGNOSIS — F33.0 MILD RECURRENT MAJOR DEPRESSION (CMD): Primary | ICD-10-CM

## 2023-05-12 RX ORDER — BUPROPION HYDROCHLORIDE 300 MG/1
300 TABLET ORAL DAILY
Qty: 90 TABLET | Refills: 1 | Status: SHIPPED | OUTPATIENT
Start: 2023-05-12 | End: 2023-11-01 | Stop reason: SDUPTHER

## 2023-05-16 DIAGNOSIS — F90.0 ATTENTION DEFICIT HYPERACTIVITY DISORDER (ADHD), PREDOMINANTLY INATTENTIVE TYPE: ICD-10-CM

## 2023-05-18 RX ORDER — DEXTROAMPHETAMINE SACCHARATE, AMPHETAMINE ASPARTATE MONOHYDRATE, DEXTROAMPHETAMINE SULFATE AND AMPHETAMINE SULFATE 5; 5; 5; 5 MG/1; MG/1; MG/1; MG/1
CAPSULE, EXTENDED RELEASE ORAL
Qty: 60 CAPSULE | Refills: 0 | Status: SHIPPED | OUTPATIENT
Start: 2023-05-18 | End: 2023-06-23 | Stop reason: SDUPTHER

## 2023-06-23 DIAGNOSIS — F90.0 ATTENTION DEFICIT HYPERACTIVITY DISORDER (ADHD), PREDOMINANTLY INATTENTIVE TYPE: ICD-10-CM

## 2023-06-26 RX ORDER — DEXTROAMPHETAMINE SACCHARATE, AMPHETAMINE ASPARTATE MONOHYDRATE, DEXTROAMPHETAMINE SULFATE AND AMPHETAMINE SULFATE 5; 5; 5; 5 MG/1; MG/1; MG/1; MG/1
CAPSULE, EXTENDED RELEASE ORAL
Qty: 60 CAPSULE | Refills: 0 | Status: SHIPPED | OUTPATIENT
Start: 2023-06-26 | End: 2023-07-21 | Stop reason: SDUPTHER

## 2023-07-10 ENCOUNTER — TELEPHONE (OUTPATIENT)
Dept: GASTROENTEROLOGY | Age: 58
End: 2023-07-10

## 2023-07-21 ENCOUNTER — PREP FOR CASE (OUTPATIENT)
Dept: GASTROENTEROLOGY | Age: 58
End: 2023-07-21

## 2023-07-21 ENCOUNTER — TELEPHONE (OUTPATIENT)
Dept: GASTROENTEROLOGY | Age: 58
End: 2023-07-21

## 2023-07-21 DIAGNOSIS — Z86.010 HISTORY OF COLON POLYPS: ICD-10-CM

## 2023-07-21 DIAGNOSIS — Z12.11 SCREENING FOR COLON CANCER: Primary | ICD-10-CM

## 2023-07-21 DIAGNOSIS — F90.0 ATTENTION DEFICIT HYPERACTIVITY DISORDER (ADHD), PREDOMINANTLY INATTENTIVE TYPE: ICD-10-CM

## 2023-07-21 RX ORDER — POLYETHYLENE GLYCOL 3350, SODIUM CHLORIDE, SODIUM BICARBONATE, POTASSIUM CHLORIDE 420; 11.2; 5.72; 1.48 G/4L; G/4L; G/4L; G/4L
POWDER, FOR SOLUTION ORAL
Qty: 4000 ML | Refills: 0 | Status: ON HOLD | OUTPATIENT
Start: 2023-07-21 | End: 2023-09-01 | Stop reason: ALTCHOICE

## 2023-07-21 RX ORDER — 0.9 % SODIUM CHLORIDE 0.9 %
2 VIAL (ML) INJECTION EVERY 12 HOURS SCHEDULED
Status: CANCELLED | OUTPATIENT
Start: 2023-07-21

## 2023-07-21 RX ORDER — SODIUM CHLORIDE 9 MG/ML
INJECTION, SOLUTION INTRAVENOUS CONTINUOUS
Status: CANCELLED | OUTPATIENT
Start: 2023-07-21

## 2023-07-21 RX ORDER — BISACODYL 5 MG/1
10 TABLET, DELAYED RELEASE ORAL ONCE
Qty: 2 TABLET | Refills: 0 | Status: SHIPPED | OUTPATIENT
Start: 2023-07-21 | End: 2023-08-12

## 2023-07-24 RX ORDER — DEXTROAMPHETAMINE SACCHARATE, AMPHETAMINE ASPARTATE MONOHYDRATE, DEXTROAMPHETAMINE SULFATE AND AMPHETAMINE SULFATE 5; 5; 5; 5 MG/1; MG/1; MG/1; MG/1
CAPSULE, EXTENDED RELEASE ORAL
Qty: 60 CAPSULE | Refills: 0 | Status: SHIPPED | OUTPATIENT
Start: 2023-07-24 | End: 2023-08-22 | Stop reason: SDUPTHER

## 2023-08-04 ENCOUNTER — TELEPHONE (OUTPATIENT)
Dept: GASTROENTEROLOGY | Age: 58
End: 2023-08-04

## 2023-08-22 DIAGNOSIS — F90.0 ATTENTION DEFICIT HYPERACTIVITY DISORDER (ADHD), PREDOMINANTLY INATTENTIVE TYPE: ICD-10-CM

## 2023-08-23 RX ORDER — DEXTROAMPHETAMINE SACCHARATE, AMPHETAMINE ASPARTATE MONOHYDRATE, DEXTROAMPHETAMINE SULFATE AND AMPHETAMINE SULFATE 5; 5; 5; 5 MG/1; MG/1; MG/1; MG/1
CAPSULE, EXTENDED RELEASE ORAL
Qty: 60 CAPSULE | Refills: 0 | Status: SHIPPED | OUTPATIENT
Start: 2023-08-23 | End: 2023-09-25 | Stop reason: SDUPTHER

## 2023-08-30 ASSESSMENT — ACTIVITIES OF DAILY LIVING (ADL)
ADL_SHORT_OF_BREATH: NO
RECENT_DECLINE_ADL: NO
HISTORY OF FALLING IN THE LAST YEAR (PRIOR TO ADMISSION): NO
ADL_BEFORE_ADMISSION: INDEPENDENT
ADL_SCORE: 12
NEEDS_ASSIST: NO
CHRONIC_PAIN_PRESENT: NO

## 2023-08-30 ASSESSMENT — COGNITIVE AND FUNCTIONAL STATUS - GENERAL
ARE YOU DEAF OR DO YOU HAVE SERIOUS DIFFICULTY  HEARING: NO
ARE YOU BLIND OR DO YOU HAVE SERIOUS DIFFICULTY SEEING, EVEN WHEN WEARING GLASSES: NO

## 2023-09-01 ENCOUNTER — ANESTHESIA (OUTPATIENT)
Dept: SURGERY | Age: 58
End: 2023-09-01

## 2023-09-01 ENCOUNTER — HOSPITAL ENCOUNTER (OUTPATIENT)
Age: 58
Discharge: HOME | End: 2023-09-01
Attending: INTERNAL MEDICINE | Admitting: INTERNAL MEDICINE

## 2023-09-01 ENCOUNTER — ANESTHESIA EVENT (OUTPATIENT)
Dept: SURGERY | Age: 58
End: 2023-09-01

## 2023-09-01 DIAGNOSIS — Z86.010 HISTORY OF COLON POLYPS: ICD-10-CM

## 2023-09-01 DIAGNOSIS — Z12.11 SCREENING FOR COLON CANCER: ICD-10-CM

## 2023-09-01 PROCEDURE — 10002362 HB ANESTH MAC IV 1ST 1/2 HR: Performed by: INTERNAL MEDICINE

## 2023-09-01 PROCEDURE — 10001568 HB ANESTH MAC IV ADD'L 1/2 HR: Performed by: INTERNAL MEDICINE

## 2023-09-01 PROCEDURE — 10002767 HB EXTENDED RECOVERY PER HOUR: Performed by: INTERNAL MEDICINE

## 2023-09-01 PROCEDURE — 10002807 HB RX 258: Performed by: NURSE ANESTHETIST, CERTIFIED REGISTERED

## 2023-09-01 PROCEDURE — 45380 COLONOSCOPY AND BIOPSY: CPT | Performed by: INTERNAL MEDICINE

## 2023-09-01 PROCEDURE — 88305 TISSUE EXAM BY PATHOLOGIST: CPT | Performed by: INTERNAL MEDICINE

## 2023-09-01 PROCEDURE — 10004316 HB COUNTER-PREP

## 2023-09-01 PROCEDURE — 10004560 HB COUNTER-EXTENDED RECOVERY PER HOUR

## 2023-09-01 PROCEDURE — 10006391 HB COMPLEX PROCEDURE: Performed by: INTERNAL MEDICINE

## 2023-09-01 PROCEDURE — 10003428 HB COLONOSCOPY

## 2023-09-01 PROCEDURE — A45380 ANES COLONOSCOPY FLEX-PROX SPLEN FLEX; W: Performed by: NURSE ANESTHETIST, CERTIFIED REGISTERED

## 2023-09-01 PROCEDURE — 10002117 HB ADDITIONAL SURGERY TIME/30 MIN: Performed by: INTERNAL MEDICINE

## 2023-09-01 PROCEDURE — 10002800 HB RX 250 W HCPCS: Performed by: NURSE ANESTHETIST, CERTIFIED REGISTERED

## 2023-09-01 RX ORDER — LIDOCAINE HYDROCHLORIDE 10 MG/ML
5 INJECTION, SOLUTION INFILTRATION; PERINEURAL PRN
Status: DISCONTINUED | OUTPATIENT
Start: 2023-09-01 | End: 2023-09-01 | Stop reason: HOSPADM

## 2023-09-01 RX ORDER — SODIUM CHLORIDE 9 MG/ML
INJECTION, SOLUTION INTRAVENOUS CONTINUOUS
Status: DISCONTINUED | OUTPATIENT
Start: 2023-09-01 | End: 2023-09-01 | Stop reason: HOSPADM

## 2023-09-01 RX ORDER — SCOLOPAMINE TRANSDERMAL SYSTEM 1 MG/1
1 PATCH, EXTENDED RELEASE TRANSDERMAL
Status: DISCONTINUED | OUTPATIENT
Start: 2023-09-01 | End: 2023-09-01 | Stop reason: HOSPADM

## 2023-09-01 RX ORDER — PROPOFOL 10 MG/ML
INJECTION, EMULSION INTRAVENOUS PRN
Status: DISCONTINUED | OUTPATIENT
Start: 2023-09-01 | End: 2023-09-01

## 2023-09-01 RX ORDER — SODIUM CHLORIDE, SODIUM LACTATE, POTASSIUM CHLORIDE, CALCIUM CHLORIDE 600; 310; 30; 20 MG/100ML; MG/100ML; MG/100ML; MG/100ML
INJECTION, SOLUTION INTRAVENOUS CONTINUOUS
Status: DISCONTINUED | OUTPATIENT
Start: 2023-09-01 | End: 2023-09-01 | Stop reason: HOSPADM

## 2023-09-01 RX ORDER — HUMAN INSULIN 100 [IU]/ML
INJECTION, SOLUTION SUBCUTANEOUS
Status: DISCONTINUED | OUTPATIENT
Start: 2023-09-01 | End: 2023-09-01 | Stop reason: HOSPADM

## 2023-09-01 RX ORDER — DEXTROSE MONOHYDRATE 25 G/50ML
25 INJECTION, SOLUTION INTRAVENOUS PRN
Status: DISCONTINUED | OUTPATIENT
Start: 2023-09-01 | End: 2023-09-01 | Stop reason: HOSPADM

## 2023-09-01 RX ORDER — 0.9 % SODIUM CHLORIDE 0.9 %
2 VIAL (ML) INJECTION EVERY 12 HOURS SCHEDULED
Status: DISCONTINUED | OUTPATIENT
Start: 2023-09-01 | End: 2023-09-01 | Stop reason: HOSPADM

## 2023-09-01 RX ORDER — DEXTROSE MONOHYDRATE 50 MG/ML
INJECTION, SOLUTION INTRAVENOUS CONTINUOUS PRN
Status: DISCONTINUED | OUTPATIENT
Start: 2023-09-01 | End: 2023-09-01 | Stop reason: HOSPADM

## 2023-09-01 RX ADMIN — PROPOFOL 90 MG: 10 INJECTION, EMULSION INTRAVENOUS at 10:36

## 2023-09-01 RX ADMIN — PROPOFOL 45 MG: 10 INJECTION, EMULSION INTRAVENOUS at 10:55

## 2023-09-01 RX ADMIN — PROPOFOL 90 MG: 10 INJECTION, EMULSION INTRAVENOUS at 10:47

## 2023-09-01 RX ADMIN — PROPOFOL 100 MCG/KG/MIN: 10 INJECTION, EMULSION INTRAVENOUS at 10:36

## 2023-09-01 RX ADMIN — SODIUM CHLORIDE, POTASSIUM CHLORIDE, SODIUM LACTATE AND CALCIUM CHLORIDE: 600; 310; 30; 20 INJECTION, SOLUTION INTRAVENOUS at 10:17

## 2023-09-01 ASSESSMENT — PAIN SCALES - GENERAL
PAINLEVEL_OUTOF10: 0

## 2023-09-01 ASSESSMENT — LIFESTYLE VARIABLES: SMOKING_STATUS: FORMER

## 2023-09-05 ENCOUNTER — TELEPHONE (OUTPATIENT)
Dept: GASTROENTEROLOGY | Age: 58
End: 2023-09-05

## 2023-09-05 VITALS
RESPIRATION RATE: 13 BRPM | SYSTOLIC BLOOD PRESSURE: 97 MMHG | WEIGHT: 190.04 LBS | TEMPERATURE: 98.7 F | OXYGEN SATURATION: 100 % | HEIGHT: 68 IN | BODY MASS INDEX: 28.8 KG/M2 | DIASTOLIC BLOOD PRESSURE: 55 MMHG | HEART RATE: 68 BPM

## 2023-09-05 LAB
ASR DISCLAIMER: NORMAL
CASE RPRT: NORMAL
CLINICAL INFO: NORMAL
PATH REPORT.FINAL DX SPEC: NORMAL
PATH REPORT.GROSS SPEC: NORMAL
PATH REPORT.MICROSCOPIC SPEC OTHER STN: NORMAL

## 2023-09-25 DIAGNOSIS — F90.0 ATTENTION DEFICIT HYPERACTIVITY DISORDER (ADHD), PREDOMINANTLY INATTENTIVE TYPE: ICD-10-CM

## 2023-09-27 RX ORDER — DEXTROAMPHETAMINE SACCHARATE, AMPHETAMINE ASPARTATE MONOHYDRATE, DEXTROAMPHETAMINE SULFATE AND AMPHETAMINE SULFATE 5; 5; 5; 5 MG/1; MG/1; MG/1; MG/1
CAPSULE, EXTENDED RELEASE ORAL
Qty: 60 CAPSULE | Refills: 0 | Status: SHIPPED | OUTPATIENT
Start: 2023-09-27 | End: 2023-10-27 | Stop reason: SDUPTHER

## 2023-09-28 ENCOUNTER — E-ADVICE (OUTPATIENT)
Dept: FAMILY MEDICINE | Age: 58
End: 2023-09-28

## 2023-09-28 DIAGNOSIS — E78.5 HYPERLIPIDEMIA, UNSPECIFIED HYPERLIPIDEMIA TYPE: Primary | ICD-10-CM

## 2023-09-28 RX ORDER — ATORVASTATIN CALCIUM 20 MG/1
20 TABLET, FILM COATED ORAL EVERY EVENING
Qty: 30 TABLET | Refills: 6 | Status: SHIPPED | OUTPATIENT
Start: 2023-09-28

## 2023-10-24 DIAGNOSIS — F90.0 ATTENTION DEFICIT HYPERACTIVITY DISORDER (ADHD), PREDOMINANTLY INATTENTIVE TYPE: ICD-10-CM

## 2023-10-24 RX ORDER — DEXTROAMPHETAMINE SACCHARATE, AMPHETAMINE ASPARTATE MONOHYDRATE, DEXTROAMPHETAMINE SULFATE AND AMPHETAMINE SULFATE 5; 5; 5; 5 MG/1; MG/1; MG/1; MG/1
CAPSULE, EXTENDED RELEASE ORAL
Qty: 60 CAPSULE | Refills: 0 | Status: CANCELLED | OUTPATIENT
Start: 2023-10-24

## 2023-10-26 ENCOUNTER — E-ADVICE (OUTPATIENT)
Dept: FAMILY MEDICINE | Age: 58
End: 2023-10-26

## 2023-10-26 DIAGNOSIS — F90.0 ATTENTION DEFICIT HYPERACTIVITY DISORDER (ADHD), PREDOMINANTLY INATTENTIVE TYPE: ICD-10-CM

## 2023-10-27 RX ORDER — DEXTROAMPHETAMINE SACCHARATE, AMPHETAMINE ASPARTATE MONOHYDRATE, DEXTROAMPHETAMINE SULFATE AND AMPHETAMINE SULFATE 5; 5; 5; 5 MG/1; MG/1; MG/1; MG/1
CAPSULE, EXTENDED RELEASE ORAL
Qty: 60 CAPSULE | Refills: 0 | Status: SHIPPED | OUTPATIENT
Start: 2023-10-27

## 2023-11-02 ENCOUNTER — OFFICE VISIT (OUTPATIENT)
Dept: FAMILY MEDICINE | Age: 58
End: 2023-11-02

## 2023-11-02 ENCOUNTER — APPOINTMENT (OUTPATIENT)
Dept: FAMILY MEDICINE | Age: 58
End: 2023-11-02

## 2023-11-02 ENCOUNTER — EXTERNAL LAB (OUTPATIENT)
Dept: FAMILY MEDICINE | Age: 58
End: 2023-11-02

## 2023-11-02 VITALS
SYSTOLIC BLOOD PRESSURE: 124 MMHG | WEIGHT: 190.37 LBS | DIASTOLIC BLOOD PRESSURE: 70 MMHG | RESPIRATION RATE: 17 BRPM | BODY MASS INDEX: 28.85 KG/M2 | HEART RATE: 91 BPM | HEIGHT: 68 IN | OXYGEN SATURATION: 98 %

## 2023-11-02 DIAGNOSIS — F17.211 CIGARETTE NICOTINE DEPENDENCE IN REMISSION: ICD-10-CM

## 2023-11-02 DIAGNOSIS — F33.0 MILD RECURRENT MAJOR DEPRESSION (CMD): ICD-10-CM

## 2023-11-02 DIAGNOSIS — F41.1 GENERALIZED ANXIETY DISORDER: ICD-10-CM

## 2023-11-02 DIAGNOSIS — Z23 NEED FOR VACCINATION: ICD-10-CM

## 2023-11-02 DIAGNOSIS — Z12.5 PROSTATE CANCER SCREENING: ICD-10-CM

## 2023-11-02 DIAGNOSIS — R79.0 LOW FERRITIN LEVEL: ICD-10-CM

## 2023-11-02 DIAGNOSIS — F90.0 ATTENTION DEFICIT HYPERACTIVITY DISORDER (ADHD), PREDOMINANTLY INATTENTIVE TYPE: Primary | ICD-10-CM

## 2023-11-02 DIAGNOSIS — E78.5 HYPERLIPIDEMIA, UNSPECIFIED HYPERLIPIDEMIA TYPE: ICD-10-CM

## 2023-11-02 LAB — LAB RESULT: NORMAL

## 2023-11-02 PROCEDURE — 90686 IIV4 VACC NO PRSV 0.5 ML IM: CPT | Performed by: STUDENT IN AN ORGANIZED HEALTH CARE EDUCATION/TRAINING PROGRAM

## 2023-11-02 PROCEDURE — 90471 IMMUNIZATION ADMIN: CPT | Performed by: STUDENT IN AN ORGANIZED HEALTH CARE EDUCATION/TRAINING PROGRAM

## 2023-11-02 PROCEDURE — 99214 OFFICE O/P EST MOD 30 MIN: CPT | Performed by: STUDENT IN AN ORGANIZED HEALTH CARE EDUCATION/TRAINING PROGRAM

## 2023-11-02 RX ORDER — BUPROPION HYDROCHLORIDE 300 MG/1
300 TABLET ORAL DAILY
Qty: 90 TABLET | Refills: 3 | Status: SHIPPED | OUTPATIENT
Start: 2023-11-02

## 2023-11-02 RX ORDER — MULTIVITAMIN,THER AND MINERALS
TABLET ORAL
COMMUNITY

## 2023-11-06 ENCOUNTER — TELEPHONE (OUTPATIENT)
Dept: FAMILY MEDICINE | Age: 58
End: 2023-11-06

## 2023-11-22 DIAGNOSIS — F90.0 ATTENTION DEFICIT HYPERACTIVITY DISORDER (ADHD), PREDOMINANTLY INATTENTIVE TYPE: ICD-10-CM

## 2023-11-22 RX ORDER — DEXTROAMPHETAMINE SACCHARATE, AMPHETAMINE ASPARTATE MONOHYDRATE, DEXTROAMPHETAMINE SULFATE AND AMPHETAMINE SULFATE 5; 5; 5; 5 MG/1; MG/1; MG/1; MG/1
CAPSULE, EXTENDED RELEASE ORAL
Qty: 60 CAPSULE | Refills: 0 | Status: SHIPPED | OUTPATIENT
Start: 2023-11-25

## 2023-12-26 ENCOUNTER — E-ADVICE (OUTPATIENT)
Dept: FAMILY MEDICINE | Age: 58
End: 2023-12-26

## 2023-12-26 DIAGNOSIS — F90.0 ATTENTION DEFICIT HYPERACTIVITY DISORDER (ADHD), PREDOMINANTLY INATTENTIVE TYPE: ICD-10-CM

## 2023-12-27 RX ORDER — DEXTROAMPHETAMINE SACCHARATE, AMPHETAMINE ASPARTATE MONOHYDRATE, DEXTROAMPHETAMINE SULFATE AND AMPHETAMINE SULFATE 5; 5; 5; 5 MG/1; MG/1; MG/1; MG/1
CAPSULE, EXTENDED RELEASE ORAL
Qty: 60 CAPSULE | Refills: 0 | Status: SHIPPED | OUTPATIENT
Start: 2023-12-27

## 2024-01-19 ENCOUNTER — E-ADVICE (OUTPATIENT)
Dept: FAMILY MEDICINE | Age: 59
End: 2024-01-19

## 2024-01-19 DIAGNOSIS — I70.0 AORTIC ATHEROSCLEROSIS (CMD): Primary | ICD-10-CM

## 2024-01-24 ENCOUNTER — APPOINTMENT (OUTPATIENT)
Dept: CT IMAGING | Age: 59
End: 2024-01-24
Attending: STUDENT IN AN ORGANIZED HEALTH CARE EDUCATION/TRAINING PROGRAM

## 2024-01-24 DIAGNOSIS — I70.0 AORTIC ATHEROSCLEROSIS (CMD): ICD-10-CM

## 2024-01-24 PROCEDURE — 75571 CT HRT W/O DYE W/CA TEST: CPT

## 2024-01-25 ENCOUNTER — E-ADVICE (OUTPATIENT)
Dept: FAMILY MEDICINE | Age: 59
End: 2024-01-25

## 2024-01-25 DIAGNOSIS — F33.0 MILD RECURRENT MAJOR DEPRESSION (CMD): ICD-10-CM

## 2024-01-25 DIAGNOSIS — E78.5 HYPERLIPIDEMIA, UNSPECIFIED HYPERLIPIDEMIA TYPE: ICD-10-CM

## 2024-01-25 DIAGNOSIS — F41.1 GENERALIZED ANXIETY DISORDER: ICD-10-CM

## 2024-01-26 RX ORDER — FLUOXETINE HYDROCHLORIDE 20 MG/1
20 CAPSULE ORAL DAILY
Qty: 90 CAPSULE | Refills: 3 | Status: SHIPPED | OUTPATIENT
Start: 2024-01-26

## 2024-01-26 RX ORDER — BUPROPION HYDROCHLORIDE 300 MG/1
300 TABLET ORAL DAILY
Qty: 90 TABLET | Refills: 3 | Status: SHIPPED | OUTPATIENT
Start: 2024-01-26

## 2024-01-31 DIAGNOSIS — F90.0 ATTENTION DEFICIT HYPERACTIVITY DISORDER (ADHD), PREDOMINANTLY INATTENTIVE TYPE: ICD-10-CM

## 2024-02-01 ENCOUNTER — E-ADVICE (OUTPATIENT)
Dept: FAMILY MEDICINE | Age: 59
End: 2024-02-01

## 2024-02-01 ENCOUNTER — APPOINTMENT (OUTPATIENT)
Dept: CT IMAGING | Age: 59
End: 2024-02-01
Attending: STUDENT IN AN ORGANIZED HEALTH CARE EDUCATION/TRAINING PROGRAM

## 2024-02-01 ENCOUNTER — TELEPHONE (OUTPATIENT)
Dept: CARDIOLOGY | Age: 59
End: 2024-02-01

## 2024-02-01 RX ORDER — DEXTROAMPHETAMINE SACCHARATE, AMPHETAMINE ASPARTATE MONOHYDRATE, DEXTROAMPHETAMINE SULFATE AND AMPHETAMINE SULFATE 5; 5; 5; 5 MG/1; MG/1; MG/1; MG/1
CAPSULE, EXTENDED RELEASE ORAL
Qty: 60 CAPSULE | Refills: 0 | Status: SHIPPED | OUTPATIENT
Start: 2024-02-01

## 2024-02-10 ENCOUNTER — HEALTH MAINTENANCE LETTER (OUTPATIENT)
Age: 59
End: 2024-02-10

## 2024-02-29 DIAGNOSIS — F90.0 ATTENTION DEFICIT HYPERACTIVITY DISORDER (ADHD), PREDOMINANTLY INATTENTIVE TYPE: ICD-10-CM

## 2024-03-01 DIAGNOSIS — F90.0 ATTENTION DEFICIT HYPERACTIVITY DISORDER (ADHD), PREDOMINANTLY INATTENTIVE TYPE: ICD-10-CM

## 2024-03-01 RX ORDER — DEXTROAMPHETAMINE SACCHARATE, AMPHETAMINE ASPARTATE MONOHYDRATE, DEXTROAMPHETAMINE SULFATE AND AMPHETAMINE SULFATE 5; 5; 5; 5 MG/1; MG/1; MG/1; MG/1
CAPSULE, EXTENDED RELEASE ORAL
Qty: 60 CAPSULE | Refills: 0 | Status: SHIPPED | OUTPATIENT
Start: 2024-03-01

## 2024-03-01 RX ORDER — DEXTROAMPHETAMINE SACCHARATE, AMPHETAMINE ASPARTATE MONOHYDRATE, DEXTROAMPHETAMINE SULFATE AND AMPHETAMINE SULFATE 5; 5; 5; 5 MG/1; MG/1; MG/1; MG/1
CAPSULE, EXTENDED RELEASE ORAL
Qty: 60 CAPSULE | Refills: 0 | OUTPATIENT
Start: 2024-03-01

## 2024-03-03 ENCOUNTER — E-ADVICE (OUTPATIENT)
Dept: FAMILY MEDICINE | Age: 59
End: 2024-03-03

## 2024-03-03 DIAGNOSIS — F90.0 ATTENTION DEFICIT HYPERACTIVITY DISORDER (ADHD), PREDOMINANTLY INATTENTIVE TYPE: ICD-10-CM

## 2024-03-04 RX ORDER — DEXTROAMPHETAMINE SACCHARATE, AMPHETAMINE ASPARTATE MONOHYDRATE, DEXTROAMPHETAMINE SULFATE AND AMPHETAMINE SULFATE 5; 5; 5; 5 MG/1; MG/1; MG/1; MG/1
CAPSULE, EXTENDED RELEASE ORAL
Qty: 60 CAPSULE | Refills: 0 | Status: SHIPPED | OUTPATIENT
Start: 2024-03-04

## 2024-04-03 DIAGNOSIS — F90.0 ATTENTION DEFICIT HYPERACTIVITY DISORDER (ADHD), PREDOMINANTLY INATTENTIVE TYPE: ICD-10-CM

## 2024-04-03 RX ORDER — DEXTROAMPHETAMINE SACCHARATE, AMPHETAMINE ASPARTATE MONOHYDRATE, DEXTROAMPHETAMINE SULFATE AND AMPHETAMINE SULFATE 5; 5; 5; 5 MG/1; MG/1; MG/1; MG/1
CAPSULE, EXTENDED RELEASE ORAL
Qty: 60 CAPSULE | Refills: 0 | Status: SHIPPED | OUTPATIENT
Start: 2024-04-03

## 2024-05-02 DIAGNOSIS — F90.0 ATTENTION DEFICIT HYPERACTIVITY DISORDER (ADHD), PREDOMINANTLY INATTENTIVE TYPE: ICD-10-CM

## 2024-05-03 RX ORDER — DEXTROAMPHETAMINE SACCHARATE, AMPHETAMINE ASPARTATE MONOHYDRATE, DEXTROAMPHETAMINE SULFATE AND AMPHETAMINE SULFATE 5; 5; 5; 5 MG/1; MG/1; MG/1; MG/1
CAPSULE, EXTENDED RELEASE ORAL
Qty: 60 CAPSULE | Refills: 0 | Status: SHIPPED | OUTPATIENT
Start: 2024-05-03

## 2024-05-31 DIAGNOSIS — F90.0 ATTENTION DEFICIT HYPERACTIVITY DISORDER (ADHD), PREDOMINANTLY INATTENTIVE TYPE: ICD-10-CM

## 2024-06-03 RX ORDER — DEXTROAMPHETAMINE SACCHARATE, AMPHETAMINE ASPARTATE MONOHYDRATE, DEXTROAMPHETAMINE SULFATE AND AMPHETAMINE SULFATE 5; 5; 5; 5 MG/1; MG/1; MG/1; MG/1
CAPSULE, EXTENDED RELEASE ORAL
Qty: 60 CAPSULE | Refills: 0 | Status: SHIPPED | OUTPATIENT
Start: 2024-06-03

## 2024-07-02 DIAGNOSIS — F90.0 ATTENTION DEFICIT HYPERACTIVITY DISORDER (ADHD), PREDOMINANTLY INATTENTIVE TYPE: ICD-10-CM

## 2024-07-02 RX ORDER — DEXTROAMPHETAMINE SACCHARATE, AMPHETAMINE ASPARTATE MONOHYDRATE, DEXTROAMPHETAMINE SULFATE AND AMPHETAMINE SULFATE 5; 5; 5; 5 MG/1; MG/1; MG/1; MG/1
CAPSULE, EXTENDED RELEASE ORAL
Qty: 60 CAPSULE | Refills: 0 | Status: SHIPPED | OUTPATIENT
Start: 2024-07-02

## 2024-07-25 NOTE — PROGRESS NOTES
"  Carrillo Dunlap is a 53 year old  Male for a medication check and ADHD follow up.    came 20 min late to this 20 min appt due to getting lost/first time here    CURRENT CONCERNS:  Establish care    Depression and Anxiety Follow-Up  Status since last visit: Same   Other associated symptoms:None  Complicating factors:   Significant life event: Not liking his job but not wanting to find another one    Current substance abuse: None    PHQ-9 9/23/2016 7/21/2017 5/22/2018   Total Score 8 8 9   Q9: Suicide Ideation Not at all Not at all Not at all     UGO-7 SCORE 9/23/2016 7/21/2017 5/22/2018   Total Score - - -   Total Score 12 8 13     In the past two weeks have you had thoughts of suicide or self-harm?  No.    Do you have concerns about your personal safety or the safety of others?   No  PHQ-9  English  PHQ-9   Any Language  UGO-7  Suicide Assessment Five-step Evaluation and Treatment (SAFE-T)        Review of past medical history:     Establishing care with new doctor, encounter for  Generalized anxiety disorder  Moderate major depression (H)  Screen for colon cancer  Screening for HIV (human immunodeficiency virus)  Need for prophylactic vaccination and inoculation against influenza  ADHD (attention deficit hyperactivity disorder), inattentive type  Screening for diabetes mellitus  Lipid screening     CURRENT PRESCRIPTIONS:    See list      MEDICATION BENEFITS:  Controlled symptoms:  Attention span and Distractability  Uncontrolled symptoms:  Anxiety as above      MEDICATION SIDE EFFECTS:   Has:  none  Denies:  appetite suppression, weight loss, insomnia, tics, palpitations, stomach ache, headache, emotional lability, rebound irritability, drowsiness, \"zombie\" effect, growth suppression and dry mouth        PHQ-9 SCORE 9/23/2016 7/21/2017 5/22/2018   Total Score - - -   Total Score - - -   Total Score 8 8 9      OBJECTIVE:  /64 (BP Location: Right arm, Patient Position: Sitting, Cuff Size: Adult Regular) " [phone].    Pulse 82  Temp 97.9  F (36.6  C) (Temporal)  Resp 18  Wt 174 lb (78.9 kg)  SpO2 99%  BMI 26.46 kg/m2  EXAM:GENERAL:  Alert and interactive., EYES:  Normal extra-ocular movements.  PERRLA, LUNGS:  Clear, HEART:  Normal rate and rhythm.  Normal S1 and S2.  No murmurs., ABDOMEN:  Soft, non-tender, no organomegaly., NEURO:  No tics or tremor.  Normal tone and strength. Normal gait and balance.  and Psych: MENTAL STATUS EXAM  Appearance: appropriate  Attitude: cooperative  Behavior: normal  Eye Contact: normal  Speech: normal  Orientation: oriented to person , place, time and situation  Mood: states his mood has been stable/ good   Affect: Normal/bright and anxious   Thought Process: clear      ASSESSMENT:    ICD-10-CM    1. Establishing care with new doctor, encounter for Z76.89    2. Generalized anxiety disorder F41.1 Comprehensive metabolic panel     TSH with free T4 reflex   3. Moderate major depression (H) F32.1 Comprehensive metabolic panel     TSH with free T4 reflex   4. Screen for colon cancer Z12.11    5. Screening for HIV (human immunodeficiency virus) Z11.4 HIV Screening   6. Need for prophylactic vaccination and inoculation against influenza Z23 FLU VACCINE, SPLIT VIRUS, IM (QUADRIVALENT) [40883]- >3 YRS     Vaccine Administration, Initial [73459]   7. ADHD (attention deficit hyperactivity disorder), inattentive type F90.0 TSH with free T4 reflex   8. Screening for diabetes mellitus Z13.1    9. Lipid screening Z13.220 Lipid panel reflex to direct LDL Fasting         PLAN:  Medication:  Medications changed.  See orders.  Continue Adderall and counseling, pt wishes to get off Aderall eventually, due for refill in 1 month  Follow-up:  1 month for Health maintenance exam     Anxiety worsening, discussed options of adding additional med vs switching Welbutrin to other med, next visit will do complete med trials review. Trial of buspar recommended   Consider adding buspar start low and titrate up, anxiety  not controlled, continue working with counseling regularly, rtc earlier if any concerns    Adali Merino APRN CNP     Consider     Injectable Influenza Immunization Documentation    1.  Is the person to be vaccinated sick today?   No    2. Does the person to be vaccinated have an allergy to a component   of the vaccine?   No  Egg Allergy Algorithm Link    3. Has the person to be vaccinated ever had a serious reaction   to influenza vaccine in the past?   No    4. Has the person to be vaccinated ever had Guillain-Barré syndrome?   No    Form completed by Franny Will, [facility]

## 2024-08-02 DIAGNOSIS — F90.0 ATTENTION DEFICIT HYPERACTIVITY DISORDER (ADHD), PREDOMINANTLY INATTENTIVE TYPE: ICD-10-CM

## 2024-08-05 RX ORDER — DEXTROAMPHETAMINE SACCHARATE, AMPHETAMINE ASPARTATE MONOHYDRATE, DEXTROAMPHETAMINE SULFATE AND AMPHETAMINE SULFATE 5; 5; 5; 5 MG/1; MG/1; MG/1; MG/1
CAPSULE, EXTENDED RELEASE ORAL
Qty: 60 CAPSULE | Refills: 0 | Status: SHIPPED | OUTPATIENT
Start: 2024-08-05

## 2024-08-26 ENCOUNTER — E-ADVICE (OUTPATIENT)
Dept: FAMILY MEDICINE | Age: 59
End: 2024-08-26

## 2024-08-26 DIAGNOSIS — F90.0 ATTENTION DEFICIT HYPERACTIVITY DISORDER (ADHD), PREDOMINANTLY INATTENTIVE TYPE: ICD-10-CM

## 2024-08-26 RX ORDER — DEXTROAMPHETAMINE SACCHARATE, AMPHETAMINE ASPARTATE MONOHYDRATE, DEXTROAMPHETAMINE SULFATE AND AMPHETAMINE SULFATE 5; 5; 5; 5 MG/1; MG/1; MG/1; MG/1
CAPSULE, EXTENDED RELEASE ORAL
Qty: 60 CAPSULE | Refills: 0 | Status: SHIPPED | OUTPATIENT
Start: 2024-08-26

## 2024-10-04 DIAGNOSIS — F90.0 ATTENTION DEFICIT HYPERACTIVITY DISORDER (ADHD), PREDOMINANTLY INATTENTIVE TYPE: ICD-10-CM

## 2024-10-04 RX ORDER — DEXTROAMPHETAMINE SACCHARATE, AMPHETAMINE ASPARTATE MONOHYDRATE, DEXTROAMPHETAMINE SULFATE AND AMPHETAMINE SULFATE 5; 5; 5; 5 MG/1; MG/1; MG/1; MG/1
CAPSULE, EXTENDED RELEASE ORAL
Qty: 60 CAPSULE | Refills: 0 | Status: SHIPPED | OUTPATIENT
Start: 2024-10-04

## 2024-10-09 ENCOUNTER — E-ADVICE (OUTPATIENT)
Dept: FAMILY MEDICINE | Age: 59
End: 2024-10-09

## 2024-10-22 ENCOUNTER — HOSPITAL ENCOUNTER (OUTPATIENT)
Dept: CT IMAGING | Age: 59
Discharge: HOME | End: 2024-10-22
Attending: STUDENT IN AN ORGANIZED HEALTH CARE EDUCATION/TRAINING PROGRAM

## 2024-10-22 DIAGNOSIS — F17.211 CIGARETTE NICOTINE DEPENDENCE IN REMISSION: ICD-10-CM

## 2024-10-22 PROCEDURE — 71271 CT THORAX LUNG CANCER SCR C-: CPT

## 2024-10-31 ENCOUNTER — APPOINTMENT (OUTPATIENT)
Dept: URGENT CARE | Age: 59
End: 2024-10-31

## 2024-11-01 ENCOUNTER — OFFICE VISIT (OUTPATIENT)
Dept: CHIROPRACTIC MEDICINE | Age: 59
End: 2024-11-01

## 2024-11-01 VITALS
WEIGHT: 182 LBS | DIASTOLIC BLOOD PRESSURE: 76 MMHG | BODY MASS INDEX: 27.58 KG/M2 | HEIGHT: 68 IN | SYSTOLIC BLOOD PRESSURE: 128 MMHG

## 2024-11-01 DIAGNOSIS — M54.6 PAIN IN THORACIC SPINE: ICD-10-CM

## 2024-11-01 DIAGNOSIS — M54.59 MECHANICAL LOW BACK PAIN: ICD-10-CM

## 2024-11-01 DIAGNOSIS — M53.3 SI (SACROILIAC) JOINT DYSFUNCTION: Primary | ICD-10-CM

## 2024-11-01 ASSESSMENT — PAIN SCALES - GENERAL: PAINLEVEL: 5

## 2024-11-02 DIAGNOSIS — F90.0 ATTENTION DEFICIT HYPERACTIVITY DISORDER (ADHD), PREDOMINANTLY INATTENTIVE TYPE: ICD-10-CM

## 2024-11-04 ENCOUNTER — APPOINTMENT (OUTPATIENT)
Dept: OCCUPATIONAL MEDICINE | Age: 59
End: 2024-11-04

## 2024-11-04 ENCOUNTER — APPOINTMENT (OUTPATIENT)
Dept: CHIROPRACTIC MEDICINE | Age: 59
End: 2024-11-04

## 2024-11-04 ENCOUNTER — TELEPHONE (OUTPATIENT)
Dept: FAMILY MEDICINE | Age: 59
End: 2024-11-04

## 2024-11-04 VITALS — BODY MASS INDEX: 27.67 KG/M2 | HEIGHT: 68 IN | SYSTOLIC BLOOD PRESSURE: 118 MMHG | DIASTOLIC BLOOD PRESSURE: 72 MMHG

## 2024-11-04 DIAGNOSIS — M54.59 MECHANICAL LOW BACK PAIN: ICD-10-CM

## 2024-11-04 DIAGNOSIS — M54.6 PAIN IN THORACIC SPINE: ICD-10-CM

## 2024-11-04 DIAGNOSIS — M53.3 SI (SACROILIAC) JOINT DYSFUNCTION: Primary | ICD-10-CM

## 2024-11-04 DIAGNOSIS — M54.32 LEFT SIDED SCIATICA: ICD-10-CM

## 2024-11-04 RX ORDER — DEXTROAMPHETAMINE SACCHARATE, AMPHETAMINE ASPARTATE MONOHYDRATE, DEXTROAMPHETAMINE SULFATE AND AMPHETAMINE SULFATE 5; 5; 5; 5 MG/1; MG/1; MG/1; MG/1
CAPSULE, EXTENDED RELEASE ORAL
Qty: 60 CAPSULE | Refills: 0 | Status: SHIPPED | OUTPATIENT
Start: 2024-11-04

## 2024-11-04 ASSESSMENT — PAIN SCALES - GENERAL: PAINLEVEL: 5

## 2024-11-06 ENCOUNTER — OFFICE VISIT (OUTPATIENT)
Dept: CHIROPRACTIC MEDICINE | Age: 59
End: 2024-11-06

## 2024-11-06 VITALS — BODY MASS INDEX: 27.67 KG/M2 | HEIGHT: 68 IN | SYSTOLIC BLOOD PRESSURE: 126 MMHG | DIASTOLIC BLOOD PRESSURE: 72 MMHG

## 2024-11-06 DIAGNOSIS — M54.6 PAIN IN THORACIC SPINE: ICD-10-CM

## 2024-11-06 DIAGNOSIS — M54.59 MECHANICAL LOW BACK PAIN: ICD-10-CM

## 2024-11-06 DIAGNOSIS — M54.32 LEFT SIDED SCIATICA: ICD-10-CM

## 2024-11-06 DIAGNOSIS — M53.3 SI (SACROILIAC) JOINT DYSFUNCTION: Primary | ICD-10-CM

## 2024-11-06 ASSESSMENT — PAIN SCALES - GENERAL: PAINLEVEL: 3

## 2024-11-08 ENCOUNTER — OFFICE VISIT (OUTPATIENT)
Dept: CHIROPRACTIC MEDICINE | Age: 59
End: 2024-11-08

## 2024-11-08 VITALS
HEIGHT: 68 IN | HEART RATE: 94 BPM | BODY MASS INDEX: 27.07 KG/M2 | OXYGEN SATURATION: 99 % | DIASTOLIC BLOOD PRESSURE: 80 MMHG | SYSTOLIC BLOOD PRESSURE: 124 MMHG | WEIGHT: 178.6 LBS

## 2024-11-08 DIAGNOSIS — M54.6 PAIN IN THORACIC SPINE: ICD-10-CM

## 2024-11-08 DIAGNOSIS — M54.59 MECHANICAL LOW BACK PAIN: ICD-10-CM

## 2024-11-08 DIAGNOSIS — M54.32 LEFT SIDED SCIATICA: ICD-10-CM

## 2024-11-08 DIAGNOSIS — M53.3 SI (SACROILIAC) JOINT DYSFUNCTION: Primary | ICD-10-CM

## 2024-11-08 ASSESSMENT — PAIN SCALES - GENERAL: PAINLEVEL: 3

## 2024-11-11 ENCOUNTER — APPOINTMENT (OUTPATIENT)
Dept: CHIROPRACTIC MEDICINE | Age: 59
End: 2024-11-11

## 2024-11-12 RX ORDER — ATORVASTATIN CALCIUM 20 MG/1
20 TABLET, FILM COATED ORAL EVERY EVENING
Qty: 30 TABLET | Refills: 6 | Status: CANCELLED | OUTPATIENT
Start: 2024-11-12

## 2024-11-13 ENCOUNTER — E-ADVICE (OUTPATIENT)
Dept: FAMILY MEDICINE | Age: 59
End: 2024-11-13

## 2024-11-13 ENCOUNTER — EXTERNAL LAB (OUTPATIENT)
Dept: HEALTH INFORMATION MANAGEMENT | Age: 59
End: 2024-11-13

## 2024-11-13 ENCOUNTER — OFFICE VISIT (OUTPATIENT)
Dept: FAMILY MEDICINE | Age: 59
End: 2024-11-13

## 2024-11-13 VITALS
WEIGHT: 182 LBS | HEIGHT: 68 IN | BODY MASS INDEX: 27.58 KG/M2 | HEART RATE: 88 BPM | OXYGEN SATURATION: 100 % | DIASTOLIC BLOOD PRESSURE: 76 MMHG | SYSTOLIC BLOOD PRESSURE: 128 MMHG

## 2024-11-13 DIAGNOSIS — Z12.5 PROSTATE CANCER SCREENING: ICD-10-CM

## 2024-11-13 DIAGNOSIS — F90.0 ATTENTION DEFICIT HYPERACTIVITY DISORDER (ADHD), PREDOMINANTLY INATTENTIVE TYPE: ICD-10-CM

## 2024-11-13 DIAGNOSIS — Z23 NEED FOR VACCINATION: ICD-10-CM

## 2024-11-13 DIAGNOSIS — Z13.6 SCREENING FOR ISCHEMIC HEART DISEASE: ICD-10-CM

## 2024-11-13 DIAGNOSIS — F41.1 GENERALIZED ANXIETY DISORDER: ICD-10-CM

## 2024-11-13 DIAGNOSIS — E78.5 HYPERLIPIDEMIA, UNSPECIFIED HYPERLIPIDEMIA TYPE: ICD-10-CM

## 2024-11-13 DIAGNOSIS — Z13.1 SCREENING FOR DIABETES MELLITUS (DM): ICD-10-CM

## 2024-11-13 DIAGNOSIS — M76.32 ILIOTIBIAL BAND SYNDROME OF LEFT SIDE: ICD-10-CM

## 2024-11-13 DIAGNOSIS — Z00.00 WELL ADULT EXAM: Primary | ICD-10-CM

## 2024-11-13 DIAGNOSIS — F33.0 MILD RECURRENT MAJOR DEPRESSION (CMD): ICD-10-CM

## 2024-11-13 DIAGNOSIS — I70.0 AORTIC ATHEROSCLEROSIS (CMD): ICD-10-CM

## 2024-11-13 DIAGNOSIS — I25.10 ATHEROSCLEROSIS OF NATIVE CORONARY ARTERY OF NATIVE HEART WITHOUT ANGINA PECTORIS: ICD-10-CM

## 2024-11-13 LAB — LAB RESULT: NORMAL

## 2024-11-13 PROCEDURE — 90471 IMMUNIZATION ADMIN: CPT | Performed by: STUDENT IN AN ORGANIZED HEALTH CARE EDUCATION/TRAINING PROGRAM

## 2024-11-13 PROCEDURE — 91322 SARSCOV2 VAC 50 MCG/0.5ML IM: CPT | Performed by: STUDENT IN AN ORGANIZED HEALTH CARE EDUCATION/TRAINING PROGRAM

## 2024-11-13 PROCEDURE — 90656 IIV3 VACC NO PRSV 0.5 ML IM: CPT | Performed by: STUDENT IN AN ORGANIZED HEALTH CARE EDUCATION/TRAINING PROGRAM

## 2024-11-13 PROCEDURE — 99396 PREV VISIT EST AGE 40-64: CPT | Performed by: STUDENT IN AN ORGANIZED HEALTH CARE EDUCATION/TRAINING PROGRAM

## 2024-11-13 PROCEDURE — 90480 ADMN SARSCOV2 VAC 1/ONLY CMP: CPT | Performed by: STUDENT IN AN ORGANIZED HEALTH CARE EDUCATION/TRAINING PROGRAM

## 2024-11-13 RX ORDER — ASPIRIN 81 MG/1
81 TABLET ORAL EVERY OTHER DAY
Qty: 90 TABLET | Refills: 3 | Status: SHIPPED | OUTPATIENT
Start: 2024-11-13

## 2024-11-13 RX ORDER — PYRIDOXINE HCL (VITAMIN B6) 50 MG
TABLET ORAL
COMMUNITY

## 2024-11-13 RX ORDER — ROSUVASTATIN CALCIUM 5 MG/1
5 TABLET, COATED ORAL DAILY
Qty: 90 TABLET | Refills: 3 | Status: SHIPPED | OUTPATIENT
Start: 2024-11-13

## 2024-11-13 SDOH — ECONOMIC STABILITY: HOUSING INSECURITY: DO YOU HAVE PROBLEMS WITH ANY OF THE FOLLOWING?: PATIENT DECLINED

## 2024-11-13 SDOH — ECONOMIC STABILITY: FOOD INSECURITY: WITHIN THE PAST 12 MONTHS, THE FOOD YOU BOUGHT JUST DIDN'T LAST AND YOU DIDN'T HAVE MONEY TO GET MORE.: PATIENT DECLINED

## 2024-11-13 SDOH — ECONOMIC STABILITY: HOUSING INSECURITY: WHAT IS YOUR LIVING SITUATION TODAY?: PATIENT DECLINED

## 2024-11-13 ASSESSMENT — SOCIAL DETERMINANTS OF HEALTH (SDOH)
IN THE PAST 12 MONTHS, HAS THE ELECTRIC, GAS, OIL, OR WATER COMPANY THREATENED TO SHUT OFF SERVICE IN YOUR HOME?: PATIENT DECLINED

## 2024-11-18 ENCOUNTER — TELEPHONE (OUTPATIENT)
Dept: FAMILY MEDICINE | Age: 59
End: 2024-11-18

## 2024-11-18 DIAGNOSIS — Z02.89 MEDICATION MANAGEMENT CONTRACT SIGNED: Primary | ICD-10-CM

## 2024-12-09 DIAGNOSIS — F90.0 ATTENTION DEFICIT HYPERACTIVITY DISORDER (ADHD), PREDOMINANTLY INATTENTIVE TYPE: ICD-10-CM

## 2024-12-09 RX ORDER — DEXTROAMPHETAMINE SACCHARATE, AMPHETAMINE ASPARTATE MONOHYDRATE, DEXTROAMPHETAMINE SULFATE AND AMPHETAMINE SULFATE 5; 5; 5; 5 MG/1; MG/1; MG/1; MG/1
CAPSULE, EXTENDED RELEASE ORAL
Qty: 60 CAPSULE | Refills: 0 | OUTPATIENT
Start: 2024-12-09

## 2024-12-23 ENCOUNTER — E-ADVICE (OUTPATIENT)
Dept: FAMILY MEDICINE | Age: 59
End: 2024-12-23

## 2024-12-23 ENCOUNTER — APPOINTMENT (OUTPATIENT)
Dept: FAMILY MEDICINE | Age: 59
End: 2024-12-23

## 2024-12-23 DIAGNOSIS — I25.10 ATHEROSCLEROSIS OF NATIVE CORONARY ARTERY OF NATIVE HEART WITHOUT ANGINA PECTORIS: ICD-10-CM

## 2024-12-23 DIAGNOSIS — F41.1 GENERALIZED ANXIETY DISORDER: ICD-10-CM

## 2024-12-23 DIAGNOSIS — F33.0 MILD RECURRENT MAJOR DEPRESSION (CMD): ICD-10-CM

## 2024-12-23 RX ORDER — ROSUVASTATIN CALCIUM 5 MG/1
5 TABLET, COATED ORAL DAILY
Qty: 90 TABLET | Refills: 3 | OUTPATIENT
Start: 2024-12-23

## 2024-12-23 RX ORDER — ASPIRIN 81 MG/1
81 TABLET ORAL EVERY OTHER DAY
Qty: 90 TABLET | Refills: 3 | OUTPATIENT
Start: 2024-12-23

## 2024-12-23 RX ORDER — BUPROPION HYDROCHLORIDE 300 MG/1
300 TABLET ORAL DAILY
Qty: 90 TABLET | Refills: 3 | Status: SHIPPED | OUTPATIENT
Start: 2024-12-23

## 2025-01-02 ENCOUNTER — TELEPHONE (OUTPATIENT)
Dept: FAMILY MEDICINE | Age: 60
End: 2025-01-02

## 2025-01-16 ENCOUNTER — E-ADVICE (OUTPATIENT)
Dept: FAMILY MEDICINE | Age: 60
End: 2025-01-16

## 2025-01-17 ENCOUNTER — E-ADVICE (OUTPATIENT)
Dept: FAMILY MEDICINE | Age: 60
End: 2025-01-17

## 2025-01-17 DIAGNOSIS — I25.10 ATHEROSCLEROSIS OF NATIVE CORONARY ARTERY OF NATIVE HEART WITHOUT ANGINA PECTORIS: ICD-10-CM

## 2025-01-17 DIAGNOSIS — F41.1 GENERALIZED ANXIETY DISORDER: ICD-10-CM

## 2025-01-17 DIAGNOSIS — F33.0 MILD RECURRENT MAJOR DEPRESSION (CMD): ICD-10-CM

## 2025-01-17 RX ORDER — BUPROPION HYDROCHLORIDE 300 MG/1
300 TABLET ORAL DAILY
Qty: 90 TABLET | Refills: 3 | Status: SHIPPED | OUTPATIENT
Start: 2025-01-17

## 2025-01-17 RX ORDER — ROSUVASTATIN CALCIUM 5 MG/1
5 TABLET, COATED ORAL DAILY
Qty: 90 TABLET | Refills: 2 | Status: SHIPPED | OUTPATIENT
Start: 2025-01-17

## 2025-01-27 ENCOUNTER — EXTERNAL LAB (OUTPATIENT)
Dept: HEALTH INFORMATION MANAGEMENT | Age: 60
End: 2025-01-27

## 2025-01-27 LAB
ANION GAP SERPL CALC-SCNC: 5 MMOL/L (ref 2–12)
BUN SERPL-MCNC: 19 MG/DL (ref 7–18)
BUN/CREAT SERPL: 21.1 (ref 10–20)
CALCIUM SERPL-MCNC: 9.3 MG/DL (ref 8.5–10.1)
CHLORIDE SERPL-SCNC: 105 MMOL/L (ref 100–111)
CHOLEST SERPL-MCNC: 162 MG/DL
CO2 SERPL-SCNC: 30 MMOL/L (ref 21–32)
CREAT SERPL-MCNC: 0.9 MG/DL (ref 0.7–1.3)
GFR SERPLBLD SCHWARTZ-ARVRAT: >90 ML/MIN/1.73 M2
GLUCOSE SERPL-MCNC: 93 MG/DL (ref 70–99)
HDLC SERPL-MCNC: 62 MG/DL (ref 40–60)
LDLC SERPL CALC-MCNC: 82 MG/DL
LENGTH OF FAST TIME PATIENT: ABNORMAL H
LENGTH OF FAST TIME PATIENT: ABNORMAL H
NONHDLC SERPL-MCNC: 100 MG/DL
POTASSIUM SERPL-SCNC: 3.9 MMOL/L (ref 3.5–5.1)
PSA SERPL-MCNC: 1.45 NG/ML (ref 0–4)
SODIUM SERPL-SCNC: 140 MMOL/L (ref 136–145)
TRIGL SERPL-MCNC: 90 MG/DL

## 2025-02-04 ENCOUNTER — APPOINTMENT (OUTPATIENT)
Dept: LAB | Age: 60
End: 2025-02-04

## 2025-03-08 ENCOUNTER — HEALTH MAINTENANCE LETTER (OUTPATIENT)
Age: 60
End: 2025-03-08

## 2025-03-13 ENCOUNTER — TELEPHONE (OUTPATIENT)
Dept: FAMILY MEDICINE | Age: 60
End: 2025-03-13

## 2025-03-13 DIAGNOSIS — F90.0 ATTENTION DEFICIT HYPERACTIVITY DISORDER (ADHD), PREDOMINANTLY INATTENTIVE TYPE: ICD-10-CM

## 2025-03-13 RX ORDER — DEXTROAMPHETAMINE SACCHARATE, AMPHETAMINE ASPARTATE MONOHYDRATE, DEXTROAMPHETAMINE SULFATE AND AMPHETAMINE SULFATE 5; 5; 5; 5 MG/1; MG/1; MG/1; MG/1
CAPSULE, EXTENDED RELEASE ORAL
Qty: 60 CAPSULE | Refills: 0 | Status: SHIPPED | OUTPATIENT
Start: 2025-03-13

## 2025-03-20 ENCOUNTER — TELEPHONE (OUTPATIENT)
Dept: FAMILY MEDICINE | Age: 60
End: 2025-03-20

## 2025-04-14 DIAGNOSIS — F90.0 ATTENTION DEFICIT HYPERACTIVITY DISORDER (ADHD), PREDOMINANTLY INATTENTIVE TYPE: ICD-10-CM

## 2025-04-14 RX ORDER — DEXTROAMPHETAMINE SACCHARATE, AMPHETAMINE ASPARTATE MONOHYDRATE, DEXTROAMPHETAMINE SULFATE AND AMPHETAMINE SULFATE 5; 5; 5; 5 MG/1; MG/1; MG/1; MG/1
CAPSULE, EXTENDED RELEASE ORAL
Qty: 60 CAPSULE | Refills: 0 | OUTPATIENT
Start: 2025-04-14

## 2025-04-18 ENCOUNTER — E-ADVICE (OUTPATIENT)
Dept: FAMILY MEDICINE | Age: 60
End: 2025-04-18

## 2025-04-18 DIAGNOSIS — F90.0 ATTENTION DEFICIT HYPERACTIVITY DISORDER (ADHD), PREDOMINANTLY INATTENTIVE TYPE: ICD-10-CM

## 2025-04-18 DIAGNOSIS — Z02.89 MEDICATION MANAGEMENT CONTRACT SIGNED: Primary | ICD-10-CM

## 2025-04-23 ENCOUNTER — APPOINTMENT (OUTPATIENT)
Dept: LAB | Age: 60
End: 2025-04-23

## 2025-04-24 DIAGNOSIS — F41.1 GENERALIZED ANXIETY DISORDER: ICD-10-CM

## 2025-04-24 DIAGNOSIS — F33.0 MILD RECURRENT MAJOR DEPRESSION (CMD): ICD-10-CM

## 2025-04-24 DIAGNOSIS — I25.10 ATHEROSCLEROSIS OF NATIVE CORONARY ARTERY OF NATIVE HEART WITHOUT ANGINA PECTORIS: ICD-10-CM

## 2025-04-24 DIAGNOSIS — F90.0 ATTENTION DEFICIT HYPERACTIVITY DISORDER (ADHD), PREDOMINANTLY INATTENTIVE TYPE: ICD-10-CM

## 2025-04-24 LAB
AMPHETAMINES UR QL SCN>500 NG/ML: POSITIVE
BARBITURATES UR QL SCN>200 NG/ML: NEGATIVE
BENZODIAZ UR QL SCN>200 NG/ML: NEGATIVE
BZE UR QL SCN>150 NG/ML: NEGATIVE
CANNABINOIDS UR QL SCN>50 NG/ML: NEGATIVE
ETHANOL UR-MCNC: NEGATIVE MG/DL
FENTANYL UR QL SCN: NEGATIVE
METHADONE UR QL SCN>300 NG/ML: NEGATIVE
OPIATES UR QL SCN>300 NG/ML: NEGATIVE
PCP UR QL SCN>25 NG/ML: NEGATIVE

## 2025-04-24 RX ORDER — DEXTROAMPHETAMINE SACCHARATE, AMPHETAMINE ASPARTATE MONOHYDRATE, DEXTROAMPHETAMINE SULFATE AND AMPHETAMINE SULFATE 5; 5; 5; 5 MG/1; MG/1; MG/1; MG/1
CAPSULE, EXTENDED RELEASE ORAL
Qty: 60 CAPSULE | Refills: 0 | Status: CANCELLED | OUTPATIENT
Start: 2025-04-24

## 2025-04-24 RX ORDER — BUPROPION HYDROCHLORIDE 300 MG/1
300 TABLET ORAL DAILY
Qty: 90 TABLET | Refills: 3 | Status: CANCELLED | OUTPATIENT
Start: 2025-04-24

## 2025-04-24 RX ORDER — ASPIRIN 81 MG/1
81 TABLET ORAL EVERY OTHER DAY
Qty: 90 TABLET | Refills: 3 | Status: CANCELLED | OUTPATIENT
Start: 2025-04-24

## 2025-04-24 RX ORDER — ROSUVASTATIN CALCIUM 5 MG/1
5 TABLET, COATED ORAL DAILY
Qty: 90 TABLET | Refills: 2 | Status: CANCELLED | OUTPATIENT
Start: 2025-04-24

## 2025-04-25 RX ORDER — DEXTROAMPHETAMINE SACCHARATE, AMPHETAMINE ASPARTATE MONOHYDRATE, DEXTROAMPHETAMINE SULFATE AND AMPHETAMINE SULFATE 5; 5; 5; 5 MG/1; MG/1; MG/1; MG/1
CAPSULE, EXTENDED RELEASE ORAL
Qty: 12 CAPSULE | Refills: 0 | Status: SHIPPED | OUTPATIENT
Start: 2025-04-25 | End: 2025-04-30 | Stop reason: SDUPTHER

## 2025-04-30 DIAGNOSIS — F90.0 ATTENTION DEFICIT HYPERACTIVITY DISORDER (ADHD), PREDOMINANTLY INATTENTIVE TYPE: ICD-10-CM

## 2025-04-30 RX ORDER — DEXTROAMPHETAMINE SACCHARATE, AMPHETAMINE ASPARTATE MONOHYDRATE, DEXTROAMPHETAMINE SULFATE AND AMPHETAMINE SULFATE 5; 5; 5; 5 MG/1; MG/1; MG/1; MG/1
20 CAPSULE, EXTENDED RELEASE ORAL 2 TIMES DAILY
Qty: 24 CAPSULE | Refills: 0 | Status: SHIPPED | OUTPATIENT
Start: 2025-04-30

## 2025-05-13 DIAGNOSIS — F41.1 GENERALIZED ANXIETY DISORDER: ICD-10-CM

## 2025-05-13 DIAGNOSIS — F33.0 MILD RECURRENT MAJOR DEPRESSION (CMD): ICD-10-CM

## 2025-05-13 DIAGNOSIS — F90.0 ATTENTION DEFICIT HYPERACTIVITY DISORDER (ADHD), PREDOMINANTLY INATTENTIVE TYPE: ICD-10-CM

## 2025-05-14 ENCOUNTER — E-ADVICE (OUTPATIENT)
Dept: FAMILY MEDICINE | Age: 60
End: 2025-05-14

## 2025-05-20 DIAGNOSIS — F90.0 ATTENTION DEFICIT HYPERACTIVITY DISORDER (ADHD), PREDOMINANTLY INATTENTIVE TYPE: ICD-10-CM

## 2025-05-27 DIAGNOSIS — F90.0 ATTENTION DEFICIT HYPERACTIVITY DISORDER (ADHD), PREDOMINANTLY INATTENTIVE TYPE: ICD-10-CM

## 2025-05-28 RX ORDER — DEXTROAMPHETAMINE SACCHARATE, AMPHETAMINE ASPARTATE MONOHYDRATE, DEXTROAMPHETAMINE SULFATE AND AMPHETAMINE SULFATE 5; 5; 5; 5 MG/1; MG/1; MG/1; MG/1
20 CAPSULE, EXTENDED RELEASE ORAL 2 TIMES DAILY
Qty: 60 CAPSULE | Refills: 0 | Status: SHIPPED | OUTPATIENT
Start: 2025-05-30

## 2025-05-28 RX ORDER — DEXTROAMPHETAMINE SACCHARATE, AMPHETAMINE ASPARTATE MONOHYDRATE, DEXTROAMPHETAMINE SULFATE AND AMPHETAMINE SULFATE 5; 5; 5; 5 MG/1; MG/1; MG/1; MG/1
20 CAPSULE, EXTENDED RELEASE ORAL 2 TIMES DAILY
Qty: 24 CAPSULE | Refills: 0 | OUTPATIENT
Start: 2025-05-28

## 2025-06-05 ENCOUNTER — RESULTS FOLLOW-UP (OUTPATIENT)
Dept: FAMILY MEDICINE | Age: 60
End: 2025-06-05

## 2025-06-05 DIAGNOSIS — F90.0 ATTENTION DEFICIT HYPERACTIVITY DISORDER (ADHD), PREDOMINANTLY INATTENTIVE TYPE: ICD-10-CM

## 2025-06-05 RX ORDER — DEXTROAMPHETAMINE SACCHARATE, AMPHETAMINE ASPARTATE MONOHYDRATE, DEXTROAMPHETAMINE SULFATE AND AMPHETAMINE SULFATE 5; 5; 5; 5 MG/1; MG/1; MG/1; MG/1
20 CAPSULE, EXTENDED RELEASE ORAL 2 TIMES DAILY
Qty: 60 CAPSULE | Refills: 0 | Status: CANCELLED | OUTPATIENT
Start: 2025-06-05

## 2025-07-10 DIAGNOSIS — F90.0 ATTENTION DEFICIT HYPERACTIVITY DISORDER (ADHD), PREDOMINANTLY INATTENTIVE TYPE: ICD-10-CM

## 2025-07-11 RX ORDER — DEXTROAMPHETAMINE SACCHARATE, AMPHETAMINE ASPARTATE MONOHYDRATE, DEXTROAMPHETAMINE SULFATE AND AMPHETAMINE SULFATE 5; 5; 5; 5 MG/1; MG/1; MG/1; MG/1
20 CAPSULE, EXTENDED RELEASE ORAL 2 TIMES DAILY
Qty: 60 CAPSULE | Refills: 0 | Status: SHIPPED | OUTPATIENT
Start: 2025-07-11

## 2025-08-17 DIAGNOSIS — F90.0 ATTENTION DEFICIT HYPERACTIVITY DISORDER (ADHD), PREDOMINANTLY INATTENTIVE TYPE: ICD-10-CM

## 2025-08-19 RX ORDER — DEXTROAMPHETAMINE SACCHARATE, AMPHETAMINE ASPARTATE MONOHYDRATE, DEXTROAMPHETAMINE SULFATE AND AMPHETAMINE SULFATE 5; 5; 5; 5 MG/1; MG/1; MG/1; MG/1
20 CAPSULE, EXTENDED RELEASE ORAL 2 TIMES DAILY
Qty: 60 CAPSULE | Refills: 0 | Status: SHIPPED | OUTPATIENT
Start: 2025-08-19

## 2025-11-25 ENCOUNTER — APPOINTMENT (OUTPATIENT)
Dept: FAMILY MEDICINE | Age: 60
End: 2025-11-25

## (undated) DEVICE — FORCEPS BIOPSY LG CAPACITY NDL 240CM 2.4MM RJ 3 DISP ORNG

## (undated) DEVICE — Device

## (undated) DEVICE — CONTAINER SPEC 8OZ WHT SHORT WIDE THK WALL 3.74IN 2.21IN

## (undated) DEVICE — GLOVE SURG 7 PROTEXIS LF CRM PF BEAD CUFF STRL PLISPRN 12IN

## (undated) DEVICE — KIT SURG 1.1OZ .25 COLON LUBE 2 END SEAL HYDRA ORCA

## (undated) DEVICE — CONTAINER HST 60ML 30ML AUTOCLAVABLE PREFL WIDE MOUTH PRSS